# Patient Record
Sex: FEMALE | Race: WHITE | NOT HISPANIC OR LATINO | Employment: OTHER | URBAN - METROPOLITAN AREA
[De-identification: names, ages, dates, MRNs, and addresses within clinical notes are randomized per-mention and may not be internally consistent; named-entity substitution may affect disease eponyms.]

---

## 2017-02-17 ENCOUNTER — OFFICE VISIT (OUTPATIENT)
Dept: FAMILY MEDICINE | Facility: CLINIC | Age: 82
End: 2017-02-17
Payer: MEDICARE

## 2017-02-17 VITALS
BODY MASS INDEX: 25.12 KG/M2 | SYSTOLIC BLOOD PRESSURE: 126 MMHG | RESPIRATION RATE: 12 BRPM | WEIGHT: 141.75 LBS | DIASTOLIC BLOOD PRESSURE: 72 MMHG | HEART RATE: 100 BPM | OXYGEN SATURATION: 96 % | HEIGHT: 63 IN | TEMPERATURE: 98 F

## 2017-02-17 DIAGNOSIS — K59.01 SLOW TRANSIT CONSTIPATION: ICD-10-CM

## 2017-02-17 DIAGNOSIS — G81.94 LEFT HEMIPARESIS: ICD-10-CM

## 2017-02-17 DIAGNOSIS — I63.9 CEREBROVASCULAR ACCIDENT (CVA), UNSPECIFIED MECHANISM: Primary | ICD-10-CM

## 2017-02-17 DIAGNOSIS — F32.A DEPRESSION, UNSPECIFIED DEPRESSION TYPE: ICD-10-CM

## 2017-02-17 DIAGNOSIS — I69.354 HEMIPARESIS AFFECTING LEFT SIDE AS LATE EFFECT OF STROKE: ICD-10-CM

## 2017-02-17 DIAGNOSIS — I10 HYPERTENSION, WELL CONTROLLED: ICD-10-CM

## 2017-02-17 PROCEDURE — 99204 OFFICE O/P NEW MOD 45 MIN: CPT | Mod: S$GLB,,, | Performed by: FAMILY MEDICINE

## 2017-02-17 PROCEDURE — 3074F SYST BP LT 130 MM HG: CPT | Mod: S$GLB,,, | Performed by: FAMILY MEDICINE

## 2017-02-17 PROCEDURE — 3078F DIAST BP <80 MM HG: CPT | Mod: S$GLB,,, | Performed by: FAMILY MEDICINE

## 2017-02-17 PROCEDURE — 99999 PR PBB SHADOW E&M-EST. PATIENT-LVL III: CPT | Mod: PBBFAC,,, | Performed by: FAMILY MEDICINE

## 2017-02-17 PROCEDURE — 1157F ADVNC CARE PLAN IN RCRD: CPT | Mod: S$GLB,,, | Performed by: FAMILY MEDICINE

## 2017-02-17 PROCEDURE — 1160F RVW MEDS BY RX/DR IN RCRD: CPT | Mod: S$GLB,,, | Performed by: FAMILY MEDICINE

## 2017-02-17 PROCEDURE — 1159F MED LIST DOCD IN RCRD: CPT | Mod: S$GLB,,, | Performed by: FAMILY MEDICINE

## 2017-02-17 PROCEDURE — 99499 UNLISTED E&M SERVICE: CPT | Mod: S$PBB,,, | Performed by: FAMILY MEDICINE

## 2017-02-17 RX ORDER — ATORVASTATIN CALCIUM 40 MG/1
40 TABLET, FILM COATED ORAL DAILY
Qty: 90 TABLET | Refills: 2 | Status: SHIPPED | OUTPATIENT
Start: 2017-02-17 | End: 2017-02-24 | Stop reason: SDUPTHER

## 2017-02-17 RX ORDER — LISINOPRIL 2.5 MG/1
2.5 TABLET ORAL DAILY
COMMUNITY
End: 2018-01-19 | Stop reason: SDUPTHER

## 2017-02-17 RX ORDER — ASPIRIN 81 MG/1
81 TABLET ORAL DAILY
Status: ON HOLD | COMMUNITY
End: 2021-07-06 | Stop reason: HOSPADM

## 2017-02-17 NOTE — PROGRESS NOTES
Subjective:       Patient ID: Carol Yadav is a 81 y.o. female.    Chief Complaint: Establish Care    HPI    Pt is here today to establish care of here chronic medical conditions:    CVA - Pt had a CVA 2 years ago, and has chronic L sided weakness. She has zero function of her L arm, and weakness of L leg. She is not on a statin currently and stopped it due to her weakness.     Htn - Pt is lisinopril 2.5 once per day and her blood pressure is well controlled.     Depression - pt denies SI, but she hates the current situation she is.       Current Outpatient Prescriptions on File Prior to Visit   Medication Sig Dispense Refill    acetaminophen (TYLENOL) 325 MG tablet Take 2 tablets (650 mg total) by mouth every 4 (four) hours as needed.  0    busPIRone (BUSPAR) 15 MG tablet Take 1 tablet (15 mg total) by mouth 3 (three) times daily. 90 tablet 3    calcium carbonate (OS-HAYLEE) 600 mg (1,500 mg) Tab Take 600 mg by mouth 2 (two) times daily with meals.      [DISCONTINUED] alprazolam (XANAX) 0.25 MG tablet Take 0.25 mg by mouth every 6 (six) hours as needed for Anxiety.      [DISCONTINUED] HYDROXYZINE HCL ORAL Take by mouth.      [DISCONTINUED] multivitamin (ONE DAILY MULTIVITAMIN) per tablet Take 1 tablet by mouth once daily.      fluticasone (FLONASE) 50 mcg/actuation nasal spray 1 spray by Each Nare route 2 (two) times daily as needed for Rhinitis. 16 g 0    [DISCONTINUED] aspirin 325 MG tablet Take 1 tablet (325 mg total) by mouth once daily. 30 tablet 11    [DISCONTINUED] baclofen (LIORESAL) 20 MG tablet Take 1 tablet (20 mg total) by mouth every evening. 30 tablet 3    [DISCONTINUED] cetirizine (ZYRTEC) 10 MG tablet Take 1 tablet (10 mg total) by mouth daily as needed for Allergies. 20 tablet 0    [DISCONTINUED] ciprofloxacin HCl (CIPRO) 500 MG tablet Take 500 mg by mouth 2 (two) times daily.      [DISCONTINUED] diclofenac sodium 1 % Gel Apply 4 g topically 3 (three) times daily. 500 g 2    [DISCONTINUED]  docusate sodium (COLACE) 100 MG capsule Take 1 capsule (100 mg total) by mouth 2 (two) times daily.  0    [DISCONTINUED] ondansetron (ZOFRAN-ODT) 4 MG TbDL Take 8 mg by mouth once.      [DISCONTINUED] ONDANSETRON HCL ORAL Take by mouth.      [DISCONTINUED] paroxetine (PAXIL) 20 MG tablet Take 1 tablet (20 mg total) by mouth every morning. 30 tablet 11    [DISCONTINUED] tizanidine (ZANAFLEX) 4 MG tablet Take 4 mg by mouth every 6 (six) hours as needed.      [DISCONTINUED] trazodone (DESYREL) 50 MG tablet Take 0.5 tablets (25 mg total) by mouth nightly as needed for Insomnia. 30 tablet 3     No current facility-administered medications on file prior to visit.        Review of Systems   Constitutional: Positive for fatigue. Negative for chills, fever and unexpected weight change.   HENT: Positive for hearing loss. Negative for trouble swallowing.    Eyes: Positive for discharge. Negative for pain and visual disturbance.   Respiratory: Positive for shortness of breath. Negative for cough and wheezing.    Cardiovascular: Negative for chest pain and palpitations.   Gastrointestinal: Positive for anal bleeding and constipation. Negative for abdominal pain and nausea.   Genitourinary: Positive for dysuria. Negative for hematuria and vaginal bleeding.   Musculoskeletal: Positive for gait problem. Negative for joint swelling.   Skin: Negative for rash and wound.   Neurological: Positive for dizziness, weakness and numbness. Negative for syncope.   Psychiatric/Behavioral: Positive for dysphoric mood and sleep disturbance. Negative for self-injury and suicidal ideas.       Objective:     Vitals:    02/17/17 1323   BP: 126/72   Pulse: 100   Resp: 12   Temp: 97.9 °F (36.6 °C)        Physical Exam   Constitutional: She appears well-developed. No distress.   HENT:   Head: Normocephalic and atraumatic.   Eyes: Conjunctivae are normal. No scleral icterus.   Pulmonary/Chest: Effort normal.   Neurological: She is alert.   LUPE  weakness proximal to distal.     LLE weakness - uses a hemiwalker   Skin: She is not diaphoretic.   Psychiatric: She has a normal mood and affect. Her behavior is normal.   Vitals reviewed.      Assessment:       1. Cerebrovascular accident (CVA), unspecified mechanism    2. 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 Left hemiparesis    4. Hypertension, well controlled    5. Depression, unspecified depression type    6. Slow transit constipation        Plan:       Carol LONGORIA was seen today for establish care.    Diagnoses and all orders for this visit:    Most of today was spent going through pts active meidcation list and reviewing her current diagnoses. Pt will return next week to address any acute concerns other than constipation, which was addressed today.       Cerebrovascular accident (CVA), unspecified mechanism  -     atorvastatin (LIPITOR) 40 MG tablet; Take 1 tablet (40 mg total) by mouth once daily.    Pt was on a statin which was dc'd due to fatigue.    Of note - I resolved Hypercoag state. She has a suspected hypercoagulable state from Evista, 2 years ago, which she is no longer on see note 1/25/2015 from  [facility].     Continue aspirin 81mg and restart statin.     Hemiparesis affecting left side as late effect of stroke    Left hemiparesis    Hypertension, well controlled  - Chronic - stable     Pt is doing well on current therapy and is requesting a refill. No side effects noted. Will continue current therapy.       Depression, unspecified depression type  Pt stable today on current meds. No SI or HI. Discontinued xanax at night(she was taking for sleep) due to safety concerns.     Constipation  - sxs and PEx suggest constipation. Pt encouraged to maintain adequate hydration with water, to maintain regular cardiovascular exercise, and to use Miralax or Colace prn constipation sxs. Pt advised that these treatments take a few days for effect and that the frequency of Miralax in  particular can be titrated until the desired effect is achieved. Pt warned that if they develop N/V, bloody stools, severe abdominal pain, or other concerning sxs, pt asked to seek medical attention immediately.           Return in about 1 week (around 2/24/2017) for anxiety/depression, energy, bloody stool.        Pt verbalized understanding and agreed with our plan.

## 2017-02-17 NOTE — MR AVS SNAPSHOT
[facility]  [address]  [place] [address]  Phone: [phone]  Fax: [phone]                  Carol Yadav   2017 1:00 PM   Office Visit    Description:  Female : 1935   Provider:  Jaime Hopkins MD   Department:  [facility]           Reason for Visit     Establish Care           Diagnoses this Visit        Comments    Cerebrovascular accident (CVA), unspecified mechanism                To Do List           Future Appointments        Provider Department Dept Phone    2017 1:00 PM Jaime Hopkins MD [facility] [phone]      Goals (5 Years of Data)     None      Follow-Up and Disposition     Return for anxiety/depression, energy, bloody stool.       These Medications        Disp Refills Start End    atorvastatin (LIPITOR) 40 MG tablet 90 tablet 2 2017     Take 1 tablet (40 mg total) by mouth once daily. - Oral    Pharmacy: [address] Ph #: [phone]         [facility]     [facility] On Call Nurse Care Line -  Assistance  Registered nurses in the [facility] On Call Center provide clinical advisement, health education, appointment booking, and other advisory services.  Call for this free service at [phone].             Medications           Message regarding Medications     Verify the changes and/or additions to your medication regime listed below are the same as discussed with your clinician today.  If any of these changes or additions are incorrect, please notify your healthcare provider.        START taking these NEW medications        Refills    atorvastatin (LIPITOR) 40 MG tablet 2    Sig: Take 1 tablet (40 mg total) by mouth once daily.    Class: Normal    Route: Oral      STOP taking these medications     baclofen (LIORESAL) 20 MG tablet Take 1 tablet (20 mg total) by mouth every evening.    cetirizine (ZYRTEC) 10 MG tablet Take 1 tablet  (10 mg total) by mouth daily as needed for Allergies.    ciprofloxacin HCl (CIPRO) 500 MG tablet Take 500 mg by mouth 2 (two) times daily.    ONDANSETRON HCL ORAL Take by mouth.    ondansetron (ZOFRAN-ODT) 4 MG TbDL Take 8 mg by mouth once.    paroxetine (PAXIL) 20 MG tablet Take 1 tablet (20 mg total) by mouth every morning.    trazodone (DESYREL) 50 MG tablet Take 0.5 tablets (25 mg total) by mouth nightly as needed for Insomnia.    tizanidine (ZANAFLEX) 4 MG tablet Take 4 mg by mouth every 6 (six) hours as needed.    multivitamin (ONE DAILY MULTIVITAMIN) per tablet Take 1 tablet by mouth once daily.    HYDROXYZINE HCL ORAL Take by mouth.    docusate sodium (COLACE) 100 MG capsule Take 1 capsule (100 mg total) by mouth 2 (two) times daily.    diclofenac sodium 1 % Gel Apply 4 g topically 3 (three) times daily.    aspirin 325 MG tablet Take 1 tablet (325 mg total) by mouth once daily.    alprazolam (XANAX) 0.25 MG tablet Take 0.25 mg by mouth every 6 (six) hours as needed for Anxiety.           Verify that the below list of medications is an accurate representation of the medications you are currently taking.  If none reported, the list may be blank. If incorrect, please contact your healthcare provider. Carry this list with you in case of emergency.           Current Medications     acetaminophen (TYLENOL) 325 MG tablet Take 2 tablets (650 mg total) by mouth every 4 (four) hours as needed.    aspirin (ECOTRIN) 81 MG EC tablet Take 81 mg by mouth once daily.    busPIRone (BUSPAR) 15 MG tablet Take 1 tablet (15 mg total) by mouth 3 (three) times daily.    calcium carbonate (OS-HAYLEE) 600 mg (1,500 mg) Tab Take 600 mg by mouth 2 (two) times daily with meals.    lisinopril (PRINIVIL,ZESTRIL) 2.5 MG tablet Take 2.5 mg by mouth once daily.    atorvastatin (LIPITOR) 40 MG tablet Take 1 tablet (40 mg total) by mouth once daily.    fluticasone (FLONASE) 50 mcg/actuation nasal spray 1 spray by Each Nare route 2 (two) times  "daily as needed for Rhinitis.           Clinical Reference Information           Your Vitals Were     BP Pulse Temp Resp    126/72 (BP Location: Left arm, Patient Position: Sitting, BP Method: Manual) 100 97.9 °F (36.6 °C) (Oral) 12    Height Weight SpO2 BMI    5' 2.5" (1.588 m) 64.3 kg (141 lb 12.1 oz) 96% 25.51 kg/m2      Blood Pressure          Most Recent Value    BP  126/72      Allergies as of 2/17/2017     No Known Allergies      Immunizations Administered on Date of Encounter - 2/17/2017     None      Language Assistance Services     ATTENTION: Language assistance services are available, free of charge. Please call [phone].      ATENCIÓN: Si habla cynthia, tiene a amador disposición servicios gratuitos de asistencia lingüística. Llame al [phone].     CHÚ Ý: N?u b?n nói Ti?ng Vi?t, có các d?ch v? h? tr? ngôn ng? mi?n phí dành cho b?n. G?i s? [phone].         [facility] complies with applicable Federal civil rights laws and does not discriminate on the basis of race, color, national origin, age, disability, or sex.        "

## 2017-02-24 ENCOUNTER — OFFICE VISIT (OUTPATIENT)
Dept: FAMILY MEDICINE | Facility: CLINIC | Age: 82
End: 2017-02-24
Payer: MEDICARE

## 2017-02-24 VITALS
SYSTOLIC BLOOD PRESSURE: 110 MMHG | TEMPERATURE: 98 F | BODY MASS INDEX: 25.04 KG/M2 | OXYGEN SATURATION: 95 % | DIASTOLIC BLOOD PRESSURE: 60 MMHG | WEIGHT: 141.31 LBS | RESPIRATION RATE: 20 BRPM | HEART RATE: 97 BPM | HEIGHT: 63 IN

## 2017-02-24 DIAGNOSIS — F33.42 RECURRENT MAJOR DEPRESSIVE DISORDER, IN FULL REMISSION: ICD-10-CM

## 2017-02-24 DIAGNOSIS — I69.354 HEMIPARESIS AFFECTING LEFT SIDE AS LATE EFFECT OF STROKE: Primary | ICD-10-CM

## 2017-02-24 DIAGNOSIS — I63.9 CEREBROVASCULAR ACCIDENT (CVA), UNSPECIFIED MECHANISM: ICD-10-CM

## 2017-02-24 DIAGNOSIS — F41.9 ANXIETY DISORDER, UNSPECIFIED TYPE: ICD-10-CM

## 2017-02-24 DIAGNOSIS — N39.41 URGE INCONTINENCE: ICD-10-CM

## 2017-02-24 DIAGNOSIS — K62.5 RECTAL BLEEDING: ICD-10-CM

## 2017-02-24 PROCEDURE — 1160F RVW MEDS BY RX/DR IN RCRD: CPT | Mod: S$GLB,,, | Performed by: FAMILY MEDICINE

## 2017-02-24 PROCEDURE — 99499 UNLISTED E&M SERVICE: CPT | Mod: S$PBB,,, | Performed by: FAMILY MEDICINE

## 2017-02-24 PROCEDURE — 99214 OFFICE O/P EST MOD 30 MIN: CPT | Mod: S$GLB,,, | Performed by: FAMILY MEDICINE

## 2017-02-24 PROCEDURE — 1157F ADVNC CARE PLAN IN RCRD: CPT | Mod: S$GLB,,, | Performed by: FAMILY MEDICINE

## 2017-02-24 PROCEDURE — 3078F DIAST BP <80 MM HG: CPT | Mod: S$GLB,,, | Performed by: FAMILY MEDICINE

## 2017-02-24 PROCEDURE — 1159F MED LIST DOCD IN RCRD: CPT | Mod: S$GLB,,, | Performed by: FAMILY MEDICINE

## 2017-02-24 PROCEDURE — 99999 PR PBB SHADOW E&M-EST. PATIENT-LVL IV: CPT | Mod: PBBFAC,,, | Performed by: FAMILY MEDICINE

## 2017-02-24 PROCEDURE — 1126F AMNT PAIN NOTED NONE PRSNT: CPT | Mod: S$GLB,,, | Performed by: FAMILY MEDICINE

## 2017-02-24 PROCEDURE — 3074F SYST BP LT 130 MM HG: CPT | Mod: S$GLB,,, | Performed by: FAMILY MEDICINE

## 2017-02-24 RX ORDER — OXYBUTYNIN CHLORIDE 5 MG/1
5 TABLET, EXTENDED RELEASE ORAL NIGHTLY
Qty: 30 TABLET | Refills: 2 | Status: SHIPPED | OUTPATIENT
Start: 2017-02-24 | End: 2017-04-25 | Stop reason: SDUPTHER

## 2017-02-24 RX ORDER — ALPRAZOLAM 0.25 MG/1
0.25 TABLET ORAL DAILY PRN
Qty: 15 TABLET | Refills: 0 | Status: SHIPPED | OUTPATIENT
Start: 2017-02-24 | End: 2017-03-10

## 2017-02-24 RX ORDER — TRAZODONE HYDROCHLORIDE 50 MG/1
50 TABLET ORAL NIGHTLY
Qty: 30 TABLET | Refills: 2 | Status: SHIPPED | OUTPATIENT
Start: 2017-02-24 | End: 2017-04-25 | Stop reason: SDUPTHER

## 2017-02-24 RX ORDER — ATORVASTATIN CALCIUM 40 MG/1
40 TABLET, FILM COATED ORAL DAILY
Qty: 90 TABLET | Refills: 2 | Status: SHIPPED | OUTPATIENT
Start: 2017-02-24 | End: 2017-10-09 | Stop reason: SDUPTHER

## 2017-02-24 RX ORDER — ALPRAZOLAM 0.25 MG/1
0.25 TABLET ORAL DAILY
COMMUNITY
End: 2017-02-24 | Stop reason: SDUPTHER

## 2017-02-24 RX ORDER — HYDROCORTISONE 25 MG/G
CREAM TOPICAL DAILY
COMMUNITY
End: 2018-02-16

## 2017-02-24 NOTE — PROGRESS NOTES
Subjective:       Patient ID: Carol Yadav is a 81 y.o. female.    Chief Complaint: Depression (anxiety - follow up); Energy (follow up - weakness ); and Rectal Bleeding (bloody stool follow up )    HPI    Depression with anxiety - Pt states that she is depressed to stay where she currently is and from her chronic medical conditions, including her fatigue.     Fatigue - Pt has felt fatigued for greater than 1 year, ever since her stroke. This is constant, but fluctuates in intensity. If she does not get a good nights sleep, it is worse. She describes this as being physically tired in every muscle.    OAB/urge incontinence - Pt also notes that she has urinary frequency for many months that has worsened since the yeast.  She sometimes has some issues making to the bathroom on time. At other times, she has a hard time initiating urination.     Blood in her stool  - pt admitted that she had BRBPR 1 week ago , a small amount, over multi pel bms. Her sxs coincided with hard stools. Sxs have since resolved.     Her last colonoscopy was within 10 years and was normal.    Current Outpatient Prescriptions on File Prior to Visit   Medication Sig Dispense Refill    acetaminophen (TYLENOL) 325 MG tablet Take 2 tablets (650 mg total) by mouth every 4 (four) hours as needed.  0    aspirin (ECOTRIN) 81 MG EC tablet Take 81 mg by mouth once daily.      fluticasone (FLONASE) 50 mcg/actuation nasal spray 1 spray by Each Nare route 2 (two) times daily as needed for Rhinitis. 16 g 0    lisinopril (PRINIVIL,ZESTRIL) 2.5 MG tablet Take 2.5 mg by mouth once daily.      [DISCONTINUED] busPIRone (BUSPAR) 15 MG tablet Take 1 tablet (15 mg total) by mouth 3 (three) times daily. (Patient taking differently: Take 15 mg by mouth 2 (two) times daily. ) 90 tablet 3    calcium carbonate (OS-HAYLEE) 600 mg (1,500 mg) Tab Take 600 mg by mouth 2 (two) times daily with meals.      [DISCONTINUED] atorvastatin (LIPITOR) 40 MG tablet Take 1 tablet (40  mg total) by mouth once daily. 90 tablet 2     No current facility-administered medications on file prior to visit.        Review of Systems   Musculoskeletal: Positive for arthralgias. Negative for joint swelling.   Psychiatric/Behavioral: Positive for dysphoric mood and sleep disturbance. Negative for self-injury and suicidal ideas. The patient is nervous/anxious.        Objective:     Vitals:    02/24/17 1300   BP: 110/60   Pulse: 97   Resp: 20   Temp: 98 °F (36.7 °C)        Physical Exam   Constitutional: She appears well-developed. No distress.   HENT:   Head: Normocephalic and atraumatic.   Eyes: Conjunctivae are normal. No scleral icterus.   Pulmonary/Chest: Effort normal.   Neurological: She is alert.   LUE weakness proximal to distal.     LLE weakness - uses a hemiwalker   Skin: She is not diaphoretic.   Psychiatric: She has a normal mood and affect. Her behavior is normal.   Vitals reviewed.      Assessment:       1. Hemiparesis affecting left side as late effect of stroke    2. Cerebrovascular accident (CVA), unspecified mechanism    3. Recurrent major depressive disorder, in full remission    4. Urge incontinence    5. Rectal bleeding    6. Anxiety disorder, unspecified type        Plan:       Carol LONGORIA was seen today for depression, energy and rectal bleeding.    Diagnoses and all orders for this visit:    Hemiparesis affecting left side as late effect of stroke  -     Ambulatory referral to Neurology  Pt would like to establish care with neurology here on the [facility]. Referral placed.     Cerebrovascular accident (CVA), unspecified mechanism  -     atorvastatin (LIPITOR) 40 MG tablet; Take 1 tablet (40 mg total) by mouth once daily.  -     Ambulatory referral to Neurology  Pt was unable to obtain the atorvastatin due to some clerical error. Paper rx given for ALL meds today.     Recurrent major depressive disorder, in full remission  -     trazodone (DESYREL) 50 MG tablet; Take 1 tablet (50 mg total) by mouth  every evening.  I hope that treating pts depression and helping her sleep will improve her fatigue.     Urge incontinence  -     oxybutynin (DITROPAN-XL) 5 MG TR24; Take 1 tablet (5 mg total) by mouth nightly.  Will give trial in hopes to reduce urinary frequency.     Rectal bleeding  -     Ambulatory consult to Gastroenterology  Pt prefers to discuss this with GI.     Anxiety disorder, unspecified type  -     alprazolam (XANAX) 0.25 MG tablet; Take 1 tablet (0.25 mg total) by mouth daily as needed for Anxiety. To be kept on hand to be used in case of anxiety attack.  Will refill. Pt warned that medication is not considered safe in the elderly. It is intended to be used for anxiety attacks only. She understands.           Return in about 2 weeks (around 3/10/2017).      Pt verbalized understanding and agreed with our plan.

## 2017-02-24 NOTE — MR AVS SNAPSHOT
[facility] [address]  [place] [address]  Phone: [phone]  Fax: [phone]                  Carol Yadav   2017 1:00 PM   Office Visit    Description:  Female : 1935   Provider:  Jaime Hopkins MD   Department:  [facility]           Reason for Visit     Depression     Energy     Rectal Bleeding           Diagnoses this Visit        Comments    Hemiparesis affecting left side as late effect of stroke    -  Primary     Cerebrovascular accident (CVA), unspecified mechanism         Recurrent major depressive disorder, in full remission         Urge incontinence         Rectal bleeding         Anxiety disorder, unspecified type                To Do List           Goals (5 Years of Data)     None      Follow-Up and Disposition     Return in about 2 weeks (around 3/10/2017).       These Medications        Disp Refills Start End    atorvastatin (LIPITOR) 40 MG tablet 90 tablet 2 2017     Take 1 tablet (40 mg total) by mouth once daily. - Oral    Pharmacy: [place] [address] Ph #: [phone]       oxybutynin (DITROPAN-XL) 5 MG TR24 30 tablet 2 2017    Take 1 tablet (5 mg total) by mouth nightly. - Oral    Pharmacy: [place] [address] Ph #: [phone]       alprazolam (XANAX) 0.25 MG tablet 15 tablet 0 2017     Take 1 tablet (0.25 mg total) by mouth daily as needed for Anxiety. To be kept on hand to be used in case of anxiety attack. - Oral    Pharmacy: [facility] [address] Ph #: [phone]       trazodone (DESYREL) 50 MG tablet 30 tablet 2 2017    Take 1 tablet (50 mg total) by mouth every evening. - Oral    Pharmacy: [place] [address] Ph #: [phone]         [facility]     [facility] Nurse Care Line -  24/7 Assistance  Registered nurses in the [facility] On [facility] Center provide clinical advisement, health education, appointment booking, and other advisory services.  Call for this free service at [phone].             Medications           Message regarding Medications     Verify the changes and/or additions to your medication regime listed below are the same as discussed with your clinician today.  If any of these changes or additions are incorrect, please notify your healthcare provider.        START taking these NEW medications        Refills    oxybutynin (DITROPAN-XL) 5 MG TR24 2    Sig: Take 1 tablet (5 mg total) by mouth nightly.    Class: Print    Route: Oral    alprazolam (XANAX) 0.25 MG tablet 0    Sig: Take 1 tablet (0.25 mg total) by mouth daily as needed for Anxiety. To be kept on hand to be used in case of anxiety attack.    Class: Print    Route: Oral    trazodone (DESYREL) 50 MG tablet 2    Sig: Take 1 tablet (50 mg total) by mouth every evening.    Class: Print    Route: Oral      STOP taking these medications     busPIRone (BUSPAR) 15 MG tablet Take 1 tablet (15 mg total) by mouth 3 (three) times daily.           Verify that the below list of medications is an accurate representation of the medications you are currently taking.  If none reported, the list may be blank. If incorrect, please contact your healthcare provider. Carry this list with you in case of emergency.           Current Medications     acetaminophen (TYLENOL) 325 MG tablet Take 2 tablets (650 mg total) by mouth every 4 (four) hours as needed.    alprazolam (XANAX) 0.25 MG tablet Take 1 tablet (0.25 mg total) by mouth daily as needed for Anxiety. To be kept on hand to be used in case of anxiety attack.    aspirin (ECOTRIN) 81 MG EC tablet Take 81 mg by mouth once daily.    fluticasone (FLONASE) 50 mcg/actuation nasal spray 1 spray by Each Nare route 2 (two) times daily as needed for Rhinitis.    hydrocortisone 2.5 % cream Apply  topically once daily.    lisinopril (PRINIVIL,ZESTRIL) 2.5 MG tablet Take 2.5 mg by mouth once daily.    atorvastatin (LIPITOR) 40 MG tablet Take 1 tablet (40 mg total) by mouth once daily.    calcium carbonate (OS-HAYLEE) 600 mg (1,500 mg) Tab Take 600 mg by mouth 2 (two) times daily with meals.    oxybutynin (DITROPAN-XL) 5 MG TR24 Take 1 tablet (5 mg total) by mouth nightly.    trazodone (DESYREL) 50 MG tablet Take 1 tablet (50 mg total) by mouth every evening.           Clinical Reference Information           Your Vitals Were     BP                   110/60 (BP Location: Right arm, Patient Position: Sitting, BP Method: Manual)           Blood Pressure          Most Recent Value    BP  110/60      Allergies as of 2/24/2017     No Known Allergies      Immunizations Administered on Date of Encounter - 2/24/2017     None      Orders Placed During Today's Visit      Normal Orders This Visit    Ambulatory consult to Gastroenterology     Ambulatory referral to Neurology       Language Assistance Services     ATTENTION: Language assistance services are available, free of charge. Please call [phone].      ATENCIÓN: Si mary beth marie, tiene a amadro disposición servicios gratuitos de asistencia lingüística. Llame al [phone].     JOLIE Ý: N?u b?n nói Ti?ng Vi?t, có các d?ch v? h? tr? ngôn ng? mi?n phí dành cho b?n. G?i s? [phone].         [facility] complies with applicable Federal civil rights laws and does not discriminate on the basis of race, color, national origin, age, disability, or sex.

## 2017-03-10 ENCOUNTER — LAB VISIT (OUTPATIENT)
Dept: LAB | Facility: HOSPITAL | Age: 82
End: 2017-03-10
Attending: FAMILY MEDICINE
Payer: MEDICARE

## 2017-03-10 ENCOUNTER — OFFICE VISIT (OUTPATIENT)
Dept: FAMILY MEDICINE | Facility: CLINIC | Age: 82
End: 2017-03-10
Payer: MEDICARE

## 2017-03-10 VITALS
SYSTOLIC BLOOD PRESSURE: 122 MMHG | DIASTOLIC BLOOD PRESSURE: 60 MMHG | RESPIRATION RATE: 16 BRPM | WEIGHT: 143.06 LBS | OXYGEN SATURATION: 95 % | HEIGHT: 63 IN | BODY MASS INDEX: 25.35 KG/M2 | HEART RATE: 80 BPM | TEMPERATURE: 98 F

## 2017-03-10 DIAGNOSIS — R41.3 SHORT-TERM MEMORY LOSS: ICD-10-CM

## 2017-03-10 DIAGNOSIS — E78.5 HYPERLIPIDEMIA, UNSPECIFIED HYPERLIPIDEMIA TYPE: ICD-10-CM

## 2017-03-10 DIAGNOSIS — F41.9 ANXIETY DISORDER, UNSPECIFIED TYPE: Primary | ICD-10-CM

## 2017-03-10 DIAGNOSIS — Z23 NEED FOR VACCINATION WITH 13-POLYVALENT PNEUMOCOCCAL CONJUGATE VACCINE: ICD-10-CM

## 2017-03-10 DIAGNOSIS — J30.2 SEASONAL ALLERGIC RHINITIS, UNSPECIFIED ALLERGIC RHINITIS TRIGGER: ICD-10-CM

## 2017-03-10 LAB
ALBUMIN SERPL BCP-MCNC: 3.8 G/DL
ALP SERPL-CCNC: 76 U/L
ALT SERPL W/O P-5'-P-CCNC: 14 U/L
ANION GAP SERPL CALC-SCNC: 8 MMOL/L
AST SERPL-CCNC: 19 U/L
BASOPHILS # BLD AUTO: 0.03 K/UL
BASOPHILS NFR BLD: 0.3 %
BILIRUB SERPL-MCNC: 0.9 MG/DL
BUN SERPL-MCNC: 15 MG/DL
CALCIUM SERPL-MCNC: 9.5 MG/DL
CHLORIDE SERPL-SCNC: 107 MMOL/L
CHOLEST/HDLC SERPL: 3.2 {RATIO}
CO2 SERPL-SCNC: 28 MMOL/L
CREAT SERPL-MCNC: 0.9 MG/DL
DIFFERENTIAL METHOD: NORMAL
EOSINOPHIL # BLD AUTO: 0.1 K/UL
EOSINOPHIL NFR BLD: 0.9 %
ERYTHROCYTE [DISTWIDTH] IN BLOOD BY AUTOMATED COUNT: 13.7 %
EST. GFR  (AFRICAN AMERICAN): >60 ML/MIN/1.73 M^2
EST. GFR  (NON AFRICAN AMERICAN): >60 ML/MIN/1.73 M^2
GLUCOSE SERPL-MCNC: 87 MG/DL
HCT VFR BLD AUTO: 42.9 %
HDL/CHOLESTEROL RATIO: 31.7 %
HDLC SERPL-MCNC: 183 MG/DL
HDLC SERPL-MCNC: 58 MG/DL
HGB BLD-MCNC: 13.9 G/DL
LDLC SERPL CALC-MCNC: 97.4 MG/DL
LYMPHOCYTES # BLD AUTO: 2.5 K/UL
LYMPHOCYTES NFR BLD: 27.5 %
MAGNESIUM SERPL-MCNC: 2.1 MG/DL
MCH RBC QN AUTO: 30.2 PG
MCHC RBC AUTO-ENTMCNC: 32.4 %
MCV RBC AUTO: 93 FL
MONOCYTES # BLD AUTO: 0.7 K/UL
MONOCYTES NFR BLD: 7.7 %
NEUTROPHILS # BLD AUTO: 5.8 K/UL
NEUTROPHILS NFR BLD: 63.2 %
NONHDLC SERPL-MCNC: 125 MG/DL
PLATELET # BLD AUTO: 334 K/UL
PMV BLD AUTO: 10.4 FL
POTASSIUM SERPL-SCNC: 4.2 MMOL/L
PROT SERPL-MCNC: 7.2 G/DL
RBC # BLD AUTO: 4.6 M/UL
SODIUM SERPL-SCNC: 143 MMOL/L
TRIGL SERPL-MCNC: 138 MG/DL
TSH SERPL DL<=0.005 MIU/L-ACNC: 2.02 UIU/ML
WBC # BLD AUTO: 9.24 K/UL

## 2017-03-10 PROCEDURE — 80061 LIPID PANEL: CPT

## 2017-03-10 PROCEDURE — 3078F DIAST BP <80 MM HG: CPT | Mod: S$GLB,,, | Performed by: FAMILY MEDICINE

## 2017-03-10 PROCEDURE — 99215 OFFICE O/P EST HI 40 MIN: CPT | Mod: S$GLB,,, | Performed by: FAMILY MEDICINE

## 2017-03-10 PROCEDURE — 1159F MED LIST DOCD IN RCRD: CPT | Mod: S$GLB,,, | Performed by: FAMILY MEDICINE

## 2017-03-10 PROCEDURE — 80053 COMPREHEN METABOLIC PANEL: CPT

## 2017-03-10 PROCEDURE — 3074F SYST BP LT 130 MM HG: CPT | Mod: S$GLB,,, | Performed by: FAMILY MEDICINE

## 2017-03-10 PROCEDURE — 85025 COMPLETE CBC W/AUTO DIFF WBC: CPT

## 2017-03-10 PROCEDURE — 1160F RVW MEDS BY RX/DR IN RCRD: CPT | Mod: S$GLB,,, | Performed by: FAMILY MEDICINE

## 2017-03-10 PROCEDURE — 83036 HEMOGLOBIN GLYCOSYLATED A1C: CPT

## 2017-03-10 PROCEDURE — 36415 COLL VENOUS BLD VENIPUNCTURE: CPT | Mod: PO

## 2017-03-10 PROCEDURE — 1126F AMNT PAIN NOTED NONE PRSNT: CPT | Mod: S$GLB,,, | Performed by: FAMILY MEDICINE

## 2017-03-10 PROCEDURE — 99499 UNLISTED E&M SERVICE: CPT | Mod: S$PBB,,, | Performed by: FAMILY MEDICINE

## 2017-03-10 PROCEDURE — 99999 PR PBB SHADOW E&M-EST. PATIENT-LVL III: CPT | Mod: PBBFAC,,, | Performed by: FAMILY MEDICINE

## 2017-03-10 PROCEDURE — 1157F ADVNC CARE PLAN IN RCRD: CPT | Mod: S$GLB,,, | Performed by: FAMILY MEDICINE

## 2017-03-10 PROCEDURE — 83735 ASSAY OF MAGNESIUM: CPT

## 2017-03-10 PROCEDURE — 84443 ASSAY THYROID STIM HORMONE: CPT

## 2017-03-10 RX ORDER — FLUTICASONE PROPIONATE 50 MCG
1 SPRAY, SUSPENSION (ML) NASAL 2 TIMES DAILY PRN
Qty: 16 G | Refills: 5
Start: 2017-03-10 | End: 2017-03-10 | Stop reason: SDUPTHER

## 2017-03-10 RX ORDER — FLUTICASONE PROPIONATE 50 MCG
1 SPRAY, SUSPENSION (ML) NASAL 2 TIMES DAILY PRN
Qty: 16 G | Refills: 5 | Status: SHIPPED | OUTPATIENT
Start: 2017-03-10 | End: 2017-11-01 | Stop reason: SDUPTHER

## 2017-03-10 NOTE — PROGRESS NOTES
"Subjective:       Patient ID: Carol Yadav is a 81 y.o. female.    Chief Complaint: Follow-up (from LOV 2/24/17. Has new symptoms that she would like to discuss with you.)    HPI    Fatigue - pt states that she has had worsening fatigue over the last 2 weeks. She is unsure of any triggers.     She also states that she has difficulty with her short term memory which has worsened over the last 3 weeks.     HLD - pt has noted some "generalized weakness" after taking her stating at 1800. She thinks she will try taking the medication at night instead.     Blood per rectum - Pt has an appointment scheduled for 3/16 to be evaluated.     OAB - pts urinary frequency is much improved on oxybutynin! Pt is satisfied with the effects.     Anxiety - pt is doing well with her current trazodone dose and without xanax.       Current Outpatient Prescriptions on File Prior to Visit   Medication Sig Dispense Refill    acetaminophen (TYLENOL) 325 MG tablet Take 2 tablets (650 mg total) by mouth every 4 (four) hours as needed.  0    aspirin (ECOTRIN) 81 MG EC tablet Take 81 mg by mouth once daily.      atorvastatin (LIPITOR) 40 MG tablet Take 1 tablet (40 mg total) by mouth once daily. 90 tablet 2    calcium carbonate (OS-HAYLEE) 600 mg (1,500 mg) Tab Take 600 mg by mouth 2 (two) times daily with meals.      hydrocortisone 2.5 % cream Apply topically once daily.      lisinopril (PRINIVIL,ZESTRIL) 2.5 MG tablet Take 2.5 mg by mouth once daily.      oxybutynin (DITROPAN-XL) 5 MG TR24 Take 1 tablet (5 mg total) by mouth nightly. 30 tablet 2    trazodone (DESYREL) 50 MG tablet Take 1 tablet (50 mg total) by mouth every evening. 30 tablet 2     No current facility-administered medications on file prior to visit.        Review of Systems   Constitutional: Negative for chills and fever.   Musculoskeletal: Positive for gait problem.   Neurological: Positive for weakness and light-headedness. Negative for syncope and numbness.     "   Objective:     Vitals:    03/10/17 1321   BP: 122/60   Pulse: 80   Resp: 16   Temp: 98.2 °F (36.8 °C)        Physical Exam   Constitutional: She appears well-developed. No distress.   HENT:   Head: Normocephalic and atraumatic.   Eyes: Conjunctivae are normal. Right eye exhibits no discharge. Left eye exhibits no discharge. No scleral icterus.   Cardiovascular: Normal rate, regular rhythm and normal heart sounds.  Exam reveals no gallop and no friction rub.    No murmur heard.  Pulmonary/Chest: Effort normal and breath sounds normal. No respiratory distress. She has no wheezes. She has no rales.   Musculoskeletal: She exhibits no edema.   Neurological: She is alert.   Skin: She is not diaphoretic.   Psychiatric: She has a normal mood and affect.   Vitals reviewed.      Assessment:       1. Anxiety disorder, unspecified type    2. Need for vaccination with 13-polyvalent pneumococcal conjugate vaccine    3. Hyperlipidemia, unspecified hyperlipidemia type    4. Seasonal allergic rhinitis, unspecified allergic rhinitis trigger    5. Short-term memory loss        Plan:       Carol LONGORIA was seen today for follow-up.    Diagnoses and all orders for this visit:    Anxiety disorder, unspecified type  Pt is doing well with trazadone and without xanax. I am please with how she is doing.     - Chronic - stable      Will continue current therapy.    Need for vaccination with 13-polyvalent pneumococcal conjugate vaccine  -     Pneumococcal Conjugate Vaccine (13 Valent) (IM)    Hyperlipidemia, unspecified hyperlipidemia type  -     Comprehensive metabolic panel; Future  -     Hemoglobin A1c; Future  -     Lipid panel; Future  -     CBC auto differential; Future  Will obtain labwork as above. No old records have been received, so will obtain our own so I can see what meds are appropriate for her.    Seasonal allergic rhinitis, unspecified allergic rhinitis trigger  -     fluticasone (FLONASE) 50 mcg/actuation nasal spray; 1 spray by  Each Nare route 2 (two) times daily as needed for Rhinitis.  Pts sxs and PE most coincide with AR. Will give pt a trial of flonase and daily zyrtec for relief of pts sxs. Pt asked to allow 2 weeks of consistent use before evaluating the efficacy of flonase. Pt to call back if sxs worsen or do not improve in 2 weeks.     Short-term memory loss  -     Magnesium; Future  -     TSH; Future  Pt states that she has had this since her stroke, but this has been worsening lately. Will obtain labs as above and have pt f/u for MoCA.           Return in about 2 weeks (around 3/24/2017) for MoCA memory test - 40 minutes.        Pt verbalized understanding and agreed with our plan.     At least 45 minutes were spent today with the patient in the office, which more than 50% of the time was spent on evaluation and counseling regarding The primary encounter diagnosis was Anxiety disorder, unspecified type. Diagnoses of Need for vaccination with 13-polyvalent pneumococcal conjugate vaccine, Hyperlipidemia, unspecified hyperlipidemia type, Seasonal allergic rhinitis, unspecified allergic rhinitis trigger, and Short-term memory loss were also pertinent to this visit..

## 2017-03-10 NOTE — MR AVS SNAPSHOT
[facility]  [address]  [place] [address]  Phone: [phone]  Fax: [phone]                  Carol Yadav   3/10/2017 1:00 PM   Office Visit    Description:  Female : 1935   Provider:  Jaime Hopkins MD   Department:  [facility]           Reason for Visit     Follow-up           Diagnoses this Visit        Comments    Anxiety disorder, unspecified type    -  Primary     Need for vaccination with 13-polyvalent pneumococcal conjugate vaccine         Hyperlipidemia, unspecified hyperlipidemia type         Seasonal allergic rhinitis, unspecified allergic rhinitis trigger         Short-term memory loss                To Do List           Future Appointments        Provider Department Dept Phone    3/30/2017 11:00 AM Dorian Ruiz MD [facility] [phone]      Goals (5 Years of Data)     None      Follow-Up and Disposition     Return in about 2 weeks (around 3/24/2017) for MoCA memory test - 40 minutes.       These Medications        Disp Refills Start End    fluticasone (FLONASE) 50 mcg/actuation nasal spray 16 g 5 3/10/2017     1 spray by Each Nare route 2 (two) times daily as needed for Rhinitis. - Each Nare    Pharmacy: [facility] - [address]  #: [phone]         [facility] On Call     [facility] On Call Nurse Care Line -  Assistance  Registered nurses in the [facility] On Call Center provide clinical advisement, health education, appointment booking, and other advisory services.  Call for this free service at [phone].             Medications           Message regarding Medications     Verify the changes and/or additions to your medication regime listed below are the same as discussed with your clinician today.  If any of these changes or additions are incorrect, please notify your healthcare provider.        STOP taking these medications     alprazolam (XANAX) 0.25 MG tablet Take  "1 tablet (0.25 mg total) by mouth daily as needed for Anxiety. To be kept on hand to be used in case of anxiety attack.           Verify that the below list of medications is an accurate representation of the medications you are currently taking.  If none reported, the list may be blank. If incorrect, please contact your healthcare provider. Carry this list with you in case of emergency.           Current Medications     acetaminophen (TYLENOL) 325 MG tablet Take 2 tablets (650 mg total) by mouth every 4 (four) hours as needed.    aspirin (ECOTRIN) 81 MG EC tablet Take 81 mg by mouth once daily.    atorvastatin (LIPITOR) 40 MG tablet Take 1 tablet (40 mg total) by mouth once daily.    calcium carbonate (OS-HAYLEE) 600 mg (1,500 mg) Tab Take 600 mg by mouth 2 (two) times daily with meals.    fluticasone (FLONASE) 50 mcg/actuation nasal spray 1 spray by Each Nare route 2 (two) times daily as needed for Rhinitis.    hydrocortisone 2.5 % cream Apply topically once daily.    lisinopril (PRINIVIL,ZESTRIL) 2.5 MG tablet Take 2.5 mg by mouth once daily.    oxybutynin (DITROPAN-XL) 5 MG TR24 Take 1 tablet (5 mg total) by mouth nightly.    trazodone (DESYREL) 50 MG tablet Take 1 tablet (50 mg total) by mouth every evening.           Clinical Reference Information           Your Vitals Were     BP Pulse Temp Resp Height Weight    122/60 (BP Location: Right arm, Patient Position: Sitting, BP Method: Manual) 80 98.2 °F (36.8 °C) (Oral) 16 5' 2.5" (1.588 m) 64.9 kg (143 lb 1.3 oz)    SpO2 BMI             95% 25.75 kg/m2         Blood Pressure          Most Recent Value    BP  122/60      Allergies as of 3/10/2017     No Known Allergies      Immunizations Administered on Date of Encounter - 3/10/2017     Name Date Dose VIS Date Route    Pneumococcal Conjugate - 13 Valent  Incomplete 0.5 mL 11/5/2015 Intramuscular      Orders Placed During Today's Visit      Normal Orders This Visit    Pneumococcal Conjugate Vaccine (13 Valent) (IM)  "    Future Labs/Procedures Expected by Expires    CBC auto differential  3/10/2017 3/10/2018    Comprehensive metabolic panel  3/10/2017 3/10/2018    Hemoglobin A1c  3/10/2017 3/10/2018    Lipid panel  3/10/2017 3/10/2018    Magnesium  3/10/2017 5/9/2018    TSH  3/10/2017 3/10/2018      Language Assistance Services     ATTENTION: Language assistance services are available, free of charge. Please call [phone].      ATENCIÓN: Si habla cynthia, tiene a amador disposición servicios gratuitos de asistencia lingüística. Llame al [phone].     CHÚ Ý: N?u b?n nói Ti?ng Vi?t, có các d?ch v? h? tr? ngôn ng? mi?n phí dành cho b?n. G?i s? [phone].         [facility] complies with applicable Federal civil rights laws and does not discriminate on the basis of race, color, national origin, age, disability, or sex.

## 2017-03-11 LAB
ESTIMATED AVG GLUCOSE: 120 MG/DL
HBA1C MFR BLD HPLC: 5.8 %

## 2017-03-24 ENCOUNTER — OFFICE VISIT (OUTPATIENT)
Dept: FAMILY MEDICINE | Facility: CLINIC | Age: 82
End: 2017-03-24
Payer: MEDICARE

## 2017-03-24 VITALS
SYSTOLIC BLOOD PRESSURE: 128 MMHG | TEMPERATURE: 99 F | RESPIRATION RATE: 12 BRPM | DIASTOLIC BLOOD PRESSURE: 72 MMHG | HEART RATE: 74 BPM | OXYGEN SATURATION: 96 % | HEIGHT: 63 IN | WEIGHT: 142 LBS | BODY MASS INDEX: 25.16 KG/M2

## 2017-03-24 DIAGNOSIS — R41.3 SHORT-TERM MEMORY LOSS: Primary | ICD-10-CM

## 2017-03-24 DIAGNOSIS — Z23 NEED FOR VACCINATION WITH 13-POLYVALENT PNEUMOCOCCAL CONJUGATE VACCINE: ICD-10-CM

## 2017-03-24 PROCEDURE — 3078F DIAST BP <80 MM HG: CPT | Mod: S$GLB,,, | Performed by: FAMILY MEDICINE

## 2017-03-24 PROCEDURE — 1157F ADVNC CARE PLAN IN RCRD: CPT | Mod: S$GLB,,, | Performed by: FAMILY MEDICINE

## 2017-03-24 PROCEDURE — 3074F SYST BP LT 130 MM HG: CPT | Mod: S$GLB,,, | Performed by: FAMILY MEDICINE

## 2017-03-24 PROCEDURE — 99999 PR PBB SHADOW E&M-EST. PATIENT-LVL III: CPT | Mod: PBBFAC,,, | Performed by: FAMILY MEDICINE

## 2017-03-24 PROCEDURE — 1159F MED LIST DOCD IN RCRD: CPT | Mod: S$GLB,,, | Performed by: FAMILY MEDICINE

## 2017-03-24 PROCEDURE — 90670 PCV13 VACCINE IM: CPT | Mod: S$GLB,,, | Performed by: FAMILY MEDICINE

## 2017-03-24 PROCEDURE — 99214 OFFICE O/P EST MOD 30 MIN: CPT | Mod: S$GLB,,, | Performed by: FAMILY MEDICINE

## 2017-03-24 PROCEDURE — 1160F RVW MEDS BY RX/DR IN RCRD: CPT | Mod: S$GLB,,, | Performed by: FAMILY MEDICINE

## 2017-03-24 PROCEDURE — G0009 ADMIN PNEUMOCOCCAL VACCINE: HCPCS | Mod: S$GLB,,, | Performed by: FAMILY MEDICINE

## 2017-03-24 RX ORDER — ALPRAZOLAM 0.25 MG/1
0.25 TABLET ORAL
COMMUNITY
End: 2017-04-21

## 2017-03-24 NOTE — PROGRESS NOTES
Pt tolerated pneumococcal vaccine to left deltoid without difficulty; no adverse reaction noted; VIS given

## 2017-03-24 NOTE — PROGRESS NOTES
Subjective:       Patient ID: Carol Yadav is a 81 y.o. female.    Chief Complaint: Anxiety (2 week f/u)    HPI    Pt is here for today for her MoCA assessment. This was administered and she scored 28/30.     Blood per rectum - resolved - pt has decided to forgo the colonoscopy at this time as her sxs have resolved. She is also worried about th burden of the test itself.     Fatigue - worse - pt is concerned that the statin may be related to her fatigue. It is worsening over the last few weeks.       Current Outpatient Prescriptions on File Prior to Visit   Medication Sig Dispense Refill    acetaminophen (TYLENOL) 325 MG tablet Take 2 tablets (650 mg total) by mouth every 4 (four) hours as needed.  0    aspirin (ECOTRIN) 81 MG EC tablet Take 81 mg by mouth once daily.      atorvastatin (LIPITOR) 40 MG tablet Take 1 tablet (40 mg total) by mouth once daily. 90 tablet 2    calcium carbonate (OS-HAYLEE) 600 mg (1,500 mg) Tab Take 600 mg by mouth 2 (two) times daily with meals.      fluticasone (FLONASE) 50 mcg/actuation nasal spray 1 spray by Each Nare route 2 (two) times daily as needed for Rhinitis. 16 g 5    hydrocortisone 2.5 % cream Apply topically once daily.      lisinopril (PRINIVIL,ZESTRIL) 2.5 MG tablet Take 2.5 mg by mouth once daily.      oxybutynin (DITROPAN-XL) 5 MG TR24 Take 1 tablet (5 mg total) by mouth nightly. 30 tablet 2    trazodone (DESYREL) 50 MG tablet Take 1 tablet (50 mg total) by mouth every evening. 30 tablet 2     No current facility-administered medications on file prior to visit.        Review of Systems   Constitutional: Negative for chills and fever.   Neurological: Positive for weakness and numbness.       Objective:     Vitals:    03/24/17 1309   BP: 128/72   Pulse: 74   Resp: 12   Temp: 98.7 °F (37.1 °C)        Physical Exam   Constitutional: She appears well-developed. No distress.   HENT:   Head: Normocephalic and atraumatic.   Eyes: Conjunctivae are normal. No scleral  icterus.   Pulmonary/Chest: Effort normal.   Neurological: She is alert.   Skin: She is not diaphoretic.   Psychiatric: She has a normal mood and affect. Her behavior is normal.   Vitals reviewed.      Assessment:       1. Short-term memory loss    2. Need for vaccination with 13-polyvalent pneumococcal conjugate vaccine        Plan:       Carol LONGORIA was seen today for anxiety.    Diagnoses and all orders for this visit:    Short-term memory loss  MoCA Scanned. Score 28/30 which is normal No sign of dementia.  Pt advised that this does not mean that her memory is the same as it was when she was 22, but that it is not significant enough to suggest a progressive dementia at this time.       Need for vaccination with 13-polyvalent pneumococcal conjugate vaccine  -     Pneumococcal Conjugate Vaccine (13 Valent) (IM)          Return in about 4 weeks (around 4/21/2017) for generalized weakness.     At least 25 minutes were spent today with the patient in the office, which more than 50% of the time was spent on evaluation and counseling regarding The primary encounter diagnosis was Short-term memory loss. A diagnosis of Need for vaccination with 13-polyvalent pneumococcal conjugate vaccine was also pertinent to this visit.. Time spent administering and discussing MoCA results.           Pt verbalized understanding and agreed with our plan.

## 2017-03-30 ENCOUNTER — OFFICE VISIT (OUTPATIENT)
Dept: NEUROLOGY | Facility: CLINIC | Age: 82
End: 2017-03-30
Payer: MEDICARE

## 2017-03-30 VITALS
SYSTOLIC BLOOD PRESSURE: 141 MMHG | HEART RATE: 76 BPM | HEIGHT: 62 IN | BODY MASS INDEX: 26.41 KG/M2 | DIASTOLIC BLOOD PRESSURE: 70 MMHG | WEIGHT: 143.5 LBS

## 2017-03-30 DIAGNOSIS — I10 HYPERTENSION, WELL CONTROLLED: ICD-10-CM

## 2017-03-30 DIAGNOSIS — R53.81 PHYSICAL DECONDITIONING: Primary | ICD-10-CM

## 2017-03-30 DIAGNOSIS — I63.9 CEREBROVASCULAR ACCIDENT (CVA), UNSPECIFIED MECHANISM: ICD-10-CM

## 2017-03-30 DIAGNOSIS — R27.0 ATAXIA: ICD-10-CM

## 2017-03-30 DIAGNOSIS — R26.81 GAIT INSTABILITY: ICD-10-CM

## 2017-03-30 DIAGNOSIS — G81.94 LEFT HEMIPARESIS: ICD-10-CM

## 2017-03-30 DIAGNOSIS — R47.1 DYSARTHRIA: ICD-10-CM

## 2017-03-30 DIAGNOSIS — Z74.09 IMPAIRED MOBILITY AND ADLS: ICD-10-CM

## 2017-03-30 DIAGNOSIS — Z78.9 IMPAIRED MOBILITY AND ADLS: ICD-10-CM

## 2017-03-30 DIAGNOSIS — I69.354 HEMIPARESIS AFFECTING LEFT SIDE AS LATE EFFECT OF STROKE: ICD-10-CM

## 2017-03-30 PROCEDURE — 1126F AMNT PAIN NOTED NONE PRSNT: CPT | Mod: S$GLB,,, | Performed by: NEUROLOGICAL SURGERY

## 2017-03-30 PROCEDURE — 3077F SYST BP >= 140 MM HG: CPT | Mod: S$GLB,,, | Performed by: NEUROLOGICAL SURGERY

## 2017-03-30 PROCEDURE — 99499 UNLISTED E&M SERVICE: CPT | Mod: S$PBB,,, | Performed by: NEUROLOGICAL SURGERY

## 2017-03-30 PROCEDURE — 99215 OFFICE O/P EST HI 40 MIN: CPT | Mod: S$GLB,,, | Performed by: NEUROLOGICAL SURGERY

## 2017-03-30 PROCEDURE — 1159F MED LIST DOCD IN RCRD: CPT | Mod: S$GLB,,, | Performed by: NEUROLOGICAL SURGERY

## 2017-03-30 PROCEDURE — 1160F RVW MEDS BY RX/DR IN RCRD: CPT | Mod: S$GLB,,, | Performed by: NEUROLOGICAL SURGERY

## 2017-03-30 PROCEDURE — 99999 PR PBB SHADOW E&M-EST. PATIENT-LVL III: CPT | Mod: PBBFAC,,, | Performed by: NEUROLOGICAL SURGERY

## 2017-03-30 PROCEDURE — 1157F ADVNC CARE PLAN IN RCRD: CPT | Mod: S$GLB,,, | Performed by: NEUROLOGICAL SURGERY

## 2017-03-30 PROCEDURE — 3078F DIAST BP <80 MM HG: CPT | Mod: S$GLB,,, | Performed by: NEUROLOGICAL SURGERY

## 2017-03-30 NOTE — PROGRESS NOTES
Chief Complaint   Patient presents with    Stroke        Carol Yadav is a 81 y.o. female with a history of multiple medical diagnoses as listed below that presents for evaluation of stroke. She has been feeling that her strength has been getting worse progressively since she was discharged from rehab. She says that she still walks often and she has been exercising about three days per week but despite her level of activity she has been feeling generalized weakness and malaise. She says that she has been seen in the past by outpatient therapy but has not felt that she has made any improvement since she was discharges. She had discussed this with her PCP and has not felt that there has been any solution uncovered to this point. She has been compliant with all of her medications and has been maintaining a good blood pressure.    PAST MEDICAL HISTORY:  Past Medical History:   Diagnosis Date    Allergic rhinitis     Elevated blood pressure reading without diagnosis of hypertension     Hormone replacement therapy (postmenopausal)     Hyperlipidemia        PAST SURGICAL HISTORY:  Past Surgical History:   Procedure Laterality Date    APPENDECTOMY      EYE SURGERY      hai cataract surgery    OOPHORECTOMY      TONSILLECTOMY         SOCIAL HISTORY:  Social History     Social History    Marital status:      Spouse name: N/A    Number of children: N/A    Years of education: N/A     Occupational History    Not on file.     Social History Main Topics    Smoking status: Former Smoker     Years: 45.00     Types: Cigarettes    Smokeless tobacco: Not on file      Comment: quit smoking when 52 yrs old    Alcohol use 7.0 oz/week     14 drink(s) per week    Drug use: No    Sexual activity: Not on file     Other Topics Concern    Not on file     Social History Narrative       FAMILY HISTORY:  Family History   Problem Relation Age of Onset    Cancer Mother     Stroke Father        ALLERGIES AND MEDICATIONS:  updated and reviewed.  Review of patient's allergies indicates:  No Known Allergies  Current Outpatient Prescriptions   Medication Sig Dispense Refill    acetaminophen (TYLENOL) 325 MG tablet Take 2 tablets (650 mg total) by mouth every 4 (four) hours as needed.  0    alprazolam (XANAX) 0.25 MG tablet Take 0.25 mg by mouth as needed for Anxiety.      aspirin (ECOTRIN) 81 MG EC tablet Take 81 mg by mouth once daily.      atorvastatin (LIPITOR) 40 MG tablet Take 1 tablet (40 mg total) by mouth once daily. 90 tablet 2    calcium carbonate (OS-HAYLEE) 600 mg (1,500 mg) Tab Take 600 mg by mouth 2 (two) times daily with meals.      fluticasone (FLONASE) 50 mcg/actuation nasal spray 1 spray by Each Nare route 2 (two) times daily as needed for Rhinitis. 16 g 5    hydrocortisone 2.5 % cream Apply topically once daily.      lisinopril (PRINIVIL,ZESTRIL) 2.5 MG tablet Take 2.5 mg by mouth once daily.      oxybutynin (DITROPAN-XL) 5 MG TR24 Take 1 tablet (5 mg total) by mouth nightly. 30 tablet 2    trazodone (DESYREL) 50 MG tablet Take 1 tablet (50 mg total) by mouth every evening. 30 tablet 2     No current facility-administered medications for this visit.        Review of Systems   Constitutional: Negative for activity change, appetite change, fever and unexpected weight change.   HENT: Negative for trouble swallowing and voice change.    Eyes: Negative for photophobia and visual disturbance.   Respiratory: Negative for apnea and shortness of breath.    Cardiovascular: Negative for chest pain and leg swelling.   Gastrointestinal: Negative for constipation and nausea.   Genitourinary: Negative for difficulty urinating.   Musculoskeletal: Positive for gait problem. Negative for back pain and neck pain.   Skin: Negative for color change and pallor.   Neurological: Positive for facial asymmetry, speech difficulty and weakness. Negative for dizziness, seizures, syncope and numbness.   Hematological: Negative for  adenopathy.   Psychiatric/Behavioral: Negative for agitation, confusion and decreased concentration.       Neurologic Exam     Mental Status   Oriented to person, place, and time.   Registration: recalls 3 of 3 objects.   Attention: normal. Concentration: normal.   Speech: slurred   Level of consciousness: alert  Knowledge: good.     Cranial Nerves     CN II   Visual fields full to confrontation.   Right visual field deficit: none  Left visual field deficit: none     CN III, IV, VI   Pupils are equal, round, and reactive to light.  Extraocular motions are normal.   Right pupil: Size: 3 mm. Shape: regular. Accommodation: intact.   Left pupil: Size: 3 mm. Shape: regular. Accommodation: intact.   CN III: no CN III palsy  CN VI: no CN VI palsy  Nystagmus: none   Diplopia: none  Ophthalmoparesis: none  Upgaze: normal  Downgaze: normal  Conjugate gaze: present    CN V   Facial sensation intact.   Right facial sensation deficit: none  Left facial sensation deficit: none    CN VII   Facial expression full, symmetric.   Right facial weakness: none  Left facial weakness: central    CN VIII   CN VIII normal.     CN IX, X   CN IX normal.   CN X normal.   Palate: symmetric    CN XI   CN XI normal.   Right sternocleidomastoid strength: normal  Left sternocleidomastoid strength: normal  Right trapezius strength: normal  Left trapezius strength: normal    CN XII   CN XII normal.   Tongue deviation: left    Motor Exam   Muscle bulk: normal  Overall muscle tone: increased  Right arm tone: normal  Left arm tone: increased  Right leg tone: normal  Left leg tone: increased    Strength   Strength 5/5 except as noted.   Left deltoid: 4/5  Left biceps: 4/5  Left triceps: 4/5  Left wrist flexion: 4/5  Left wrist extension: 4/5  Left interossei: 4/5  Left iliopsoas: 4/5  Left quadriceps: 4/5  Left hamstrin/5  Left glutei: 4/5  Left anterior tibial: 4/5  Left posterior tibial: 4/5  Left peroneal: 4/5  Left gastroc: 4/5    Sensory Exam    Right arm light touch: normal  Left arm light touch: normal  Right leg light touch: normal  Left leg light touch: normal  Right arm vibration: normal  Left arm vibration: normal  Right leg vibration: normal  Left leg vibration: normal  Right arm proprioception: normal  Left arm proprioception: normal  Right leg proprioception: normal  Left leg proprioception: normal  Right arm pinprick: normal  Left arm pinprick: normal  Right leg pinprick: normal  Left leg pinprick: normal    Gait, Coordination, and Reflexes     Gait  Gait: normal    Coordination   Romberg: negative  Finger to nose coordination: normal  Heel to shin coordination: normal  Tandem walking coordination: normal    Tremor   Resting tremor: absent    Reflexes   Right brachioradialis: 2+  Left brachioradialis: 3+  Right biceps: 2+  Left biceps: 3+  Right triceps: 2+  Left triceps: 3+  Right patellar: 2+  Left patellar: 3+  Right achilles: 2+  Left achilles: 3+  Right plantar: normal  Left plantar: equivocal      Physical Exam   Constitutional: She is oriented to person, place, and time. She appears well-developed and well-nourished.   HENT:   Head: Normocephalic and atraumatic.   Eyes: EOM are normal. Pupils are equal, round, and reactive to light.   Neck: Normal range of motion.   Cardiovascular: Normal rate and intact distal pulses.    Pulmonary/Chest: Effort normal. No apnea. No respiratory distress.   Musculoskeletal: Normal range of motion.   Neurological: She is alert and oriented to person, place, and time. She has a normal Finger-Nose-Finger Test, a normal Heel to Shin Test, a normal Romberg Test and a normal Tandem Gait Test. Gait normal.   Reflex Scores:       Tricep reflexes are 2+ on the right side and 3+ on the left side.       Bicep reflexes are 2+ on the right side and 3+ on the left side.       Brachioradialis reflexes are 2+ on the right side and 3+ on the left side.       Patellar reflexes are 2+ on the right side and 3+ on the left  "side.       Achilles reflexes are 2+ on the right side and 3+ on the left side.  Skin: Skin is warm and dry.   Psychiatric: She has a normal mood and affect. Her behavior is normal. Thought content normal. Her speech is slurred.   Vitals reviewed.      Vitals:    03/30/17 1111   BP: (!) 141/70   BP Location: Left arm   Patient Position: Sitting   BP Method: Manual   Pulse: 76   Weight: 65.1 kg (143 lb 8.3 oz)   Height: 5' 2" (1.575 m)       Assessment & Plan:    Problem List Items Addressed This Visit     Ataxia    Hemiparesis affecting left side as late effect of stroke    CVA (cerebral vascular accident)    Overview     L sided chronic weakness. LUE > LLE         Left hemiparesis    Dysarthria    Impaired mobility and ADLs    Gait instability    Hypertension, well controlled      Other Visit Diagnoses     Physical deconditioning    -  Primary    Relevant Orders    Ambulatory Referral to Physical/Occupational Therapy          Follow-up: Return in about 3 months (around 6/30/2017).   More than 50% of this 45 minute encounter was spent in counseling and coordinating care.        "

## 2017-03-30 NOTE — LETTER
March 30, 2017      Jaime Hopkins MD  [address] [facility] [address]           [facility] [address] [facility] [address]  [facility] [address]  Phone: [phone]  Fax: [phone]          Patient: Carol Yadav   MR Number: [id number]   YOB: 1935   Date of Visit: 3/30/2017       Dear Dr. Jaime Hopkins:    Thank you for referring Carol Yadav to me for evaluation. Attached you will find relevant portions of my assessment and plan of care.    If you have questions, please do not hesitate to call me. I look forward to following Carol Yadav along with you.    Sincerely,    Dorian Ruiz MD    Enclosure  CC:  No Recipients    If you would like to receive this communication electronically, please contact [email or web address] or [phone] to request more information on [facility] [employer] access.    For providers and/or their staff who would like to refer a patient to [facility], please contact us through our one-stop-shop provider referral line, [facility], at [phone].    If you feel you have received this communication in error or would no longer like to receive these types of communications, please e-mail [email or web address]

## 2017-04-11 ENCOUNTER — CLINICAL SUPPORT (OUTPATIENT)
Dept: REHABILITATION | Facility: HOSPITAL | Age: 82
End: 2017-04-11
Attending: NEUROLOGICAL SURGERY
Payer: MEDICARE

## 2017-04-11 DIAGNOSIS — R26.81 GAIT INSTABILITY: ICD-10-CM

## 2017-04-11 DIAGNOSIS — I69.354 HEMIPARESIS AFFECTING LEFT SIDE AS LATE EFFECT OF STROKE: Primary | ICD-10-CM

## 2017-04-11 DIAGNOSIS — Z74.09 IMPAIRED MOBILITY AND ADLS: ICD-10-CM

## 2017-04-11 DIAGNOSIS — Z78.9 IMPAIRED MOBILITY AND ADLS: ICD-10-CM

## 2017-04-11 DIAGNOSIS — R53.81 PHYSICAL DECONDITIONING: ICD-10-CM

## 2017-04-11 PROCEDURE — G8979 MOBILITY GOAL STATUS: HCPCS | Mod: CK,PN

## 2017-04-11 PROCEDURE — 97161 PT EVAL LOW COMPLEX 20 MIN: CPT | Mod: PN

## 2017-04-11 PROCEDURE — G8978 MOBILITY CURRENT STATUS: HCPCS | Mod: CL,PN

## 2017-04-11 NOTE — PROGRESS NOTES
Physical Therapy Initial Evaluation     Name: Carol Yadav  [facility] Number: [id number]    Diagnosis:   Encounter Diagnoses   Name Primary?    Physical deconditioning     Hemiparesis affecting left side as late effect of stroke Yes    Gait instability     Impaired mobility and ADLs      Physician: Dorian Ruiz MD  Treatment Orders: PT Eval and Treat  Past Medical History:   Diagnosis Date    Allergic rhinitis     Elevated blood pressure reading without diagnosis of hypertension     Hormone replacement therapy (postmenopausal)     Hyperlipidemia      Current Outpatient Prescriptions   Medication Sig    acetaminophen (TYLENOL) 325 MG tablet Take 2 tablets (650 mg total) by mouth every 4 (four) hours as needed.    alprazolam (XANAX) 0.25 MG tablet Take 0.25 mg by mouth as needed for Anxiety.    aspirin (ECOTRIN) 81 MG EC tablet Take 81 mg by mouth once daily.    atorvastatin (LIPITOR) 40 MG tablet Take 1 tablet (40 mg total) by mouth once daily.    calcium carbonate (OS-HAYLEE) 600 mg (1,500 mg) Tab Take 600 mg by mouth 2 (two) times daily with meals.    fluticasone (FLONASE) 50 mcg/actuation nasal spray 1 spray by Each Nare route 2 (two) times daily as needed for Rhinitis.    hydrocortisone 2.5 % cream Apply topically once daily.    lisinopril (PRINIVIL,ZESTRIL) 2.5 MG tablet Take 2.5 mg by mouth once daily.    oxybutynin (DITROPAN-XL) 5 MG TR24 Take 1 tablet (5 mg total) by mouth nightly.    trazodone (DESYREL) 50 MG tablet Take 1 tablet (50 mg total) by mouth every evening.     No current facility-administered medications for this visit.      Review of patient's allergies indicates:  No Known Allergies    SUBJECTIVE     Patient states:  Chief complaint is progressive weakness overall following CVA affecting L side in 2015. Pt had PT following CVA for [facility] at [facility], and PT at [facility]. Pt states she had 3-6 months of PT, but  "unable to give exact amount. Pt states she has gotten much weaker, with "perpetual exhaustion" during simple tasks now. Pt has been using R cortez-walker since stroke. Pt lives in [facility]; Pt is doing exercise 3x/week there. Pt reports mild back ache, but no significant pain in extremities. Pt uses [employer] service bus. Pt denies fall in past 3 months. You have assistance with showering and dressing. Pt states she can experience SOB with extended walking. Pt reports occasional numbness in arm; she states she has 2 permanently numb toes in R foot. Pt states she feels dizziness.    Pts goals: To improve strength,    Pain Scale: Carol LONGORIA rates pain on a scale of 0-10 to be 4 at worst; 2 currently; 1 at best in lumbar .  Onset: gradual  Chief complaint:  Fatigue, exhaustion  Radicular symptoms:  None  Aggravating factors:   Standing, walking, squatting  Easing factors:  Resting  Precautions: Fall  Prior Therapy: Outpatient post-CVA in 2015  Home Environment (Steps/Adaptations): 3rd floor of Assisted Living facility, she uses elevator.  Functional Deficits Leading to Referral: Difficulty with dressing, grooming, bathing, walking  Prior functional status: Modified-independent with AD; Supervision with AD; assistance with bathing/dressing  Current functional status:  Supervision in AD  DME owned: Cortez-walker, wheel-chair, quad cane  Occupation:  Retired                           OBJECTIVE     Mental status: alert, oriented, aware  Posture Alignment: slouched posture  Sensation: Light Touch: Intact         ROM:  UPPER EXTREMITY--AROM/PROM  (R) UE: WNLs  (L) UE: limited as follows: 90% limited in Shoulder Flex/Abd; 50% limited in Elb flex; L hand  5% limited, aberrant movement pattern         LOWER EXTREMITY -- AROM/PROM  (R) LE: WFLs  (L) LE: limited as follows: L LE ROM 25% limited    FLEXIBILITY:  Sensation: Absent light touch on plantar surface of 3rd and 4th R toe; no " deficits L UE or L LE      Upper Extremity Strength  (R) UE  (L) UE    Shoulder flexion: 4+/5 Shoulder flexion: 2+/5   Shoulder Abduction: 4+/5 Shoulder Abduction: 2+/5   Shoulder IR: 4+/5 Shoulder IR: 2+/5   Shoulder ER: 4+/5 Shoulder ER: 2+/5   Elbow flexion: 4+/5 Elbow flexion: 2+/5   Elbow extension: 4+/5 Elbow extension: 2+/5   Wrist flexion: 4+/5 Wrist flexion: 3+/5   Wrist extension: 4+/5 Wrist extension: 3+/5    4+/5 : 3/5     Lower Extremity Strength  Right LE  Left LE    Hip Flexion: 4-/5 Hip Flexion: 3+/5   Hip Abduction: 4+/5 Hip Abduction: 4/5   Hip Adduction 4+/5 Hip IR: 4/5   Hip Extension: 4-/5 tested in side lying Hip Extension: 3+/5 tested in side lying   Knee extension: 4/5 Knee extension: 3-/5   Knee flexion: 4-/5 Knee flexion: 3-/5   Ankle dorsiflexion:   4+/5 Ankle dorsiflexion:   3+/5   Ankle plantarflexion: 4+/5 Ankle plantarflexion: 4-/5     GAIT: Carol LONGORIA ambulates 50 feet with dhruv-walker with independently.     GAIT DEVIATIONS: Carol LONGORIA displays L foot ER, decreased stance time on L LE,     Balance: Narrow ESTELITA: Eyes Open - 20 sec; Eyes Closed: 3 sec  Modified Tandem - 5 seconds bilaterally    Pt/family was provided educational information, including: role of PT, goals for PT, scheduling - pt verbalized understanding. Discussed insurance limitations with pt.     Exercises were reviewed and pt was able to demonstrate them prior to the end of the session. Pt received a written copy of exercises to perform at home.  Pt has no cultural, educational or language barriers to learning provided.    TREATMENT     Time In: 1:00pm  Time Out: 2:00pm    PT Evaluation Completed? Yes  Discussed Plan of Care with patient: Yes    Carol LONGORIA received 0 minutes of therapeutic exercises.  Carol LONGORIA received 0 minutes of neuromuscular re-education.    Written Home Exercises Provided:       ASSESSMENT     Patient presents to Physical Therapy Evaluation with diagnosis of Physical Deconditioning, with signs and  symptoms including: decreased strength, decreased active range of motion, decreased endurance, gait abnormality, postural dysfunction, and decreased tolerance to functional activities. Pt with severe L UE and LE weakness and deficits in active range of motion both UE and LE. Pt with dysfunctional gait pattern and decreased independence with ambulation. Pt with age-appropriate passive range of motion in B UE and B LE, no spasticity during this session. Pt with no significant sensory abnormalities, with light touch intact overall except for absent light touch of plantar surface of 3rd and 4th toes. Pt with impaired static standing balance, with eyes open and eyes closed; with inability to perform standing dynamic balance. Pt with gait abnormality demonstrated by L foot ER, decreased stance on L LE, decreased stride length, and occasional unsteadiness. Pt with no apparent cognitive or emotional deficits present. Pt with overall deconditioning and strength deficits related to residual effects of CVA affecting L side. Pt with great motivation to perform physical activity and responds well to cueing.    Pt prognosis is Good.  Pt will benefit from skilled outpatient physical therapy to address the above stated deficits, provide pt/family education and to maximize pt's level of independence.       History  Co-morbidities and personal factors that may impact the plan of care Examination  Body Structures and Functions, activity limitations and participation restrictions that may impact the plan of care Clinical Presentation   Decision Making/ Complexity Score   Co-morbidities:     -post-CVA strength deficits            Personal Factors:   Anxiety/panic disorder Body Regions: BLE, trunk/core     Body Systems: musculoskeletal (ROM, strength, endurance, flexibility, gait); neuromuscular (balance, posture, motor control, coordination)          Activity limitations:       Participation Restrictions:          Stable, uncomplicated    Low Complexity    FOTO Lower Leg (w/o knee) Survey  Score: 76% Limitation        Pt's spiritual, cultural and educational needs considered and pt agreeable to plan of care and goals as stated below:     Short Term GOALS: 4 weeks. Pt agrees with goals set.  1. Patient demonstrates independence with HEP.   2. Patient demonstrates independence with Postural Awareness.   3. Patient demonstrates improved L UE AROM to 75% Limitation to improve tolerance to functional activities pain free.   4. Patient demonstrates increased strength BUE/LE's by 1/2 muscle grade or greater, on MMT, to improve tolerance to functional activities pain free.  5. Patient will demonstrate ability to ambulate 200 feet, with R dhruv-walker, no LOB, appropriate gait pattern    Long Term GOALS: 8 weeks. Pt agrees with goals set.  1. Patient demonstrates improved L UE AROM to 50% Limitation to improve tolerance to functional activities pain free.   2. Patient demonstrates increased strength BLE's to 4/5 or greater to improve tolerance to functional activities pain free.   3. Patient demonstrates improved overall function per FOTO Knee Survey to 60% Limitation or less.   4. Patient will demonstrate ability to ambulate 500 feet, with R dhruv-walker, no LOB, appropriate gait pattern      Functional Limitations Reports - G Codes  Category: mobility  Tool: FOTO Lower Leg (w/o knee) Survey  Score: 76% Limitation    Modifier  Impairment Limitation Restriction    CH  0 % impaired, limited or restricted    CI  @ least 1% but less than 20% impaired, limited or restricted    CJ  @ least 20%<40% impaired, limited or restricted    CK  @ least 40%<60% impaired, limited or restricted    CL  @ least 60% <80% impaired, limited or restricted    CM  @ least 80%<100% impaired limited or restricted    CN  100% impaired, limited or restricted     Current/  CL = 60-80 Limitation  Goal/ : CK = 40-60 Limitation    PLAN     Certification Period: 4/11/17 -  6/11/17    Outpatient physical therapy 2-3 times weekly to include: pt ed, hep, therapeutic exercises, neuromuscular re-education/ balance exercises, joint mobilizations, aquatic therapy and modalities prn. Cont PT for  6-8 weeks. Pt may be seen by PTA as part of the rehabilitation team.       Therapist: Suhail Phelps, PT  4/11/2017

## 2017-04-11 NOTE — PLAN OF CARE
"                                                  Physical Therapy Initial Evaluation     Name: Carol Yadav  [facility] Number: [id number]    Diagnosis:   Encounter Diagnoses   Name Primary?    Physical deconditioning     Hemiparesis affecting left side as late effect of stroke Yes    Gait instability     Impaired mobility and ADLs      Physician: Dorian Ruiz MD  Treatment Orders: PT Eval and Treat    SUBJECTIVE     Patient states:  Chief complaint is progressive weakness overall following CVA affecting L side in 2015. Pt had PT following CVA for [facility] at [facility], and PT at [facility]. Pt states she had 3-6 months of PT, but unable to give exact amount. Pt states she has gotten much weaker, with "perpetual exhaustion" during simple tasks now. Pt has been using R dhruv-walker since stroke. Pt lives in [facility]; Pt is doing exercise 3x/week there. Pt reports mild back ache, but no significant pain in extremities. Pt uses [employer] service bus. Pt denies fall in past 3 months. You have assistance with showering and dressing. Pt states she can experience SOB with extended walking. Pt reports occasional numbness in arm; she states she has 2 permanently numb toes in R foot. Pt states she feels dizziness.    Pts goals: To improve strength,    Pain Scale: Carol LONGORIA rates pain on a scale of 0-10 to be 4 at worst; 2 currently; 1 at best in lumbar .  Onset: gradual  Chief complaint:  Fatigue, exhaustion  Radicular symptoms:  None  Aggravating factors:   Standing, walking, squatting  Easing factors:  Resting  Precautions: Fall  Prior Therapy: Outpatient post-CVA in 2015  Home Environment (Steps/Adaptations): 3rd floor of Assisted Living facility, she uses elevator.  Functional Deficits Leading to Referral: Difficulty with dressing, grooming, bathing, walking  Prior functional status: Modified-independent with AD; Supervision with AD; assistance with " bathing/dressing  Current functional status:  Supervision in AD  DME owned: Cortez-walker, wheel-chair, quad cane  Occupation:  Retired                           OBJECTIVE     Mental status: alert, oriented, aware  Posture Alignment: slouched posture  Sensation: Light Touch: Intact         ROM:  UPPER EXTREMITY--AROM/PROM  (R) UE: WNLs  (L) UE: limited as follows: 90% limited in Shoulder Flex/Abd; 50% limited in Elb flex; L hand  5% limited, aberrant movement pattern         LOWER EXTREMITY -- AROM/PROM  (R) LE: WFLs  (L) LE: limited as follows: L LE ROM 25% limited    FLEXIBILITY:  Sensation: Absent light touch on plantar surface of 3rd and 4th R toe; no deficits L UE or L LE      Upper Extremity Strength  (R) UE  (L) UE    Shoulder flexion: 4+/5 Shoulder flexion: 2+/5   Shoulder Abduction: 4+/5 Shoulder Abduction: 2+/5   Shoulder IR: 4+/5 Shoulder IR: 2+/5   Shoulder ER: 4+/5 Shoulder ER: 2+/5   Elbow flexion: 4+/5 Elbow flexion: 2+/5   Elbow extension: 4+/5 Elbow extension: 2+/5   Wrist flexion: 4+/5 Wrist flexion: 3+/5   Wrist extension: 4+/5 Wrist extension: 3+/5    4+/5 : 3/5     Lower Extremity Strength  Right LE  Left LE    Hip Flexion: 4-/5 Hip Flexion: 3+/5   Hip Abduction: 4+/5 Hip Abduction: 4/5   Hip Adduction 4+/5 Hip IR: 4/5   Hip Extension: 4-/5 tested in side lying Hip Extension: 3+/5 tested in side lying   Knee extension: 4/5 Knee extension: 3-/5   Knee flexion: 4-/5 Knee flexion: 3-/5   Ankle dorsiflexion:   4+/5 Ankle dorsiflexion:   3+/5   Ankle plantarflexion: 4+/5 Ankle plantarflexion: 4-/5     GAIT: Carol LONGORIA ambulates 50 feet with cortez-walker with independently.     GAIT DEVIATIONS: Carol LONGORIA displays L foot ER, decreased stance time on L LE,     Balance: Narrow ESTELITA: Eyes Open - 20 sec; Eyes Closed: 3 sec  Modified Tandem - 5 seconds bilaterally    Pt/family was provided educational information, including: role of PT, goals for PT, scheduling - pt verbalized understanding. Discussed  insurance limitations with pt.     Exercises were reviewed and pt was able to demonstrate them prior to the end of the session. Pt received a written copy of exercises to perform at home.  Pt has no cultural, educational or language barriers to learning provided.    TREATMENT     Time In: 1:00pm  Time Out: 2:00pm    PT Evaluation Completed? Yes  Discussed Plan of Care with patient: Yes    Carol LONGORIA received 0 minutes of therapeutic exercises.  Carol LONGORIA received 0 minutes of neuromuscular re-education.    Written Home Exercises Provided:       ASSESSMENT     Patient presents to Physical Therapy Evaluation with diagnosis of Physical Deconditioning, with signs and symptoms including: decreased strength, decreased active range of motion, decreased endurance, gait abnormality, postural dysfunction, and decreased tolerance to functional activities. Pt with severe L UE and LE weakness and deficits in active range of motion both UE and LE. Pt with dysfunctional gait pattern and decreased independence with ambulation. Pt with age-appropriate passive range of motion in B UE and B LE, no spasticity during this session. Pt with no significant sensory abnormalities, with light touch intact overall except for absent light touch of plantar surface of 3rd and 4th toes. Pt with impaired static standing balance, with eyes open and eyes closed; with inability to perform standing dynamic balance. Pt with gait abnormality demonstrated by L foot ER, decreased stance on L LE, decreased stride length, and occasional unsteadiness. Pt with no apparent cognitive or emotional deficits present. Pt with overall deconditioning and strength deficits related to residual effects of CVA affecting L side. Pt with great motivation to perform physical activity and responds well to cueing.    Pt prognosis is Good.  Pt will benefit from skilled outpatient physical therapy to address the above stated deficits, provide pt/family education and to maximize pt's  level of independence.       History  Co-morbidities and personal factors that may impact the plan of care Examination  Body Structures and Functions, activity limitations and participation restrictions that may impact the plan of care Clinical Presentation   Decision Making/ Complexity Score   Co-morbidities:     -post-CVA strength deficits            Personal Factors:   Anxiety/panic disorder Body Regions: BLE, trunk/core     Body Systems: musculoskeletal (ROM, strength, endurance, flexibility, gait); neuromuscular (balance, posture, motor control, coordination)          Activity limitations:       Participation Restrictions:          Stable, uncomplicated   Low Complexity    FOTO Lower Leg (w/o knee) Survey  Score: 76% Limitation        Pt's spiritual, cultural and educational needs considered and pt agreeable to plan of care and goals as stated below:     Short Term GOALS: 4 weeks. Pt agrees with goals set.  1. Patient demonstrates independence with HEP.   2. Patient demonstrates independence with Postural Awareness.   3. Patient demonstrates improved L UE AROM to 75% Limitation to improve tolerance to functional activities pain free.   4. Patient demonstrates increased strength BUE/LE's by 1/2 muscle grade or greater, on MMT, to improve tolerance to functional activities pain free.  5. Patient will demonstrate ability to ambulate 200 feet, with R dhruv-walker, no LOB, appropriate gait pattern    Long Term GOALS: 8 weeks. Pt agrees with goals set.  1. Patient demonstrates improved L UE AROM to 50% Limitation to improve tolerance to functional activities pain free.   2. Patient demonstrates increased strength BLE's to 4/5 or greater to improve tolerance to functional activities pain free.   3. Patient demonstrates improved overall function per FOTO Knee Survey to 60% Limitation or less.   4. Patient will demonstrate ability to ambulate 500 feet, with R dhruv-walker, no LOB, appropriate gait pattern      Functional  Limitations Reports - G Codes  Category: mobility  Tool: FOTO Lower Leg (w/o knee) Survey  Score: 76% Limitation    Modifier  Impairment Limitation Restriction    CH  0 % impaired, limited or restricted    CI  @ least 1% but less than 20% impaired, limited or restricted    CJ  @ least 20%<40% impaired, limited or restricted    CK  @ least 40%<60% impaired, limited or restricted    CL  @ least 60% <80% impaired, limited or restricted    CM  @ least 80%<100% impaired limited or restricted    CN  100% impaired, limited or restricted     Current/  CL = 60-80 Limitation  Goal/ : CK = 40-60 Limitation    PLAN     Certification Period: 4/11/17 - 6/11/17    Outpatient physical therapy 2-3 times weekly to include: pt ed, hep, therapeutic exercises, neuromuscular re-education/ balance exercises, joint mobilizations, aquatic therapy and modalities prn. Cont PT for  6-8 weeks. Pt may be seen by PTA as part of the rehabilitation team.       Therapist: Suhail Phelps, PT  4/11/2017

## 2017-04-18 ENCOUNTER — CLINICAL SUPPORT (OUTPATIENT)
Dept: REHABILITATION | Facility: HOSPITAL | Age: 82
End: 2017-04-18
Attending: NEUROLOGICAL SURGERY
Payer: MEDICARE

## 2017-04-18 PROCEDURE — 97110 THERAPEUTIC EXERCISES: CPT | Mod: PN

## 2017-04-18 NOTE — PROGRESS NOTES
"                                                    Physical Therapy Daily Note     Name: Carol LONGORIA [facility] Number: [id number]  Diagnosis:   Encounter Diagnoses   Name Primary?    Physical deconditioning     Hemiparesis affecting left side as late effect of stroke Yes    Gait instability     Impaired mobility and ADLs     Physician: Dorian Ruiz MD  Precautions: Fall  Visit #: 2 of 12  PTA Visit #: 1  Time In: 3:10 pm   Time Out: 3:50 pm  Total Treatment Time 1:1: 40 mins    Subjective     Pt reports: "I feel weak today."  Pain Scale: Carol LONGORIA rates pain on a scale of 0-10 to be 0 currently.    Objective     Carol LONGORIA received individual therapeutic exercises to develop strength, endurance, ROM, flexibility, posture and core stabilization for 40 minutes including:  Hip Add x 2 mins  Hip Abd YTB x 2 mins   Quad sets x 1 min ea LE  SLR x 1 min ea LE  Supine Hip Abd x 1 min ea LE  SAQ's x 1 min ea LE  LAQ's x 1 min ea LE  Bicep curl 20x ea UE - assistance on LUE  Scap squeezes x 2 mins  PROM L shoulder flexion     Written Home Exercises Provided: none  Pt demo good understanding of the education provided. Carol LONGORIA demonstrated good return demonstration of activities.     Education provided re: improving strength and endurance  Carol LONGORIA verbalized good understanding of education provided.   No spiritual or educational barriers to learning provided    Assessment     Patient tolerated treatment fair this session. Pt arrived complaining of general weakness and malaise stating "today is a bad day." Pt with good response to LE strengthening exercise. Pt unable to perform UE strengthening exercise due to previous stroke. Assistance required with L side bicep curls and hip abduction.   This is a 81 y.o. female referred to outpatient physical therapy and presents with a medical diagnosis of physical deconditioning and demonstrates limitations as described in the problem list. Pt prognosis is Good. Pt will continue to benefit " from skilled outpatient physical therapy to address the deficits listed in the problem list, provide pt/family education and to maximize pt's level of independence in the home and community environment.     Goals as follows: See IE on 4/11/2017     Plan     Continue with established Plan of Care towards PT goals.    Therapist: Liliana Paez, PTA  4/18/2017

## 2017-04-20 ENCOUNTER — CLINICAL SUPPORT (OUTPATIENT)
Dept: REHABILITATION | Facility: HOSPITAL | Age: 82
End: 2017-04-20
Attending: NEUROLOGICAL SURGERY
Payer: MEDICARE

## 2017-04-20 PROCEDURE — 97110 THERAPEUTIC EXERCISES: CPT | Mod: PN

## 2017-04-20 NOTE — PROGRESS NOTES
"                                                    Physical Therapy Daily Note     Name: Carol LONGORIA [facility] Number: [id number]  Diagnosis:   Encounter Diagnoses   Name Primary?    Physical deconditioning     Hemiparesis affecting left side as late effect of stroke Yes    Gait instability     Impaired mobility and ADLs     Physician: Dorian Ruiz MD  Precautions: Fall  Visit #: 3 of 12  PTA Visit #: 2  Time In: 12:00 pm   Time Out: 12:50 pm  Total Treatment Time 1:1: 50 mins    Subjective     Pt reports: mild L hip pain. Pt reports "I feel slightly better today."  Pain Scale: Carol LONGORIA rates pain on a scale of 0-10 to be 2 currently, L hip.    Objective     Carol LONGORIA received individual therapeutic exercises to develop strength, endurance, ROM, flexibility, posture and core stabilization for 50 minutes including:  Hip Add x 2 mins  Hip Abd YTB x 2 mins   Quad sets x 2 mins ea LE  SLR x 1 min ea LE  Supine Hip Add/Abd x 1 min ea LE  SAQ's x 1 min ea LE  LAQ's x 1 min ea LE  Bicep curl 2 x 10 (white dowel) - /c assistance on LUE  Scap squeezes x 2 mins  +Shoulder press ups (white dowel) 2 x 10 - /c assistance on LUE  +Shoulder wand flexion (white dowel) 2 x 10 - /c assistance on LUE    Neuro Re-ed:   +Seated marches x 1 minute  +Seated toe taps (half blue foam) x 20  +Kyle pick ups x 1 trial (LUE)    Written Home Exercises Provided: none  Pt demo good understanding of the education provided. Carol LONGORIA demonstrated good return demonstration of activities.     Education provided re: improving strength and endurance  Carol LONGORIA verbalized good understanding of education provided.   No spiritual or educational barriers to learning provided    Assessment     Patient tolerated treatment better this session. Pt with good muscle response to LE strengthening exercise. Added UE strengthening therex this session with use of white dowel. Pt requires assistance with L upper extremity due to previous stroke and increased weakness. Pt " challenged with seated marches and seated toe taps due to L hip weakness.   This is a 81 y.o. female referred to outpatient physical therapy and presents with a medical diagnosis of physical deconditioning and demonstrates limitations as described in the problem list. Pt prognosis is Good. Pt will continue to benefit from skilled outpatient physical therapy to address the deficits listed in the problem list, provide pt/family education and to maximize pt's level of independence in the home and community environment.     Goals as follows: See IE on 4/11/2017 (PN due by 5/11/17)     Plan     Continue with established Plan of Care towards PT goals.    Therapist: Liliana Paez, PTA  4/20/2017

## 2017-04-21 ENCOUNTER — OFFICE VISIT (OUTPATIENT)
Dept: FAMILY MEDICINE | Facility: CLINIC | Age: 82
End: 2017-04-21
Payer: MEDICARE

## 2017-04-21 VITALS
HEIGHT: 62 IN | OXYGEN SATURATION: 96 % | SYSTOLIC BLOOD PRESSURE: 120 MMHG | WEIGHT: 141.75 LBS | DIASTOLIC BLOOD PRESSURE: 72 MMHG | RESPIRATION RATE: 17 BRPM | BODY MASS INDEX: 26.09 KG/M2 | HEART RATE: 79 BPM | TEMPERATURE: 98 F

## 2017-04-21 DIAGNOSIS — R53.82 CHRONIC FATIGUE: Primary | ICD-10-CM

## 2017-04-21 PROCEDURE — 3074F SYST BP LT 130 MM HG: CPT | Mod: S$GLB,,, | Performed by: FAMILY MEDICINE

## 2017-04-21 PROCEDURE — 1159F MED LIST DOCD IN RCRD: CPT | Mod: S$GLB,,, | Performed by: FAMILY MEDICINE

## 2017-04-21 PROCEDURE — 99214 OFFICE O/P EST MOD 30 MIN: CPT | Mod: S$GLB,,, | Performed by: FAMILY MEDICINE

## 2017-04-21 PROCEDURE — 1160F RVW MEDS BY RX/DR IN RCRD: CPT | Mod: S$GLB,,, | Performed by: FAMILY MEDICINE

## 2017-04-21 PROCEDURE — 3078F DIAST BP <80 MM HG: CPT | Mod: S$GLB,,, | Performed by: FAMILY MEDICINE

## 2017-04-21 PROCEDURE — 1125F AMNT PAIN NOTED PAIN PRSNT: CPT | Mod: S$GLB,,, | Performed by: FAMILY MEDICINE

## 2017-04-21 PROCEDURE — 99999 PR PBB SHADOW E&M-EST. PATIENT-LVL III: CPT | Mod: PBBFAC,,, | Performed by: FAMILY MEDICINE

## 2017-04-21 RX ORDER — SERTRALINE HYDROCHLORIDE 50 MG/1
50 TABLET, FILM COATED ORAL DAILY
Qty: 30 TABLET | Refills: 1 | Status: SHIPPED | OUTPATIENT
Start: 2017-04-21 | End: 2017-04-25

## 2017-04-21 NOTE — MR AVS SNAPSHOT
[facility]  [address]  [place] [address]  Phone: [phone]  Fax: [phone]                  Carol Yadav   2017 1:00 PM   Office Visit    Description:  Female : 1935   Provider:  Jaime Hopkins MD   Department:  [facility]           Reason for Visit     Follow-up           Diagnoses this Visit        Comments    Chronic fatigue    -  Primary     Need for vaccination with 13-polyvalent pneumococcal conjugate vaccine                To Do List           Future Appointments        Provider Department Dept Phone    2017 11:00 AM Liliana Paez PTA [facility] [phone]    2017 1:30 PM Suhail Phelps PT [facility] [phone]    2017 11:00 AM Liliana Paez PTA [facility] [phone]      Goals (5 Years of Data)     None      Follow-Up and Disposition     Return in about 4 weeks (around 2017) for chronic fatigue.       These Medications        Disp Refills Start End    sertraline (ZOLOFT) 50 MG tablet 30 tablet 1 2017    Take 1 tablet (50 mg total) by mouth once daily. - Oral    Pharmacy: [facility]  #: [phone]         [facility]     [facility] Nurse Care Line - 24/ Assistance  Unless otherwise directed by your provider, please contact [facility], our nurse care line that is available for / assistance.     Registered nurses in the [facility] provide: appointment scheduling, clinical advisement, health education, and other advisory services.  Call: [phone] (toll free)               Medications           Message regarding Medications     Verify the changes and/or additions to your medication regime listed below are the same as discussed with your clinician today.  If any of these changes or additions are incorrect, please notify  "your healthcare provider.        START taking these NEW medications        Refills    sertraline (ZOLOFT) 50 MG tablet 1    Sig: Take 1 tablet (50 mg total) by mouth once daily.    Class: Normal    Route: Oral      STOP taking these medications     alprazolam (XANAX) 0.25 MG tablet Take 0.25 mg by mouth as needed for Anxiety.           Verify that the below list of medications is an accurate representation of the medications you are currently taking.  If none reported, the list may be blank. If incorrect, please contact your healthcare provider. Carry this list with you in case of emergency.           Current Medications     acetaminophen (TYLENOL) 325 MG tablet Take 2 tablets (650 mg total) by mouth every 4 (four) hours as needed.    aspirin (ECOTRIN) 81 MG EC tablet Take 81 mg by mouth once daily.    atorvastatin (LIPITOR) 40 MG tablet Take 1 tablet (40 mg total) by mouth once daily.    calcium carbonate (OS-HAYLEE) 600 mg (1,500 mg) Tab Take 600 mg by mouth 2 (two) times daily with meals.    fluticasone (FLONASE) 50 mcg/actuation nasal spray 1 spray by Each Nare route 2 (two) times daily as needed for Rhinitis.    hydrocortisone 2.5 % cream Apply topically once daily.    lisinopril (PRINIVIL,ZESTRIL) 2.5 MG tablet Take 2.5 mg by mouth once daily.    oxybutynin (DITROPAN-XL) 5 MG TR24 Take 1 tablet (5 mg total) by mouth nightly.    trazodone (DESYREL) 50 MG tablet Take 1 tablet (50 mg total) by mouth every evening.    sertraline (ZOLOFT) 50 MG tablet Take 1 tablet (50 mg total) by mouth once daily.           Clinical Reference Information           Your Vitals Were     BP Pulse Temp Resp Height Weight    120/72 (BP Location: Right arm, Patient Position: Sitting, BP Method: Manual) 79 97.6 °F (36.4 °C) (Oral) 17 5' 2" (1.575 m) 64.3 kg (141 lb 12.1 oz)    SpO2 BMI             96% 25.93 kg/m2         Blood Pressure          Most Recent Value    BP  120/72      Allergies as of 4/21/2017     No Known Allergies    "   Immunizations Administered on Date of Encounter - 4/21/2017     Name Date Dose VIS Date Route    Pneumococcal Conjugate - 13 Valent  Incomplete 0.5 mL 11/5/2015 Intramuscular      Orders Placed During Today's Visit      Normal Orders This Visit    Pneumococcal Conjugate Vaccine (13 Valent) (IM)       Language Assistance Services     ATTENTION: Language assistance services are available, free of charge. Please call [phone].      ATENCIÓN: Si habla español, tiene a amador disposición servicios gratuitos de asistencia lingüística. Llame al [phone].     CHÚ Ý: N?u b?n nói Ti?ng Vi?t, có các d?ch v? h? tr? ngôn ng? mi?n phí dành cho b?n. G?i s? [phone].         [facility] complies with applicable Federal civil rights laws and does not discriminate on the basis of race, color, national origin, age, disability, or sex.

## 2017-04-21 NOTE — PROGRESS NOTES
Subjective:       Patient ID: Carol Yadav is a 81 y.o. female.    Chief Complaint: Follow-up (memory loss)    HPI    Chronic fatigue - Her fatigue was worse than it has ever 6 days ago a/w weakness that lasted 1-2 days, and has since improved. She does nto feel that her muscel asre weak currently, but does continue to feel chronically fatigued.     Her sxs are worse first thing in the morning and after dinner at night, but vary during the day.     She states that her sxs seem to be stair stepping down at increments over the past 2 years.     A/w with mild int lightheadedness - no falls.     She is participating in PT which has not improved her sxs, but she enjoys it.     She feels that her words are slowly becoming more slurred over time over the past 2 years.     She does admit to some depressed sxs. No anhedonia.     Heartburn - pt had intermittent burning pain in her epigastrium a/w heartburn over the weekend as well. These sx worsened when she layed down, and improved when she stood up.         Current Outpatient Prescriptions on File Prior to Visit   Medication Sig Dispense Refill    acetaminophen (TYLENOL) 325 MG tablet Take 2 tablets (650 mg total) by mouth every 4 (four) hours as needed.  0    aspirin (ECOTRIN) 81 MG EC tablet Take 81 mg by mouth once daily.      atorvastatin (LIPITOR) 40 MG tablet Take 1 tablet (40 mg total) by mouth once daily. 90 tablet 2    calcium carbonate (OS-HAYLEE) 600 mg (1,500 mg) Tab Take 600 mg by mouth 2 (two) times daily with meals.      fluticasone (FLONASE) 50 mcg/actuation nasal spray 1 spray by Each Nare route 2 (two) times daily as needed for Rhinitis. 16 g 5    hydrocortisone 2.5 % cream Apply topically once daily.      lisinopril (PRINIVIL,ZESTRIL) 2.5 MG tablet Take 2.5 mg by mouth once daily.      oxybutynin (DITROPAN-XL) 5 MG TR24 Take 1 tablet (5 mg total) by mouth nightly. 30 tablet 2    trazodone (DESYREL) 50 MG tablet Take 1 tablet (50 mg total) by mouth  every evening. 30 tablet 2    [DISCONTINUED] alprazolam (XANAX) 0.25 MG tablet Take 0.25 mg by mouth as needed for Anxiety.       No current facility-administered medications on file prior to visit.        Past Medical History:   Diagnosis Date    Allergic rhinitis     Elevated blood pressure reading without diagnosis of hypertension     Hormone replacement therapy (postmenopausal)     Hyperlipidemia     Stroke        Family History   Problem Relation Age of Onset    Cancer Mother     Stroke Father         reports that she has quit smoking. Her smoking use included Cigarettes. She quit after 45.00 years of use. She does not have any smokeless tobacco history on file. She reports that she drinks about 7.0 oz of alcohol per week  She reports that she does not use illicit drugs.    Review of Systems   Constitutional: Negative for chills and fever.   Neurological: Positive for light-headedness. Negative for dizziness, seizures, syncope, weakness (chronic LLE weakness ) and numbness.   Psychiatric/Behavioral: Positive for dysphoric mood. Negative for self-injury and suicidal ideas. The patient is not nervous/anxious.        Objective:     Vitals:    04/21/17 1314   BP: 120/72   Pulse: 79   Resp: 17   Temp: 97.6 °F (36.4 °C)        Physical Exam   Constitutional: She appears well-developed. No distress.   HENT:   Head: Normocephalic and atraumatic.   Eyes: Conjunctivae are normal. No scleral icterus.   Pulmonary/Chest: Effort normal.   Neurological: She is alert.   LUE weakness proximal to distal.     LLE weakness - uses a hemiwalker    R sided facial droop   Skin: She is not diaphoretic.   Psychiatric: She has a normal mood and affect. Her behavior is normal.   Vitals reviewed.      Assessment:       1. Chronic fatigue    2. Need for vaccination with 13-polyvalent pneumococcal conjugate vaccine        Plan:       Carol LONGORIA was seen today for follow-up.    Diagnoses and all orders for this visit:    Chronic  fatigue  -     sertraline (ZOLOFT) 50 MG tablet; Take 1 tablet (50 mg total) by mouth once daily.  - Possibly CFS vs sx of depression? Will try sertraline in the am.  Discussed potential diagnosis with patient.     -  I also explained that SSRIs/SNRIs that are used can take up to 8 weeks for full efficacy and I encouraged compliance through this period.     - I also advised patient that once medication was started, that it should not be stopped abruptly. Pt asked to notify me if they want to stop themedication for any reason so that I can explain how to wean off of the medication safely.    At least 25 minutes were spent today with the patient in the office, which more than 50% of the time was spent on evaluation and counseling regarding The encounter diagnosis was Chronic fatigue..          Return in about 4 weeks (around 5/19/2017) for chronic fatigue.        Pt verbalized understanding and agreed with our plan.

## 2017-04-24 ENCOUNTER — TELEPHONE (OUTPATIENT)
Dept: FAMILY MEDICINE | Facility: CLINIC | Age: 82
End: 2017-04-24

## 2017-04-24 NOTE — TELEPHONE ENCOUNTER
----- Message from Ashley Cary sent at 4/24/2017  8:35 AM CDT -----  Contact: SELF  Calling regarding side effects from Zoloft . She had uncontrollable diarrhea once & did not take it again  .   [phone]    LL

## 2017-04-24 NOTE — TELEPHONE ENCOUNTER
Pt was started on zoloft Friday; states she had uncontrollable diarrhea few hours after taking the medication; she did not take it again and has not had any diarrhea; please advise

## 2017-04-25 DIAGNOSIS — N39.41 URGE INCONTINENCE: ICD-10-CM

## 2017-04-25 DIAGNOSIS — F33.42 RECURRENT MAJOR DEPRESSIVE DISORDER, IN FULL REMISSION: ICD-10-CM

## 2017-04-25 RX ORDER — OXYBUTYNIN CHLORIDE 5 MG/1
TABLET, EXTENDED RELEASE ORAL
Qty: 30 TABLET | Refills: 3 | Status: SHIPPED | OUTPATIENT
Start: 2017-04-25 | End: 2017-08-15 | Stop reason: SDUPTHER

## 2017-04-25 RX ORDER — TRAZODONE HYDROCHLORIDE 50 MG/1
TABLET ORAL
Qty: 30 TABLET | Refills: 1 | Status: SHIPPED | OUTPATIENT
Start: 2017-04-25 | End: 2017-06-20

## 2017-04-25 RX ORDER — FLUOXETINE HYDROCHLORIDE 20 MG/1
20 CAPSULE ORAL DAILY
Qty: 30 CAPSULE | Refills: 1 | Status: SHIPPED | OUTPATIENT
Start: 2017-04-25 | End: 2017-05-22

## 2017-04-26 ENCOUNTER — TELEPHONE (OUTPATIENT)
Dept: FAMILY MEDICINE | Facility: CLINIC | Age: 82
End: 2017-04-26

## 2017-04-26 NOTE — TELEPHONE ENCOUNTER
----- Message from Marcela Mustafa sent at 4/25/2017  5:58 PM CDT -----  Contact: Pt  Pt is calling to speak with nurse in regards to medication and care.  Pt can be reached at [phone].    Thank you

## 2017-04-26 NOTE — TELEPHONE ENCOUNTER
Patient inquiring on advice from previous encounter.Patient notified of the alternative sent to pharmacy per  in previous encounter

## 2017-04-27 ENCOUNTER — CLINICAL SUPPORT (OUTPATIENT)
Dept: REHABILITATION | Facility: HOSPITAL | Age: 82
End: 2017-04-27
Attending: NEUROLOGICAL SURGERY
Payer: MEDICARE

## 2017-04-27 PROCEDURE — 97110 THERAPEUTIC EXERCISES: CPT | Mod: PN

## 2017-04-27 NOTE — PROGRESS NOTES
"                                                    Physical Therapy Daily Note     Name: Carol LONGORIA [facility] Number: [id number]  Diagnosis:   Encounter Diagnoses   Name Primary?    Physical deconditioning     Hemiparesis affecting left side as late effect of stroke Yes    Gait instability     Impaired mobility and ADLs     Physician: Dorian Ruiz MD  Precautions: Fall  Visit #: 4 of 12  PTA Visit #: 3  Time In: 11:00 am   Time Out: 11:40 am  Total Treatment Time: 40 mins (1:1 with PTA for 40 mins)    Subjective     Pt reports: "I feel really fatigued and weak today."  Pain Scale: Carol LONGORIA rates pain on a scale of 0-10 to be 2 currently, L hip.    Objective     Carol LONGORIA received individual therapeutic exercises to develop strength, endurance, ROM, flexibility, posture and core stabilization for 35 minutes including:  Hip Add x 2 mins  Hip Abd YTB x 2 mins   Quad sets x 2 mins ea LE  SLR x 1 min ea LE  Supine Hip Add/Abd x 1 min ea LE  SAQ's x 1 min ea LE  LAQ's x 1 min ea LE  Bicep curl 2 x 10 (white dowel) - /c assistance on LUE  Scap squeezes x 2 mins  Shoulder press ups (white dowel) 2 x 10 - /c assistance on LUE  Shoulder wand flexion (white dowel) 2 x 10 - /c assistance on LUE    Neuro Re-ed: x 5 mins  Seated marches x 1 minute ea LE  Seated toe taps (half blue foam) x 20    Written Home Exercises Provided: none  Pt demo good understanding of the education provided. Carol LONGORIA demonstrated good return demonstration of activities.     Education provided re: improving strength and endurance  Carol LONGORIA verbalized good understanding of education provided.   No spiritual or educational barriers to learning provided    Assessment     Patient tolerated treatment good this session despite subjective reports of increased fatigue and weakness today. Pt with good muscle response to LE strengthening exercise. Pt requires assistance with L upper extremity strengthening therex. Pt challenged with seated marches and seated toe " taps due to L hip weakness.   This is a 81 y.o. female referred to outpatient physical therapy and presents with a medical diagnosis of physical deconditioning and demonstrates limitations as described in the problem list. Pt prognosis is Good. Pt will continue to benefit from skilled outpatient physical therapy to address the deficits listed in the problem list, provide pt/family education and to maximize pt's level of independence in the home and community environment.     Goals as follows: See IE on 4/11/2017 (PN due by 5/11/17)     Plan     Continue with established Plan of Care towards PT goals.    Therapist: Liliana Paez, PTA  4/27/2017

## 2017-05-02 ENCOUNTER — CLINICAL SUPPORT (OUTPATIENT)
Dept: REHABILITATION | Facility: HOSPITAL | Age: 82
End: 2017-05-02
Attending: NEUROLOGICAL SURGERY
Payer: MEDICARE

## 2017-05-02 DIAGNOSIS — R53.81 PHYSICAL DECONDITIONING: Primary | ICD-10-CM

## 2017-05-02 DIAGNOSIS — Z74.09 IMPAIRED MOBILITY AND ADLS: ICD-10-CM

## 2017-05-02 DIAGNOSIS — R26.81 GAIT INSTABILITY: ICD-10-CM

## 2017-05-02 DIAGNOSIS — I69.354 HEMIPARESIS AFFECTING LEFT SIDE AS LATE EFFECT OF STROKE: ICD-10-CM

## 2017-05-02 DIAGNOSIS — Z78.9 IMPAIRED MOBILITY AND ADLS: ICD-10-CM

## 2017-05-02 PROCEDURE — 97110 THERAPEUTIC EXERCISES: CPT | Mod: PN

## 2017-05-02 NOTE — PROGRESS NOTES
"                                                    Physical Therapy Daily Note     Name: Carol LONGORIA [facility] Number: [id number]  Diagnosis:   Encounter Diagnoses   Name Primary?    Physical deconditioning     Hemiparesis affecting left side as late effect of stroke Yes    Gait instability     Impaired mobility and ADLs     Physician: Dorian Ruiz MD  Precautions: Fall  Visit #: 4 of 12  PTA Visit #: 0  Time In: 1:00pm   Time Out: 2:00pm  Total Treatment Time: 60 mins (1:1 with PT for 30 mins)    Subjective     Pt reports: She doesn't believe she is improving, which she relates to change of anti-depressant medication causing increased fatigue. She wishes to continue attempting PT/exercise to improve mobility and function. She denies pain but states she has "increasing weakness due to medication".  Pain Scale: Carol LONGORIA rates pain on a scale of 0-10 to be 0 currently, L hip.    Objective     Sulcus Sign:  L GHJ: Positive    Carol LONGORIA received individual therapeutic exercises to develop strength, endurance, ROM, flexibility, posture and core stabilization for  60 minutes including:    Nustep 8 min for endurance  Hip Add c/ ball 2x10  Hip Abd YTB x 2 mins   Quad sets x 2 mins ea LE  SLR x 1 min ea LE  Supine Hip Add/Abd x 1 min ea LE  SAQ 2x10 B  Bridges 2x10  LAQ's x 1 min ea LE  Bicep curl 2 x 10 (white dowel) - /c assistance on LUE  Scap squeezes x 2 mins  Shoulder press ups (white dowel) 2 x 10 - /c assistance on LUE  Shoulder wand flexion (white dowel) 2 x 10 - /c assistance on LUE  Shoulder Shrugs 2x10  Modified Plantigrade at EOM with LUE; R UE stacking cups 5 min    Neuro Re-ed: x 5 mins  Seated marches x 1 minute ea LE  Seated toe taps (half blue foam) x 20    Written Home Exercises Provided: none  Pt demo good understanding of the education provided. Carol LONGORIA demonstrated good return demonstration of activities.     Education provided re: improving strength and endurance  Carol LONGORIA verbalized good understanding " of education provided.   No spiritual or educational barriers to learning provided    Assessment     Patient tolerated treatment fairly this session. Pt with easily achieved overall fatigue during aerobic training on Nustep this session. Pt with depressed L humeral head in glenoid cavity with positive L sulcus sign, with increased discomfort and aberrant motion while performing active L shoulder motion; significant deficits in L UE strength, endurance, and motor control. Pt with fair tolerance to modified plantigrade position with no adverse effects to increased weight-bearing through L UE.     This is a 81 y.o. female referred to outpatient physical therapy and presents with a medical diagnosis of physical deconditioning and demonstrates limitations as described in the problem list. Pt prognosis is Good. Pt will continue to benefit from skilled outpatient physical therapy to address the deficits listed in the problem list, provide pt/family education and to maximize pt's level of independence in the home and community environment.     Goals as follows:  (PN due by 5/11/17)  Short Term GOALS: 4 weeks. Pt agrees with goals set.  1. Patient demonstrates independence with HEP.   2. Patient demonstrates independence with Postural Awareness.   3. Patient demonstrates improved L UE AROM to 75% Limitation to improve tolerance to functional activities pain free.   4. Patient demonstrates increased strength BUE/LE's by 1/2 muscle grade or greater, on MMT, to improve tolerance to functional activities pain free.  5. Patient will demonstrate ability to ambulate 200 feet, with R dhruv-walker, no LOB, appropriate gait pattern     Plan     Certification Period: 4/11/17 - 6/11/17  Continue with established Plan of Care towards PT goals.    Therapist: Suhail Phelps, PT  5/2/2017

## 2017-05-22 ENCOUNTER — OFFICE VISIT (OUTPATIENT)
Dept: FAMILY MEDICINE | Facility: CLINIC | Age: 82
End: 2017-05-22
Payer: MEDICARE

## 2017-05-22 VITALS
DIASTOLIC BLOOD PRESSURE: 76 MMHG | OXYGEN SATURATION: 96 % | SYSTOLIC BLOOD PRESSURE: 128 MMHG | HEIGHT: 62 IN | TEMPERATURE: 99 F | BODY MASS INDEX: 26.82 KG/M2 | HEART RATE: 69 BPM | RESPIRATION RATE: 12 BRPM | WEIGHT: 145.75 LBS

## 2017-05-22 DIAGNOSIS — F41.8 ANXIETY WITH DEPRESSION: ICD-10-CM

## 2017-05-22 DIAGNOSIS — R26.81 GAIT INSTABILITY: Primary | ICD-10-CM

## 2017-05-22 DIAGNOSIS — R27.0 ATAXIA: ICD-10-CM

## 2017-05-22 DIAGNOSIS — Z12.11 COLON CANCER SCREENING: ICD-10-CM

## 2017-05-22 DIAGNOSIS — R53.83 FATIGUE, UNSPECIFIED TYPE: ICD-10-CM

## 2017-05-22 PROCEDURE — 99499 UNLISTED E&M SERVICE: CPT | Mod: S$PBB,,, | Performed by: FAMILY MEDICINE

## 2017-05-22 PROCEDURE — 1160F RVW MEDS BY RX/DR IN RCRD: CPT | Mod: S$GLB,,, | Performed by: FAMILY MEDICINE

## 2017-05-22 PROCEDURE — 3074F SYST BP LT 130 MM HG: CPT | Mod: S$GLB,,, | Performed by: FAMILY MEDICINE

## 2017-05-22 PROCEDURE — 1126F AMNT PAIN NOTED NONE PRSNT: CPT | Mod: S$GLB,,, | Performed by: FAMILY MEDICINE

## 2017-05-22 PROCEDURE — 99214 OFFICE O/P EST MOD 30 MIN: CPT | Mod: S$GLB,,, | Performed by: FAMILY MEDICINE

## 2017-05-22 PROCEDURE — 3078F DIAST BP <80 MM HG: CPT | Mod: S$GLB,,, | Performed by: FAMILY MEDICINE

## 2017-05-22 PROCEDURE — 1159F MED LIST DOCD IN RCRD: CPT | Mod: S$GLB,,, | Performed by: FAMILY MEDICINE

## 2017-05-22 PROCEDURE — 99999 PR PBB SHADOW E&M-EST. PATIENT-LVL IV: CPT | Mod: PBBFAC,,, | Performed by: FAMILY MEDICINE

## 2017-05-22 RX ORDER — DULOXETIN HYDROCHLORIDE 60 MG/1
60 CAPSULE, DELAYED RELEASE ORAL DAILY
Qty: 30 CAPSULE | Refills: 1 | Status: SHIPPED | OUTPATIENT
Start: 2017-05-22 | End: 2017-06-20

## 2017-05-22 RX ORDER — SERTRALINE HYDROCHLORIDE 50 MG/1
TABLET, FILM COATED ORAL
COMMUNITY
Start: 2017-05-05 | End: 2017-05-22

## 2017-05-22 NOTE — PROGRESS NOTES
Subjective:       Patient ID: Carol Yadav is a 81 y.o. female.    Chief Complaint: Fatigue (says she's more tired, forgetting)    HPI    Fatigue - Pt still has significant fatigue, as well as generalized weakness, which she feels may be worsening.  (has been worsening or at least, not better in the last 2 years). She has been evaluated by neurology, and he referred her to rehab, which she feels does give her more energy.     Depression - pt was taking sertraline daily until earlier this week, and did not feel any better, so she stopped it.     Trazodone works well to help her get to sleep. She would like to continue this, but it does not seem to help her depression.    Colon cancer screening - pt does not wish to pursue colonoscopy - if cancer causes her narrow stools, she would not pursue treatment.         Current Outpatient Prescriptions on File Prior to Visit   Medication Sig Dispense Refill    acetaminophen (TYLENOL) 325 MG tablet Take 2 tablets (650 mg total) by mouth every 4 (four) hours as needed.  0    aspirin (ECOTRIN) 81 MG EC tablet Take 81 mg by mouth once daily.      atorvastatin (LIPITOR) 40 MG tablet Take 1 tablet (40 mg total) by mouth once daily. 90 tablet 2    calcium carbonate (OS-HAYLEE) 600 mg (1,500 mg) Tab Take 600 mg by mouth 2 (two) times daily with meals.      fluticasone (FLONASE) 50 mcg/actuation nasal spray 1 spray by Each Nare route 2 (two) times daily as needed for Rhinitis. 16 g 5    hydrocortisone 2.5 % cream Apply topically once daily.      lisinopril (PRINIVIL,ZESTRIL) 2.5 MG tablet Take 2.5 mg by mouth once daily.      oxybutynin (DITROPAN-XL) 5 MG TR24 TAKE 1 TABLET BY MOUTH AT BEDTIME 30 tablet 3    trazodone (DESYREL) 50 MG tablet TAKE 1 TABLET BY MOUTH EVERY EVENING 30 tablet 1    [DISCONTINUED] fluoxetine (PROZAC) 20 MG capsule Take 1 capsule (20 mg total) by mouth once daily. (Patient taking differently: Take 20 mg by mouth as needed. ) 30 capsule 1     No current  facility-administered medications on file prior to visit.        Past Medical History:   Diagnosis Date    Allergic rhinitis     Elevated blood pressure reading without diagnosis of hypertension     Hormone replacement therapy (postmenopausal)     Hyperlipidemia     Stroke        Family History   Problem Relation Age of Onset    Cancer Mother     Stroke Father         reports that she has quit smoking. Her smoking use included Cigarettes. She quit after 45.00 years of use. She does not have any smokeless tobacco history on file. She reports that she drinks about 7.0 oz of alcohol per week . She reports that she does not use drugs.    Review of Systems   Constitutional: Positive for fatigue. Negative for activity change, appetite change and unexpected weight change.   Cardiovascular: Negative for chest pain and palpitations.   Psychiatric/Behavioral: Positive for dysphoric mood. The patient is nervous/anxious.        Objective:     Vitals:    05/22/17 1249   BP: 128/76   Pulse: 69   Resp: 12   Temp: 98.5 °F (36.9 °C)        Physical Exam   Constitutional: She appears well-developed. No distress.   HENT:   Head: Normocephalic and atraumatic.   Eyes: Conjunctivae are normal. No scleral icterus.   Pulmonary/Chest: Effort normal.   Neurological: She is alert.   LUE weakness proximal to distal.     LLE weakness - uses a hemiwalker    R sided facial droop   Skin: She is not diaphoretic.   Psychiatric: She has a normal mood and affect. Her behavior is normal.   Vitals reviewed.      Assessment:       1. Gait instability    2. Ataxia    3. Anxiety with depression    4. Colon cancer screening    5. Fatigue, unspecified type        Plan:       Carol LONGORIA was seen today for fatigue.    Diagnoses and all orders for this visit:    Fatigue - Chronic - worsening. Pt asked to return to neurology for what sounds like an NCS. Pt has associated generalized weakness, which was diagnosed by neuro as generalized weakness.     Will try  treatment for depression in hopes that this will improve her fatigue as well.     Gait instability  chronic - stable    Anxiety with depression  -     duloxetine (CYMBALTA) 60 MG capsule; Take 1 capsule (60 mg total) by mouth once daily.    Pt has tried and failed fluoxetine and sertraline due to ineffectiveness. Will try SNR, cymbalta.         Return in about 4 weeks (around 6/19/2017).        Pt verbalized understanding and agreed with our plan.

## 2017-05-25 DIAGNOSIS — R53.82 CHRONIC FATIGUE: ICD-10-CM

## 2017-05-26 RX ORDER — FLUOXETINE HYDROCHLORIDE 20 MG/1
CAPSULE ORAL
Qty: 28 CAPSULE | Refills: 2 | OUTPATIENT
Start: 2017-05-26

## 2017-05-26 RX ORDER — SERTRALINE HYDROCHLORIDE 50 MG/1
TABLET, FILM COATED ORAL
Qty: 28 TABLET | Refills: 2 | OUTPATIENT
Start: 2017-05-26

## 2017-05-30 ENCOUNTER — TELEPHONE (OUTPATIENT)
Dept: FAMILY MEDICINE | Facility: CLINIC | Age: 82
End: 2017-05-30

## 2017-05-30 NOTE — TELEPHONE ENCOUNTER
----- Message from Sobia Machuca sent at 5/30/2017 11:40 AM CDT -----  Contact: Self   Patient called to follow up on her referral to see a Neurologist. Please call at [phone]

## 2017-05-30 NOTE — TELEPHONE ENCOUNTER
Informed pt that since referral has been placed and she already had OV with Dr Ruiz she needs to call their office to schedule appointment; pt verbalized understanding; phone number for neurology given to pt

## 2017-06-20 ENCOUNTER — OFFICE VISIT (OUTPATIENT)
Dept: FAMILY MEDICINE | Facility: CLINIC | Age: 82
End: 2017-06-20
Payer: MEDICARE

## 2017-06-20 VITALS
RESPIRATION RATE: 16 BRPM | SYSTOLIC BLOOD PRESSURE: 114 MMHG | OXYGEN SATURATION: 95 % | BODY MASS INDEX: 27.1 KG/M2 | WEIGHT: 147.25 LBS | HEIGHT: 62 IN | HEART RATE: 101 BPM | DIASTOLIC BLOOD PRESSURE: 60 MMHG | TEMPERATURE: 98 F

## 2017-06-20 DIAGNOSIS — R53.83 FATIGUE, UNSPECIFIED TYPE: Primary | ICD-10-CM

## 2017-06-20 DIAGNOSIS — R53.81 PHYSICAL DECONDITIONING: ICD-10-CM

## 2017-06-20 DIAGNOSIS — N39.41 URGE INCONTINENCE: ICD-10-CM

## 2017-06-20 DIAGNOSIS — F41.8 ANXIETY WITH DEPRESSION: ICD-10-CM

## 2017-06-20 PROCEDURE — 1159F MED LIST DOCD IN RCRD: CPT | Mod: S$GLB,,, | Performed by: FAMILY MEDICINE

## 2017-06-20 PROCEDURE — 99499 UNLISTED E&M SERVICE: CPT | Mod: S$PBB,,, | Performed by: FAMILY MEDICINE

## 2017-06-20 PROCEDURE — 1126F AMNT PAIN NOTED NONE PRSNT: CPT | Mod: S$GLB,,, | Performed by: FAMILY MEDICINE

## 2017-06-20 PROCEDURE — 99999 PR PBB SHADOW E&M-EST. PATIENT-LVL III: CPT | Mod: PBBFAC,,, | Performed by: FAMILY MEDICINE

## 2017-06-20 PROCEDURE — 99214 OFFICE O/P EST MOD 30 MIN: CPT | Mod: S$GLB,,, | Performed by: FAMILY MEDICINE

## 2017-06-20 NOTE — MEDICAL/APP STUDENT
Subjective:       Patient ID: Carol Yadav is a 81 y.o. female.    Chief Complaint: Follow-up (memory loss getting worse)    HPI     Fatigue  Ms Yadav presents for 4wk follow-up of her medications. Last visit she was started on duloxetine 60mg daily for her anxiety and depression. Pt reports that nothing has improved and she feels worse. She has increased fatigue, weakness and memory loss. She also reports dizziness and gait instability but no recent falls or loss of consciousness. She has no associated pain. She says she has been compliant with all her medications and hasn't noticed any other side effects. She reports improvement with depression and anxiety. She also adds that she no longer has nocturia and has no problems with sleep. Pt expressed concern about expectations and seeks clarity about her prognosis.        Review of Systems   Constitutional: Positive for fatigue. Negative for chills and fever.   HENT: Negative for congestion, ear pain, rhinorrhea and sore throat.    Respiratory: Negative for cough, chest tightness and shortness of breath.    Cardiovascular: Negative for chest pain and leg swelling.   Gastrointestinal: Negative for abdominal distention, abdominal pain, blood in stool, constipation, diarrhea, nausea and vomiting.   Genitourinary: Negative for dysuria, frequency and hematuria.   Musculoskeletal: Negative for back pain and joint swelling.   Neurological: Positive for dizziness, weakness and light-headedness. Negative for tremors, seizures, syncope, numbness and headaches.       Objective:      Physical Exam   Constitutional: She appears well-developed and well-nourished.   HENT:   Head: Normocephalic and atraumatic.   Cardiovascular: Normal rate, regular rhythm, normal heart sounds and intact distal pulses.    Pulmonary/Chest: Effort normal and breath sounds normal.   Neurological: She is alert.   Psychiatric: Judgment normal.       Assessment:       No diagnosis found.    Plan:        Fatigue  Pt still having issues with new medications. Stop taking cymbalta and Trazadone and see if there is any improvement. Follow-up in 4 weeks   She has appointment with neurology for follow-up of generalized weakness. She is scheduled Dr. Ruiz 7/5/17.

## 2017-06-20 NOTE — PROGRESS NOTES
Subjective:       Patient ID: Carol Yadav is a 81 y.o. female.    Chief Complaint: Follow-up (memory loss getting worse)    HPI  Fatigue - pt feels like she is having more fatigue, worsening control of her R hand, tremors,     F/u for memory loss - stable - pts memory is still not what it used to be, but it is not worsening. She states that she forgets short term memory items, such as wether or not she took her medicine that day or not.     Anxiety/depression -   Pt states that she is not doing much better on trazadone or cymbalta    Urge incontinence - much improved with oxybutynin. No complaints of side effects.         Current Outpatient Prescriptions on File Prior to Visit   Medication Sig Dispense Refill    acetaminophen (TYLENOL) 325 MG tablet Take 2 tablets (650 mg total) by mouth every 4 (four) hours as needed.  0    aspirin (ECOTRIN) 81 MG EC tablet Take 81 mg by mouth once daily.      atorvastatin (LIPITOR) 40 MG tablet Take 1 tablet (40 mg total) by mouth once daily. 90 tablet 2    calcium carbonate (OS-HAYLEE) 600 mg (1,500 mg) Tab Take 600 mg by mouth 2 (two) times daily with meals.      fluticasone (FLONASE) 50 mcg/actuation nasal spray 1 spray by Each Nare route 2 (two) times daily as needed for Rhinitis. 16 g 5    hydrocortisone 2.5 % cream Apply topically once daily.      lisinopril (PRINIVIL,ZESTRIL) 2.5 MG tablet Take 2.5 mg by mouth once daily.      oxybutynin (DITROPAN-XL) 5 MG TR24 TAKE 1 TABLET BY MOUTH AT BEDTIME 30 tablet 3    [DISCONTINUED] duloxetine (CYMBALTA) 60 MG capsule Take 1 capsule (60 mg total) by mouth once daily. 30 capsule 1    [DISCONTINUED] trazodone (DESYREL) 50 MG tablet TAKE 1 TABLET BY MOUTH EVERY EVENING 30 tablet 1     No current facility-administered medications on file prior to visit.        Past Medical History:   Diagnosis Date    Allergic rhinitis     Elevated blood pressure reading without diagnosis of hypertension     Hormone replacement therapy  (postmenopausal)     Hyperlipidemia     Stroke        Family History   Problem Relation Age of Onset    Cancer Mother     Stroke Father         reports that she has quit smoking. Her smoking use included Cigarettes. She quit after 45.00 years of use. She does not have any smokeless tobacco history on file. She reports that she drinks about 7.0 oz of alcohol per week . She reports that she does not use drugs.    Review of Systems   Constitutional: Positive for fatigue.   Musculoskeletal: Positive for gait problem. Negative for joint swelling.   Neurological: Positive for facial asymmetry, weakness and numbness.       Objective:     Vitals:    06/20/17 1335   BP: 114/60   Pulse: 101   Resp: 16   Temp: 97.8 °F (36.6 °C)        Physical Exam   Constitutional: She appears well-developed. No distress.   HENT:   Head: Normocephalic and atraumatic.   Eyes: Conjunctivae are normal. No scleral icterus.   Pulmonary/Chest: Effort normal.   Neurological: She is alert.   LUE weakness proximal to distal.     LLE weakness - uses a hemiwalker    R sided facial droop   Skin: She is not diaphoretic.   Psychiatric: She has a normal mood and affect. Her behavior is normal.   Vitals reviewed.      Assessment:       1. Fatigue, unspecified type    2. Anxiety with depression    3. Physical deconditioning    4. Urge incontinence        Plan:       Carol LONGORIA was seen today for follow-up.    Diagnoses and all orders for this visit:    Fatigue, unspecified type  Pt starts the visit off often complaining of how her sxs have worsened, but by the end of the visit she stats that she is doing ok. Will follow this and discontinue cymbalta and trazodone as they do not seem to be helping her fatigue or depressed sxs. Pt asked to discontinue cymbalta by weaning off and instructed on how to do so safely. This was written down for her.     Anxiety with depression  See above.     Physical deconditioning  chronic - stable - pt wishes to stop PT as she  does not believe it si helping.     Urge incontinence    Doing well on oxybutynin. Will continue.         Return in about 4 weeks (around 7/18/2017) for fatigue.      Pt verbalized understanding and agreed with our plan.

## 2017-06-21 ENCOUNTER — TELEPHONE (OUTPATIENT)
Dept: FAMILY MEDICINE | Facility: CLINIC | Age: 82
End: 2017-06-21

## 2017-06-21 DIAGNOSIS — F33.42 RECURRENT MAJOR DEPRESSIVE DISORDER, IN FULL REMISSION: ICD-10-CM

## 2017-06-21 RX ORDER — TRAZODONE HYDROCHLORIDE 50 MG/1
TABLET ORAL
Qty: 28 TABLET | OUTPATIENT
Start: 2017-06-21

## 2017-06-21 NOTE — TELEPHONE ENCOUNTER
Patient was seen yesterday had asked to mention to PCP that she had stop rehab due to transportation  And time management. Patient is currently exercising 3 times per week on Monday, Wednesday, and Friday. Patient wanted to know if that is ok or if patient would have to go back to rehab if PCP recommend. Notified PCP. Note ok with what patient is currently doing right now. Pt verbalized understanding.    Patient stated that she also forgot to mention to PCP that she have been having loose stool for past week. Stated it had worsen. Pt is taking miralax every other day but will stop taking it due to sx. Patient denied of any other sx other than what is noted during visit. PCP notified. Stated to stop taking miralax but if constipation occur to resume taking. If sx worsen and is not getting better to schedule visit. Patient verbalized understanding.

## 2017-06-22 DIAGNOSIS — F33.42 RECURRENT MAJOR DEPRESSIVE DISORDER, IN FULL REMISSION: ICD-10-CM

## 2017-06-22 RX ORDER — TRAZODONE HYDROCHLORIDE 50 MG/1
TABLET ORAL
Qty: 28 TABLET | OUTPATIENT
Start: 2017-06-22

## 2017-06-22 RX ORDER — DULOXETIN HYDROCHLORIDE 60 MG/1
CAPSULE, DELAYED RELEASE ORAL
Qty: 28 CAPSULE | OUTPATIENT
Start: 2017-06-22

## 2017-06-27 ENCOUNTER — TELEPHONE (OUTPATIENT)
Dept: FAMILY MEDICINE | Facility: CLINIC | Age: 82
End: 2017-06-27

## 2017-06-27 NOTE — TELEPHONE ENCOUNTER
Director of nursing at [facility] states pt has gotten very mean and arguing with staff since weaning off of cymbalta; pt was arguing with staff that it was supposed to be the statin not the cymbalta until they showed her the AVS and pt called here to clarify; also states pt used to be on vitamins and wondering if maybe that would help with pts fatigue; please send order for cymbalta to pharmacy and also fax to nursing [place] at [phone] if you restart

## 2017-06-27 NOTE — TELEPHONE ENCOUNTER
----- Message from Leslie Morin sent at 6/27/2017  2:53 PM CDT -----  Contact: Neal/[facility]/[phone]  Neal would like to speak to the staff regarding the discontinuation of patient's prescription. Thank you.

## 2017-06-27 NOTE — TELEPHONE ENCOUNTER
"----- Message from Ashley Cary sent at 6/27/2017  2:32 PM CDT -----  Contact: self  Calling regarding a " medication confusion " . She refused to explain .She asked for a call back ASAP .            [phone]  "

## 2017-06-28 RX ORDER — DULOXETIN HYDROCHLORIDE 60 MG/1
60 CAPSULE, DELAYED RELEASE ORAL DAILY
Qty: 90 CAPSULE | Refills: 2 | Status: SHIPPED | OUTPATIENT
Start: 2017-06-28 | End: 2017-10-09 | Stop reason: SDUPTHER

## 2017-06-28 NOTE — TELEPHONE ENCOUNTER
I spoke to pt and she states she would like to get back on cymbalta; states she had a nervous breakdown yesterday and had to get a xanax from the nurse to help her calm down

## 2017-07-03 ENCOUNTER — DOCUMENTATION ONLY (OUTPATIENT)
Dept: REHABILITATION | Facility: HOSPITAL | Age: 82
End: 2017-07-03

## 2017-07-03 NOTE — PROGRESS NOTES
PHYSICAL THERAPY DISCHARGE:    This patient was originally evaluated at our facility on: 4/11/17         Pt attended PT for a total of 4 Visits receiving: Manual Therapy, Therex, Postural Education, Body Mechanics Training, Home Exercise Instruction     This patient's last visit occurred on: 5/2/17      Short term goals were achieved: 2/5    Long term goals were achieved: None    Pt was DC'd from our care secondary to: Pt declined to schedule further Physical Therapy appointments       Therapist: Suhail Phelps, PT  7/3/2017

## 2017-07-05 ENCOUNTER — OFFICE VISIT (OUTPATIENT)
Dept: NEUROLOGY | Facility: CLINIC | Age: 82
End: 2017-07-05
Payer: MEDICARE

## 2017-07-05 VITALS
WEIGHT: 147.25 LBS | HEIGHT: 62 IN | BODY MASS INDEX: 27.1 KG/M2 | DIASTOLIC BLOOD PRESSURE: 78 MMHG | HEART RATE: 104 BPM | SYSTOLIC BLOOD PRESSURE: 120 MMHG

## 2017-07-05 DIAGNOSIS — Z78.9 IMPAIRED MOBILITY AND ADLS: ICD-10-CM

## 2017-07-05 DIAGNOSIS — R26.81 GAIT INSTABILITY: ICD-10-CM

## 2017-07-05 DIAGNOSIS — R53.81 PHYSICAL DECONDITIONING: ICD-10-CM

## 2017-07-05 DIAGNOSIS — I69.354 HEMIPARESIS AFFECTING LEFT SIDE AS LATE EFFECT OF STROKE: ICD-10-CM

## 2017-07-05 DIAGNOSIS — Z74.09 IMPAIRED MOBILITY AND ADLS: ICD-10-CM

## 2017-07-05 DIAGNOSIS — R53.1 WEAKNESS: Primary | ICD-10-CM

## 2017-07-05 DIAGNOSIS — I63.9 CEREBROVASCULAR ACCIDENT (CVA), UNSPECIFIED MECHANISM: ICD-10-CM

## 2017-07-05 PROCEDURE — 1126F AMNT PAIN NOTED NONE PRSNT: CPT | Mod: S$GLB,,, | Performed by: NEUROLOGICAL SURGERY

## 2017-07-05 PROCEDURE — 99999 PR PBB SHADOW E&M-EST. PATIENT-LVL III: CPT | Mod: PBBFAC,,, | Performed by: NEUROLOGICAL SURGERY

## 2017-07-05 PROCEDURE — 99214 OFFICE O/P EST MOD 30 MIN: CPT | Mod: S$GLB,,, | Performed by: NEUROLOGICAL SURGERY

## 2017-07-05 PROCEDURE — 1159F MED LIST DOCD IN RCRD: CPT | Mod: S$GLB,,, | Performed by: NEUROLOGICAL SURGERY

## 2017-07-05 PROCEDURE — 99499 UNLISTED E&M SERVICE: CPT | Mod: S$PBB,,, | Performed by: NEUROLOGICAL SURGERY

## 2017-07-06 NOTE — PROGRESS NOTES
Chief Complaint   Patient presents with    Stroke        Carol Yadav is a 81 y.o. female with a history of multiple medical diagnoses as listed below that presents for evaluation of stroke. She has been feeling that her strength has been getting worse progressively since she was discharged from rehab. She says that she still walks often and she has been exercising about three days per week but despite her level of activity she has been feeling generalized weakness and malaise. She says that she has been seen in the past by outpatient therapy but has not felt that she has made any improvement since she was discharges. She had discussed this with her PCP and has not felt that there has been any solution uncovered to this point. She has been compliant with all of her medications and has been maintaining a good blood pressure.    Interval History  07/05/2017  She continues to feel globally weak. She says that overall she feels that all of her residual problems have continued to worsen. She feels that her walking and her balance has declines. She has been getting weaker since last seen as well.    PAST MEDICAL HISTORY:  Past Medical History:   Diagnosis Date    Allergic rhinitis     Elevated blood pressure reading without diagnosis of hypertension     Hormone replacement therapy (postmenopausal)     Hyperlipidemia     Stroke        PAST SURGICAL HISTORY:  Past Surgical History:   Procedure Laterality Date    APPENDECTOMY      EYE SURGERY      hai cataract surgery    OOPHORECTOMY      TONSILLECTOMY         SOCIAL HISTORY:  Social History     Social History    Marital status:      Spouse name: N/A    Number of children: N/A    Years of education: N/A     Occupational History    Not on file.     Social History Main Topics    Smoking status: Former Smoker     Years: 45.00     Types: Cigarettes    Smokeless tobacco: Not on file      Comment: quit smoking when 52 yrs old    Alcohol use 7.0 oz/week      14 drink(s) per week    Drug use: No    Sexual activity: Not on file     Other Topics Concern    Not on file     Social History Narrative    No narrative on file       FAMILY HISTORY:  Family History   Problem Relation Age of Onset    Cancer Mother     Stroke Father        ALLERGIES AND MEDICATIONS: updated and reviewed.  Review of patient's allergies indicates:  No Known Allergies  Current Outpatient Prescriptions   Medication Sig Dispense Refill    acetaminophen (TYLENOL) 325 MG tablet Take 2 tablets (650 mg total) by mouth every 4 (four) hours as needed.  0    aspirin (ECOTRIN) 81 MG EC tablet Take 81 mg by mouth once daily.      atorvastatin (LIPITOR) 40 MG tablet Take 1 tablet (40 mg total) by mouth once daily. 90 tablet 2    calcium carbonate (OS-HAYLEE) 600 mg (1,500 mg) Tab Take 600 mg by mouth 2 (two) times daily with meals.      duloxetine (CYMBALTA) 60 MG capsule Take 1 capsule (60 mg total) by mouth once daily. 90 capsule 2    fluticasone (FLONASE) 50 mcg/actuation nasal spray 1 spray by Each Nare route 2 (two) times daily as needed for Rhinitis. 16 g 5    hydrocortisone 2.5 % cream Apply topically once daily.      lisinopril (PRINIVIL,ZESTRIL) 2.5 MG tablet Take 2.5 mg by mouth once daily.      oxybutynin (DITROPAN-XL) 5 MG TR24 TAKE 1 TABLET BY MOUTH AT BEDTIME 30 tablet 3     No current facility-administered medications for this visit.        Review of Systems   Constitutional: Negative for activity change, appetite change, fever and unexpected weight change.   HENT: Negative for trouble swallowing and voice change.    Eyes: Negative for photophobia and visual disturbance.   Respiratory: Negative for apnea and shortness of breath.    Cardiovascular: Negative for chest pain and leg swelling.   Gastrointestinal: Negative for constipation and nausea.   Genitourinary: Negative for difficulty urinating.   Musculoskeletal: Positive for gait problem. Negative for back pain and neck pain.   Skin:  Negative for color change and pallor.   Neurological: Positive for facial asymmetry, speech difficulty and weakness. Negative for dizziness, seizures, syncope and numbness.   Hematological: Negative for adenopathy.   Psychiatric/Behavioral: Negative for agitation, confusion and decreased concentration.       Neurologic Exam     Mental Status   Oriented to person, place, and time.   Registration: recalls 3 of 3 objects.   Attention: normal. Concentration: normal.   Speech: slurred   Level of consciousness: alert  Knowledge: good.     Cranial Nerves     CN II   Visual fields full to confrontation.   Right visual field deficit: none  Left visual field deficit: none     CN III, IV, VI   Pupils are equal, round, and reactive to light.  Extraocular motions are normal.   Right pupil: Size: 3 mm. Shape: regular. Accommodation: intact.   Left pupil: Size: 3 mm. Shape: regular. Accommodation: intact.   CN III: no CN III palsy  CN VI: no CN VI palsy  Nystagmus: none   Diplopia: none  Ophthalmoparesis: none  Upgaze: normal  Downgaze: normal  Conjugate gaze: present    CN V   Facial sensation intact.   Right facial sensation deficit: none  Left facial sensation deficit: none    CN VII   Facial expression full, symmetric.   Right facial weakness: none  Left facial weakness: central    CN VIII   CN VIII normal.     CN IX, X   CN IX normal.   CN X normal.   Palate: symmetric    CN XI   CN XI normal.   Right sternocleidomastoid strength: normal  Left sternocleidomastoid strength: normal  Right trapezius strength: normal  Left trapezius strength: normal    CN XII   CN XII normal.   Tongue deviation: left    Motor Exam   Muscle bulk: normal  Overall muscle tone: increased  Right arm tone: normal  Left arm tone: increased  Right leg tone: normal  Left leg tone: increased    Strength   Strength 5/5 except as noted.   Left deltoid: 4/5  Left biceps: 4/5  Left triceps: 4/5  Left wrist flexion: 4/5  Left wrist extension: 4/5  Left  interossei: 4/5  Left iliopsoas: 4/5  Left quadriceps: 4/5  Left hamstrin/5  Left glutei: 4/5  Left anterior tibial: 4/5  Left posterior tibial: 4/5  Left peroneal: 4/5  Left gastroc: 4/5    Sensory Exam   Right arm light touch: normal  Left arm light touch: normal  Right leg light touch: normal  Left leg light touch: normal  Right arm vibration: normal  Left arm vibration: normal  Right leg vibration: normal  Left leg vibration: normal  Right arm proprioception: normal  Left arm proprioception: normal  Right leg proprioception: normal  Left leg proprioception: normal  Right arm pinprick: normal  Left arm pinprick: normal  Right leg pinprick: normal  Left leg pinprick: normal    Gait, Coordination, and Reflexes     Gait  Gait: normal    Coordination   Romberg: negative  Finger to nose coordination: normal  Heel to shin coordination: normal  Tandem walking coordination: normal    Tremor   Resting tremor: absent    Reflexes   Right brachioradialis: 2+  Left brachioradialis: 3+  Right biceps: 2+  Left biceps: 3+  Right triceps: 2+  Left triceps: 3+  Right patellar: 2+  Left patellar: 3+  Right achilles: 2+  Left achilles: 3+  Right plantar: normal  Left plantar: equivocal      Physical Exam   Constitutional: She is oriented to person, place, and time. She appears well-developed and well-nourished.   HENT:   Head: Normocephalic and atraumatic.   Eyes: EOM are normal. Pupils are equal, round, and reactive to light.   Neck: Normal range of motion.   Cardiovascular: Normal rate and intact distal pulses.    Pulmonary/Chest: Effort normal. No apnea. No respiratory distress.   Musculoskeletal: Normal range of motion.   Neurological: She is alert and oriented to person, place, and time. She has a normal Finger-Nose-Finger Test, a normal Heel to Shin Test, a normal Romberg Test and a normal Tandem Gait Test. Gait normal.   Reflex Scores:       Tricep reflexes are 2+ on the right side and 3+ on the left side.       Bicep  "reflexes are 2+ on the right side and 3+ on the left side.       Brachioradialis reflexes are 2+ on the right side and 3+ on the left side.       Patellar reflexes are 2+ on the right side and 3+ on the left side.       Achilles reflexes are 2+ on the right side and 3+ on the left side.  Skin: Skin is warm and dry.   Psychiatric: She has a normal mood and affect. Her behavior is normal. Thought content normal. Her speech is slurred.   Vitals reviewed.      Vitals:    07/05/17 1533   BP: 120/78   BP Location: Left arm   Patient Position: Sitting   BP Method: Manual   Pulse: 104   Weight: 66.8 kg (147 lb 4.3 oz)   Height: 5' 2" (1.575 m)       Assessment & Plan:    Problem List Items Addressed This Visit     Hemiparesis affecting left side as late effect of stroke    CVA (cerebral vascular accident)    Overview     L sided chronic weakness. LUE > LLE         Impaired mobility and ADLs    Gait instability    Physical deconditioning      Other Visit Diagnoses     Weakness    -  Primary    Relevant Orders    MRI Brain Without Contrast          Follow-up: Return in about 3 months (around 10/5/2017).   More than 50% of this 25 minute encounter was spent in counseling and coordinating care.        "

## 2017-07-12 ENCOUNTER — TELEPHONE (OUTPATIENT)
Dept: FAMILY MEDICINE | Facility: CLINIC | Age: 82
End: 2017-07-12

## 2017-07-12 NOTE — TELEPHONE ENCOUNTER
Pt has OV with you on 7/24 and Dr Ruiz on 7/27; wants to know if she should reschedule her OV with you until after she sees neurology; please advise

## 2017-07-12 NOTE — TELEPHONE ENCOUNTER
----- Message from Colleen Vega sent at 7/12/2017  2:19 PM CDT -----  Contact: self  Patient called not sure if she should pushed back her OV until after she sees Dr Ruiz. Please contact her at [phone].    Thanks!

## 2017-07-13 ENCOUNTER — TELEPHONE (OUTPATIENT)
Dept: FAMILY MEDICINE | Facility: CLINIC | Age: 82
End: 2017-07-13

## 2017-07-13 NOTE — TELEPHONE ENCOUNTER
----- Message from Charu Jauregui sent at 7/13/2017  2:44 PM CDT -----  Contact: self  Pt returning call.. Please call [phone].. Thanks

## 2017-07-17 ENCOUNTER — HOSPITAL ENCOUNTER (OUTPATIENT)
Dept: RADIOLOGY | Facility: HOSPITAL | Age: 82
Discharge: HOME | End: 2017-07-17
Attending: NEUROLOGICAL SURGERY
Payer: MEDICARE

## 2017-07-17 DIAGNOSIS — R53.1 WEAKNESS: ICD-10-CM

## 2017-07-17 PROCEDURE — 70551 MRI BRAIN STEM W/O DYE: CPT | Mod: 26,,, | Performed by: RADIOLOGY

## 2017-07-17 PROCEDURE — 70551 MRI BRAIN STEM W/O DYE: CPT | Mod: TC

## 2017-07-18 DIAGNOSIS — F33.42 RECURRENT MAJOR DEPRESSIVE DISORDER, IN FULL REMISSION: Primary | ICD-10-CM

## 2017-07-18 RX ORDER — TRAZODONE HYDROCHLORIDE 50 MG/1
50 TABLET ORAL NIGHTLY
Qty: 30 TABLET | Refills: 1 | Status: SHIPPED | OUTPATIENT
Start: 2017-07-18 | End: 2017-08-01

## 2017-07-18 NOTE — TELEPHONE ENCOUNTER
----- Message from Leslie Morin sent at 7/18/2017 11:50 AM CDT -----  Contact: Parviz/[facility]/[phone]  Parviz states that the patient has been having restless nights. She states that the patient is requesting a prescription for trazodone tablet 25 mg. Thank you.

## 2017-07-19 ENCOUNTER — TELEPHONE (OUTPATIENT)
Dept: FAMILY MEDICINE | Facility: CLINIC | Age: 82
End: 2017-07-19

## 2017-07-19 NOTE — TELEPHONE ENCOUNTER
----- Message from Airam Burrows sent at 7/19/2017 10:50 AM CDT -----  Contact: self  Pt requesting a call back regarding a sleep aid.  Please call pt at .    Thanks

## 2017-07-19 NOTE — TELEPHONE ENCOUNTER
Patient notified medication approved and sent to pharmacy on 7/18. Patient verbalized understanding

## 2017-08-01 ENCOUNTER — OFFICE VISIT (OUTPATIENT)
Dept: FAMILY MEDICINE | Facility: CLINIC | Age: 82
End: 2017-08-01
Payer: MEDICARE

## 2017-08-01 VITALS
BODY MASS INDEX: 28.24 KG/M2 | TEMPERATURE: 98 F | DIASTOLIC BLOOD PRESSURE: 72 MMHG | HEART RATE: 96 BPM | HEIGHT: 62 IN | OXYGEN SATURATION: 96 % | WEIGHT: 153.44 LBS | SYSTOLIC BLOOD PRESSURE: 140 MMHG | RESPIRATION RATE: 14 BRPM

## 2017-08-01 DIAGNOSIS — R53.82 CHRONIC FATIGUE: Primary | ICD-10-CM

## 2017-08-01 DIAGNOSIS — I10 HYPERTENSION, WELL CONTROLLED: ICD-10-CM

## 2017-08-01 DIAGNOSIS — F41.8 ANXIETY WITH DEPRESSION: ICD-10-CM

## 2017-08-01 DIAGNOSIS — I69.354 HEMIPARESIS AFFECTING LEFT SIDE AS LATE EFFECT OF STROKE: ICD-10-CM

## 2017-08-01 DIAGNOSIS — R41.3 MEMORY LOSS: ICD-10-CM

## 2017-08-01 PROCEDURE — 99214 OFFICE O/P EST MOD 30 MIN: CPT | Mod: S$GLB,,, | Performed by: FAMILY MEDICINE

## 2017-08-01 PROCEDURE — 1126F AMNT PAIN NOTED NONE PRSNT: CPT | Mod: S$GLB,,, | Performed by: FAMILY MEDICINE

## 2017-08-01 PROCEDURE — 99499 UNLISTED E&M SERVICE: CPT | Mod: S$PBB,,, | Performed by: FAMILY MEDICINE

## 2017-08-01 PROCEDURE — 1159F MED LIST DOCD IN RCRD: CPT | Mod: S$GLB,,, | Performed by: FAMILY MEDICINE

## 2017-08-01 PROCEDURE — 99999 PR PBB SHADOW E&M-EST. PATIENT-LVL III: CPT | Mod: PBBFAC,,, | Performed by: FAMILY MEDICINE

## 2017-08-01 RX ORDER — TRAZODONE HYDROCHLORIDE 50 MG/1
25 TABLET ORAL NIGHTLY
Qty: 15 TABLET | Refills: 11 | Status: SHIPPED | OUTPATIENT
Start: 2017-08-01 | End: 2017-08-15

## 2017-08-01 NOTE — PROGRESS NOTES
Subjective:       Patient ID: Carol Yadav is a 81 y.o. female.    Chief Complaint: Fatigue    HPI    Depression with anxiety and sleep disturbance - pt overall feels better after stopping trazodone, but she cannot sleep when she stops the trazodone, so her nurse called and had her restart it.     So she feels that she must choose between being overly fatigue, vs not sleeping.     Pt does feel that her cymbalta is working very well and that she gets along with others much better. She would like to continue this and experiences no side effects.     She also feels that her memory loss is worsening.     Elevated blood pressure - pt normally has very good blood pressure, but it is elevated today. She denies any sxs.         Current Outpatient Prescriptions on File Prior to Visit   Medication Sig Dispense Refill    acetaminophen (TYLENOL) 325 MG tablet Take 2 tablets (650 mg total) by mouth every 4 (four) hours as needed.  0    aspirin (ECOTRIN) 81 MG EC tablet Take 81 mg by mouth once daily.      atorvastatin (LIPITOR) 40 MG tablet Take 1 tablet (40 mg total) by mouth once daily. 90 tablet 2    calcium carbonate (OS-HAYLEE) 600 mg (1,500 mg) Tab Take 600 mg by mouth 2 (two) times daily with meals.      duloxetine (CYMBALTA) 60 MG capsule Take 1 capsule (60 mg total) by mouth once daily. 90 capsule 2    fluticasone (FLONASE) 50 mcg/actuation nasal spray 1 spray by Each Nare route 2 (two) times daily as needed for Rhinitis. 16 g 5    hydrocortisone 2.5 % cream Apply topically once daily.      lisinopril (PRINIVIL,ZESTRIL) 2.5 MG tablet Take 2.5 mg by mouth once daily.      oxybutynin (DITROPAN-XL) 5 MG TR24 TAKE 1 TABLET BY MOUTH AT BEDTIME 30 tablet 3    trazodone (DESYREL) 50 MG tablet Take 1 tablet (50 mg total) by mouth every evening. 30 tablet 1     No current facility-administered medications on file prior to visit.        Past Medical History:   Diagnosis Date    Allergic rhinitis     Elevated blood  pressure reading without diagnosis of hypertension     Hormone replacement therapy (postmenopausal)     Hyperlipidemia     Stroke        Family History   Problem Relation Age of Onset    Cancer Mother     Stroke Father         reports that she has quit smoking. Her smoking use included Cigarettes. She quit after 45.00 years of use. She has never used smokeless tobacco. She reports that she drinks about 7.0 oz of alcohol per week . She reports that she does not use drugs.    Review of Systems   Respiratory: Negative for cough and shortness of breath.    Cardiovascular: Negative for chest pain and palpitations.   Gastrointestinal: Negative for vomiting.       Objective:     Vitals:    08/01/17 1342   BP: (!) 140/72   Pulse:    Resp:    Temp:         Physical Exam   Constitutional: She appears well-developed. No distress.   HENT:   Head: Normocephalic and atraumatic.   Eyes: Conjunctivae are normal. No scleral icterus.   Cardiovascular: Normal rate.  Exam reveals no gallop and no friction rub.    No murmur heard.  Pulmonary/Chest: Effort normal and breath sounds normal. No respiratory distress. She has no wheezes. She has no rales. She exhibits no tenderness.   Neurological: She is alert.   LUE weakness proximal to distal.     LLE weakness - uses a hemiwalker    R sided facial droop   Skin: She is not diaphoretic.   Psychiatric: She has a normal mood and affect. Her behavior is normal.   Vitals reviewed.      Assessment:       1. Chronic fatigue    2. Hypertension, well controlled    3. Anxiety with depression    4. Hemiparesis affecting left side as late effect of stroke    5. Memory loss        Plan:       Diagnoses and all orders for this visit:    Chronic fatigue  - pt is frustrated with her chronic fatigue. Possibly exacerbated by trazodone? (pt had this before trazodone was initiated). Will try cutting dose of trazodone to 25mg to hopefully reach a happy medium. Pt to call in 3 weeks from now and report how  she is doing.      Hypertension, well controlled  BP usually well controlled - pt will have aid watch BP 3x a week.     Anxiety with depression  Well managed - will continue cymbalta and trazodone as above    Hemiparesis affecting left side as late effect of stroke  Chronic - stable    Memory loss  Worsening? MOCA done ~ 6 months ago was normal. Likely early onset dementia. Sees neurology.           Return in about 6 weeks (around 9/12/2017) for memory loss, elevated blood pressure.        Pt verbalized understanding and agreed with our plan.

## 2017-08-03 ENCOUNTER — TELEPHONE (OUTPATIENT)
Dept: FAMILY MEDICINE | Facility: CLINIC | Age: 82
End: 2017-08-03

## 2017-08-03 DIAGNOSIS — Z86.73 H/O: CVA (CEREBROVASCULAR ACCIDENT): Primary | ICD-10-CM

## 2017-08-03 NOTE — TELEPHONE ENCOUNTER
----- Message from Ashtyn Machuca sent at 8/3/2017 10:27 AM CDT -----  Patient states she can no longer see Dr Ruiz because her brother became too mad after they were turned away when late for her appt. Please call at [phone] Thank you!

## 2017-08-15 DIAGNOSIS — F33.42 RECURRENT MAJOR DEPRESSIVE DISORDER, IN FULL REMISSION: ICD-10-CM

## 2017-08-15 DIAGNOSIS — N39.41 URGE INCONTINENCE: ICD-10-CM

## 2017-08-15 RX ORDER — TRAZODONE HYDROCHLORIDE 50 MG/1
TABLET ORAL
Qty: 28 TABLET | Refills: 0 | Status: SHIPPED | OUTPATIENT
Start: 2017-08-15 | End: 2017-09-12 | Stop reason: SDUPTHER

## 2017-08-15 RX ORDER — OXYBUTYNIN CHLORIDE 5 MG/1
TABLET, EXTENDED RELEASE ORAL
Qty: 30 TABLET | Refills: 1 | Status: SHIPPED | OUTPATIENT
Start: 2017-08-15 | End: 2017-10-09 | Stop reason: SDUPTHER

## 2017-09-12 DIAGNOSIS — F33.42 RECURRENT MAJOR DEPRESSIVE DISORDER, IN FULL REMISSION: ICD-10-CM

## 2017-09-12 RX ORDER — TRAZODONE HYDROCHLORIDE 50 MG/1
TABLET ORAL
Qty: 28 TABLET | Refills: 5 | Status: SHIPPED | OUTPATIENT
Start: 2017-09-12 | End: 2018-02-28 | Stop reason: SDUPTHER

## 2017-09-15 ENCOUNTER — OFFICE VISIT (OUTPATIENT)
Dept: FAMILY MEDICINE | Facility: CLINIC | Age: 82
End: 2017-09-15
Payer: MEDICARE

## 2017-09-15 VITALS
RESPIRATION RATE: 14 BRPM | TEMPERATURE: 98 F | DIASTOLIC BLOOD PRESSURE: 82 MMHG | HEART RATE: 111 BPM | WEIGHT: 163.13 LBS | BODY MASS INDEX: 30.02 KG/M2 | SYSTOLIC BLOOD PRESSURE: 128 MMHG | HEIGHT: 62 IN | OXYGEN SATURATION: 93 %

## 2017-09-15 DIAGNOSIS — G47.9 SLEEP DISTURBANCE: ICD-10-CM

## 2017-09-15 DIAGNOSIS — I10 HYPERTENSION, WELL CONTROLLED: ICD-10-CM

## 2017-09-15 DIAGNOSIS — F41.8 ANXIETY WITH DEPRESSION: Primary | ICD-10-CM

## 2017-09-15 PROCEDURE — 99499 UNLISTED E&M SERVICE: CPT | Mod: S$PBB,,, | Performed by: FAMILY MEDICINE

## 2017-09-15 PROCEDURE — 1159F MED LIST DOCD IN RCRD: CPT | Mod: S$GLB,,, | Performed by: FAMILY MEDICINE

## 2017-09-15 PROCEDURE — 99999 PR PBB SHADOW E&M-EST. PATIENT-LVL IV: CPT | Mod: PBBFAC,,, | Performed by: FAMILY MEDICINE

## 2017-09-15 PROCEDURE — 99214 OFFICE O/P EST MOD 30 MIN: CPT | Mod: S$GLB,,, | Performed by: FAMILY MEDICINE

## 2017-09-15 PROCEDURE — 3079F DIAST BP 80-89 MM HG: CPT | Mod: S$GLB,,, | Performed by: FAMILY MEDICINE

## 2017-09-15 PROCEDURE — 3008F BODY MASS INDEX DOCD: CPT | Mod: S$GLB,,, | Performed by: FAMILY MEDICINE

## 2017-09-15 PROCEDURE — 3074F SYST BP LT 130 MM HG: CPT | Mod: S$GLB,,, | Performed by: FAMILY MEDICINE

## 2017-09-15 PROCEDURE — 1126F AMNT PAIN NOTED NONE PRSNT: CPT | Mod: S$GLB,,, | Performed by: FAMILY MEDICINE

## 2017-09-15 NOTE — PROGRESS NOTES
Subjective:       Patient ID: Carol Yadav is a 81 y.o. female.    Chief Complaint: Hypertension    HPI    HTN F/u:    Adherence with meds? Yes  Home BP range: 110-120s/80s  Side effects: No  Following a low salt diet ? No, she has difficulty as her place of residence makes food for her.   Current exercise? no  Need of refills? No  Recent changes in blood pressure medication: No  Patient has been in pain or taking decongestants/anti-inflammatory/OCP/SSRI medication: yes - snri  Patient has other symptoms (headache, weakness,  blurry vision, chest pain, palpitations, etc): yes - weakness - chronic - stable    Weight gain - + 10 lbs, pt attributes this to huge chocolate intake.       Sleeping difficulty - pt is doing ok with 1/2 trazodone. She is interested in taking a full 50mg, which is actually the prescribed dose. No side effects.     Anxiety - pt is doing well with cymbalta. Less angry outbursts. No side effects.     Current Outpatient Prescriptions on File Prior to Visit   Medication Sig Dispense Refill    acetaminophen (TYLENOL) 325 MG tablet Take 2 tablets (650 mg total) by mouth every 4 (four) hours as needed.  0    aspirin (ECOTRIN) 81 MG EC tablet Take 81 mg by mouth once daily.      atorvastatin (LIPITOR) 40 MG tablet Take 1 tablet (40 mg total) by mouth once daily. 90 tablet 2    calcium carbonate (OS-HAYLEE) 600 mg (1,500 mg) Tab Take 600 mg by mouth 2 (two) times daily with meals.      duloxetine (CYMBALTA) 60 MG capsule Take 1 capsule (60 mg total) by mouth once daily. 90 capsule 2    fluticasone (FLONASE) 50 mcg/actuation nasal spray 1 spray by Each Nare route 2 (two) times daily as needed for Rhinitis. 16 g 5    hydrocortisone 2.5 % cream Apply topically once daily.      lisinopril (PRINIVIL,ZESTRIL) 2.5 MG tablet Take 2.5 mg by mouth once daily.      oxybutynin (DITROPAN-XL) 5 MG TR24 TAKE 1 TABLET BY MOUTH AT BEDTIME 30 tablet 1    trazodone (DESYREL) 50 MG tablet TAKE 1 TABLET BY MOUTH EVERY  EVENING 28 tablet 5     No current facility-administered medications on file prior to visit.        Past Medical History:   Diagnosis Date    Allergic rhinitis     Elevated blood pressure reading without diagnosis of hypertension     Hormone replacement therapy (postmenopausal)     Hyperlipidemia     Stroke        Family History   Problem Relation Age of Onset    Cancer Mother     Stroke Father         reports that she has quit smoking. Her smoking use included Cigarettes. She quit after 45.00 years of use. She has never used smokeless tobacco. She reports that she drinks about 7.0 oz of alcohol per week . She reports that she does not use drugs.    Review of Systems  see hpi  Objective:     Vitals:    09/15/17 1308   BP: 128/82   Pulse: (!) 111   Resp: 14   Temp: 98 °F (36.7 °C)        Physical Exam   Constitutional: She appears well-developed. No distress.   HENT:   Head: Normocephalic and atraumatic.   Eyes: Conjunctivae are normal. No scleral icterus.   Cardiovascular: Normal rate.  Exam reveals no gallop and no friction rub.    No murmur heard.  Pulmonary/Chest: Effort normal and breath sounds normal. No respiratory distress. She has no wheezes. She has no rales. She exhibits no tenderness.   Neurological: She is alert.   LUE weakness proximal to distal.     LLE weakness - uses a hemiwalker    R sided facial droop   Skin: She is not diaphoretic.   Psychiatric: She has a normal mood and affect. Her behavior is normal.   Vitals reviewed.      Assessment:       1. Anxiety with depression    2. Sleep disturbance    3. Hypertension, well controlled        Plan:       Carol LONGORIA was seen today for hypertension.    Diagnoses and all orders for this visit:    Anxiety with depression  - Chronic - stable     Pt is doing well on current therapy and is requesting a refill. No side effects noted. Will continue current therapy.         Sleep disturbance  - Chronic - stable     Pt is doing well on current therapy and is  requesting a refill. No side effects noted. Will continue current therapy.         Hypertension, well controlled    - Chronic - stable     Pt is doing well on current therapy and is requesting a refill. No side effects noted. Will continue current therapy.    Pt will work on cutting back chocolate intake to curb weight gain.              Return in about 3 months (around 12/15/2017) for Hypertension, sleep disturbance.      Pt verbalized understanding and agreed with our plan.

## 2017-09-25 ENCOUNTER — TELEPHONE (OUTPATIENT)
Dept: FAMILY MEDICINE | Facility: CLINIC | Age: 82
End: 2017-09-25

## 2017-09-25 DIAGNOSIS — R53.83 FATIGUE, UNSPECIFIED TYPE: Primary | ICD-10-CM

## 2017-09-25 DIAGNOSIS — I69.354 HEMIPARESIS AFFECTING LEFT SIDE AS LATE EFFECT OF STROKE: ICD-10-CM

## 2017-09-25 DIAGNOSIS — Z86.73 H/O: CVA (CEREBROVASCULAR ACCIDENT): ICD-10-CM

## 2017-09-25 NOTE — TELEPHONE ENCOUNTER
Left message for patient of information below.        Patient wants to know if you Dr. Hopkins have tried getting patient in to see neuro somewhere else , in [facility]?----- Message from Leslie Morin sent at 9/25/2017 12:21 PM CDT -----  Contact: Self/[phone]  Patient would like to know if she needs an appointment. Thank you.

## 2017-09-26 ENCOUNTER — TELEPHONE (OUTPATIENT)
Dept: FAMILY MEDICINE | Facility: CLINIC | Age: 82
End: 2017-09-26

## 2017-09-26 NOTE — TELEPHONE ENCOUNTER
----- Message from Colleen Vega sent at 9/26/2017 10:35 AM CDT -----  Contact: self  Patient called just to let Dr know that she has appt with Dr Daugherty @1:40pm on 9/29. She wanted to make sure that  can retrieve her records.    Thanks!

## 2017-09-29 ENCOUNTER — OFFICE VISIT (OUTPATIENT)
Dept: NEUROLOGY | Facility: CLINIC | Age: 82
End: 2017-09-29
Payer: MEDICARE

## 2017-09-29 VITALS
DIASTOLIC BLOOD PRESSURE: 82 MMHG | SYSTOLIC BLOOD PRESSURE: 161 MMHG | HEIGHT: 62 IN | WEIGHT: 166.88 LBS | BODY MASS INDEX: 30.71 KG/M2 | HEART RATE: 100 BPM

## 2017-09-29 DIAGNOSIS — R41.3 MEMORY CHANGE: ICD-10-CM

## 2017-09-29 DIAGNOSIS — R25.1 TREMOR: ICD-10-CM

## 2017-09-29 DIAGNOSIS — I63.9 CEREBROVASCULAR ACCIDENT (CVA), UNSPECIFIED MECHANISM: Primary | ICD-10-CM

## 2017-09-29 DIAGNOSIS — I69.354 HEMIPARESIS AFFECTING LEFT SIDE AS LATE EFFECT OF STROKE: ICD-10-CM

## 2017-09-29 DIAGNOSIS — Z78.9 IMPAIRED MOBILITY AND ADLS: ICD-10-CM

## 2017-09-29 DIAGNOSIS — Z74.09 IMPAIRED MOBILITY AND ADLS: ICD-10-CM

## 2017-09-29 PROCEDURE — 99999 PR PBB SHADOW E&M-EST. PATIENT-LVL IV: CPT | Mod: PBBFAC,,, | Performed by: PHYSICIAN ASSISTANT

## 2017-09-29 PROCEDURE — 99499 UNLISTED E&M SERVICE: CPT | Mod: S$PBB,,, | Performed by: PHYSICIAN ASSISTANT

## 2017-09-29 PROCEDURE — 3008F BODY MASS INDEX DOCD: CPT | Mod: S$GLB,,, | Performed by: PHYSICIAN ASSISTANT

## 2017-09-29 PROCEDURE — 3077F SYST BP >= 140 MM HG: CPT | Mod: S$GLB,,, | Performed by: PHYSICIAN ASSISTANT

## 2017-09-29 PROCEDURE — 99214 OFFICE O/P EST MOD 30 MIN: CPT | Mod: S$GLB,,, | Performed by: PHYSICIAN ASSISTANT

## 2017-09-29 PROCEDURE — 3079F DIAST BP 80-89 MM HG: CPT | Mod: S$GLB,,, | Performed by: PHYSICIAN ASSISTANT

## 2017-09-29 PROCEDURE — 1159F MED LIST DOCD IN RCRD: CPT | Mod: S$GLB,,, | Performed by: PHYSICIAN ASSISTANT

## 2017-09-29 NOTE — LETTER
September 29, 2017      Jaime Hopkins MD  [address] [facility] [place] [address]           [facility]  [address]  [place] [address]  Phone: [phone]  Fax: [phone]          Patient: Carol Yadav   MR Number: [id number]   YOB: 1935   Date of Visit: 9/29/2017       Dear Dr. Jaime Hopkins:    Thank you for referring Carol Yadav to me for evaluation. Attached you will find relevant portions of my assessment and plan of care.    If you have questions, please do not hesitate to call me. I look forward to following Craol Yadav along with you.    Sincerely,    Angelica Daugherty PA-C    Enclosure  CC:  No Recipients    If you would like to receive this communication electronically, please contact [email or web address] or [phone] to request more information on [facility] access.    For providers and/or their staff who would like to refer a patient to [facility], please contact us through our one-stop-shop provider referral line, [facility] [place], at [phone].    If you feel you have received this communication in error or would no longer like to receive these types of communications, please e-mail [email or web address]

## 2017-09-29 NOTE — PROGRESS NOTES
[facility], Department of Neurology   [facility] [place] [address]  Phone:[phone]  Fax: [phone]    Patient name: Carol Yadav  : 1935  MRN: [id number]    2017      Chief complaint:   Chief Complaint   Patient presents with    Consult       PCP: Jaime Hopkins MD    Assessment:     ICD-10-CM ICD-9-CM    1. Cerebrovascular accident (CVA), unspecified mechanism I63.9 434.91 Vitamin B12      Methylmalonic acid, serum      TSH      T4      CK      CBC auto differential      Comprehensive metabolic panel      Folate      Ambulatory Referral to Neurology      Ambulatory consult to Physical Therapy      Ambulatory consult to Occupational Therapy   2. Hemiparesis affecting left side as late effect of stroke I69.354 438.20    3. Impaired mobility and ADLs Z74.09 799.89 Ambulatory consult to Physical Therapy      Ambulatory consult to Occupational Therapy   4. Memory change R41.3 780.93 Ambulatory Referral to Neurology   5. Tremor R25.1 781.0 Ambulatory Referral to Neurology      Ambulatory consult to Occupational Therapy       Dx: hx R BG/CR infarct 2014  Stroke risk factors: HLD, HTN     She notes increased weakness in the past couple of yrs following some initial improvement of stroke symptoms. She has residual left sided weakness which she feels has worsened. Recent MRI brain without new infarct. On exam she has 5/5 strength RUE and RLE. She has left sided weakness as detailed below. I do not suspect myopathy, as right sided strength appears normal. MND unlikely. Would like to have her complete therapy and see if strength improves. She also notes some difficulty with fine motor tasks (which OT may help), she also has tremor of this hand which concerns her. I will have her see Mrs. Sarkar for tremor and memory problems. If the weakness worsens in spite of therapy, may consider EMG.       Plan:  -labs today   -continue current medications   -refer to OT/PT  -will place  referral to Carol Sarkar NP for eval of memory problems along with tremor   -Continue f/u with PCP regarding stroke risk factor modification as outlined below and discussed with patient.   -RTC 3 mo    Stroke education: Continue to address with PCP these risk factor guidelines: SBP<140, FBS<100, LDL<70 mg/dL, antiplatelet. Continue exercise as tolerated, avoid tobacco, abstain from ETOH (<3 bevs optimal a week), stay well hydrated, avoid PO intake of NaCl, regular sleep. Will have patient continue to address this with PCP. Discussed CVA symptoms with patient at length, etiology of CVA and need to remain compliant on all medications. Mentioned that medication is preventative however nothing is absolute. Robust recovery first 4-6 months up to a year for noticeable improvement. If symptomatic, will need to report to ED in <2h for prompt eval. Patient voiced understanding.  All questions answered. The patient indicates understanding of these issues and agrees to the plan.    HPI:  Carol Yadav is a 81 yo female with HLD, HTN and history of right BG/CR infarct and asymptomatic left M1 stenosis. Currently on ASA 81 and Lipitor 40.    She had residual left sided weakness. Pt notes initial improvement following the stroke. About 6 mos later is when she believes she has progressively gotten weaker, mostly on the left side. She had repeat MRI brain 7/17/17 which showed no new strokes.     She notes her walking has declined. Feels her speech is lower volume. She needs to pull on things to help her sit up. Denies falls. Her muscles feel like she has done something very strenuously. This is more recent- began about 1 yr ago.     Pt notes memory loss, mostly short-term.   Denies chewing, swallowing, or breathing difficulty.       Cerebrovascular history:   11/7-11/11/17  HOSPITAL COURSE: pt admitted with acute cva on 11-7-14.  Progression noted in house.  Not a candidate for TPA, beyond time window.  Dense paralysis left arm,  3/5 weakness left leg, some mild dysphagia.  Started on asa, statin, low dose lisinopril.  BP stable in 140-145 range.  Transferred to in patient rehab on 11-  Echo unremarkable, mra no vascular occlusion, other than left mca, not amenable to intervention     History of Present Illness:  Patient is a 79 y.o. female who presents to clinic today. She has a h/o dyslipidemia, allergies. She had been on Evista (raloxifene). There was no h/o hypertension, DM, CAD, or prior stroke. She was admitted to [facility] on 11-7-14 with 2 days of LE weakness, heaviness, and left facial droop. Progression noted while in the hospital. Evaluation was notable for an acute  right BG/CR infarct on MRI and a asymptomatic left M1 stenosis. She was started on ASA and Lipitor. She was transferred for inpatient rehab on 11-11-14 and then to NH on 12-16-14. She has been home about 2 weeks.         Medications:   Current Outpatient Prescriptions   Medication Sig Dispense Refill    acetaminophen (TYLENOL) 325 MG tablet Take 2 tablets (650 mg total) by mouth every 4 (four) hours as needed.  0    aspirin (ECOTRIN) 81 MG EC tablet Take 81 mg by mouth once daily.      atorvastatin (LIPITOR) 40 MG tablet Take 1 tablet (40 mg total) by mouth once daily. 90 tablet 2    calcium carbonate (OS-HAYLEE) 600 mg (1,500 mg) Tab Take 600 mg by mouth 2 (two) times daily with meals.      duloxetine (CYMBALTA) 60 MG capsule Take 1 capsule (60 mg total) by mouth once daily. 90 capsule 2    fluticasone (FLONASE) 50 mcg/actuation nasal spray 1 spray by Each Nare route 2 (two) times daily as needed for Rhinitis. 16 g 5    hydrocortisone 2.5 % cream Apply topically once daily.      lisinopril (PRINIVIL,ZESTRIL) 2.5 MG tablet Take 2.5 mg by mouth once daily.      oxybutynin (DITROPAN-XL) 5 MG TR24 TAKE 1 TABLET BY MOUTH AT BEDTIME 30 tablet 1    trazodone (DESYREL) 50 MG tablet TAKE 1 TABLET BY MOUTH EVERY EVENING 28 tablet 5     No current facility-administered  medications for this visit.        Allergies:  Review of patient's allergies indicates:  No Known Allergies    PMHx:  Past Medical History:   Diagnosis Date    Allergic rhinitis     Elevated blood pressure reading without diagnosis of hypertension     Hormone replacement therapy (postmenopausal)     Hyperlipidemia     Stroke      Past Surgical History:   Procedure Laterality Date    APPENDECTOMY      EYE SURGERY      hai cataract surgery    OOPHORECTOMY      TONSILLECTOMY         Fhx:  Family History   Problem Relation Age of Onset    Cancer Mother     Stroke Father        Shx:   Social History     Social History    Marital status:      Spouse name: N/A    Number of children: N/A    Years of education: N/A     Occupational History    Not on file.     Social History Main Topics    Smoking status: Former Smoker     Years: 45.00     Types: Cigarettes    Smokeless tobacco: Never Used      Comment: quit smoking when 52 yrs old    Alcohol use 7.0 oz/week     14 Standard drinks or equivalent per week    Drug use: No    Sexual activity: Not on file     Other Topics Concern    Not on file     Social History Narrative    No narrative on file       Labs:  Results for COLIN HOOVER (MRN [id number]) as of 9/29/2017 14:02   Ref. Range 3/10/2017 14:07   Cholesterol Latest Ref Range: 120 - 199 mg/dL 183   HDL Latest Ref Range: 40 - 75 mg/dL 58   LDL Cholesterol Latest Ref Range: 63.0 - 159.0 mg/dL 97.4   Total Cholesterol/HDL Ratio Latest Ref Range: 2.0 - 5.0  3.2   Triglycerides Latest Ref Range: 30 - 150 mg/dL 138   Results for COLIN HOOVER (MRN [id number]) as of 9/29/2017 14:02   Ref. Range 3/10/2017 14:07   Hemoglobin A1C Latest Ref Range: 4.5 - 6.2 % 5.8   Estimated Avg Glucose Latest Ref Range: 68 - 131 mg/dL 120   TSH Latest Ref Range: 0.400 - 4.000 uIU/mL 2.022       Imaging:  MRI brain 7/17/17  Impression      There is no evidence acute intracranial process. There is evidence for prior right frontal  "infarct and periventricular white matter changes.         ROS:   Review Of Systems (questions asked, positive or additions in BOLD)  Gen: Weight change, fatigue/malaise, pyrexia   HEENT: Tinnitus, headache,  blurred vision, eye pain, diplopia, photophobia   Card: Palpitations, CP   Pulm: SOB   Vas: Easy bruising, easy bleeding   GI: N/V/D/C, incontinence, hematemesis, hematochezia    : incontinence, hematuria   Endocrine: Temp intolerance, polyuria, polydipsia   M/S: Neck pain, myalgia, back pain, joint pain, falls    Neuro: PER HPI   PSY: Memory loss, confusion, depression, anxiety, trouble in sleep      Physical Exam:  BP (!) 161/82   Pulse 100   Ht 5' 2" (1.575 m)   Wt 75.7 kg (166 lb 14.2 oz)   BMI 30.52 kg/m²     Well developed, well nourished male  Extremities: no edema    NIH Stroke Scale:  Level of Consciousness: 0 - alert  LOC Questions: 0 - answers both correctly  LOC Commands: 0 - performs both correctly  Best Gaze: 0 - normal  Visual: 0 - no visual loss  Facial Palsy: 0 - normal  Motor Left Arm: 3 - no effort against gravity   Motor Right Arm: 0 - no drift  Motor Left Le - no drift  Motor Right Le - no drift  Limb Ataxia: 0 - absent  Sensory: 0 - normal  Best Language: 0 - no aphasia  Dysarthria: 0 - normal articulation  Extinction and Inattention: 0 - no neglect    NIH: 3  Mental status:                        Awake, alert and appropriately oriented                        Normal recent and remote memory                        Normal attention and concentration                        Normal speech and language                        Normal fund of knowledge                        No extinction  Cranial nerves:                        PERRLA                        EOMF without nystagmus                        VFF                        Normal facial sensation                        Normal facial movements                        Intact hearing bilaterally                        Palate elevates " symmetrically                        Normal SCM and trapezius strength                        Tongue midline  Motor:                        No pronator drift                        Normal FF movements bilaterally                        Normal muscle tone, bulk and power                        No abnormal movements  She has 5/5 strength throughout RUE and RLE. She has diffuse weakness of the LUE. She has 4/5 left HF and 5-/5 left quad strength, otherwise 5/5 LLE.   5/5 neck flexion and extension. Good orbicularis oris and orbicularis oculi strength.     No fasciculations noted.     Sensory                        Intact to LT  DTRs                        2+ and symmetric  Coordination                        Intact to FNF and HTS, unable to perform with RUE   Gait                        in wheelchair         Attending, Dr. Solomon, was available during today's encounter. Any change to plan along with cosign to appear in the EMR.       This document has been electronically signed by Angelica Daugherty PA-C on 9/29/2017, 1:47 PM. I have personally typed this message using the EMR.       Angelica Daugherty PA-C  Department of Neurology   [facility] [place], [place]

## 2017-10-09 DIAGNOSIS — I63.9 CEREBROVASCULAR ACCIDENT (CVA), UNSPECIFIED MECHANISM: ICD-10-CM

## 2017-10-09 DIAGNOSIS — N39.41 URGE INCONTINENCE: ICD-10-CM

## 2017-10-09 RX ORDER — OXYBUTYNIN CHLORIDE 5 MG/1
TABLET, EXTENDED RELEASE ORAL
Qty: 30 TABLET | Refills: 0 | Status: SHIPPED | OUTPATIENT
Start: 2017-10-09 | End: 2017-11-07 | Stop reason: SDUPTHER

## 2017-10-09 RX ORDER — ATORVASTATIN CALCIUM 40 MG/1
TABLET, FILM COATED ORAL
Qty: 30 TABLET | Refills: 0 | Status: SHIPPED | OUTPATIENT
Start: 2017-10-09 | End: 2017-11-07 | Stop reason: SDUPTHER

## 2017-10-09 RX ORDER — DULOXETIN HYDROCHLORIDE 60 MG/1
CAPSULE, DELAYED RELEASE ORAL
Qty: 30 CAPSULE | Refills: 0 | Status: SHIPPED | OUTPATIENT
Start: 2017-10-09 | End: 2017-11-01

## 2017-10-16 ENCOUNTER — CLINICAL SUPPORT (OUTPATIENT)
Dept: REHABILITATION | Facility: HOSPITAL | Age: 82
End: 2017-10-16
Payer: MEDICARE

## 2017-10-16 DIAGNOSIS — Z78.9 IMPAIRED MOBILITY AND ADLS: ICD-10-CM

## 2017-10-16 DIAGNOSIS — G81.94 LEFT HEMIPARESIS: ICD-10-CM

## 2017-10-16 DIAGNOSIS — Z74.09 IMPAIRED MOBILITY AND ADLS: ICD-10-CM

## 2017-10-16 DIAGNOSIS — Z86.73 H/O: CVA (CEREBROVASCULAR ACCIDENT): Primary | ICD-10-CM

## 2017-10-16 DIAGNOSIS — R53.81 PHYSICAL DECONDITIONING: ICD-10-CM

## 2017-10-16 PROCEDURE — G8987 SELF CARE CURRENT STATUS: HCPCS | Mod: CK,PN

## 2017-10-16 PROCEDURE — 97166 OT EVAL MOD COMPLEX 45 MIN: CPT | Mod: PN

## 2017-10-16 PROCEDURE — G8988 SELF CARE GOAL STATUS: HCPCS | Mod: CK,PN

## 2017-10-18 NOTE — PLAN OF CARE
OUTPATIENT OCCUPATIONAL THERAPY NEUROLOGICAL EVALUATION    Date: 12/16/17    Start Time:  205 pm  Stop Time:  300 pm  Visit 1 / 12 exp 12/9/17    Total Timed Minutes:  55  Total Timed Units:  0  Total Untimed Units:  1  Charges Billed/# of units:  1    Encounter Diagnoses   Name Primary?    Physical deconditioning     Impaired mobility and ADLs     H/O: CVA (cerebrovascular accident) Yes    Left hemiparesis      Past Medical History:   Diagnosis Date    Allergic rhinitis     Elevated blood pressure reading without diagnosis of hypertension     Hormone replacement therapy (postmenopausal)     Hyperlipidemia     Stroke      Past Surgical History:   Procedure Laterality Date    APPENDECTOMY      EYE SURGERY      hai cataract surgery    OOPHORECTOMY      TONSILLECTOMY           Complaints of Pain:  No c/o pain  What Increases Pain:  na  What Decreases Pain:  na    Cognition/Perception: Appears grossly intact  Hearing:  intact  Communication:  intact  Vision:  intact  Safety/Judgment:  intact  Insight into Deficits:  intact  Cognition:  Appears grossly WFL for daily tasks Alert and oriented x 3  Attention:  intact  Memory:  Generally appears intact  Problem Solving:  intact  Neglect:  intact    Comments:    Current living situation:Pt had a CVA in 2014. And has had IP and OP therapy  Most recently had OP therapy in spring of 2017. She currently resides in an assisted living apt at [facility]. She gets assistance with dressing, showering, cleaning and laundry. She also has most of her meals in the dining room. Her meds are also taken care of by a nurse TINO.  Transportation is provided by a [facility] bus.  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  Hobbies: previously acting and singing, currently watching NFL football and IPad.  [facility] has a chair exercise class that she participates in 3x a week M W F      UE Movement and Coordination:  Hand Domination:   right  Affected Extremity:  left  Subluxation:  Not noted.  Tone:  Hypotonic  Arm Reach:  Impaired/no Synergy  Hand Function:  Stabilizer    UE Strength ROM and comments:  L UE ARom  Shoulder flex 20; Ext 30; Abd 30 shoulder strength grossly 2-/5 sHOULDER prom FLEX 137 ER 20 IR 80  Elbow flex 80 3+/5; ext -40 2+/5  Wrist flex 50; ext 5  Sup 68 / pro 70  PROM elbow wrist and hand WFL   L 15# R 40  Lat pinch L 4# R 8#  9 hole peg test unable to complete any pegs with L   Sensation:  intact  Activities of Daily Living Functional Limitations:  Self Care:  Mod assist for dressingUB and Max for LB with fastners and bathing. Able to don T shirt with minimal asist and pull up elaistic waist pants with mod assist.  Transfer:  Mod I  Functional Mobility:  Ambulates with a  Cortez walker    Balance:  Static Sitting:  good   Static Standing:  good   Dynamic Sitting:  good   Dynamic Standing:  fair     Edema:  NO  Additional Comments:  Patient would like to increase functional use of her L hand  CMS Impairment/Limitation/Restriction for FOTO Cerebrovascular Disorders Survey  Status Limitation G-Code CMS Severity Modifier  Intake 53% 47% Current Status CK - At least 40 percent but less than 60 percent  Predicted 60% 40% Goal Status+ CK - At least 40 percent but less than 60 percent    Assessment: patient has a history of CVA with L sided weakness. She has some degree of active movement throughout her L UE. She has poor strength at her shoulder but fair elbow distally. She has significantly impaired coordination in her L hand that limits her functional use. She states that this has gotten worse. She was able to use her L hand to stabilze container to open them and support her ipad to plug it in, however now has to resort to using her teeth or thighs for most of these tasks. Pt appers to have the potential to gain some functional use of her left hand to increase function as a gross assist.Pt would benefit from skillet OT to  address defecits in ROM, functional use, weakness and adl impairments.    Plan:  Plan to see pt 2 x a week for 6 week POC expiring on 12/9/17 for ROM, Strengthening, Neuromuscular Reeducation, therapeutic activities and modalities as indicated.  Goals: 6 weeks  1. Instruct pt in HEP  2. Increase  strength to 25 # for increased functional use.  3. Pt able to hold jar or bottle in L hand to open with R  4. Pt able to hold Ipad with L hand and plug it in with R  5. Pt able to don t shirt following set up.  6. Increased coordination for adl's demonstrated by ability to remove 7/9 pegs from 9 hole peg test in 2 min.    Therapist's Name: SUDEEP Malone         I CERTIFY THE NEED FOR THESE SERVICES FURNISHED UNDER THIS PLAN OF TREATMENT AND WHILE UNDER MY CARE    Physician's comments: ________________________________________________________________________________________________________________________________________________      Physician's Name: ___________________________________

## 2017-10-19 ENCOUNTER — CLINICAL SUPPORT (OUTPATIENT)
Dept: REHABILITATION | Facility: HOSPITAL | Age: 82
End: 2017-10-19
Payer: MEDICARE

## 2017-10-19 DIAGNOSIS — I69.354 HEMIPARESIS AFFECTING LEFT SIDE AS LATE EFFECT OF STROKE: ICD-10-CM

## 2017-10-19 DIAGNOSIS — Z78.9 IMPAIRED MOBILITY AND ADLS: ICD-10-CM

## 2017-10-19 DIAGNOSIS — G81.94 LEFT HEMIPARESIS: ICD-10-CM

## 2017-10-19 DIAGNOSIS — Z74.09 IMPAIRED MOBILITY AND ADLS: ICD-10-CM

## 2017-10-19 DIAGNOSIS — R53.81 PHYSICAL DECONDITIONING: ICD-10-CM

## 2017-10-19 DIAGNOSIS — Z86.73 H/O: CVA (CEREBROVASCULAR ACCIDENT): Primary | ICD-10-CM

## 2017-10-19 PROCEDURE — 97110 THERAPEUTIC EXERCISES: CPT | Mod: PN

## 2017-10-19 PROCEDURE — 97112 NEUROMUSCULAR REEDUCATION: CPT | Mod: PN

## 2017-10-19 NOTE — PROGRESS NOTES
Patient:  Carol Yadav  [facility] #:  [id number]   Date of Note: 10/19/2017   Referring Physician:  Angelica Daugherty PA-C  Diagnosis:    Encounter Diagnoses   Name Primary?    Physical deconditioning     Impaired mobility and ADLs     H/O: CVA (cerebrovascular accident) Yes    Left hemiparesis         Start Time: 105   End Time: 200  Total Time: 55 min  Visit 2/12 Exp 12/9/17  Subjective:   I am feeling tired today, but that is nothing new.  Pain: no C/O pain today.Except at end ROM    Objective:   Patient seen by OT this session. Treatment  consist of the following:  Neuromuscular re-education and there ex  Treatment: Therapeutic exercises x 25 min and Neuromuscular re-education x 30 min  Pt ambulated in with Cortez Walker. Participated in there ex:  Supine Elbow ext 3x10; Supine AA flexion 3x10; Supine Dowel Chest PRess AA with 1 # dowel 3x10; Raymundo transfer supine  To sit. Sitting on side of mat  Open close fist 20x; wrist extendion 2x15; pronation and supination 2x15; Manual resistance for ER 2x10. UBE 2 min reverse. Cross cable pulley 7# AA shoulder flexiion and Active shoulder extension elbow to side. Also focus on shoulder depression.  Neuro re ed Standing utilizing R hand to stack small orange cones with  L hand also participated in L hand over large red swiss ball for protraction retraction and IR ER.      Assessment:  Patient participated very well today. She is motivated and has good potential to make functional gains with her L UE. Continues to need cuing to remained focused on task and for proper technique. Pt has AROM throughout L UE. Weakness is more significant at shoulder.  Pt will continue to benefit from skilled OT intervention.    Patient continues to demonstrate limitation  with  ROM, strength coordination and functional use.        Plan:  Continue 2x week x 4 weeks  during the certification period from  to 12/9/17  in pursuit of  OT goals.

## 2017-10-23 ENCOUNTER — TELEPHONE (OUTPATIENT)
Dept: NEUROLOGY | Facility: CLINIC | Age: 82
End: 2017-10-23

## 2017-10-23 NOTE — TELEPHONE ENCOUNTER
----- Message from Angelica Daugherty PA-C sent at 10/12/2017  5:29 PM CDT -----  Can you let them know her folate was a little low and she might benefit from over the counter folate

## 2017-10-26 ENCOUNTER — CLINICAL SUPPORT (OUTPATIENT)
Dept: REHABILITATION | Facility: HOSPITAL | Age: 82
End: 2017-10-26
Payer: MEDICARE

## 2017-10-26 DIAGNOSIS — G81.94 LEFT HEMIPARESIS: ICD-10-CM

## 2017-10-26 DIAGNOSIS — Z78.9 IMPAIRED MOBILITY AND ADLS: ICD-10-CM

## 2017-10-26 DIAGNOSIS — I69.354 HEMIPARESIS AFFECTING LEFT SIDE AS LATE EFFECT OF STROKE: ICD-10-CM

## 2017-10-26 DIAGNOSIS — R53.81 PHYSICAL DECONDITIONING: ICD-10-CM

## 2017-10-26 DIAGNOSIS — Z86.73 H/O: CVA (CEREBROVASCULAR ACCIDENT): Primary | ICD-10-CM

## 2017-10-26 DIAGNOSIS — Z74.09 IMPAIRED MOBILITY AND ADLS: ICD-10-CM

## 2017-10-26 PROCEDURE — 97110 THERAPEUTIC EXERCISES: CPT | Mod: PN

## 2017-10-26 PROCEDURE — 97112 NEUROMUSCULAR REEDUCATION: CPT | Mod: PN

## 2017-10-26 NOTE — PROGRESS NOTES
Patient:  Carol Yadav  [facility] #:  [id number]   Date of Note: 10/26/2017   Referring Physician:  Angelica Daugherty PA-C  Diagnosis:    Encounter Diagnoses   Name Primary?    Physical deconditioning     Impaired mobility and ADLs     H/O: CVA (cerebrovascular accident) Yes    Left hemiparesis         Start Time: 127   End Time: 210  Total Time: 55 min  Visit 3/12 Exp 12/9/17  Subjective:   I am aggrivated with my transportation. They broght me here on the wrong dayt also c/o more pain in her arm today.  Pain: L UE 6/10 with motion    Objective:   Patient seen by OT this session. Treatment  consist of the following:  Neuromuscular re-education and there ex  Treatment: Therapeutic exercises x 30 min and Neuromuscular re-education x 13 min  Pt ambulated in with Cortez Walker. Participated in there ex:  Supine Elbow ext 3x10; Supine AA flexion 3x10; Supine Dowel Chest PRess AA with 1 # dowel 3x10; Raymundo transfer supine  To sit. Sitting on side of mat  Open close fist 20x; wrist extendion 2x15; pronation and supination 2x15; Manual resistance for ER 2x10. UBE 2 min reverse, 2 min forward. Cross cable pulley 3# AA shoulder flexiion and Active shoulder extension elbow to side. Also focus on shoulder depression.  Neuro re ed Standing utilizing R hand to stack small orange cones with  L hand also participated in L hand over large red swiss ball for protraction retraction and IR ER. Pt slso participated weightbearing L and reaching with R UE leaning on elevated Mat.   Assessment:  Patient participated well today, but appeared more fatigued and c/o more pain in L shoulder. Pain decreased with reverse on UBE and with hands close together for supine chest press. She is motivated and has good potential to make functional gains with her L UE. Continues to need cuing to remained focused on task and for proper technique. Pt has AROM throughout L UE. Weakness is more significant at shoulder.  Pt will continue to benefit from skilled OT  intervention.    Patient continues to require skilled OT demonstrate limitation  with  ROM, strength coordination and functional use.    Plan:  Continue 2x week during the certification period from  to 12/9/17  in pursuit of  OT goals.

## 2017-10-31 ENCOUNTER — CLINICAL SUPPORT (OUTPATIENT)
Dept: REHABILITATION | Facility: HOSPITAL | Age: 82
End: 2017-10-31
Payer: MEDICARE

## 2017-10-31 DIAGNOSIS — Z74.09 IMPAIRED MOBILITY AND ADLS: ICD-10-CM

## 2017-10-31 DIAGNOSIS — Z86.73 H/O: CVA (CEREBROVASCULAR ACCIDENT): Primary | ICD-10-CM

## 2017-10-31 DIAGNOSIS — G81.94 LEFT HEMIPARESIS: ICD-10-CM

## 2017-10-31 DIAGNOSIS — R53.81 PHYSICAL DECONDITIONING: ICD-10-CM

## 2017-10-31 DIAGNOSIS — I69.354 HEMIPARESIS AFFECTING LEFT SIDE AS LATE EFFECT OF STROKE: ICD-10-CM

## 2017-10-31 DIAGNOSIS — Z78.9 IMPAIRED MOBILITY AND ADLS: ICD-10-CM

## 2017-10-31 PROCEDURE — 97112 NEUROMUSCULAR REEDUCATION: CPT | Mod: PN

## 2017-10-31 PROCEDURE — 97110 THERAPEUTIC EXERCISES: CPT | Mod: PN

## 2017-11-01 ENCOUNTER — OFFICE VISIT (OUTPATIENT)
Dept: FAMILY MEDICINE | Facility: CLINIC | Age: 82
End: 2017-11-01
Payer: MEDICARE

## 2017-11-01 VITALS
HEIGHT: 62 IN | TEMPERATURE: 98 F | SYSTOLIC BLOOD PRESSURE: 116 MMHG | OXYGEN SATURATION: 94 % | DIASTOLIC BLOOD PRESSURE: 80 MMHG | RESPIRATION RATE: 20 BRPM | HEART RATE: 92 BPM | BODY MASS INDEX: 30.71 KG/M2 | WEIGHT: 166.88 LBS

## 2017-11-01 DIAGNOSIS — K59.01 SLOW TRANSIT CONSTIPATION: ICD-10-CM

## 2017-11-01 DIAGNOSIS — F32.A ANXIETY AND DEPRESSION: Primary | ICD-10-CM

## 2017-11-01 DIAGNOSIS — R27.0 ATAXIA: ICD-10-CM

## 2017-11-01 DIAGNOSIS — F41.9 ANXIETY AND DEPRESSION: Primary | ICD-10-CM
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
## 2017-11-01 DIAGNOSIS — J30.2 ACUTE SEASONAL ALLERGIC RHINITIS DUE TO OTHER ALLERGEN: ICD-10-CM

## 2017-11-01 DIAGNOSIS — Z13.820 OSTEOPOROSIS SCREENING: ICD-10-CM

## 2017-11-01 DIAGNOSIS — H04.123 DRY EYES: ICD-10-CM

## 2017-11-01 DIAGNOSIS — I69.354 HEMIPARESIS AFFECTING LEFT SIDE AS LATE EFFECT OF STROKE: ICD-10-CM

## 2017-11-01 DIAGNOSIS — Z86.73 H/O: CVA (CEREBROVASCULAR ACCIDENT): ICD-10-CM

## 2017-11-01 DIAGNOSIS — G47.9 SLEEP DISTURBANCE: ICD-10-CM

## 2017-11-01 DIAGNOSIS — Z23 FLU VACCINE NEED: ICD-10-CM

## 2017-11-01 PROCEDURE — 99215 OFFICE O/P EST HI 40 MIN: CPT | Mod: S$GLB,,, | Performed by: FAMILY MEDICINE

## 2017-11-01 PROCEDURE — 99999 PR PBB SHADOW E&M-EST. PATIENT-LVL V: CPT | Mod: PBBFAC,,, | Performed by: FAMILY MEDICINE

## 2017-11-01 PROCEDURE — 99499 UNLISTED E&M SERVICE: CPT | Mod: S$PBB,,, | Performed by: FAMILY MEDICINE

## 2017-11-01 PROCEDURE — 90662 IIV NO PRSV INCREASED AG IM: CPT | Mod: S$GLB,,, | Performed by: FAMILY MEDICINE

## 2017-11-01 PROCEDURE — G0008 ADMIN INFLUENZA VIRUS VAC: HCPCS | Mod: S$GLB,,, | Performed by: FAMILY MEDICINE

## 2017-11-01 RX ORDER — CALCIUM CARBONATE 600 MG
600 TABLET ORAL 2 TIMES DAILY WITH MEALS
Qty: 60 TABLET | Refills: 9 | Status: SHIPPED | OUTPATIENT
Start: 2017-11-01 | End: 2018-07-25 | Stop reason: SDUPTHER

## 2017-11-01 RX ORDER — FLUTICASONE PROPIONATE 50 MCG
1 SPRAY, SUSPENSION (ML) NASAL 2 TIMES DAILY PRN
Qty: 16 G | Refills: 9 | Status: SHIPPED | OUTPATIENT
Start: 2017-11-01 | End: 2019-04-03 | Stop reason: SDUPTHER

## 2017-11-01 RX ORDER — VENLAFAXINE HYDROCHLORIDE 37.5 MG/1
37.5 CAPSULE, EXTENDED RELEASE ORAL DAILY
Qty: 30 CAPSULE | Refills: 1 | Status: SHIPPED | OUTPATIENT
Start: 2017-11-01 | End: 2017-12-11 | Stop reason: SDUPTHER

## 2017-11-01 NOTE — PROGRESS NOTES
Subjective:       Patient ID: Carol Yadav is a 82 y.o. female.    Chief Complaint: Chest Pain (chest pain 1 time last week )    HPI    New - Fall 1 week ago - pt fell due to weakness and slipped down the side of her bed down to the floor. She did not have enough strength to stand up. No hard hit on the floor. No complaints of pain.     This has occurred 3 times total in this matter. She has significant difficulty to get out of bed.     She is in rehab and is improving.       Chest pain 1 episode 1 week ago that lasted for 2 hours and was intermittent during this time frame. It started while she was cough. No pain since this time. HUYNH at baseline with strenuous activity.      Constipation - no relief with miralax once per day. Onset 4 days ago of hard bowel movements.     She also notes that she has had increased difficulty sleeping over the last 1 week or so. Trazodone does work, but she feels that the efficacy may be waning.         Current Outpatient Prescriptions on File Prior to Visit   Medication Sig Dispense Refill    aspirin (ECOTRIN) 81 MG EC tablet Take 81 mg by mouth once daily.      atorvastatin (LIPITOR) 40 MG tablet TAKE 1 TABLET BY MOUTH ONCE DAILY 30 tablet 0    lisinopril (PRINIVIL,ZESTRIL) 2.5 MG tablet Take 2.5 mg by mouth once daily.      oxybutynin (DITROPAN-XL) 5 MG TR24 TAKE 1 TABLET BY MOUTH AT BEDTIME 30 tablet 0    trazodone (DESYREL) 50 MG tablet TAKE 1 TABLET BY MOUTH EVERY EVENING 28 tablet 5    [DISCONTINUED] duloxetine (CYMBALTA) 60 MG capsule TAKE 1 CAPSULE BY MOUTH ONCE DAILY 30 capsule 0    [DISCONTINUED] fluticasone (FLONASE) 50 mcg/actuation nasal spray 1 spray by Each Nare route 2 (two) times daily as needed for Rhinitis. 16 g 5    acetaminophen (TYLENOL) 325 MG tablet Take 2 tablets (650 mg total) by mouth every 4 (four) hours as needed.  0    hydrocortisone 2.5 % cream Apply topically once daily.      [DISCONTINUED] calcium carbonate (OS-HAYLEE) 600 mg (1,500 mg) Tab Take  600 mg by mouth 2 (two) times daily with meals.       No current facility-administered medications on file prior to visit.        Past Medical History:   Diagnosis Date    Allergic rhinitis     Elevated blood pressure reading without diagnosis of hypertension     Hormone replacement therapy (postmenopausal)     Hyperlipidemia     Stroke        Family History   Problem Relation Age of Onset    Cancer Mother     Stroke Father         reports that she has quit smoking. Her smoking use included Cigarettes. She quit after 45.00 years of use. She has never used smokeless tobacco. She reports that she drinks about 12.6 oz of alcohol per week . She reports that she does not use drugs.    Review of Systems   Constitutional: Negative for chills and fever.   Respiratory: Negative for shortness of breath and wheezing.    Cardiovascular: Positive for chest pain. Negative for palpitations.        HUYNH at baseline with strenuous exertion.   Gastrointestinal: Negative for nausea and vomiting.       Objective:     Vitals:    11/01/17 1059   BP: 116/80   Pulse: 92   Resp: 20   Temp: 98.1 °F (36.7 °C)        Physical Exam   Constitutional: She appears well-developed. No distress.   HENT:   Head: Normocephalic and atraumatic.   Eyes: Conjunctivae are normal. No scleral icterus.   Cardiovascular: Normal rate.  Exam reveals no gallop and no friction rub.    No murmur heard.  Pulmonary/Chest: Effort normal and breath sounds normal. No respiratory distress. She has no wheezes. She has no rales. She exhibits no tenderness.   Neurological: She is alert.   LUE weakness proximal to distal.     LLE weakness - uses a hemiwalker    R sided facial droop   Skin: She is not diaphoretic.   Psychiatric: She has a normal mood and affect. Her behavior is normal.   Vitals reviewed.      Assessment:       1. Anxiety and depression    2. Flu vaccine need    3. H/O: CVA (cerebrovascular accident)    4. Hemiparesis affecting left side as late effect of  stroke    5. Slow transit constipation    6. Ataxia    7. Sleep disturbance    8. Fall, initial encounter    9. Dry eyes    10. Acute seasonal allergic rhinitis due to other allergen        Plan:       Carol LONGORIA was seen today for chest pain.    Diagnoses and all orders for this visit:    Anxiety and depression  -     venlafaxine (EFFEXOR-XR) 37.5 MG 24 hr capsule; Take 1 capsule (37.5 mg total) by mouth once daily.  Patient continues to feel fatigued and depressed. She is amenable to trying another medication for her anxiety and depression. Will try Effexor.  Patient was on Paxil, fluoxetine, sertraline, and now Cymbalta with little to no effect.    Flu vaccine need  -     Influenza - High Dose (65+) (PF) (IM)    H/O: CVA (cerebrovascular accident)  Chronic. This is likely the cause of her chronic fatigue as her chronic fatigue began around this time  Hemiparesis affecting left side as late effect of stroke  As above.  Slow transit constipation  I encouraged patient to increase MiraLAX to twice a day for 3 days and then increased to 3 times a day for up to 3 days if twice a day dosing is not affected. Patient may titrate back down once his bowel movements are obtained.    Ataxia  -     calcium carbonate (OS-HAYLEE) 600 mg calcium (1,500 mg) Tab; Take 1 tablet (600 mg total) by mouth 2 (two) times daily with meals.  -     multivitamin-minerals-lutein (MULTIVITAMIN 50 PLUS) Tab; Take 1 tablet by mouth once daily.  Patient is following twice in the last week or so per her recollection. She is in the process of obtaining more safety bars/supports in her room at the nursing home. I encouraged patient to continue rehabilitation as this may help prevent future falls. Further education on fall prevention was given. Patient does have a neckalce transponder to call for help if she does fall.     Her 3 most recent falls of all but about the same and have resulted in no traumatic injury. She has been able to slide herself gently  down the side of the bed to the floor.    Sleep disturbance  Patient is on trazodone. We'll continue current dose in hopes that controlling her anxiety and depression with Effexor will help improve her sleep quality as well     Fall, initial encounter   as mentioned above. Patient is aware of the potential harm falls can cause.     Dry eyes  -     peg 400-propylene glycol 0.4-0.3 % Drop; Apply 1 drop to eye 3 (three) times daily as needed.   chest before ending the visit, the patient asked for recommendation for eyedrops for dry eyes. No examination was performed. Patient to follow-up if symptoms worsen or she develops blurry vision eye redness eye pain or other concerning symptoms.     Acute seasonal allergic rhinitis due to other allergen  -     fluticasone (FLONASE) 50 mcg/actuation nasal spray; 1 spray by Each Nare route 2 (two) times daily as needed for Rhinitis.  - Chronic - stable     Pt is doing well on current therapy and is requesting a refill. No side effects noted. Will continue current therapy.                Return as scheduled on 12/15, for falls, sleep difficulty.        Pt verbalized understanding and agreed with our plan.     At least 40 minutes were spent today with the patient in the office, which more than 50% of the time was spent on evaluation and counseling regarding The primary encounter diagnosis was Anxiety and depression. Diagnoses of Flu vaccine need, H/O: CVA (cerebrovascular accident), Hemiparesis affecting left side as late effect of stroke, Slow transit constipation, Ataxia, Sleep disturbance, Fall, initial encounter, Dry eyes, and Acute seasonal allergic rhinitis due to other allergen were also pertinent to this visit..

## 2017-11-01 NOTE — PROGRESS NOTES
Patient:  Carol Yadav  [facility] #:  [id number]   Date of Note: 10/31/2017   Referring Physician:  Angelica Daugherty PA-C  Diagnosis:    Encounter Diagnoses   Name Primary?    Physical deconditioning     Impaired mobility and ADLs     H/O: CVA (cerebrovascular accident) Yes    Left hemiparesis         Start Time: 105   End Time: 205  Total Time: 60 min  Visit 4/12 Exp 12/9/17  Subjective:   I just feel like I keep getting weaker. I did not do my group exercises yesterday, because I was too tired.  Pain: L UE 6/10 with motion    Objective:   Patient seen by OT this session. Treatment  consist of the following:  Neuromuscular re-education and there ex  Treatment: Therapeutic exercises x 35 min and Neuromuscular re-education x 25 min  Pt ambulated in with Cortez Walker. Participated in there ex:  Supine Elbow ext 3x10; Supine AA flexion 3x10; Supine Dowel Chest PRess AA with 1 # dowel 3x10; manual resistance for isometric ER L sholder 2x10; Supine elbow flex/ext 2x10.  MIN A transfer supine  To sit. Sitting on side of mat  Open close fist 20x; wrist extendion 2x15; pronation and supination 2x15; UBE 2 min reverse x2. Cross cable pulley 3# AA shoulder flexion and Active shoulder extension elbow to side. Also focus on shoulder depression.  Neuro re ed Standing utilizing R hand to stack small orange cones with  L hand also participated in L hand over large red swiss ball for protraction retraction and IR ER. Pt slso participated weightbearing L and reaching with R UE standing and leaning on elevated Mat.   Assessment:  Pt continues to c/o fatigue, but did not appear any different today. C/o pain was less in shoulder than last visit. She is motivated and has good potential to make functional gains with her L UE. Continues to need cuing to remained focused on task and for proper technique. Pt has AROM throughout L UE. Weakness is more significant at shoulder.  Pt will continue to benefit from skilled OT intervention.    Patient  continues to require skilled OT demonstrate limitation  with  ROM, strength coordination and functional use.    Plan:  Continue 2x week during the certification period from  to 12/9/17  in pursuit of  OT goals.

## 2017-11-01 NOTE — PROGRESS NOTES
DEXA SCAN  scheduled. Pending orders put in today.     Zoster Vaccine Hx shingles in the past - consult      Influenza Vaccine Ok to get today

## 2017-11-02 ENCOUNTER — CLINICAL SUPPORT (OUTPATIENT)
Dept: REHABILITATION | Facility: HOSPITAL | Age: 82
End: 2017-11-02
Payer: MEDICARE

## 2017-11-02 DIAGNOSIS — I69.354 HEMIPARESIS AFFECTING LEFT SIDE AS LATE EFFECT OF STROKE: ICD-10-CM

## 2017-11-02 DIAGNOSIS — G81.94 LEFT HEMIPARESIS: ICD-10-CM

## 2017-11-02 DIAGNOSIS — Z86.73 H/O: CVA (CEREBROVASCULAR ACCIDENT): Primary | ICD-10-CM

## 2017-11-02 DIAGNOSIS — Z78.9 IMPAIRED MOBILITY AND ADLS: ICD-10-CM

## 2017-11-02 DIAGNOSIS — Z74.09 IMPAIRED MOBILITY AND ADLS: ICD-10-CM

## 2017-11-02 DIAGNOSIS — R53.81 PHYSICAL DECONDITIONING: ICD-10-CM

## 2017-11-02 PROCEDURE — 97112 NEUROMUSCULAR REEDUCATION: CPT | Mod: PN

## 2017-11-02 PROCEDURE — 97110 THERAPEUTIC EXERCISES: CPT | Mod: PN

## 2017-11-03 NOTE — PROGRESS NOTES
Patient:  Carol Yadav  [facility] #:  [id number]   Date of Note: 11/2/2017   Referring Physician:  Angelica Daugherty PA-C, Dr Ruiz  Diagnosis:    Encounter Diagnoses   Name Primary?    Physical deconditioning     Impaired mobility and ADLs     H/O: CVA (cerebrovascular accident) Yes    Left hemiparesis         Start Time: 100pm   End Time: 150pm  Total Time: 50 min  Visit 5/12 Exp 12/9/17  Subjective: I feel really tired today. I almost didn't come, but I want to try. I am using my L arm a liilt more at home.  Pain: L UE 6/10 with motion    Objective:   Patient seen by OT this session. Treatment  consist of the following:  Neuromuscular re-education and there ex  Treatment: Therapeutic exercises x 30 min and Neuromuscular re-education x 20 min  Pt ambulated in with Cortez Walker. Participated in there ex:  Supine Elbow ext 3x10; Supine AA flexion 3x10; Supine Dowel Chest PRess AA with 2 # dowel 3x10; manual resistance for isometric ER L sholder 2x10, also ER in POS lifting arm off of abdomen; Supine elbow flex/ext 2x10.  MIN A transfer supine to sit. Sitting on side of mat  Open close fist 20x; wrist extendion 2x15; pronation and supination 2x15; UBE 2 min reverse x2.   Neuro re ed Sitting utilizing R hand to stack small orange cones with  L hand reaching down into gravity; also participated in L hand over large red swiss ball for protraction retraction and IR ER.    Assessment:  Pt continues to c/o fatigue, and appeard more fatigued today. No significant c/o pain today except at end ROM.She did state that she is using her L hand as more of a gross assist than she was a month ago. She is motivated and has good potential to make functional gains with her L UE. Continues to need cuing to remained focused on task and for proper technique. Pt has AROM throughout L UE. Weakness is more significant at shoulder.  Pt will continue to benefit from skilled OT intervention.    Patient continues to require skilled OT demonstrate  limitation  with  ROM, strength coordination and functional use.    Plan:  Continue 2x week during the certification period from  to 12/9/17  in pursuit of  OT goals.  Encouraged pt to contue with dowel ex at home. Provided pt with pictures.

## 2017-11-06 ENCOUNTER — CLINICAL SUPPORT (OUTPATIENT)
Dept: REHABILITATION | Facility: HOSPITAL | Age: 82
End: 2017-11-06
Payer: MEDICARE

## 2017-11-06 DIAGNOSIS — Z74.09 DECREASED FUNCTIONAL MOBILITY AND ENDURANCE: ICD-10-CM

## 2017-11-06 PROCEDURE — G8979 MOBILITY GOAL STATUS: HCPCS | Mod: CK,PN

## 2017-11-06 PROCEDURE — 97162 PT EVAL MOD COMPLEX 30 MIN: CPT | Mod: PN

## 2017-11-06 PROCEDURE — G8978 MOBILITY CURRENT STATUS: HCPCS | Mod: CK,PN

## 2017-11-06 NOTE — PROGRESS NOTES
See full Physical Therapy Evaluation in POC     Precautions: Chronic CVA 3 years ago      Evaluation Date: 11/7/2017  Visit # authorized: 12  Authorization period: 12/9/2017     Prognosis: Fair     Anticipated barriers to physical therapy: Chronic stroke     Medical necessity is demonstrated by the following IMPAIRMENTS/PROBLEM LIST:   1) Impaired functional mobility/balance   2) LE weakness   3) Decreased flexibility    4) Decreased mobility    5) Lack of HEP    GOALS: Short Term Goals:  4 weeks  1. Patient will be able to increase 30 second sit to stand by 2 in order to improve transfer ability.   2. Patient will be able to increase B popliteal angle by > or = 10 degrees in order to improve flexibility and posture.   3. Patient will be able to increase strength in left hip flexion to 4+/5 in order to improve mobility.   4. Patient will be able to report an increase in ability to perform transfers in bed at home by 30% to improve mobility.   5. Patient will be able to report a decrease in resting pain to 3/4 to improve mobility and quality of life.     Long Term Goals: 6 weeks  1. Patient will be able to increase 30 second sit to stand by 4 in order to improve transfer ability.   2. Patient will be able to increase B popliteal angle by > or = 15 degrees in order to improve flexibility and posture.   3. Patient will be able to increase strength in left hip extensor to 4+/5  in order to improve mobility.   4. Patient will be able to report an increase in ability to perform transfers in bed at home by 70% to improve mobility.   5. Patient will be able to report a decrease in resting pain to 2/4 to improve mobility and quality of life.   6. Patient to be Independent with HEP to improve ROM and independence with ADL's

## 2017-11-07 ENCOUNTER — CLINICAL SUPPORT (OUTPATIENT)
Dept: REHABILITATION | Facility: HOSPITAL | Age: 82
End: 2017-11-07
Payer: MEDICARE

## 2017-11-07 DIAGNOSIS — I63.9 CEREBROVASCULAR ACCIDENT (CVA), UNSPECIFIED MECHANISM: ICD-10-CM

## 2017-11-07 DIAGNOSIS — Z78.9 IMPAIRED MOBILITY AND ADLS: ICD-10-CM

## 2017-11-07 DIAGNOSIS — N39.41 URGE INCONTINENCE: ICD-10-CM

## 2017-11-07 DIAGNOSIS — Z74.09 IMPAIRED MOBILITY AND ADLS: ICD-10-CM

## 2017-11-07 DIAGNOSIS — Z86.73 H/O: CVA (CEREBROVASCULAR ACCIDENT): Primary | ICD-10-CM

## 2017-11-07 DIAGNOSIS — Z74.09 DECREASED FUNCTIONAL MOBILITY AND ENDURANCE: ICD-10-CM

## 2017-11-07 DIAGNOSIS — G81.94 LEFT HEMIPARESIS: ICD-10-CM

## 2017-11-07 DIAGNOSIS — R53.81 PHYSICAL DECONDITIONING: ICD-10-CM

## 2017-11-07 PROCEDURE — 97112 NEUROMUSCULAR REEDUCATION: CPT | Mod: PN

## 2017-11-07 PROCEDURE — 97110 THERAPEUTIC EXERCISES: CPT | Mod: PN

## 2017-11-07 RX ORDER — OXYBUTYNIN CHLORIDE 5 MG/1
TABLET, EXTENDED RELEASE ORAL
Qty: 30 TABLET | Refills: 2 | Status: SHIPPED | OUTPATIENT
Start: 2017-11-07 | End: 2018-02-01 | Stop reason: SDUPTHER

## 2017-11-07 RX ORDER — DULOXETIN HYDROCHLORIDE 60 MG/1
CAPSULE, DELAYED RELEASE ORAL
Qty: 30 CAPSULE | Refills: 2 | OUTPATIENT
Start: 2017-11-07

## 2017-11-07 RX ORDER — ATORVASTATIN CALCIUM 40 MG/1
TABLET, FILM COATED ORAL
Qty: 30 TABLET | Refills: 11 | Status: SHIPPED | OUTPATIENT
Start: 2017-11-07 | End: 2018-09-10 | Stop reason: SDUPTHER

## 2017-11-07 NOTE — PLAN OF CARE
Physical Therapy Evaluation    Name: Carol Yadav  Clinic Number: [id number]    Diagnosis:   Encounter Diagnosis   Name Primary?    Decreased functional mobility and endurance      Physician: Angelica Daugherty PA-C  Treatment Orders: PT Eval and Treat    Past Medical History:   Diagnosis Date    Allergic rhinitis     Elevated blood pressure reading without diagnosis of hypertension     Hormone replacement therapy (postmenopausal)     Hyperlipidemia     Stroke      Current Outpatient Prescriptions   Medication Sig    acetaminophen (TYLENOL) 325 MG tablet Take 2 tablets (650 mg total) by mouth every 4 (four) hours as needed.    aspirin (ECOTRIN) 81 MG EC tablet Take 81 mg by mouth once daily.    atorvastatin (LIPITOR) 40 MG tablet TAKE 1 TABLET BY MOUTH ONCE DAILY    calcium carbonate (OS-HAYLEE) 600 mg calcium (1,500 mg) Tab Take 1 tablet (600 mg total) by mouth 2 (two) times daily with meals.    fluticasone (FLONASE) 50 mcg/actuation nasal spray 1 spray by Each Nare route 2 (two) times daily as needed for Rhinitis.    hydrocortisone 2.5 % cream Apply topically once daily.    lisinopril (PRINIVIL,ZESTRIL) 2.5 MG tablet Take 2.5 mg by mouth once daily.    multivitamin-minerals-lutein (MULTIVITAMIN 50 PLUS) Tab Take 1 tablet by mouth once daily.    oxybutynin (DITROPAN-XL) 5 MG TR24 TAKE 1 TABLET BY MOUTH AT BEDTIME    peg 400-propylene glycol 0.4-0.3 % Drop Apply 1 drop to eye 3 (three) times daily as needed.    trazodone (DESYREL) 50 MG tablet TAKE 1 TABLET BY MOUTH EVERY EVENING    venlafaxine (EFFEXOR-XR) 37.5 MG 24 hr capsule Take 1 capsule (37.5 mg total) by mouth once daily.     No current facility-administered medications for this visit.      Review of patient's allergies indicates:  No Known Allergies  Precautions: Chronic CVA 3 years ago      Evaluation Date: 11/7/2017  Visit # authorized: 12  Authorization period: 12/9/2017       Lynne LONGORIA is a 82 y.o. female that presents to [facility]  outpatient clinic secondary to right sided CVA that happened three years ago. Patient indicates feeling worse about a year ago.      Patient c/o: of feelings of tired and weakness, patient also indicates that she gets dizzy when she gets up and down, patient indicates that she has problems lifting her bottom from her bed and she has had to slide off her bed in order to prefent herself from falling,. Patient indicates that she had a muscle spasm in her back recently and it seems to keep happening unless she lays down. Patient indicates discomfort when she bends down   Onset: sudden following a stroke 3 years ago   Pain Scale: 4/10 when she bends down and mainly when she comes back up   Aggravating factors: Patient indicates that when she goes to pull herself up with a transfer from supine to sit   Relieving factors: Laying down   Previous treatment: Patient had [facility] inpatient followed with outpatient therapy for about 3 weeks  Imaging: present in chart   Past surgical history: none   Functional deficits: problems with lifting and carrying with arm, patient indicates problems with pushing up from her bed,   Prior level of function:   Occupation: Retried   Environment:Lives in a home, has hemiwalker AD, patient indicates that she can go up steps but its harder to go down   No cultural or spiritual barriers identified to treatment or learning.  Patient's goals: Balance, and what she is doing, WC volley ball she would like to get back into that with a beach ball, maintain strength in legs as long as she can       Objective     Observation: Patient walked into clinic with dhruv walker on right side     Posture Alignment :trunk deviated right  Dominant hand:  right    Lower Extremity Strength  Right LE  Left LE    Hip flexion:  5/5 Hip flexion: 4-/5   Knee extension: 5/5 Knee extension: 4+/5   Knee flexion: 5/5 Knee flexion: 4-/5   Ankle dorsiflexion:  5/5 Ankle dorsiflexion: 5/5   Ankle plantarflexion:  5/5 Ankle  plantarflexion: 5/5   Hip abduction: 5/5 Hip abduction: 5/5   Hip adduction: 5/5 Hip adduction 4+/5   Hip extension: 4+/5 Hip extension: 4/5     Upper extremity strength: Only LE was assessed due to active engaging in OT     Functional Strength:   -30 sec sit to stand: 8 (17 in table height)    - Abdominal/Trunk Strength: 3+/5    Bed mobility: Mod I     Transfers: Mod I     TU.5 seconds (on smooth surface)     Balance Assessment  5. Pivot Transfer: 2 - able to transfer with verbal cuing and/or supervision  6. Standing with Eyes Closed: 3 - able to stand 10 seconds with supervision  7. Standing with Feet Together: 3 - able to place feet together independently and stand for 1 minute with supervision  8. Reaching Forward with Outstretched Arm: 1 - reaches forward but needs supervision    Flexibility:    Hamstring length 90/90: R = 40 degrees ; L = 50 degrees    Sensation: intact     Gait Assessment:   · AD used: Cortez walker Assistance: Mod I; patient demonstrated the ability to walk into clinic with no problem     · GAIT DEVIATIONS:   Carol A displays the following deviations with ambulation:    Impairments contributing to deviations: impaired motor control, hemiparesis   ·     Functional Limitations Reports - G Codes  Category: Mobility   Intake 50% Current Status CK -    Predicted 42% Goal Status+ CK -     PT Evaluation Completed? Yes  Discussed Plan of Care with patient: Yes    HEP provided: Patient will be given an additional exercises to be part of HEP.   Instructed pt. Regarding: Proper technique with all exercises. Pt demo good understanding of the education provided. Carol A demonstrated good return demonstration of activities.      Assessment     This is a 82 y.o. female referred to outpatient physical therapy and presents with a medical diagnosis of Cerebrovascular accident (CVA), unspecified mechanism and impaired mobility and ADLs and demonstrates limitations as described in the problem list.  Patient presents with poor balance, gait speed, fall risk, decreased bed mobility and limited flexibility. Patient will benefit from activities to help improve her transfer ability such as a sit to stand that will focus on a more equal weight distribution into lower extremities. Patient should engage core activities in order to improve ability to transfer from supine to short seated. Patient will benefit from stretches and strengthening activities for her back as well as she express some complaint of discomfort with bending activities. Lumbar activities should be coupled with core activities to better improve mobility and transfers. Pt will benefit from physcial therapy services in order to maximize functional independence. The following goals were discussed with the patient and patient is in agreement with them as to be addressed in the treatment plan. Patient will be given additional exercises to be added to her HEP. Pt verbally understood the instructions as they were given and demonstrated proper form and technique during therapy. Pt was advised to perform these exercises free of pain, and to stop performing them if pain occurs. Patient would benefit from skilled PT to address above stated problems, as well as, achieve pt goals within a timely manner. Patient has set realistic goals and has verbalized good understanding and agreement with reported diagnosis, prognosis and treatment. Patient demonstrates no additional cultural, spiritual or educational need and currently has no barriers to learning.      History  Co-morbidities and personal factors that may impact the plan of care Examination  Body Structures and Functions, activity limitations and participation restrictions that may impact the plan of care Clinical Presentation   Decision Making/ Complexity Score   Co-morbidities:   Allergic rhinitis   Elevated blood pressure reading without diagnosis of hypertension    Hormone replacement therapy  (postmenopausal)  Hyperlipidemia   Stroke     Personal Factors:   Age 82  Occupation: Retired   Lifestyle: Mildly active    Attitudes: Pleasant but wants to be more active    Body Regions: Core, Le,     Body Systems: Musculoskeletal (symmetry, ROM, strength, flexibility, joint mobility), Neuromuscular (coordination, posture, balance, gait, transfers, motor control/learning), Cardiovascular (endurance)    Activity limitations: Limited involvement in activities that do not include the use of a WC     Participation Restrictions: Patient reports that she would like to get back to her volleyball with the beach ball    Stable clinical presentation with changing clinical characteristics    Pain: 4/10 a rest    Complexity: Moderate     Functional Outcome measure  FOTO limitation:  50% disability       Prognosis: Fair     Anticipated barriers to physical therapy: Chronic stroke     Medical necessity is demonstrated by the following IMPAIRMENTS/PROBLEM LIST:   1) Impaired functional mobility/balance   2) LE weakness   3) Decreased flexibility    4) Decreased mobility    5) Lack of HEP    GOALS: Short Term Goals:  4 weeks  1. Patient will be able to increase 30 second sit to stand by 2 in order to improve transfer ability.   2. Patient will be able to increase B popliteal angle by > or = 10 degrees in order to improve flexibility and posture.   3. Patient will be able to increase strength in left hip flexion to 4+/5 in order to improve mobility.   4. Patient will be able to report an increase in ability to perform transfers in bed at home by 30% to improve mobility.   5. Patient will be able to report a decrease in resting pain to 3/4 to improve mobility and quality of life.     Long Term Goals: 6 weeks  1. Patient will be able to increase 30 second sit to stand by 4 in order to improve transfer ability.   2. Patient will be able to increase B popliteal angle by > or = 15 degrees in order to improve flexibility and posture.    3. Patient will be able to increase strength in left hip extensor to 4+/5  in order to improve mobility.   4. Patient will be able to report an increase in ability to perform transfers in bed at home by 70% to improve mobility.   5. Patient will be able to report a decrease in resting pain to 2/4 to improve mobility and quality of life.   6. Patient to be Independent with HEP to improve ROM and independence with ADL's      Plan     Patient will be treated by physical therapy 1-2 times a week for 6 weeks for Electrical Stimulation PRN, Iontophoresis (with dexamethasone PRN), Manual Therapy, Moist Heat/ Ice, Neuromuscular Re-ed, Patient Education, Therapeutic Activites, Therapeutic Exercise and Other therapeutic taping, dry needling, aquatic therapy to achieve established goals. Carol may at times be seen by a PTA as part of the Rehab Team.      Cont PT for 6 weeks.      I certify the need for these services furnished under this plan of treatment and while under my care.______________________________ Physician/Referring Practitioner  Date of Signature      Buzz Grace, PT  11/7/2017

## 2017-11-08 ENCOUNTER — HOSPITAL ENCOUNTER (OUTPATIENT)
Dept: RADIOLOGY | Facility: HOSPITAL | Age: 82
Discharge: HOME | End: 2017-11-08
Attending: FAMILY MEDICINE
Payer: MEDICARE

## 2017-11-08 DIAGNOSIS — Z13.820 OSTEOPOROSIS SCREENING: ICD-10-CM

## 2017-11-08 DIAGNOSIS — M81.0 AGE-RELATED OSTEOPOROSIS WITHOUT CURRENT PATHOLOGICAL FRACTURE: Primary | ICD-10-CM

## 2017-11-08 PROCEDURE — 77080 DXA BONE DENSITY AXIAL: CPT | Mod: 26,,, | Performed by: RADIOLOGY

## 2017-11-08 PROCEDURE — 77080 DXA BONE DENSITY AXIAL: CPT | Mod: TC

## 2017-11-08 RX ORDER — ALENDRONATE SODIUM 70 MG/1
70 TABLET ORAL
Qty: 12 TABLET | Refills: 3 | Status: SHIPPED | OUTPATIENT
Start: 2017-11-08 | End: 2018-02-02

## 2017-11-08 RX ORDER — VIT C/E/ZN/COPPR/LUTEIN/ZEAXAN 250MG-90MG
1000 CAPSULE ORAL DAILY
Qty: 90 CAPSULE | Refills: 3 | Status: SHIPPED | OUTPATIENT
Start: 2017-11-08 | End: 2018-10-09 | Stop reason: SDUPTHER

## 2017-11-08 NOTE — PROGRESS NOTES
Patient:  Carol Yadav  Clinic #:  [id number]   Date of Note: 11/2/2017   Referring Physician:  Angelica Daugherty PA-C, Dr Ruiz  Diagnosis:    Encounter Diagnoses   Name Primary?    Physical deconditioning     Impaired mobility and ADLs     H/O: CVA (cerebrovascular accident) Yes    Decreased functional mobility and endurance     Left hemiparesis         Start Time: 100pm   End Time: 200pm  Total Time: 60min  Visit 6/12 Exp 12/9/17  Neuro re ed 2  There ex 2  Subjective: I feel really tired today. I almost didn't come, but I want to try. I am using my L arm a little more at home.  Pain: L UE  Pain at end ROM motion    Objective:   Patient seen by OT this session. Treatment  consist of the following:  Neuromuscular re-education and there ex  Treatment: Therapeutic exercises x 35 min and Neuromuscular re-education x 25 min  Pt ambulated in with Cortez Walker. Participated in there ex:  Supine Elbow ext 3x10; Supine AA flexion 3x10; Supine Dowel Chest PRess AA with 2 # dowel 3x10; manual resistance for isometric ER L sholder 2x10, also ER in POS lifting arm off of abdomen to 90 degrees; Supine elbow flex/ext 2x10.  MIN A transfer supine to sit. Sitting on side of mat  Open close fist 20x; wrist extendion 2x15; pronation and supination 2x15; UBE 2 min reverse x2.   Neuro re ed Sitting utilizing R hand to stack small orange cones with  L hand reaching down into gravity; also participated in weight bearing on L hand and reaching with Right hand. Repeated sets of 10 weight bearing and 6 cones for 25 min.    Assessment:  Pt continues to c/o fatigue, however she stated that she felt better following therapy today. No significant c/o pain today except at end ROM.She was able to grasp cones better today than previous session.  She is motivated and has good potential to make functional gains with her L UE. Continues to need cuing to remained focused on task and for proper technique. Pt has AROM throughout L UE. Weakness is more  significant at shoulder.  Pt will continue to benefit from skilled OT intervention.    Patient continues to require skilled OT demonstrate limitation  with  ROM, strength coordination and functional use.    Plan:  Continue 2x week during the certification period from  to 12/9/17  in pursuit of  OT goals.  Encouraged pt to contue with dowel ex at home. Provided pt with pictures.

## 2017-11-09 ENCOUNTER — TELEPHONE (OUTPATIENT)
Dept: FAMILY MEDICINE | Facility: CLINIC | Age: 82
End: 2017-11-09

## 2017-11-09 NOTE — TELEPHONE ENCOUNTER
----- Message from Jaime Hopkins MD sent at 11/8/2017  5:09 PM CST -----  Please inform patient that her bone density test showed that she has osteoporosis and is at risk for fracture. I am starting a new medication that is taken by mouth once per week to help avoid fractures and strengthen the bones. An order for this to be effective, patient also needs to be taking calcium and vitamin D supplement. She is currently taking an adequate amount of calcium, but needs a vitamin D supplement which I will also send a prescription for.

## 2017-11-10 RX ORDER — DULOXETIN HYDROCHLORIDE 60 MG/1
CAPSULE, DELAYED RELEASE ORAL
Qty: 30 CAPSULE | OUTPATIENT
Start: 2017-11-10

## 2017-11-13 NOTE — PATIENT INSTRUCTIONS
Flexion (Passive)        Use other hand to bend elbow, with thumb toward same shoulder. Do NOT force this motion. Hold ____ seconds.  Repeat ____ times. Do ____ sessions per day.    Copyright © I. All rights reserved.   Flexion (Passive)        Use other hand to bend elbow, with thumb toward same shoulder. Do NOT force this motion. Hold ____ seconds.  Repeat ____ times. Do ____ sessions per day.    Copyright © I. All rights reserved.

## 2017-11-17 ENCOUNTER — TELEPHONE (OUTPATIENT)
Dept: FAMILY MEDICINE | Facility: CLINIC | Age: 82
End: 2017-11-17

## 2017-11-17 ENCOUNTER — CLINICAL SUPPORT (OUTPATIENT)
Dept: REHABILITATION | Facility: HOSPITAL | Age: 82
End: 2017-11-17
Payer: MEDICARE

## 2017-11-17 DIAGNOSIS — Z74.09 DECREASED FUNCTIONAL MOBILITY AND ENDURANCE: ICD-10-CM

## 2017-11-17 DIAGNOSIS — N39.0 URINARY TRACT INFECTION WITHOUT HEMATURIA, SITE UNSPECIFIED: Primary | ICD-10-CM

## 2017-11-17 DIAGNOSIS — G81.94 LEFT HEMIPARESIS: ICD-10-CM

## 2017-11-17 PROCEDURE — 97110 THERAPEUTIC EXERCISES: CPT | Mod: PN

## 2017-11-17 NOTE — TELEPHONE ENCOUNTER
Pt states she been urinating frequently, strange smell and burning; would like to get urine specimen sent to [facility]

## 2017-11-17 NOTE — PROGRESS NOTES
Physical Therapy Daily Note   Name: Carol Yadav  Clinic Number: [id number]    Diagnosis:   Encounter Diagnoses   Name Primary?    Decreased functional mobility and endurance     Left hemiparesis      Physician: Angelica aDugherty PA-C  Treatment Orders: PT Eval and Treat    Precautions: Chronic CVA 3 years ago   Visit #: 2 of 12  Eval date: 11/6/2017  G-code reported: Initial Evaluation (Visit #1)     Subjective   Pt reports: that she is hurting today due to weakness only. No presence of pain     Pain Scale:  0/10      Objective     Time In: 1030  Time Out:: 1100  Total Treatment time: 30 minutes (1:1 with PT for 30' of treatment session)     Patient performed the following therapeutic exercises for 30 minutes in order to strength and improve transfers:  Sit to stands 2 sets of 8 reps  Lateral push ups c wedge 10 each side  Lateral push ups from table 10 on left side   Hip adduction c ball 2 times 1 minute  Hip abduction c yellow TB 2 times 1 minute  Standing M/L weight shifts 2 minutes  Standing A/P weight shifts 2 minutes     Written Home Exercises Provided: Patient educated to continue with previously issued HEP in order to complement therapy sessions.   Exercises were reviewed and Carol LONGORIA was able to demonstrate them prior to the end of the session. Patient received a written copy of exercises to perform at home. Carol LONGORIA demonstrated good  understanding of the education provided.     Assessment     Carol LONGORIA is progressing well towards her goals. Patient demonstrates accuracy in her HEP exercises demonstrated and performed in clinic today. Patient was able to demonstrate the supine to sit transfer from her initial evaluation. Patient was able to complete exercises in clinic today but demonstrates limited endurance and was only able to tolerate 30 minute session. Patient will continue to engage in activities that promote the strength in her LE and trunk to improve her  transfers. Patient was also able to tolerate a few balance activities today. Patient required several rest breaks during treatment session today and water breaks. Patient will continue to benefit from skilled PT services in order to address limitations present in problem list and education on proper transfers to reduce strain on UE and lower back.       Pt's spiritual, cultural and educational needs considered and pt agreeable to plan of care and goals.    Prognosis: Fair     Anticipated barriers to physical therapy: Chronic stroke     Medical necessity is demonstrated by the following IMPAIRMENTS/PROBLEM LIST:   1) Impaired functional mobility/balance   2) LE weakness   3) Decreased flexibility    4) Decreased mobility    5) Lack of HEP    GOALS: Short Term Goals:  4 weeks  1. Patient will be able to increase 30 second sit to stand by 2 in order to improve transfer ability.   2. Patient will be able to increase B popliteal angle by > or = 10 degrees in order to improve flexibility and posture.   3. Patient will be able to increase strength in left hip flexion to 4+/5 in order to improve mobility.   4. Patient will be able to report an increase in ability to perform transfers in bed at home by 30% to improve mobility.   5. Patient will be able to report a decrease in resting pain to 3/4 to improve mobility and quality of life.     Long Term Goals: 6 weeks  1. Patient will be able to increase 30 second sit to stand by 4 in order to improve transfer ability.   2. Patient will be able to increase B popliteal angle by > or = 15 degrees in order to improve flexibility and posture.   3. Patient will be able to increase strength in left hip extensor to 4+/5  in order to improve mobility.   4. Patient will be able to report an increase in ability to perform transfers in bed at home by 70% to improve mobility.   5. Patient will be able to report a decrease in resting pain to 2/4 to improve mobility and quality of life.   6.  Patient to be Independent with HEP to improve ROM and independence with ADL's    Plan   Continue with established Plan of Care towards PT goals.   Certification period: (11/6/2017 -12/1/2017) PN Due (12/6/2017)    Buzz Grace, PT  11/17/2017

## 2017-11-17 NOTE — TELEPHONE ENCOUNTER
Neal states pt reports having UTI; previous PCP had standing orders for prn urinalysis; if you would like to enter orders for urinalysis to be done at [facility] Neal can come  specimen cup, collect from pt and bring to [facility]; please advise; she was unable to report what symptoms pt complaining of

## 2017-11-17 NOTE — TELEPHONE ENCOUNTER
----- Message from Rosa Isela Lindsey sent at 11/17/2017  3:09 PM CST -----  Patient states that she wants to know where/how to go to get urinary sample be tested by doctor. Patient can be reached at [phone]. Thank you!

## 2017-11-17 NOTE — TELEPHONE ENCOUNTER
----- Message from Susanne Hobbs sent at 11/17/2017  3:14 PM CST -----  Contact: Neal with [facility]  Possible UTI and doctor to call something in. Neal can be reached at [phone].      Thanks,

## 2017-11-20 ENCOUNTER — TELEPHONE (OUTPATIENT)
Dept: FAMILY MEDICINE | Facility: CLINIC | Age: 82
End: 2017-11-20

## 2017-11-20 NOTE — TELEPHONE ENCOUNTER
----- Message from Lucero Ramos sent at 11/20/2017 11:57 AM CST -----  Contact: [phone]  Pt is requesting to speak to you regarding a growth d=she has on her arm, pt use to go to a doctor and he would burn it off, wants to know if she should see you or another doctor. Pls call pt [phone]. Thanks.....Nury

## 2017-11-20 NOTE — TELEPHONE ENCOUNTER
Called to schedule patient for an appointment but no answer/ patient does already have an appointment set for 12/15/2017 with Dr. Kincaid (checking to see if patient wants a sooner appointment)

## 2017-11-21 ENCOUNTER — TELEPHONE (OUTPATIENT)
Dept: FAMILY MEDICINE | Facility: CLINIC | Age: 82
End: 2017-11-21

## 2017-11-21 DIAGNOSIS — L98.9 SKIN LESION: Primary | ICD-10-CM

## 2017-11-21 NOTE — TELEPHONE ENCOUNTER
----- Message from Lucero Ramos sent at 11/21/2017 12:10 PM CST -----  Contact: Neal Edward  [phone]  Requesting to speak to you regarding a growth this pt has. Pls call nurse De Jesus [phone]. Thanks......Nury

## 2017-11-22 NOTE — TELEPHONE ENCOUNTER
Pt states she has skin growth that dermatologist in [place] removed before; she would rather stay on [facility] if there is a dermatologist on [address] [facility] that she could be referred to

## 2017-11-27 ENCOUNTER — CLINICAL SUPPORT (OUTPATIENT)
Dept: REHABILITATION | Facility: HOSPITAL | Age: 82
End: 2017-11-27
Payer: MEDICARE

## 2017-11-27 DIAGNOSIS — Z74.09 DECREASED FUNCTIONAL MOBILITY AND ENDURANCE: ICD-10-CM

## 2017-11-27 DIAGNOSIS — G81.94 LEFT HEMIPARESIS: ICD-10-CM

## 2017-11-27 PROCEDURE — 97110 THERAPEUTIC EXERCISES: CPT | Mod: PN

## 2017-11-27 NOTE — PROGRESS NOTES
Physical Therapy Daily Note   Name: Carol Yadav  Clinic Number: [id number]    Diagnosis:   Encounter Diagnoses   Name Primary?    Decreased functional mobility and endurance     Left hemiparesis      Physician: Angelica Daugherty PA-C  Treatment Orders: PT Eval and Treat    Precautions: Chronic CVA 3 years ago   Visit #: 3 of 12  Eval date: 11/6/2017  G-code reported: Initial Evaluation (Visit #1)     Subjective   Pt reports: that once again she is not feeling any presence of pain just weakness. Patient indicates she does not know how she was able to make it today just based on how weak she feels.     Pain Scale:  0/10      Objective     Time In: 1315  Time Out:: 1400  Total Treatment time: 45 minutes (1:1 with PT for 45' of treatment session)     Patient performed the following therapeutic exercises for 30 minutes in order to strength and improve transfers:  Sit to stands 2 sets of 8 reps  Lateral push ups c wedge table 10 each side  Hip adduction c ball 2 times 1 minute  Hip abduction c yellow TB 2 times 1 minute  Standing M/L weight shifts 2 minutes  Standing A/P weight shifts 2 minutes   Supine hip IR and ER AROM 3 minutes     Written Home Exercises Provided: Patient educated to continue with previously issued HEP in order to complement therapy sessions.   Exercises were reviewed and Carol LONGORIA was able to demonstrate them prior to the end of the session. Patient received a written copy of exercises to perform at home. Carol LONGORIA demonstrated good  understanding of the education provided.     Assessment     Carol LONGORIA is progressing well towards her goals. Patient was able to demonstrate greater endurance during today's treatment session as she did not require as many rest breaks. Patient was also able to engage in exercises without complaint of feeling tired. Patient will continue to benefit from skilled PT services in order to address limitations present in problem list and  education on proper transfers to reduce strain on UE and lower back.     Pt's spiritual, cultural and educational needs considered and pt agreeable to plan of care and goals.    Prognosis: Fair     Anticipated barriers to physical therapy: Chronic stroke     Medical necessity is demonstrated by the following IMPAIRMENTS/PROBLEM LIST:   1) Impaired functional mobility/balance   2) LE weakness   3) Decreased flexibility    4) Decreased mobility    5) Lack of HEP    GOALS: Short Term Goals:  4 weeks  1. Patient will be able to increase 30 second sit to stand by 2 in order to improve transfer ability.   2. Patient will be able to increase B popliteal angle by > or = 10 degrees in order to improve flexibility and posture.   3. Patient will be able to increase strength in left hip flexion to 4+/5 in order to improve mobility.   4. Patient will be able to report an increase in ability to perform transfers in bed at home by 30% to improve mobility.   5. Patient will be able to report a decrease in resting pain to 3/4 to improve mobility and quality of life.     Long Term Goals: 6 weeks  1. Patient will be able to increase 30 second sit to stand by 4 in order to improve transfer ability.   2. Patient will be able to increase B popliteal angle by > or = 15 degrees in order to improve flexibility and posture.   3. Patient will be able to increase strength in left hip extensor to 4+/5  in order to improve mobility.   4. Patient will be able to report an increase in ability to perform transfers in bed at home by 70% to improve mobility.   5. Patient will be able to report a decrease in resting pain to 2/4 to improve mobility and quality of life.   6. Patient to be Independent with HEP to improve ROM and independence with ADL's    Plan   Continue with established Plan of Care towards PT goals.   Certification period: (11/6/2017 -12/1/2017) PN Due (12/6/2017)    Buzz Grace, PT  11/28/2017

## 2017-12-01 ENCOUNTER — CLINICAL SUPPORT (OUTPATIENT)
Dept: REHABILITATION | Facility: HOSPITAL | Age: 82
End: 2017-12-01
Payer: MEDICARE

## 2017-12-01 DIAGNOSIS — Z74.09 DECREASED FUNCTIONAL MOBILITY AND ENDURANCE: ICD-10-CM

## 2017-12-01 DIAGNOSIS — G81.94 LEFT HEMIPARESIS: ICD-10-CM

## 2017-12-01 PROCEDURE — 97110 THERAPEUTIC EXERCISES: CPT | Mod: PN

## 2017-12-01 PROCEDURE — 97112 NEUROMUSCULAR REEDUCATION: CPT | Mod: PN

## 2017-12-01 NOTE — PROGRESS NOTES
Physical Therapy Daily Note   Name: Carol Yadav  Clinic Number: [id number]    Diagnosis:   Encounter Diagnoses   Name Primary?    Decreased functional mobility and endurance     Left hemiparesis      Physician: Angelica Daugherty PA-C  Treatment Orders: PT Eval and Treat    Precautions: Chronic CVA 3 years ago   Visit #: 4 of 12  Eval date: 11/6/2017  G-code reported: Initial Evaluation (Visit #1)     Subjective   Pt reports: that she still feels like she is dying and that it just does not seem to be getting any better. Patient indicates that she is not feeling pain she is just feeling like she is weak and it isn't getting any better. Patient indicates that she is able to get out of her bed much better.     Pain Scale:  0/10      Objective     Time In: 1330  Time Out:: 1410  Total Treatment time: 40 minutes (1:1 with PT for 30' of treatment session)     Patient performed the following therapeutic exercises for 40 minutes in order to strength and improve transfers:  Sit to stands 1 sets of 10 reps  Lateral push ups c wedge table 10 each side  Hip adduction c ball 2 times 1 minute  Hip abduction c yellow TB 2 times 1 minute  Standing M/L weight shifts 2 minutes  Standing A/P weight shifts 2 minutes   Supine hip IR and ER AROM 3 minutes   Step ups on 1 inch step 2 sets of 10 reps  Modified Romberg stance weight shifts 2 minutes ea   Seated ball roll outs for back stretch 10 reps 5 second holds     Written Home Exercises Provided: Patient educated to continue with previously issued HEP in order to complement therapy sessions.   Exercises were reviewed and Carol LONGORIA was able to demonstrate them prior to the end of the session. Patient received a written copy of exercises to perform at home. Carol LONGORIA demonstrated good  understanding of the education provided.     Assessment     Carol LONGORIA is progressing well towards her goals. Patient tolerated treatment session well today. Patient  was challenged with more standing activities. Patient continues to have a negative outlook on her progress and her feelings waking up every morning. Patient demonstrates greater strength while in clinic and even demonstrates greater ease with transfers from supine to short sitting. Patient will continue to benefit from skilled PT services in order to address limitations present in problem list and education on proper transfers to reduce strain on UE and lower back.     Pt's spiritual, cultural and educational needs considered and pt agreeable to plan of care and goals.    Prognosis: Fair     Anticipated barriers to physical therapy: Chronic stroke     Medical necessity is demonstrated by the following IMPAIRMENTS/PROBLEM LIST:   1) Impaired functional mobility/balance   2) LE weakness   3) Decreased flexibility    4) Decreased mobility    5) Lack of HEP    GOALS: Short Term Goals:  4 weeks  1. Patient will be able to increase 30 second sit to stand by 2 in order to improve transfer ability.   2. Patient will be able to increase B popliteal angle by > or = 10 degrees in order to improve flexibility and posture.   3. Patient will be able to increase strength in left hip flexion to 4+/5 in order to improve mobility.   4. Patient will be able to report an increase in ability to perform transfers in bed at home by 30% to improve mobility.   5. Patient will be able to report a decrease in resting pain to 3/4 to improve mobility and quality of life.     Long Term Goals: 6 weeks  1. Patient will be able to increase 30 second sit to stand by 4 in order to improve transfer ability.   2. Patient will be able to increase B popliteal angle by > or = 15 degrees in order to improve flexibility and posture.   3. Patient will be able to increase strength in left hip extensor to 4+/5  in order to improve mobility.   4. Patient will be able to report an increase in ability to perform transfers in bed at home by 70% to improve  mobility.   5. Patient will be able to report a decrease in resting pain to 2/4 to improve mobility and quality of life.   6. Patient to be Independent with HEP to improve ROM and independence with ADL's    Plan   Continue with established Plan of Care towards PT goals.   Certification period: (11/6/2017 -12/1/2017) PN Due (12/6/2017)    Buzz Grace, PT  12/1/2017

## 2017-12-04 ENCOUNTER — CLINICAL SUPPORT (OUTPATIENT)
Dept: REHABILITATION | Facility: HOSPITAL | Age: 82
End: 2017-12-04
Payer: MEDICARE

## 2017-12-04 DIAGNOSIS — G81.94 LEFT HEMIPARESIS: ICD-10-CM

## 2017-12-04 DIAGNOSIS — Z74.09 DECREASED FUNCTIONAL MOBILITY AND ENDURANCE: ICD-10-CM

## 2017-12-04 PROCEDURE — 97110 THERAPEUTIC EXERCISES: CPT | Mod: PN

## 2017-12-04 NOTE — PROGRESS NOTES
Physical Therapy Daily Note   Name: Carol Yadav  [facility] Number: [id number]    Diagnosis:   Encounter Diagnoses   Name Primary?    Decreased functional mobility and endurance     Left hemiparesis      Physician: Angelica Daugherty PA-C  Treatment Orders: PT Eval and Treat    Precautions: Chronic CVA 3 years ago   Visit #: 5 of 12  Eval date: 11/6/2017  G-code reported: Initial Evaluation (Visit #1)     Subjective   Pt reports: similar feelings as last treatment session. Patient indicates that she just keeps getting weaker and does not see any improvements in her function.     Pain Scale:  0/10      Objective     Time In: 1330  Time Out: 1410  Total Treatment time: 40 minutes (1:1 with PT for 30' of treatment session)     Patient performed the following therapeutic exercises for 40 minutes in order to strength and improve transfers:  Sit to stands 1 sets of 10 reps  Lateral push ups c wedge table 10 each side  Hip adduction c ball 1 times 1 minute  Hip abduction c red TB 1 times 1 minute  Standing M/L weight shifts 2 minutes  Standing A/P weight shifts 2 minutes   Supine hip IR and ER AROM 3 minutes   Step ups on 1 inch step 2 sets of 10 reps  Modified Romberg stance weight shifts 2 minutes ea   Seated ball roll outs for back stretch 10 reps 5 second holds     Written Home Exercises Provided: Patient educated to continue with previously issued HEP in order to complement therapy sessions.   Exercises were reviewed and Carol LONGORIA was able to demonstrate them prior to the end of the session. Patient received a written copy of exercises to perform at home. Carol LONGORIA demonstrated good  understanding of the education provided.     Assessment     Carol LONGORIA is progressing well towards her goals. Patient tolerated treatment session well today. Patient was challenged with more standing activities. Patient continues to have a negative outlook on her progress and her feelings waking up every  morning. Patient was able to demonstrate greater ease with her sit to stand task in clinic today but still requires additional cueing to shift weight toward her left LE during the task. Patient even demonstrates greater clearance of left foot while ambulating into clinic today. Left foot clearance was less following treatment session most likely due to fatigue. Patient will continue to benefit from skilled PT services in order to address limitations present in problem list and education on proper transfers to reduce strain on UE and lower back.     Pt's spiritual, cultural and educational needs considered and pt agreeable to plan of care and goals.    Prognosis: Fair     Anticipated barriers to physical therapy: Chronic stroke     Medical necessity is demonstrated by the following IMPAIRMENTS/PROBLEM LIST:   1) Impaired functional mobility/balance   2) LE weakness   3) Decreased flexibility    4) Decreased mobility    5) Lack of HEP    GOALS: Short Term Goals:  4 weeks  1. Patient will be able to increase 30 second sit to stand by 2 in order to improve transfer ability.   2. Patient will be able to increase B popliteal angle by > or = 10 degrees in order to improve flexibility and posture.   3. Patient will be able to increase strength in left hip flexion to 4+/5 in order to improve mobility.   4. Patient will be able to report an increase in ability to perform transfers in bed at home by 30% to improve mobility.   5. Patient will be able to report a decrease in resting pain to 3/4 to improve mobility and quality of life.     Long Term Goals: 6 weeks  1. Patient will be able to increase 30 second sit to stand by 4 in order to improve transfer ability.   2. Patient will be able to increase B popliteal angle by > or = 15 degrees in order to improve flexibility and posture.   3. Patient will be able to increase strength in left hip extensor to 4+/5  in order to improve mobility.   4. Patient will be able to report an  increase in ability to perform transfers in bed at home by 70% to improve mobility.   5. Patient will be able to report a decrease in resting pain to 2/4 to improve mobility and quality of life.   6. Patient to be Independent with HEP to improve ROM and independence with ADL's    Plan   Continue with established Plan of Care towards PT goals.   Certification period: (11/6/2017 -12/1/2017) PN Due (12/6/2017)    Buzz Grace, PT  12/4/2017

## 2017-12-11 DIAGNOSIS — F41.9 ANXIETY AND DEPRESSION: ICD-10-CM

## 2017-12-11 DIAGNOSIS — F32.A ANXIETY AND DEPRESSION: ICD-10-CM

## 2017-12-11 NOTE — TELEPHONE ENCOUNTER
----- Message from Rosa Isela Lindsey sent at 12/11/2017  2:46 PM CST -----  Refill: venlafaxine (EFFEXOR-XR) 37.5 MG 24 hr capsule    [facility] [place] - [address]  [facility] can be reached at [phone]. Thank you!

## 2017-12-12 RX ORDER — VENLAFAXINE HYDROCHLORIDE 37.5 MG/1
37.5 CAPSULE, EXTENDED RELEASE ORAL DAILY
Qty: 30 CAPSULE | Refills: 1 | Status: SHIPPED | OUTPATIENT
Start: 2017-12-12 | End: 2018-03-28 | Stop reason: SDUPTHER

## 2017-12-14 ENCOUNTER — TELEPHONE (OUTPATIENT)
Dept: FAMILY MEDICINE | Facility: CLINIC | Age: 82
End: 2017-12-14

## 2017-12-15 ENCOUNTER — OFFICE VISIT (OUTPATIENT)
Dept: FAMILY MEDICINE | Facility: CLINIC | Age: 82
End: 2017-12-15
Payer: MEDICARE

## 2017-12-15 VITALS
DIASTOLIC BLOOD PRESSURE: 78 MMHG | BODY MASS INDEX: 31.21 KG/M2 | HEART RATE: 100 BPM | WEIGHT: 170.63 LBS | OXYGEN SATURATION: 95 % | TEMPERATURE: 98 F | SYSTOLIC BLOOD PRESSURE: 148 MMHG | RESPIRATION RATE: 16 BRPM

## 2017-12-15 DIAGNOSIS — H04.203 BILATERAL EPIPHORA: Primary | ICD-10-CM

## 2017-12-15 DIAGNOSIS — F41.8 ANXIETY WITH DEPRESSION: ICD-10-CM

## 2017-12-15 DIAGNOSIS — I69.354 HEMIPARESIS AFFECTING LEFT SIDE AS LATE EFFECT OF STROKE: ICD-10-CM

## 2017-12-15 DIAGNOSIS — J30.2 CHRONIC SEASONAL ALLERGIC RHINITIS, UNSPECIFIED TRIGGER: ICD-10-CM

## 2017-12-15 PROCEDURE — 99214 OFFICE O/P EST MOD 30 MIN: CPT | Mod: S$GLB,,, | Performed by: FAMILY MEDICINE

## 2017-12-15 PROCEDURE — 99999 PR PBB SHADOW E&M-EST. PATIENT-LVL IV: CPT | Mod: PBBFAC,,, | Performed by: FAMILY MEDICINE

## 2017-12-15 PROCEDURE — 99499 UNLISTED E&M SERVICE: CPT | Mod: S$PBB,,, | Performed by: FAMILY MEDICINE

## 2017-12-15 RX ORDER — OLOPATADINE HYDROCHLORIDE 1 MG/ML
1 SOLUTION/ DROPS OPHTHALMIC 2 TIMES DAILY
Qty: 5 ML | Refills: 3 | Status: SHIPPED | OUTPATIENT
Start: 2017-12-15 | End: 2018-07-13

## 2017-12-15 NOTE — PROGRESS NOTES
Subjective:       Patient ID: Carol Yadav is a 82 y.o. female.    Chief Complaint: Anxiety    HPI    Depression - Pt is about the same on effexor as she was on cymbalta. This has not increased her generalized weakness/chronic fatigue as we had hoped.     Eye watering - Pt is complaining of increased eye watering without fevers, chills, SOB or wheezing. No releif with lubricating eye drops.     AR - pt is inconsistent with flonase, but this is helpful for her sxs when she uses it.       Current Outpatient Prescriptions on File Prior to Visit   Medication Sig Dispense Refill    acetaminophen (TYLENOL) 325 MG tablet Take 2 tablets (650 mg total) by mouth every 4 (four) hours as needed.  0    alendronate (FOSAMAX) 70 MG tablet Take 1 tablet (70 mg total) by mouth every 7 days. 12 tablet 3    aspirin (ECOTRIN) 81 MG EC tablet Take 81 mg by mouth once daily.      atorvastatin (LIPITOR) 40 MG tablet TAKE 1 TABLET BY MOUTH ONCE DAILY 30 tablet 11    calcium carbonate (OS-HAYLEE) 600 mg calcium (1,500 mg) Tab Take 1 tablet (600 mg total) by mouth 2 (two) times daily with meals. 60 tablet 9    cholecalciferol, vitamin D3, 1,000 unit capsule Take 1 capsule (1,000 Units total) by mouth once daily. 90 capsule 3    fluticasone (FLONASE) 50 mcg/actuation nasal spray 1 spray by Each Nare route 2 (two) times daily as needed for Rhinitis. 16 g 9    hydrocortisone 2.5 % cream Apply topically once daily.      lisinopril (PRINIVIL,ZESTRIL) 2.5 MG tablet Take 2.5 mg by mouth once daily.      multivitamin-minerals-lutein (MULTIVITAMIN 50 PLUS) Tab Take 1 tablet by mouth once daily. 90 tablet 3    oxybutynin (DITROPAN-XL) 5 MG TR24 TAKE 1 TABLET BY MOUTH AT BEDTIME 30 tablet 2    peg 400-propylene glycol 0.4-0.3 % Drop Apply 1 drop to eye 3 (three) times daily as needed. 40 mL 3    trazodone (DESYREL) 50 MG tablet TAKE 1 TABLET BY MOUTH EVERY EVENING 28 tablet 5    venlafaxine (EFFEXOR-XR) 37.5 MG 24 hr capsule Take 1 capsule  (37.5 mg total) by mouth once daily. 30 capsule 1     No current facility-administered medications on file prior to visit.        Past Medical History:   Diagnosis Date    Allergic rhinitis     Elevated blood pressure reading without diagnosis of hypertension     Hormone replacement therapy (postmenopausal)     Hyperlipidemia     Stroke        Family History   Problem Relation Age of Onset    Cancer Mother     Stroke Father         reports that she has quit smoking. Her smoking use included Cigarettes. She quit after 45.00 years of use. She has never used smokeless tobacco. She reports that she drinks about 12.6 oz of alcohol per week . She reports that she does not use drugs.    Review of Systems   Constitutional: Positive for fatigue. Negative for chills and fever.   Neurological: Positive for weakness and numbness.       Objective:     Vitals:    12/15/17 1303   BP: (!) 148/78   Pulse: 100   Resp: 16   Temp: 98.1 °F (36.7 °C)        Physical Exam   Constitutional: She appears well-developed. No distress.   HENT:   Head: Normocephalic and atraumatic.   Eyes: Conjunctivae are normal. No scleral icterus.   Cardiovascular: Normal rate.  Exam reveals no gallop and no friction rub.    No murmur heard.  Pulmonary/Chest: Effort normal and breath sounds normal. No respiratory distress. She has no wheezes. She has no rales. She exhibits no tenderness.   Neurological: She is alert.   LUE weakness proximal to distal.     LLE weakness - uses a hemiwalker    R sided facial droop   Skin: She is not diaphoretic.   Psychiatric: She has a normal mood and affect. Her behavior is normal.   Vitals reviewed.      Assessment:       1. Bilateral epiphora    2. Chronic seasonal allergic rhinitis, unspecified trigger    3. Hemiparesis affecting left side as late effect of stroke    4. Anxiety with depression        Plan:       Carol LONGORIA was seen today for anxiety.    Diagnoses and all orders for this visit:    Bilateral epiphora  -     " olopatadine (PATANOL) 0.1 % ophthalmic solution; Place 1 drop into both eyes 2 (two) times daily.  Trial of patanol for eye watering.    Chronic seasonal allergic rhinitis, unspecified trigger  continue flonase    Hemiparesis affecting left side as late effect of stroke  Chronic - stable     Anxiety with depression  Continue effexor. Patient complains of chronic fatigue that is "worsening' but her weakness and functioning seems stable since I have known her. PT agrees that her strength is improving.           Return in about 3 months (around 3/15/2018) for chronic fatigue.        Pt verbalized understanding and agreed with our plan.   "

## 2018-01-04 ENCOUNTER — TELEPHONE (OUTPATIENT)
Dept: ADMINISTRATIVE | Facility: HOSPITAL | Age: 83
End: 2018-01-04

## 2018-01-04 NOTE — TELEPHONE ENCOUNTER
Contact patient to schedule. She states she scheduled an appt with Dr Lucien Figueroa located on [place].

## 2018-01-05 ENCOUNTER — TELEPHONE (OUTPATIENT)
Dept: FAMILY MEDICINE | Facility: CLINIC | Age: 83
End: 2018-01-05

## 2018-01-05 NOTE — TELEPHONE ENCOUNTER
"Pt calling stated she previously had skin biopsy from dermatology, after biopsy pt notice skin problem was spreading throughout back and arm. Pt denied of rash like sx. Pt stated it maybe "some kind of cancer like sx." Pt wondering if needed to follow up with both Dermatologist along with seeing Dr Hopkins as well. Notified pt best to follow up with dermatologist but if worsen best to go to urgent care; notified pt x3. Pt denied to go to urgent care but will wait until OV 1/11/17 since sooner appt for Dr Hopkins will be same day as visit with dermatologist. Pt will get records to be sent to Dr Hopkins as well once advise. PCP notified   "

## 2018-01-05 NOTE — TELEPHONE ENCOUNTER
----- Message from Kathleen Don sent at 1/5/2018 10:24 AM CST -----  Contact: [phone]  Pt is requesting a call back in regards to a serious skin disease that she needs to discuss with the provider Please call pt at your earliest convenience.  Thanks !

## 2018-01-12 ENCOUNTER — TELEPHONE (OUTPATIENT)
Dept: FAMILY MEDICINE | Facility: CLINIC | Age: 83
End: 2018-01-12

## 2018-01-12 NOTE — TELEPHONE ENCOUNTER
----- Message from Colleen Vega sent at 1/12/2018 10:06 AM CST -----  Contact: self  Patient called stating that she is trying to submit a urine sample but does not have the resources where she resides. She also have some other concerns. Please contact her at [phone].    Thanks!

## 2018-01-19 ENCOUNTER — OFFICE VISIT (OUTPATIENT)
Dept: FAMILY MEDICINE | Facility: CLINIC | Age: 83
End: 2018-01-19
Payer: MEDICARE

## 2018-01-19 VITALS
DIASTOLIC BLOOD PRESSURE: 82 MMHG | SYSTOLIC BLOOD PRESSURE: 148 MMHG | BODY MASS INDEX: 32.18 KG/M2 | TEMPERATURE: 99 F | WEIGHT: 175.94 LBS | RESPIRATION RATE: 18 BRPM | OXYGEN SATURATION: 95 % | HEART RATE: 112 BPM

## 2018-01-19 DIAGNOSIS — F33.41 RECURRENT MAJOR DEPRESSIVE DISORDER, IN PARTIAL REMISSION: ICD-10-CM

## 2018-01-19 DIAGNOSIS — R30.0 DYSURIA: ICD-10-CM

## 2018-01-19 DIAGNOSIS — I69.354 HEMIPARESIS AFFECTING LEFT SIDE AS LATE EFFECT OF STROKE: ICD-10-CM

## 2018-01-19 DIAGNOSIS — J06.9 ACUTE UPPER RESPIRATORY INFECTION: Primary | ICD-10-CM

## 2018-01-19 DIAGNOSIS — Z86.73 H/O: CVA (CEREBROVASCULAR ACCIDENT): ICD-10-CM

## 2018-01-19 DIAGNOSIS — N89.8 VAGINAL DISCHARGE: ICD-10-CM

## 2018-01-19 DIAGNOSIS — E66.09 CLASS 1 OBESITY DUE TO EXCESS CALORIES WITH SERIOUS COMORBIDITY AND BODY MASS INDEX (BMI) OF 32.0 TO 32.9 IN ADULT: ICD-10-CM

## 2018-01-19 DIAGNOSIS — I10 HYPERTENSION, WELL CONTROLLED: ICD-10-CM

## 2018-01-19 DIAGNOSIS — G81.94 LEFT HEMIPARESIS: ICD-10-CM

## 2018-01-19 PROBLEM — E66.9 CLASS 1 OBESITY WITH SERIOUS COMORBIDITY AND BODY MASS INDEX (BMI) OF 32.0 TO 32.9 IN ADULT: Status: ACTIVE | Noted: 2018-01-19

## 2018-01-19 PROBLEM — E66.811 CLASS 1 OBESITY WITH SERIOUS COMORBIDITY AND BODY MASS INDEX (BMI) OF 32.0 TO 32.9 IN ADULT: Status: ACTIVE | Noted: 2018-01-19

## 2018-01-19 PROCEDURE — 99999 PR PBB SHADOW E&M-EST. PATIENT-LVL III: CPT | Mod: PBBFAC,,, | Performed by: FAMILY MEDICINE

## 2018-01-19 PROCEDURE — 99214 OFFICE O/P EST MOD 30 MIN: CPT | Mod: S$GLB,,, | Performed by: FAMILY MEDICINE

## 2018-01-19 PROCEDURE — 99499 UNLISTED E&M SERVICE: CPT | Mod: S$GLB,,, | Performed by: FAMILY MEDICINE

## 2018-01-19 RX ORDER — LISINOPRIL 2.5 MG/1
5 TABLET ORAL DAILY
Qty: 90 TABLET | Refills: 3 | Status: SHIPPED | OUTPATIENT
Start: 2018-01-19 | End: 2018-01-19 | Stop reason: SDUPTHER

## 2018-01-19 RX ORDER — LISINOPRIL 5 MG/1
5 TABLET ORAL DAILY
Qty: 90 TABLET | Refills: 3 | Status: SHIPPED | OUTPATIENT
Start: 2018-01-19 | End: 2018-02-02

## 2018-01-19 NOTE — PROGRESS NOTES
Subjective:       Patient ID: Carol Yadav is a 82 y.o. female.    Chief Complaint: Vaginal Discharge (for about a month patient has been having symptoms of discomfort)    HPI       Pt had cough/sneeze, some fatigue, and some SOB that has since resolved. These sxs began about 3 days ago. No fevers/c n/v.     AK - pt had recent treatments by derm for AKs that were removed from her chest and L forehead. She is recovering well with bandages in place today.     Depression - pt is currently on venlafaxine which isnt completely controlling her sxs, but has been the best medication she has tried as she has tried and failed several.     Obesity - pt has noted that she has gained 35lbs over the last year. She noted that she eats 2Lb box of chocolate per week! She has decided that she will stop this habit and try to be more mindful of her dietary choices.     Vaginal Discharge - intermittent - Pts sxs foul smelling , and have been occurring off and on x 1 month. No vaginal itching, but occasionally dose. Intermittent dysuria noted as well. Patient prefers to see OB/GYN as opposed to the medial vaginal examination today.          Current Outpatient Prescriptions on File Prior to Visit   Medication Sig Dispense Refill    acetaminophen (TYLENOL) 325 MG tablet Take 2 tablets (650 mg total) by mouth every 4 (four) hours as needed.  0    alendronate (FOSAMAX) 70 MG tablet Take 1 tablet (70 mg total) by mouth every 7 days. 12 tablet 3    aspirin (ECOTRIN) 81 MG EC tablet Take 81 mg by mouth once daily.      atorvastatin (LIPITOR) 40 MG tablet TAKE 1 TABLET BY MOUTH ONCE DAILY 30 tablet 11    calcium carbonate (OS-HAYLEE) 600 mg calcium (1,500 mg) Tab Take 1 tablet (600 mg total) by mouth 2 (two) times daily with meals. 60 tablet 9    cholecalciferol, vitamin D3, 1,000 unit capsule Take 1 capsule (1,000 Units total) by mouth once daily. 90 capsule 3    fluticasone (FLONASE) 50 mcg/actuation nasal spray 1 spray by Each Nare route 2  (two) times daily as needed for Rhinitis. 16 g 9    hydrocortisone 2.5 % cream Apply topically once daily.      multivitamin-minerals-lutein (MULTIVITAMIN 50 PLUS) Tab Take 1 tablet by mouth once daily. 90 tablet 3    olopatadine (PATANOL) 0.1 % ophthalmic solution Place 1 drop into both eyes 2 (two) times daily. 5 mL 3    oxybutynin (DITROPAN-XL) 5 MG TR24 TAKE 1 TABLET BY MOUTH AT BEDTIME 30 tablet 2    peg 400-propylene glycol 0.4-0.3 % Drop Apply 1 drop to eye 3 (three) times daily as needed. 40 mL 3    trazodone (DESYREL) 50 MG tablet TAKE 1 TABLET BY MOUTH EVERY EVENING 28 tablet 5    venlafaxine (EFFEXOR-XR) 37.5 MG 24 hr capsule Take 1 capsule (37.5 mg total) by mouth once daily. 30 capsule 1    [DISCONTINUED] lisinopril (PRINIVIL,ZESTRIL) 2.5 MG tablet Take 2.5 mg by mouth once daily.       No current facility-administered medications on file prior to visit.        Past Medical History:   Diagnosis Date    Allergic rhinitis     Elevated blood pressure reading without diagnosis of hypertension     Hormone replacement therapy (postmenopausal)     Hyperlipidemia     Stroke        Family History   Problem Relation Age of Onset    Cancer Mother     Stroke Father         reports that she has quit smoking. Her smoking use included Cigarettes. She quit after 45.00 years of use. She has never used smokeless tobacco. She reports that she drinks about 12.6 oz of alcohol per week . She reports that she does not use drugs.    Review of Systems   Constitutional: Negative for chills and fever.   Cardiovascular: Positive for palpitations. Negative for chest pain.       Objective:     Vitals:    01/19/18 1259   BP: (!) 148/82   Pulse: (!) 112   Resp: 18   Temp: 98.6 °F (37 °C)        Physical Exam   Constitutional: She appears well-developed. No distress.   HENT:   Head: Normocephalic and atraumatic.   Eyes: Conjunctivae are normal. No scleral icterus.   Cardiovascular: Normal rate.  Exam reveals no gallop  and no friction rub.    No murmur heard.  Pulmonary/Chest: Effort normal and breath sounds normal. No respiratory distress. She has no wheezes. She has no rales. She exhibits no tenderness.   Neurological: She is alert.   LUE weakness proximal to distal.     LLE weakness - uses a hemiwalker    R sided facial droop   Skin: She is not diaphoretic.   Psychiatric: She has a normal mood and affect. Her behavior is normal.   Vitals reviewed.      Assessment:       1. Acute upper respiratory infection    2. Dysuria    3. Left hemiparesis    4. H/O: CVA (cerebrovascular accident)    5. 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 Recurrent major depressive disorder, in partial remission    7. Class 1 obesity due to excess calories with serious comorbidity and body mass index (BMI) of 32.0 to 32.9 in adult    8. Vaginal discharge    9. Hypertension, well controlled        Plan:       Carol LONGORIA was seen today for vaginal discharge.    Diagnoses and all orders for this visit:    Acute upper respiratory infection  I counseled the patient on fluids, rest, OTC medications that can safely be used such as Mucinex, Zyrtec and cough syrup (if pt does not have htn), alternative therapies such as honey, hand/cough hygiene, and expected course of illness. I also advised them when to seek further medical attention, including but not limited to the development of  persistently high fevers, shortness of breath, or persistent vomiting.    F/u as needed or as previously scheduled.     Dysuria    -     Urine culture  -     Urinalysis    Patient with symptoms of urine dysuria. We'll obtain urine studies and treatment depending on findings. Patient was unable to urinate today so she was sent home with specimen cup and labs were changed at home collect.    Left hemiparesis  Chronic after CVA. Stable. Patient is on antihypertensives and a statin.    H/O: CVA (cerebrovascular accident)  Chronic after CVA. Stable. Patient is on  antihypertensives and a statin.    Hemiparesis affecting left side as late effect of stroke  Chronic after CVA. Stable. Patient is on antihypertensives and a statin.    Recurrent major depressive disorder, in partial remission  Patient is doing fairly well on her Effexor.    Class 1 obesity due to excess calories with serious comorbidity and body mass index (BMI) of 32.0 to 32.9 in adult  Patient is currently formulating a plan to help weight loss. She realizes that this is slowing her down and should not continue at the rate that it currently is.    Vaginal discharge  -     Ambulatory referral to Gynecology    Hypertension, well controlled    -     lisinopril (PRINIVIL,ZESTRIL) 5 MG tablet; Take 1 tablet (5 mg total) by mouth once daily.  Pts blood pressure is uncontrolled. I advised the following lifestyle changes:  - exercise for 20 mins x 3-5 a week per AHA recommendations  - weight reduction if overweight by calorie restriction  - increase consumption of fruit/vegetables to 5 or more servings a day        - reduce sodium intake    At this stage, we discussed that their risk for MI and CVA is too high with their current BP, and will increase lis to 5mg.          Pt asked to keep BP log with date/BP, taking BP at least 4 x a week. They will bring this with them to their next appointment.     Pt asked to call me if BPs consistently above 140/90.    Pts questions were answered.    The CVD Risk score (D'Agostino, et al., 2008) failed to calculate for the following reasons:    The 2008 CVD risk score is only valid for ages 30 to 74    The patient has a prior MI, stroke, CHF, or peripheral vascular disease diagnosis            Follow-up in about 2 months (around 3/19/2018) for Hypertension.        Pt verbalized understanding and agreed with our plan.

## 2018-01-25 ENCOUNTER — TELEPHONE (OUTPATIENT)
Dept: FAMILY MEDICINE | Facility: CLINIC | Age: 83
End: 2018-01-25

## 2018-01-25 LAB
APPEARANCE UR: CLEAR
BACTERIA UR CULT: NORMAL
BILIRUB UR QL STRIP: NEGATIVE
COLOR UR: YELLOW
GLUCOSE UR QL STRIP: NEGATIVE
HGB UR QL STRIP: NEGATIVE
KETONES UR QL STRIP: NEGATIVE
LEUKOCYTE ESTERASE UR QL STRIP: ABNORMAL
NITRITE UR QL STRIP: NEGATIVE
PH UR STRIP: 7 [PH] (ref 5–8)
PROT UR QL STRIP: NEGATIVE
SP GR UR STRIP: 1.02 (ref 1–1.03)

## 2018-01-25 NOTE — TELEPHONE ENCOUNTER
Notified patient of information below. Verbalized understanding  Pt requesting a call back regarding schedule GYN. Pt c/o never received called

## 2018-01-25 NOTE — TELEPHONE ENCOUNTER
----- Message from Jaime Hopkins MD sent at 1/25/2018  1:25 PM CST -----  Please notify patient results show no sign of a urinary track infection. She may need to see gyn if she is continuing to have sxs.  Thanks.

## 2018-01-29 ENCOUNTER — TELEPHONE (OUTPATIENT)
Dept: FAMILY MEDICINE | Facility: CLINIC | Age: 83
End: 2018-01-29

## 2018-01-29 NOTE — TELEPHONE ENCOUNTER
----- Message from Maximus Hale sent at 1/29/2018 10:02 AM CST -----  Contact: Self  Pt called regarding change in medication. Pt can be reached @ [phone]..

## 2018-01-29 NOTE — LETTER
January 29, 2018    Carol Yadav  [address]  [address]  [place] [address]             [facility]  [address]  [place] [address]  Phone: [phone]  Fax: [phone] Dear Mrs. Yadav:    iTm we were unable to contact you to schedule your OB-GYN appointment. Please give the referral department a call at [phone].      If you have any questions or concerns, please don't hesitate to call.    Sincerely,        Og Parks MA

## 2018-01-29 NOTE — TELEPHONE ENCOUNTER
Patient stated that she think that it is the lisinopril that is causing her to feel bad. She wanted you to know this information and stated that if this is her stroke medication that is keeping her alive she know that there are no changes that can be made.

## 2018-01-30 NOTE — TELEPHONE ENCOUNTER
Patient notified of  advise, patient verbalized understanding, patient scheduled for Friday to discuss

## 2018-02-01 DIAGNOSIS — N39.41 URGE INCONTINENCE: ICD-10-CM

## 2018-02-01 RX ORDER — OXYBUTYNIN CHLORIDE 5 MG/1
TABLET, EXTENDED RELEASE ORAL
Qty: 30 TABLET | Refills: 2 | Status: SHIPPED | OUTPATIENT
Start: 2018-02-01 | End: 2018-02-02

## 2018-02-02 ENCOUNTER — OFFICE VISIT (OUTPATIENT)
Dept: FAMILY MEDICINE | Facility: CLINIC | Age: 83
End: 2018-02-02
Payer: MEDICARE

## 2018-02-02 VITALS
DIASTOLIC BLOOD PRESSURE: 74 MMHG | RESPIRATION RATE: 16 BRPM | HEIGHT: 62 IN | BODY MASS INDEX: 31.85 KG/M2 | TEMPERATURE: 98 F | OXYGEN SATURATION: 97 % | HEART RATE: 111 BPM | WEIGHT: 173.06 LBS | SYSTOLIC BLOOD PRESSURE: 122 MMHG

## 2018-02-02 DIAGNOSIS — R00.0 TACHYCARDIA: ICD-10-CM

## 2018-02-02 DIAGNOSIS — R41.0 CONFUSION: Primary | ICD-10-CM

## 2018-02-02 DIAGNOSIS — T50.905A ADVERSE EFFECT OF DRUG, INITIAL ENCOUNTER: ICD-10-CM

## 2018-02-02 DIAGNOSIS — I10 HYPERTENSION, WELL CONTROLLED: ICD-10-CM

## 2018-02-02 PROCEDURE — 1159F MED LIST DOCD IN RCRD: CPT | Mod: S$GLB,,, | Performed by: FAMILY MEDICINE

## 2018-02-02 PROCEDURE — 99999 PR PBB SHADOW E&M-EST. PATIENT-LVL IV: CPT | Mod: PBBFAC,,, | Performed by: FAMILY MEDICINE

## 2018-02-02 PROCEDURE — 3008F BODY MASS INDEX DOCD: CPT | Mod: S$GLB,,, | Performed by: FAMILY MEDICINE

## 2018-02-02 PROCEDURE — 99214 OFFICE O/P EST MOD 30 MIN: CPT | Mod: S$GLB,,, | Performed by: FAMILY MEDICINE

## 2018-02-02 PROCEDURE — 93010 ELECTROCARDIOGRAM REPORT: CPT | Mod: S$GLB,,, | Performed by: INTERNAL MEDICINE

## 2018-02-02 PROCEDURE — 1126F AMNT PAIN NOTED NONE PRSNT: CPT | Mod: S$GLB,,, | Performed by: FAMILY MEDICINE

## 2018-02-02 PROCEDURE — 93005 ELECTROCARDIOGRAM TRACING: CPT | Mod: S$GLB,,, | Performed by: FAMILY MEDICINE

## 2018-02-02 RX ORDER — AMLODIPINE BESYLATE 2.5 MG/1
2.5 TABLET ORAL DAILY
Qty: 90 TABLET | Refills: 2 | Status: SHIPPED | OUTPATIENT
Start: 2018-02-02 | End: 2018-03-05

## 2018-02-02 RX ORDER — BUSPIRONE HYDROCHLORIDE 15 MG/1
1 TABLET ORAL
COMMUNITY
Start: 2017-02-01 | End: 2018-02-02

## 2018-02-02 NOTE — PROGRESS NOTES
Subjective:       Patient ID: Carol Yadav is a 82 y.o. female.    Chief Complaint: Medication Problem (patient is confused she says about the meds the facility is giving and the amounts of pills she is being given.) and Knee Pain (left knee pain when walking today)    HPI    Confusion - pt states that she feels dizzy and shaky after taking her medicine this morning. She stated that lisinopril was making her feel worse, so she called in and we discontinued this earlier this week, but she believes that her SNF may have given her the medication again today. She does state that she does not drink much water.       Tachycardia - Pt has elevated HR today on intake. No palpitations or cp. No SOB +dizziness.     Constipation - pt continues to use miralax to good effect. Pt would like to continue.                Current Outpatient Prescriptions on File Prior to Visit   Medication Sig Dispense Refill    acetaminophen (TYLENOL) 325 MG tablet Take 2 tablets (650 mg total) by mouth every 4 (four) hours as needed.  0    aspirin (ECOTRIN) 81 MG EC tablet Take 81 mg by mouth once daily.      atorvastatin (LIPITOR) 40 MG tablet TAKE 1 TABLET BY MOUTH ONCE DAILY 30 tablet 11    calcium carbonate (OS-HAYLEE) 600 mg calcium (1,500 mg) Tab Take 1 tablet (600 mg total) by mouth 2 (two) times daily with meals. 60 tablet 9    cholecalciferol, vitamin D3, 1,000 unit capsule Take 1 capsule (1,000 Units total) by mouth once daily. 90 capsule 3    fluticasone (FLONASE) 50 mcg/actuation nasal spray 1 spray by Each Nare route 2 (two) times daily as needed for Rhinitis. 16 g 9    hydrocortisone 2.5 % cream Apply topically once daily.      multivitamin-minerals-lutein (MULTIVITAMIN 50 PLUS) Tab Take 1 tablet by mouth once daily. 90 tablet 3    olopatadine (PATANOL) 0.1 % ophthalmic solution Place 1 drop into both eyes 2 (two) times daily. 5 mL 3    peg 400-propylene glycol 0.4-0.3 % Drop Apply 1 drop to eye 3 (three) times daily as needed.  40 mL 3    trazodone (DESYREL) 50 MG tablet TAKE 1 TABLET BY MOUTH EVERY EVENING 28 tablet 5    venlafaxine (EFFEXOR-XR) 37.5 MG 24 hr capsule Take 1 capsule (37.5 mg total) by mouth once daily. 30 capsule 1    [DISCONTINUED] alendronate (FOSAMAX) 70 MG tablet Take 1 tablet (70 mg total) by mouth every 7 days. 12 tablet 3    [DISCONTINUED] oxybutynin (DITROPAN-XL) 5 MG TR24 TAKE 1 TABLET BY MOUTH AT BEDTIME 30 tablet 2    [DISCONTINUED] lisinopril (PRINIVIL,ZESTRIL) 5 MG tablet Take 1 tablet (5 mg total) by mouth once daily. 90 tablet 3     No current facility-administered medications on file prior to visit.        Past Medical History:   Diagnosis Date    Allergic rhinitis     Elevated blood pressure reading without diagnosis of hypertension     Hormone replacement therapy (postmenopausal)     Hyperlipidemia     Stroke        Family History   Problem Relation Age of Onset    Cancer Mother     Stroke Father         reports that she has quit smoking. Her smoking use included Cigarettes. She quit after 45.00 years of use. She has never used smokeless tobacco. She reports that she drinks about 12.6 oz of alcohol per week . She reports that she does not use drugs.    Review of Systems   Respiratory: Negative for cough and shortness of breath.    Cardiovascular: Negative for chest pain and palpitations.   Gastrointestinal: Negative for vomiting.   Neurological: Positive for dizziness.       Objective:     Vitals:    02/02/18 1337   BP: 122/74   Pulse: (!) 111   Resp: 16   Temp: 97.6 °F (36.4 °C)        Physical Exam   Constitutional: She appears well-developed. No distress.   HENT:   Head: Normocephalic and atraumatic.   Eyes: Conjunctivae are normal. Right eye exhibits no discharge. Left eye exhibits no discharge. No scleral icterus.   Cardiovascular: Normal rate, regular rhythm and normal heart sounds.  Exam reveals no gallop and no friction rub.    No murmur heard.  Pulmonary/Chest: Effort normal and  breath sounds normal. No respiratory distress. She has no wheezes. She has no rales.   Neurological: She is alert.   LUE weakness proximal to distal.     LLE weakness - uses a hemiwalker    R sided facial droop    Skin: She is not diaphoretic.   Psychiatric: She has a normal mood and affect.   Vitals reviewed.      Assessment:       1. Confusion    2. Adverse effect of drug, initial encounter    3. Tachycardia    4. Hypertension, well controlled        Plan:       Carol LONGORIA was seen today for medication problem and knee pain.    Diagnoses and all orders for this visit:    Confusion  Improved since onset this morning. Possibly secondary to lisinopril? Pt does feel much better now than when she presented.     Adverse effect of drug, initial encounter  Discontinued lisinopril and boniva for possible side effects.     Tachycardia  -     EKG 12-lead  Resolved since her presentation. No cp or dizziness currently.     EKG as read by me is NSR @ ~90bpm, nl axis RBBB, mod st- t wave abnormalities, no acute MI.      Hypertension, well controlled  -     amLODIPine (NORVASC) 2.5 MG tablet; Take 1 tablet (2.5 mg total) by mouth once daily.    Will replace lisinopril with amlodipine.        Follow-up in about 4 weeks (around 3/2/2018).      Pt verbalized understanding and agreed with our plan.

## 2018-02-16 ENCOUNTER — OFFICE VISIT (OUTPATIENT)
Dept: FAMILY MEDICINE | Facility: CLINIC | Age: 83
End: 2018-02-16
Payer: MEDICARE

## 2018-02-16 VITALS
HEART RATE: 102 BPM | SYSTOLIC BLOOD PRESSURE: 126 MMHG | DIASTOLIC BLOOD PRESSURE: 56 MMHG | BODY MASS INDEX: 31.72 KG/M2 | TEMPERATURE: 98 F | WEIGHT: 172.38 LBS | HEIGHT: 62 IN | OXYGEN SATURATION: 95 % | RESPIRATION RATE: 16 BRPM

## 2018-02-16 DIAGNOSIS — H02.403 PTOSIS OF BOTH EYELIDS: ICD-10-CM

## 2018-02-16 DIAGNOSIS — F41.8 ANXIETY WITH DEPRESSION: ICD-10-CM

## 2018-02-16 DIAGNOSIS — R53.83 FATIGUE, UNSPECIFIED TYPE: ICD-10-CM

## 2018-02-16 DIAGNOSIS — R53.81 PHYSICAL DECONDITIONING: Primary | ICD-10-CM

## 2018-02-16 DIAGNOSIS — R00.0 TACHYCARDIA: ICD-10-CM

## 2018-02-16 PROCEDURE — 1125F AMNT PAIN NOTED PAIN PRSNT: CPT | Mod: S$GLB,,, | Performed by: FAMILY MEDICINE

## 2018-02-16 PROCEDURE — 1159F MED LIST DOCD IN RCRD: CPT | Mod: S$GLB,,, | Performed by: FAMILY MEDICINE

## 2018-02-16 PROCEDURE — 3008F BODY MASS INDEX DOCD: CPT | Mod: S$GLB,,, | Performed by: FAMILY MEDICINE

## 2018-02-16 PROCEDURE — 99999 PR PBB SHADOW E&M-EST. PATIENT-LVL III: CPT | Mod: PBBFAC,,, | Performed by: FAMILY MEDICINE

## 2018-02-16 PROCEDURE — 99214 OFFICE O/P EST MOD 30 MIN: CPT | Mod: S$GLB,,, | Performed by: FAMILY MEDICINE

## 2018-02-16 RX ORDER — POLYETHYLENE GLYCOL 3350 17 G/17G
POWDER, FOR SOLUTION ORAL
COMMUNITY
End: 2021-05-26 | Stop reason: SDUPTHER

## 2018-02-16 NOTE — PROGRESS NOTES
"Subjective:       Patient ID: Carol Yadav is a 82 y.o. female.    Chief Complaint: Extremity Weakness (follow up ) and Fatigue (follow up )    HPI    Fatigue - Pts sxs have not improved despite cessation of lisinopril which we thought may be contributing. Her fatigue is stable.     Confusion - resolved per patient report. Has not occurred since I saw her last year.     Tachycardia - pts HR is elevated today, she denies chest pain, but she does have an occasional "flutter" that lasts a few seconds and travels across her chest.    Depressive sxs - pt states that her depression is stable. She gets some depressed mood when she thinks about her poor health. No SI or HI.     Ptosis R > L - chronic and stable.     Outpatient Prescriptions Marked as Taking for the 2/16/18 encounter (Office Visit) with Jaime Hopkins MD   Medication Sig Dispense Refill    acetaminophen (TYLENOL) 325 MG tablet Take 2 tablets (650 mg total) by mouth every 4 (four) hours as needed.  0    amLODIPine (NORVASC) 2.5 MG tablet Take 1 tablet (2.5 mg total) by mouth once daily. 90 tablet 2    aspirin (ECOTRIN) 81 MG EC tablet Take 81 mg by mouth once daily.      atorvastatin (LIPITOR) 40 MG tablet TAKE 1 TABLET BY MOUTH ONCE DAILY 30 tablet 11    calcium carbonate (OS-HAYLEE) 600 mg calcium (1,500 mg) Tab Take 1 tablet (600 mg total) by mouth 2 (two) times daily with meals. 60 tablet 9    cholecalciferol, vitamin D3, 1,000 unit capsule Take 1 capsule (1,000 Units total) by mouth once daily. 90 capsule 3    fluticasone (FLONASE) 50 mcg/actuation nasal spray 1 spray by Each Nare route 2 (two) times daily as needed for Rhinitis. 16 g 9    multivitamin-minerals-lutein (MULTIVITAMIN 50 PLUS) Tab Take 1 tablet by mouth once daily. 90 tablet 3    olopatadine (PATANOL) 0.1 % ophthalmic solution Place 1 drop into both eyes 2 (two) times daily. 5 mL 3    polyethylene glycol (MIRALAX) 17 gram PwPk Take by mouth. Take every other day      trazodone " (DESYREL) 50 MG tablet TAKE 1 TABLET BY MOUTH EVERY EVENING 28 tablet 5    venlafaxine (EFFEXOR-XR) 37.5 MG 24 hr capsule Take 1 capsule (37.5 mg total) by mouth once daily. 30 capsule 1    [DISCONTINUED] hydrocortisone 2.5 % cream Apply topically once daily.         Past Medical History:   Diagnosis Date    Allergic rhinitis     Elevated blood pressure reading without diagnosis of hypertension     Hormone replacement therapy (postmenopausal)     Hyperlipidemia     Stroke        Family History   Problem Relation Age of Onset    Cancer Mother     Stroke Father         reports that she has quit smoking. Her smoking use included Cigarettes. She quit after 45.00 years of use. She has never used smokeless tobacco. She reports that she drinks about 12.6 oz of alcohol per week . She reports that she does not use drugs.    Review of Systems   Respiratory: Positive for shortness of breath. Negative for wheezing.    Cardiovascular: Positive for palpitations. Negative for chest pain and leg swelling.       Objective:     Vitals:    02/16/18 1321   BP: (!) 126/56   Pulse: 102   Resp: 16   Temp: 97.5 °F (36.4 °C)        Physical Exam   Constitutional: She appears well-developed. No distress.   HENT:   Head: Normocephalic and atraumatic.   Eyes: Conjunctivae are normal. Right eye exhibits no discharge. Left eye exhibits no discharge. No scleral icterus.   Bilateral R > L facial droop   Cardiovascular: Normal rate, regular rhythm and normal heart sounds.  Exam reveals no gallop and no friction rub.    No murmur heard.  Pulmonary/Chest: Effort normal and breath sounds normal. No respiratory distress. She has no wheezes. She has no rales.   Neurological: She is alert.   LUE weakness proximal to distal.     LLE weakness - uses a hemiwalker    R sided facial droop    Skin: She is not diaphoretic.   Psychiatric: She has a normal mood and affect.   Vitals reviewed.      Assessment:       1. Physical deconditioning    2.  "Tachycardia    3. Anxiety with depression    4. Fatigue, unspecified type    5. Ptosis of both eyelids        Plan:       Carol LONGORIA was seen today for extremity weakness and fatigue.    Diagnoses and all orders for this visit:    Tachycardia  -     Ambulatory referral to Cardiology    Pt with intermittent tachycardia and "heart flutters". Will send to cards for their opinion.     Physical deconditioning  Chronic - stable    Anxiety with depression  Chronic - stable - will continue effexor. We have tried several antidepressants, none of which have been very effective.    Fatigue, unspecified type   Chronic - stable - maybe related to depression.      Ptosis of both eyelids  Chronic - stable.           Follow-up in about 3 months (around 5/16/2018) for fatigue, depression.      Pt verbalized understanding and agreed with our plan.   "

## 2018-02-23 ENCOUNTER — TELEPHONE (OUTPATIENT)
Dept: FAMILY MEDICINE | Facility: CLINIC | Age: 83
End: 2018-02-23

## 2018-02-23 NOTE — LETTER
February 23, 2018    Carol Yadav  [address]  [address]  [place] [address]             [facility]  [address]  [place] [address]  Phone: [phone]  Fax: [phone] Dear Mrs. Yadav:    Tim we were unable to contact you to schedule your Cardiology appointment. Please give the referral department a call at [phone].        If you have any questions or concerns, please don't hesitate to call.    Sincerely,        Og Parks MA

## 2018-02-28 DIAGNOSIS — F33.42 RECURRENT MAJOR DEPRESSIVE DISORDER, IN FULL REMISSION: ICD-10-CM

## 2018-02-28 RX ORDER — TRAZODONE HYDROCHLORIDE 50 MG/1
TABLET ORAL
Qty: 28 TABLET | Refills: 0 | Status: SHIPPED | OUTPATIENT
Start: 2018-02-28 | End: 2018-04-25 | Stop reason: SDUPTHER

## 2018-03-03 ENCOUNTER — NURSE TRIAGE (OUTPATIENT)
Dept: ADMINISTRATIVE | Facility: CLINIC | Age: 83
End: 2018-03-03

## 2018-03-03 NOTE — TELEPHONE ENCOUNTER
"Daughter called re pt at facility who has CVA three years ago. Pt is excitable/ easily upset. rec'd call from care giver /92.     Reason for Disposition   BP  >= 180/110    Answer Assessment - Initial Assessment Questions  1. BLOOD PRESSURE: "What is the blood pressure?" "Did you take at least two measurements 5 minutes apart?"     187/92 , feeling strange feeling all over. Feeling weak all over. Got worse this past week. Afeb. Able to walk around with dhruv walker. Numbness in arms at night nothing now.   2. ONSET: "When did you take your blood pressure?"    Today, laying down now and feeling now.   3. HOW: "How did you obtain the blood pressure?" (e.g., visiting nurse, automatic home BP monitor)     Caregiver woke pt up to check BP.   4. HISTORY: "Do you have a history of high blood pressure?"     yes  5. MEDICATIONS: "Are you taking any medications for blood pressure?" "Have you missed any doses recently?"     Changed med 2/21  6. OTHER SYMPTOMS: "Do you have any symptoms?" (e.g., headache, chest pain, blurred vision, difficulty breathing, weakness)    Ate lunch.    Answer Assessment - Initial Assessment Questions  1. DESCRIPTION: "Describe how you are feeling."     Weaker this week.   2. SEVERITY: "How bad is it?"  "Can you stand and walk?"    - MILD - Feels weak or tired, but does not interfere with work, school or normal activities    - MODERATE - Able to stand and walk; weakness interferes with work, school, or normal activities    - SEVERE - Unable to stand or walk     moderate  3. ONSET:  "When did the weakness begin?"     This week   4. CAUSE: "What do you think is causing the weakness?"     Unsure   5. OTHER SYMPTOMS: "Do you have any other symptoms?" (e.g., chest pain, fever, cough, SOB, vomiting, diarrhea, bleeding)     Constipation- taking miralax qd. Good uop, no pain with urination. Drinking three days of water. rec to drink more water. Pt has MD appt on Monday.    Protocols used: ST HIGH BLOOD " PRESSURE-A-AH, ST WEAKNESS (GENERALIZED) AND FATIGUE-A-AH  trouble sleeping this past week. Restless legs. Cat at vet.   ER/EMS warnings given. Call back with questions.

## 2018-03-05 ENCOUNTER — OFFICE VISIT (OUTPATIENT)
Dept: FAMILY MEDICINE | Facility: CLINIC | Age: 83
End: 2018-03-05
Payer: MEDICARE

## 2018-03-05 ENCOUNTER — TELEPHONE (OUTPATIENT)
Dept: FAMILY MEDICINE | Facility: CLINIC | Age: 83
End: 2018-03-05

## 2018-03-05 VITALS
RESPIRATION RATE: 16 BRPM | OXYGEN SATURATION: 95 % | SYSTOLIC BLOOD PRESSURE: 136 MMHG | HEART RATE: 99 BPM | BODY MASS INDEX: 31.24 KG/M2 | WEIGHT: 169.75 LBS | HEIGHT: 62 IN | DIASTOLIC BLOOD PRESSURE: 68 MMHG | TEMPERATURE: 98 F

## 2018-03-05 DIAGNOSIS — I69.354 HEMIPARESIS AFFECTING LEFT SIDE AS LATE EFFECT OF STROKE: ICD-10-CM

## 2018-03-05 DIAGNOSIS — R45.4 DIFFICULTY CONTROLLING ANGER: Primary | ICD-10-CM

## 2018-03-05 DIAGNOSIS — N32.81 OAB (OVERACTIVE BLADDER): ICD-10-CM

## 2018-03-05 DIAGNOSIS — Z86.73 H/O: CVA (CEREBROVASCULAR ACCIDENT): ICD-10-CM

## 2018-03-05 DIAGNOSIS — G81.94 LEFT HEMIPARESIS: ICD-10-CM

## 2018-03-05 DIAGNOSIS — R27.0 ATAXIA: ICD-10-CM

## 2018-03-05 DIAGNOSIS — F33.41 RECURRENT MAJOR DEPRESSIVE DISORDER, IN PARTIAL REMISSION: ICD-10-CM

## 2018-03-05 DIAGNOSIS — F41.9 ANXIETY: ICD-10-CM

## 2018-03-05 DIAGNOSIS — I10 HYPERTENSION, UNCONTROLLED: ICD-10-CM

## 2018-03-05 PROCEDURE — 3075F SYST BP GE 130 - 139MM HG: CPT | Mod: S$GLB,,, | Performed by: FAMILY MEDICINE

## 2018-03-05 PROCEDURE — 99215 OFFICE O/P EST HI 40 MIN: CPT | Mod: S$GLB,,, | Performed by: FAMILY MEDICINE

## 2018-03-05 PROCEDURE — 3078F DIAST BP <80 MM HG: CPT | Mod: S$GLB,,, | Performed by: FAMILY MEDICINE

## 2018-03-05 PROCEDURE — 99999 PR PBB SHADOW E&M-EST. PATIENT-LVL IV: CPT | Mod: PBBFAC,,, | Performed by: FAMILY MEDICINE

## 2018-03-05 RX ORDER — OXYBUTYNIN CHLORIDE 10 MG/1
10 TABLET, EXTENDED RELEASE ORAL DAILY
Qty: 90 TABLET | Refills: 1 | Status: SHIPPED | OUTPATIENT
Start: 2018-03-05 | End: 2018-08-20 | Stop reason: SDUPTHER

## 2018-03-05 RX ORDER — AMLODIPINE BESYLATE 5 MG/1
5 TABLET ORAL DAILY
Qty: 90 TABLET | Refills: 1 | Status: SHIPPED | OUTPATIENT
Start: 2018-03-05 | End: 2018-04-02

## 2018-03-05 NOTE — TELEPHONE ENCOUNTER
Daughter states this weekend pt had argument with daughter and her BP was elevated; states lately she has been having bad mood swings, yelling at family, bad temper; she did give her xanax to calm her down because BP was 205 systolic, came down to 150's; she states pt saw psychiatrist in past who had her on depakote to help with mood swings; also thinks maybe she needs to get back on lisinopril for her BP; they have OV scheduled for 1:20 today; wanted you to know this prior to OV

## 2018-03-05 NOTE — PROGRESS NOTES
"Subjective:       Patient ID: Carol Yadav is a 82 y.o. female.    Chief Complaint: Chest Pain (follow up) and Tachycardia (follow up)    HPI    Htn - Pts blood pressure was very elevated over the weekend , up to 200+ systolic which she blames on being very upset at her daughters.  Pt declined going to the hospital at the time as she states that she "knew why it was high".  Her bp has been better since then.     She has been unable to f/u with cardiology as she never received a call.     Pt has also been ill tempered over the weekend and her daughter called and informed my staff that she lost her tempered several times and "needed to go back on her depakote".     She is very frustrated by her surroundings and has been lashing out at her living quarters.             Outpatient Prescriptions Marked as Taking for the 3/5/18 encounter (Office Visit) with Jaime Hopkins MD   Medication Sig Dispense Refill    acetaminophen (TYLENOL) 325 MG tablet Take 2 tablets (650 mg total) by mouth every 4 (four) hours as needed.  0    aspirin (ECOTRIN) 81 MG EC tablet Take 81 mg by mouth once daily.      atorvastatin (LIPITOR) 40 MG tablet TAKE 1 TABLET BY MOUTH ONCE DAILY 30 tablet 11    calcium carbonate (OS-HAYLEE) 600 mg calcium (1,500 mg) Tab Take 1 tablet (600 mg total) by mouth 2 (two) times daily with meals. 60 tablet 9    cholecalciferol, vitamin D3, 1,000 unit capsule Take 1 capsule (1,000 Units total) by mouth once daily. 90 capsule 3    fluticasone (FLONASE) 50 mcg/actuation nasal spray 1 spray by Each Nare route 2 (two) times daily as needed for Rhinitis. 16 g 9    multivitamin-minerals-lutein (MULTIVITAMIN 50 PLUS) Tab Take 1 tablet by mouth once daily. 90 tablet 3    olopatadine (PATANOL) 0.1 % ophthalmic solution Place 1 drop into both eyes 2 (two) times daily. 5 mL 3    peg 400-propylene glycol 0.4-0.3 % Drop Apply 1 drop to eye 3 (three) times daily as needed. 40 mL 3    polyethylene glycol (MIRALAX) 17 " gram PwPk Take by mouth. Take every other day      traZODone (DESYREL) 50 MG tablet TAKE 1 TABLET BY MOUTH EVERY EVENING 28 tablet 0    [DISCONTINUED] amLODIPine (NORVASC) 2.5 MG tablet Take 1 tablet (2.5 mg total) by mouth once daily. 90 tablet 2       Past Medical History:   Diagnosis Date    Allergic rhinitis     Elevated blood pressure reading without diagnosis of hypertension     Hormone replacement therapy (postmenopausal)     Hyperlipidemia     Stroke        Family History   Problem Relation Age of Onset    Cancer Mother     Stroke Father         reports that she has quit smoking. Her smoking use included Cigarettes. She quit after 45.00 years of use. She has never used smokeless tobacco. She reports that she drinks about 12.6 oz of alcohol per week . She reports that she does not use drugs.    Review of Systems   Neurological: Positive for weakness and numbness.   Psychiatric/Behavioral: Positive for agitation, behavioral problems and dysphoric mood. Negative for confusion, hallucinations, self-injury and suicidal ideas.       Objective:     Vitals:    03/05/18 1326   BP: 136/68   Pulse: 99   Resp: 16   Temp: 97.7 °F (36.5 °C)        Physical Exam   Constitutional: She appears well-developed. No distress.   HENT:   Head: Normocephalic and atraumatic.   Eyes: Conjunctivae are normal. Right eye exhibits no discharge. Left eye exhibits no discharge. No scleral icterus.   Bilateral R > L facial droop   Cardiovascular: Normal rate, regular rhythm and normal heart sounds.  Exam reveals no gallop and no friction rub.    No murmur heard.  Pulmonary/Chest: Effort normal and breath sounds normal. No respiratory distress. She has no wheezes. She has no rales.   Neurological: She is alert.   LUE weakness proximal to distal.     LLE weakness - uses a hemiwalker    R sided facial droop    Skin: She is not diaphoretic.   Psychiatric: She has a normal mood and affect. Thought content normal.   Speech slightly  pressured and pt a bit agitated today, but did calm down.        Vitals reviewed.      Assessment:       1. Difficulty controlling anger    2. Anxiety    3. Hypertension, uncontrolled    4. OAB (overactive bladder)    5. 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 H/O: CVA (cerebrovascular accident)    7. Ataxia    8. Left hemiparesis    9. Recurrent major depressive disorder, in partial remission        Plan:       Carol LNOGORIA was seen today for chest pain and tachycardia.    Diagnoses and all orders for this visit:    Difficulty controlling anger  With patient's recent outbursts at her living facility and difficulty with controlling her anger at her with anxiety and depressive symptoms I have recommended again that the patient see psychiatry for further evaluation. Patient agrees and understands the process of obtaining an appointment with psychiatry. She understands that no referral is necessary    Anxiety  Patient has been tried on multiple different medications for her anxiety and depression all of which have either suboptimally controlled her symptoms orbit associated with side effects. We'll continue current medication although I believe her control is suboptimal. Psychiatry will be able to help with this. She requires specialty management.    Hypertension, uncontrolled  -     amLODIPine (NORVASC) 5 MG tablet; Take 1 tablet (5 mg total) by mouth once daily.  Pts blood pressure is uncontrolled. I advised the following lifestyle changes:  - exercise for 20 mins x 3-5 a week per AHA recommendations  - weight reduction if overweight by calorie restriction  - increase consumption of fruit/vegetables to 5 or more servings a day        - reduce sodium intake    At this stage, we discussed that their risk for MI and CVA is too high with their current BP, and will adjust their medications by increasing amlo to 5mg.          Pt asked to keep BP log with date/BP, taking BP at least 4 x a week. They will  bring this with them to their next appointment.     Pt asked to call me if BPs consistently above 140/90.    Pts questions were answered.    The CVD Risk score (D'Agostino, et al., 2008) failed to calculate for the following reasons:    The 2008 CVD risk score is only valid for ages 30 to 74    The patient has a prior MI, stroke, CHF, or peripheral vascular disease diagnosis    OAB (overactive bladder)  -     oxybutynin (DITROPAN-XL) 10 MG 24 hr tablet; Take 1 tablet (10 mg total) by mouth once daily. For over active bladder    Problem List Items Addressed This Visit        Neuro    Ataxia    Relevant Orders    Ambulatory referral to Neurology    H/O: CVA (cerebrovascular accident)    Overview     L sided chronic weakness. LUE > LLE         Relevant Orders    Ambulatory referral to Neurology    Hemiparesis affecting left side as late effect of stroke    Overview     Chronic - Stable - walks with dhruv walker. Followed by Neuro         Relevant Orders    Ambulatory referral to Neurology    Left hemiparesis    Overview     Chronic - Stable - pt has been advised to contiue follow up with neurology. 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             Psychiatric    Recurrent major depressive disorder, in partial remission    Overview     Chronic - stable, yet suboptimally controlled. Pt referred to psych 3/5/18.            Other Visit Diagnoses     Difficulty controlling anger    -  Primary    Anxiety        Hypertension, uncontrolled        Relevant Medications    amLODIPine (NORVASC) 5 MG tablet    OAB (overactive bladder)        Relevant Medications    oxybutynin (DITROPAN-XL) 10 MG 24 hr tablet                  Follow-up in about 4 weeks (around 4/2/2018) for Hypertension, OAB, and Anxiety with stress.        Pt verbalized understanding and agreed with our plan.     At least 40 minutes were spent today with the patient in the office, which more than 50% of the time was spent on  evaluation and counseling regarding The primary encounter diagnosis was Difficulty controlling anger. Diagnoses of Anxiety, Hypertension, uncontrolled, OAB (overactive bladder), Hemiparesis affecting left side as late effect of stroke, H/O: CVA (cerebrovascular accident), Ataxia, Left hemiparesis, and Recurrent major depressive disorder, in partial remission were also pertinent to this visit..

## 2018-03-05 NOTE — TELEPHONE ENCOUNTER
Dr Hopkins would like to help with scheduling cardiology referral that was place 2/16/18 as well neurology that was place in today. Thanks!

## 2018-03-09 ENCOUNTER — TELEPHONE (OUTPATIENT)
Dept: FAMILY MEDICINE | Facility: CLINIC | Age: 83
End: 2018-03-09

## 2018-03-09 NOTE — TELEPHONE ENCOUNTER
Patient wanted to know if she could take her oxybutin at night because the nurse at the facility told her to change from night to daytime to take it. Patient notified med Rx, daily morning or evening. Patient stated she want to take it at night.

## 2018-03-09 NOTE — TELEPHONE ENCOUNTER
----- Message from Leslie Morin sent at 3/8/2018  3:02 PM CST -----  Contact: Self/[phone]  Patient would like to speak to the staff regarding frequent urination at night. Thank you.

## 2018-03-09 NOTE — TELEPHONE ENCOUNTER
----- Message from Susanne Hobbs sent at 3/9/2018  3:44 PM CST -----  Contact: self  Patient is returning a call and can be reached at [phone].      thanks

## 2018-03-15 ENCOUNTER — OFFICE VISIT (OUTPATIENT)
Dept: CARDIOLOGY | Facility: CLINIC | Age: 83
End: 2018-03-15
Payer: MEDICARE

## 2018-03-15 VITALS
BODY MASS INDEX: 31.21 KG/M2 | OXYGEN SATURATION: 95 % | HEART RATE: 102 BPM | DIASTOLIC BLOOD PRESSURE: 62 MMHG | RESPIRATION RATE: 20 BRPM | SYSTOLIC BLOOD PRESSURE: 124 MMHG | WEIGHT: 170.63 LBS

## 2018-03-15 DIAGNOSIS — R00.0 TACHYCARDIA: ICD-10-CM

## 2018-03-15 DIAGNOSIS — I10 HYPERTENSION, WELL CONTROLLED: ICD-10-CM

## 2018-03-15 DIAGNOSIS — R06.02 SHORTNESS OF BREATH: ICD-10-CM

## 2018-03-15 DIAGNOSIS — E78.2 MIXED HYPERLIPIDEMIA: ICD-10-CM

## 2018-03-15 DIAGNOSIS — R42 DIZZINESS: Primary | ICD-10-CM

## 2018-03-15 DIAGNOSIS — R06.09 DYSPNEA ON EXERTION: ICD-10-CM

## 2018-03-15 DIAGNOSIS — Z86.73 H/O: CVA (CEREBROVASCULAR ACCIDENT): ICD-10-CM

## 2018-03-15 PROCEDURE — 3074F SYST BP LT 130 MM HG: CPT | Mod: CPTII,S$GLB,, | Performed by: INTERNAL MEDICINE

## 2018-03-15 PROCEDURE — 3078F DIAST BP <80 MM HG: CPT | Mod: CPTII,S$GLB,, | Performed by: INTERNAL MEDICINE

## 2018-03-15 PROCEDURE — 99204 OFFICE O/P NEW MOD 45 MIN: CPT | Mod: S$GLB,,, | Performed by: INTERNAL MEDICINE

## 2018-03-15 PROCEDURE — 99999 PR PBB SHADOW E&M-EST. PATIENT-LVL III: CPT | Mod: PBBFAC,,, | Performed by: INTERNAL MEDICINE

## 2018-03-15 NOTE — LETTER
March 15, 2018      Jaime Hopkins MD  [address] [facility] [place] [address]           [facility] - Card[facility] [address] [facility] [address]  [place] [address]  Phone: [phone]          Patient: Carol Yadav   MR Number: [id number]   YOB: 1935   Date of Visit: 3/15/2018       Dear Dr. Jaime Hopkins:    Thank you for referring Carol Yadav to me for evaluation. Attached you will find relevant portions of my assessment and plan of care.    If you have questions, please do not hesitate to call me. I look forward to following Carol Yadav along with you.    Sincerely,    Pato Beverly MD    Enclosure  CC:  No Recipients    If you would like to receive this communication electronically, please contact [email or web address] or [phone] to request more information on [facility] Link access.    For providers and/or their staff who would like to refer a patient to [facility], please contact us through our one-stop-shop provider referral line, [facility] , at [phone].    If you feel you have received this communication in error or would no longer like to receive these types of communications, please e-mail [email or web address]

## 2018-03-15 NOTE — PROGRESS NOTES
Subjective:    Patient ID:  Carol Yadav is a 82 y.o. female who presents for evaluation of Consult (cant walk for too long ) and Tachycardia      HPI  Patient is here to establish cardiovascular care.  She has history of stroke and possible arrhythmia with left-sided residual weakness.  Somewhat of a poor historian mainly complains of lack of energy and interest in activities.  She feels like she is depressed and has a lot of anxiety from time to time which causes her symptoms.  She does complain of some chest pain shortness breath on activity but relieved with rest.  She's never taking anything to make it better or worse.  She denies any PND, orthopnea or lower edema.  She's not expressing dizziness to the point of presyncope or syncope.    Review of Systems   Constitution: Positive for weakness and malaise/fatigue.   HENT: Negative.    Eyes: Negative.    Cardiovascular: Positive for chest pain and dyspnea on exertion. Negative for irregular heartbeat, leg swelling, near-syncope, orthopnea, palpitations, paroxysmal nocturnal dyspnea and syncope.   Respiratory: Positive for shortness of breath.    Skin: Negative.    Musculoskeletal: Negative.    Gastrointestinal: Negative for abdominal pain, constipation and diarrhea.   Genitourinary: Negative for dysuria.   Neurological: Positive for dizziness, focal weakness, light-headedness and loss of balance.   Psychiatric/Behavioral: Negative.      Past Medical History:   Diagnosis Date    Allergic rhinitis     Elevated blood pressure reading without diagnosis of hypertension     Hormone replacement therapy (postmenopausal)     Hyperlipidemia     Stroke      Past Surgical History:   Procedure Laterality Date    APPENDECTOMY      EYE SURGERY      hai cataract surgery    OOPHORECTOMY      TONSILLECTOMY       Social History   Substance Use Topics    Smoking status: Former Smoker     Years: 45.00     Types: Cigarettes    Smokeless tobacco: Never Used      Comment: quit  smoking when 52 yrs old    Alcohol use 12.6 oz/week     14 Standard drinks or equivalent, 7 Glasses of wine per week     Family History   Problem Relation Age of Onset    Cancer Mother     Stroke Father         Objective:    Physical Exam   Constitutional: She is oriented to person, place, and time. She appears well-developed and well-nourished.   HENT:   Head: Normocephalic and atraumatic.   Eyes: Conjunctivae and EOM are normal. Pupils are equal, round, and reactive to light.   Neck: Normal range of motion. Neck supple. No thyromegaly present.   Cardiovascular: Normal rate and regular rhythm.    No murmur heard.  Pulmonary/Chest: Effort normal and breath sounds normal. No respiratory distress.   Abdominal: Soft. Bowel sounds are normal.   Musculoskeletal: She exhibits no edema.   Neurological: She is alert and oriented to person, place, and time.   Skin: Skin is warm and dry.   Psychiatric: She has a normal mood and affect. Her behavior is normal.       ekg normal sinus rhythm with right bundle-branch block    Assessment:       1. Dizziness    2. Shortness of breath    3. Tachycardia    4. Mixed hyperlipidemia    5. Hypertension, well controlled    6. H/O: CVA (cerebrovascular accident)    7. Dyspnea on exertion         Plan:       -We'll risk factors with current symptoms likely multi-factorial but will rule out cardiac issues with echo, nuclear and Holter  -Check carotid ultrasound with history of CVA current dizziness  -Encouraged exercise as tolerated    Return to clinic in one month with testing ASAP

## 2018-03-20 ENCOUNTER — OFFICE VISIT (OUTPATIENT)
Dept: OBSTETRICS AND GYNECOLOGY | Facility: CLINIC | Age: 83
End: 2018-03-20
Payer: MEDICARE

## 2018-03-20 VITALS
WEIGHT: 167.56 LBS | HEIGHT: 62 IN | SYSTOLIC BLOOD PRESSURE: 135 MMHG | BODY MASS INDEX: 30.83 KG/M2 | DIASTOLIC BLOOD PRESSURE: 77 MMHG

## 2018-03-20 DIAGNOSIS — M85.80 OSTEOPENIA, UNSPECIFIED LOCATION: ICD-10-CM

## 2018-03-20 DIAGNOSIS — Z80.3 FAMILY HISTORY OF BREAST CANCER: ICD-10-CM

## 2018-03-20 DIAGNOSIS — N76.3 CHRONIC VULVITIS: ICD-10-CM

## 2018-03-20 DIAGNOSIS — N76.0 ACUTE VAGINITIS: ICD-10-CM

## 2018-03-20 DIAGNOSIS — N95.1 MENOPAUSAL STATE: Primary | ICD-10-CM

## 2018-03-20 PROCEDURE — 3075F SYST BP GE 130 - 139MM HG: CPT | Mod: CPTII,S$GLB,, | Performed by: OBSTETRICS & GYNECOLOGY

## 2018-03-20 PROCEDURE — 99999 PR PBB SHADOW E&M-EST. PATIENT-LVL IV: CPT | Mod: PBBFAC,,, | Performed by: OBSTETRICS & GYNECOLOGY

## 2018-03-20 PROCEDURE — 87480 CANDIDA DNA DIR PROBE: CPT

## 2018-03-20 PROCEDURE — G0101 CA SCREEN;PELVIC/BREAST EXAM: HCPCS | Mod: S$GLB,,, | Performed by: OBSTETRICS & GYNECOLOGY

## 2018-03-20 PROCEDURE — 3078F DIAST BP <80 MM HG: CPT | Mod: CPTII,S$GLB,, | Performed by: OBSTETRICS & GYNECOLOGY

## 2018-03-20 PROCEDURE — 88175 CYTOPATH C/V AUTO FLUID REDO: CPT | Performed by: PATHOLOGY

## 2018-03-20 PROCEDURE — 88141 CYTOPATH C/V INTERPRET: CPT | Mod: ,,, | Performed by: PATHOLOGY

## 2018-03-20 RX ORDER — ALENDRONATE SODIUM 70 MG/1
70 TABLET ORAL
COMMUNITY
Start: 2018-03-06 | End: 2018-09-12 | Stop reason: SDUPTHER

## 2018-03-20 RX ORDER — CLOTRIMAZOLE AND BETAMETHASONE DIPROPIONATE 10; .64 MG/G; MG/G
CREAM TOPICAL
Qty: 15 G | Refills: 4 | Status: SHIPPED | OUTPATIENT
Start: 2018-03-20 | End: 2018-12-12

## 2018-03-20 NOTE — PROGRESS NOTES
Subjective:      Chief Complaint:    Chief Complaint   Patient presents with    Vaginal Discharge       Menstrual History:    OB History      Para Term  AB Living    3         3    SAB TAB Ectopic Multiple Live Births                       Menarche age: 15     No LMP recorded. Patient is postmenopausal.                Objective:        History of Present Illness AND  Examination detailed DICTATE:    PRESENT ILLNESS AND  NOTE    HISTORY OF PRESENT ILLNESS:  The patient is 82 years of age, here for annual   exam.  New patient to [facility], new patient to me.   5, para 3.    The patient has no mammogram or a bone density or Pap smear in the computer   system.  Not on any estrogen replacement therapy.  Reviewing her medical   problems, increased blood pressure, dyslipidemia, CVA, osteoporosis and weakness   secondary to CVA on her left side.  Surgery:  Right salpingo-oophorectomy,   tonsillectomy, appendectomy and cataract surgery.    PHYSICAL EXAMINATION:  VITAL SIGNS:  Blood pressure 135/77, weight 167.  BREASTS:  No lump, mass, discharge, skin changes, retraction, nipple changes.    Axilla negative.  PELVIC:  External, vulva inflammation, vulvitis.  [place], urethral and [place]   glands are negative.  Vagina is very thin, dry.  Copious amount of discharge is   noted.  Cervix is present.  Uterus is small.  Adnexa is not palpable.  Good   pelvic support.  No descensus.  No pain on examination.  EXTREMITIES:  The patient has weakness because of the paralysis as stated.    PLAN:  We will do mammogram, bone density.  The patient also received the   benefit of Pap smear and vaginal cultures.  We will put the patient on Lotrisone   ointment for the vulvitis and depending on the vaginal culture and Pap smear,   if additional therapy needed, we will administer it at that time.      ASHLEY  dd: 2018 13:56:27 (CDT)  td: 2018 10:30:53 (CDT)  Doc ID   #[id number]  Job ID #[id number]    CC:          Physical Exam   Constitutional: She is oriented to person, place, and time. She appears well-developed and well-nourished. No distress.   HENT:   Head: Normocephalic.   Mouth/Throat: Oropharynx is clear.  Eyes: Pupils are equal, round, and reactive to light.   Neck: Neck supple. No tracheal deviation present.   Cardiovascular: Normal rate, CHEST   PAIN   DYSPNEA.  Pulmonary/Chest: Effort normal and SHORTNESS   OF   BREATH.   Abdominal: Bowel sounds are normal. She exhibits no distension and no mass. There is no tenderness. There is no rebound and no guarding. Hernia confirmed negative in the right inguinal area and confirmed negative in the left inguinal area.   Musculoskeletal:WEAKNESS  Lymphadenopathy:        Right: No inguinal adenopathy present.        Left: No inguinal adenopathy present.   Neurological: She is alert and oriented.  Skin: Skin is warm. No rash noted.        Review of Systems  Review of Systems   Normal ROS:   Constitutional: Negative for fever, chills, activity change fatigue and unexpected weight change.   HENT: Negative for nosebleeds, congestion.  Eyes: Negative for visual disturbance.   Respiratory: Negative for shortness of breath and wheezing.    Cardiovascular: Negative for chest pain, palpitations.   Gastrointestinal: Negative for abdominal pain, diarrhea, constipation, blood in stool and abdominal distention.   Musculoskeletal: Negative for back pain.   Allergic/Immunologic: Negative for environmental allergies and food allergies.   Neurological: , weakness, numbness   DIZZY  Hematological: Negative for adenopathy. Does not bruise/bleed easily.   Psychiatric/Behavioral: Negative for hallucinations, behavioral problems, confusion.    Assessment:      Diagnosis:GYN   EXAM     VULVITIS     VAG   DISHYCARGE   MENOPAUSAL       Plan:      Return in 12  months

## 2018-03-20 NOTE — PATIENT INSTRUCTIONS

## 2018-03-20 NOTE — LETTER
March 20, 2018      Jaime Hopkins MD  [address] [facility] [place] [address]           [facility] [place] - OB/ GYN  [address] [facility] [address]  [facility] [address]  Phone: [phone]          Patient: Carol Yadav   MR Number: [id number]   YOB: 1935   Date of Visit: 3/20/2018       Dear Dr. Jaime Hopkins:    Thank you for referring Carol Yadav to me for evaluation. Attached you will find relevant portions of my assessment and plan of care.    If you have questions, please do not hesitate to call me. I look forward to following Carol Yadav along with you.    Sincerely,    Javier Patel MD    Enclosure  CC:  No Recipients    If you would like to receive this communication electronically, please contact [email or web address] or [phone] to request more information on [facility] Link access.    For providers and/or their staff who would like to refer a patient to [facility], please contact us through our one-stop-shop provider referral line, [facility], at [phone].    If you feel you have received this communication in error or would no longer like to receive these types of communications, please e-mail [email or web address]

## 2018-03-21 LAB
CANDIDA RRNA VAG QL PROBE: NEGATIVE
G VAGINALIS RRNA GENITAL QL PROBE: NEGATIVE
T VAGINALIS RRNA GENITAL QL PROBE: NEGATIVE

## 2018-03-28 ENCOUNTER — OFFICE VISIT (OUTPATIENT)
Dept: NEUROLOGY | Facility: CLINIC | Age: 83
End: 2018-03-28
Payer: MEDICARE

## 2018-03-28 VITALS
HEIGHT: 62 IN | DIASTOLIC BLOOD PRESSURE: 75 MMHG | BODY MASS INDEX: 31.32 KG/M2 | SYSTOLIC BLOOD PRESSURE: 141 MMHG | WEIGHT: 170.19 LBS | HEART RATE: 96 BPM

## 2018-03-28 DIAGNOSIS — F32.A ANXIETY AND DEPRESSION: ICD-10-CM

## 2018-03-28 DIAGNOSIS — R25.1 EXCESSIVE PHYSIOLOGIC TREMOR: Primary | ICD-10-CM

## 2018-03-28 DIAGNOSIS — F41.9 ANXIETY AND DEPRESSION: ICD-10-CM

## 2018-03-28 PROCEDURE — 3078F DIAST BP <80 MM HG: CPT | Mod: CPTII,GC,S$GLB, | Performed by: PSYCHIATRY & NEUROLOGY

## 2018-03-28 PROCEDURE — 99214 OFFICE O/P EST MOD 30 MIN: CPT | Mod: GC,S$GLB,, | Performed by: PSYCHIATRY & NEUROLOGY

## 2018-03-28 PROCEDURE — 99999 PR PBB SHADOW E&M-EST. PATIENT-LVL III: CPT | Mod: PBBFAC,GC,, | Performed by: STUDENT IN AN ORGANIZED HEALTH CARE EDUCATION/TRAINING PROGRAM

## 2018-03-28 PROCEDURE — 3077F SYST BP >= 140 MM HG: CPT | Mod: CPTII,GC,S$GLB, | Performed by: PSYCHIATRY & NEUROLOGY

## 2018-03-28 RX ORDER — VENLAFAXINE HYDROCHLORIDE 37.5 MG/1
CAPSULE, EXTENDED RELEASE ORAL
Qty: 90 CAPSULE | Refills: 1 | Status: SHIPPED | OUTPATIENT
Start: 2018-03-28 | End: 2018-09-10 | Stop reason: SDUPTHER

## 2018-03-30 RX ORDER — PROPRANOLOL HYDROCHLORIDE 10 MG/1
5 TABLET ORAL DAILY
Qty: 15 TABLET | Refills: 11 | Status: SHIPPED | OUTPATIENT
Start: 2018-03-30 | End: 2018-12-12

## 2018-03-30 NOTE — PROGRESS NOTES
"NEUROLOGY OUTPATIENT CLINIC NOTE    Patient Name:  Carol Yadav  Patient MRN:  [id number]    HPI:  Patient is a 82 y.o. female with PMHx of HTN, HLD, urinary retention, and R frontal stroke 2014 (basal ganglia, corona radiata) with residual LUE > LLE weakness who presents to [facility] 3/30/2018 for sensation of tremoring on her L side more than her R.  She notes that she occasionally has some tremor in the RUE as well.  Describes the tremor as intermittent and sometimes imperceptible to others, almost like an "internal shaking."  She is not experiencing the tremor on this visit but has some RUE physiologic tremor that seemed to worsen toward the end of the office visit.  She also notes having a lower voice but denies quivering voice or trembling voice.  No involvement of the head in her tremor either.  No family hx as far as she knows.  Tremor seems to be worse in the early morning. Also worse on standing but does not involve the legs very much and is not exclusively present on sitting up/standing.  She uses a hemiwalker since her stroke and feels that she has had worsening weakness since the stroke but still tries to exercise 3 times per week in the assisted living center where she resides.  Denies constipation, anosmia, change in gait, difficulty initiating movements.  Feels that her memory has been worsening but describes this as difficulty with recalling some trivial facts (gives example of names of actors in movies).  Denies depression, loss of interest in activities, change in appetite, loss of focus.  She does sleep late, but estimates that she gets ~ 8 hours total.  Gets fatigued around 6 pm.  Enjoys puzzles including crosswords and sudoku.  Also likes to binge-watch certain tv series and seems to enjoy watching [employer] [place] [employer] most recently.  Does note that she probably would enjoy having some more conversation and social opportunities.    ROS:  General:  No fever, no chills, no fatigue, +gain " of few lbs over past few months  HEENT:  No headache, no changes in vision  Respiratory:  No cough, no SOB  Cardiovascular:  No chest pain, no palpitations  GI:  No abdominal pain, no n/v/c/d  Skin:  No rashes, no pruritus, no wounds  Musculoskeletal:  No myalgias, no arthralgias  Hematologic:  No easy bruising or bleeding  Endocrine:  No heat or cold intolerance, no polyuria/polydipsia  Neuro:  No tremors, no focal weakness, no paresthesias  Psych:  No anxiety, no depression    PMHx:  Patient Active Problem List   Diagnosis    Hyperlipidemia    Allergic rhinitis    Ataxia    Slurred speech    Diastolic dysfunction    Low back pain radiating to left leg    Hemiparesis affecting left side as late effect of stroke    H/O: CVA (cerebrovascular accident)    Left hemiparesis    Dysarthria    Neurological neglect syndrome    Anxiety disorder    Frozen shoulder    Adhesive capsulitis of left shoulder    Impaired mobility and ADLs    Gait instability    Hypertension, well controlled    Anxiety with depression    Slow transit constipation    Physical deconditioning    Decreased functional mobility and endurance    Recurrent major depressive disorder, in partial remission    Class 1 obesity with serious comorbidity and body mass index (BMI) of 32.0 to 32.9 in adult    Menopausal state    Acute vaginitis    Chronic vulvitis    Osteopenia    Family history of breast cancer     PSHx:  Past Surgical History:   Procedure Laterality Date    APPENDECTOMY      EYE SURGERY      hai cataract surgery    OOPHORECTOMY      TONSILLECTOMY       Medications:  Current Outpatient Prescriptions on File Prior to Visit   Medication Sig Dispense Refill    acetaminophen (TYLENOL) 325 MG tablet Take 2 tablets (650 mg total) by mouth every 4 (four) hours as needed.  0    alendronate (FOSAMAX) 70 MG tablet       amLODIPine (NORVASC) 5 MG tablet Take 1 tablet (5 mg total) by mouth once daily. 90 tablet 1    aspirin  "(ECOTRIN) 81 MG EC tablet Take 81 mg by mouth once daily.      atorvastatin (LIPITOR) 40 MG tablet TAKE 1 TABLET BY MOUTH ONCE DAILY 30 tablet 11    calcium carbonate (OS-HAYLEE) 600 mg calcium (1,500 mg) Tab Take 1 tablet (600 mg total) by mouth 2 (two) times daily with meals. 60 tablet 9    cholecalciferol, vitamin D3, 1,000 unit capsule Take 1 capsule (1,000 Units total) by mouth once daily. 90 capsule 3    fluticasone (FLONASE) 50 mcg/actuation nasal spray 1 spray by Each Nare route 2 (two) times daily as needed for Rhinitis. 16 g 9    multivitamin-minerals-lutein (MULTIVITAMIN 50 PLUS) Tab Take 1 tablet by mouth once daily. 90 tablet 3    olopatadine (PATANOL) 0.1 % ophthalmic solution Place 1 drop into both eyes 2 (two) times daily. 5 mL 3    oxybutynin (DITROPAN-XL) 10 MG 24 hr tablet Take 1 tablet (10 mg total) by mouth once daily. For over active bladder 90 tablet 1    peg 400-propylene glycol 0.4-0.3 % Drop Apply 1 drop to eye 3 (three) times daily as needed. 40 mL 3    polyethylene glycol (MIRALAX) 17 gram PwPk Take by mouth. Take every other day      traZODone (DESYREL) 50 MG tablet TAKE 1 TABLET BY MOUTH EVERY EVENING 28 tablet 0    clotrimazole-betamethasone 1-0.05% (LOTRISONE) cream Apply to affected area 2 times daily 15 g 4     No current facility-administered medications on file prior to visit.        Allergies:  Review of patient's allergies indicates:  No Known Allergies    Social History:  Patient is a former actress.  She still enjoys local theater but does not act anymore.  Resides at an assisted living center with her cat.  Has a daughter who visits at least once a week and a few grandchildren.  Notes that her granddaughter used to be very involved in theater at school and was a natural, but she thinks she's become more concerned with what the popular crowd at her new school is doing.  She also notes that her grandson was just accepted at a "better school" which she is happy with as " "he'll get more attention in areas he struggles with.  As per HPI, she tries to get to the assisted living center gym ~ 3 times per week but stopped doing formal PT/OT as she felt it was not helpful.  She is very lively and a great conversationalist with interests in local theater, several tv series, and some older video games.  Notes that her dad was in finance but played the piano beautifully and would bring her to many events in [place] involving live music and theater.  Her mom was a stay at home mom.  She has lived in [place] all her life apart from 2 years during which she studied acting up in [place].  Quit using tobacco > 30 years ago, uses EtOH rarely--maybe 1-2 drinks per week.  Denies illicits, tried marijuana once in her youth and did not care for it.    Physical Exam:  BP (!) 141/75   Pulse 96   Ht 5' 2" (1.575 m)   Wt 77.2 kg (170 lb 3.1 oz)   BMI 31.13 kg/m²   General:  Well-developed, well-nourished, nad  HEENT:  NCAT, PERRL, EOMI, oropharygneal membranes non-erythematous/without exudate  Neck:  Supple, normal ROM without nuchal rigidity  Respiratory:  Symmetric expansion, no increased wob  CVS:  No LE edema.  Peripheral pulses 2+ and symmetric--radial, dorsalis pedis.  GI:  Abd soft, non-distended, non-tender to palpation  Skin:  No visible rashes or wounds  Psych:  Very pleasant, cooperative with exam.  Speech and thought content appropriate.  Neurologic Exam:  Mental Status:  AAOx3.  Converses easily.  Able to spell 'world' forward and backward, remote/recent recall intact.  Cranial Nerves:  PERRLA, EOMI.  Visual fields intact to confrontation.  Facial movement, sensation intact and symmetric. Tongue protrudes midline, palate raises symmetrically. Trapezius 5/5 bilaterally.  Motor:  Normal muscle bulk and tone.  RUE 4+/5 throughout.  LUE with 1/5 shoulder abduction, 3/5 biceps/triceps, and 4/5  strength.  RLE 4+/5 throughout.  LLE with 4-/5 hip flexors, 4/5 knee " flexion/extension, and 4-/5 plantarflexion/dorsiflexion.  No atrophy noted.  Sensory:  Sensation intact to light touch at all extremities without inattention.  Vibratory sensation intact and symmetric at BUE/BLE digits.  Negative Romberg.  Reflexes:  Reflexes 2+--biceps, brachioradialis, patellar, Achilles.  No ankle clonus.   Coordination:  Mild physiologic tremor RUE, low amplitude with high frequency.  Some tremor in LUE on opening hand with patient noting difficulty keeping hand open.  Otherwise no resting tremor or myoclonus appreciated.  No exacerbation of tremor with distraction.  FNF, MICHAEL intact--limited LUE testing 2/2 weakness.  HTS intact bilaterally.  No clear dysmetria/ataxia/dysdiadochokinesia.  Gait:  Uses hemiwalker at baseline.  Leans slightly toward L on hemiwalker but has good clearance bilaterally--no shuffling.  Posture normal, R arm swing with L arm on hemiwalker.  ~ 3 point turn.      Labs:  Results: CBC:   Lab Results   Component Value Date/Time    WBC 9.36 09/29/2017 03:05 PM    RBC 4.64 09/29/2017 03:05 PM    HGB 13.7 09/29/2017 03:05 PM    HCT 42.4 09/29/2017 03:05 PM     09/29/2017 03:05 PM    MCV 91 09/29/2017 03:05 PM    MCH 29.5 09/29/2017 03:05 PM    MCHC 32.3 09/29/2017 03:05 PM     CMP:   Lab Results   Component Value Date/Time     (H) 09/29/2017 03:05 PM    CALCIUM 9.0 09/29/2017 03:05 PM    ALBUMIN 3.4 (L) 09/29/2017 03:05 PM    PROT 7.0 09/29/2017 03:05 PM     09/29/2017 03:05 PM    K 3.9 09/29/2017 03:05 PM    CO2 21 (L) 09/29/2017 03:05 PM     09/29/2017 03:05 PM    BUN 13 09/29/2017 03:05 PM    CREATININE 0.8 09/29/2017 03:05 PM    ALKPHOS 101 09/29/2017 03:05 PM    ALT 21 09/29/2017 03:05 PM    AST 22 09/29/2017 03:05 PM    BILITOT 0.7 09/29/2017 03:05 PM     Imaging:  Imaging Results    None       Additional Diagnotic Testing:  N/a    ASSESSMENT/PLAN:  Patient is a 82 y.o. female with a PMHx of HLD, HTN, urinary retention, and R frontal stroke in  2014 w/ residual LUE > LLE weakness who presents to [facility] 3/30/2018 due to sensation of tremor, worse on the left than the right.    Tremor  -Ddx:  Post-stroke tremor (R basal ganglia affected), essential tremor, orthostatic tremor, PD, physiologic tremor  -Suspect very mild essential tremor vs physiologic tremor amplified by anxiety/caffeine/medications  -Tremor is not consistent with PD, orthostatic tremor  -Basal ganglia affected by previous stroke may be contributing  -Will trial low dose propranolol 5 mg   -Encouraged pt to continue exercise and any social events that interest her  -Patient will return to clinic in approximately 2 months for follow up  -In interim, patient will call clinic if there are any side effects or issues on propranolol    Loree Villalobos MD  Pager:  [phone] [place], [place] [phone]

## 2018-04-02 ENCOUNTER — OFFICE VISIT (OUTPATIENT)
Dept: FAMILY MEDICINE | Facility: CLINIC | Age: 83
End: 2018-04-02
Payer: MEDICARE

## 2018-04-02 VITALS
TEMPERATURE: 98 F | BODY MASS INDEX: 30.95 KG/M2 | HEART RATE: 97 BPM | SYSTOLIC BLOOD PRESSURE: 114 MMHG | HEIGHT: 62 IN | OXYGEN SATURATION: 95 % | DIASTOLIC BLOOD PRESSURE: 68 MMHG | RESPIRATION RATE: 20 BRPM | WEIGHT: 168.19 LBS

## 2018-04-02 DIAGNOSIS — F41.8 ANXIETY WITH DEPRESSION: ICD-10-CM

## 2018-04-02 DIAGNOSIS — G81.94 LEFT HEMIPARESIS: ICD-10-CM

## 2018-04-02 DIAGNOSIS — F33.41 RECURRENT MAJOR DEPRESSIVE DISORDER, IN PARTIAL REMISSION: ICD-10-CM

## 2018-04-02 DIAGNOSIS — T50.905A ADVERSE EFFECT OF DRUG, INITIAL ENCOUNTER: ICD-10-CM

## 2018-04-02 DIAGNOSIS — I10 HYPERTENSION, WELL CONTROLLED: Primary | ICD-10-CM

## 2018-04-02 PROCEDURE — 3078F DIAST BP <80 MM HG: CPT | Mod: CPTII,S$GLB,, | Performed by: FAMILY MEDICINE

## 2018-04-02 PROCEDURE — 99215 OFFICE O/P EST HI 40 MIN: CPT | Mod: S$GLB,,, | Performed by: FAMILY MEDICINE

## 2018-04-02 PROCEDURE — 3074F SYST BP LT 130 MM HG: CPT | Mod: CPTII,S$GLB,, | Performed by: FAMILY MEDICINE

## 2018-04-02 PROCEDURE — 99999 PR PBB SHADOW E&M-EST. PATIENT-LVL III: CPT | Mod: PBBFAC,,, | Performed by: FAMILY MEDICINE

## 2018-04-02 RX ORDER — BETAMETHASONE DIPROPIONATE 0.5 MG/G
CREAM TOPICAL
COMMUNITY
Start: 2018-03-20 | End: 2021-02-22

## 2018-04-02 RX ORDER — CLOTRIMAZOLE 1 %
CREAM (GRAM) TOPICAL
Status: ON HOLD | COMMUNITY
Start: 2018-03-20 | End: 2021-06-18

## 2018-04-02 RX ORDER — AMLODIPINE BESYLATE AND BENAZEPRIL HYDROCHLORIDE 2.5; 1 MG/1; MG/1
1 CAPSULE ORAL DAILY
Qty: 90 CAPSULE | Refills: 3 | Status: SHIPPED | OUTPATIENT
Start: 2018-04-02 | End: 2019-04-08 | Stop reason: SDUPTHER

## 2018-04-02 NOTE — PROGRESS NOTES
"Subjective:       Patient ID: Carol Yadav is a 82 y.o. female.    Chief Complaint: Hypertension (4 wks follow up ); OAB (4 wks follow up ); and Anxiety (with stress - 4 wks follow up )    HPI    Htn - BP looks much better today.  She is doing very well with amlodipine 5mg after we changed her from lisinopril.     OAB - Pt is doing well on oxybutynin and is now only getting up once at night, down from multiple trips a night. No side effects.    Anxiety - Pt feel much better today. Her emotions are on much more of an "even keel" lately.     tremors - pt recently saw neurology who prescribed propanolol for her tremors which she has not started yet.     Pedal edema - noted since increased dose of amlo 5mg. Worsens throughout the day.   Outpatient Prescriptions Marked as Taking for the 4/2/18 encounter (Office Visit) with Jaime Hopkins MD   Medication Sig Dispense Refill    acetaminophen (TYLENOL) 325 MG tablet Take 2 tablets (650 mg total) by mouth every 4 (four) hours as needed.  0    amLODIPine (NORVASC) 5 MG tablet Take 1 tablet (5 mg total) by mouth once daily. 90 tablet 1    aspirin (ECOTRIN) 81 MG EC tablet Take 81 mg by mouth once daily.      atorvastatin (LIPITOR) 40 MG tablet TAKE 1 TABLET BY MOUTH ONCE DAILY 30 tablet 11    calcium carbonate (OS-HAYLEE) 600 mg calcium (1,500 mg) Tab Take 1 tablet (600 mg total) by mouth 2 (two) times daily with meals. 60 tablet 9    cholecalciferol, vitamin D3, 1,000 unit capsule Take 1 capsule (1,000 Units total) by mouth once daily. 90 capsule 3    fluticasone (FLONASE) 50 mcg/actuation nasal spray 1 spray by Each Nare route 2 (two) times daily as needed for Rhinitis. 16 g 9    multivitamin-minerals-lutein (MULTIVITAMIN 50 PLUS) Tab Take 1 tablet by mouth once daily. 90 tablet 3    olopatadine (PATANOL) 0.1 % ophthalmic solution Place 1 drop into both eyes 2 (two) times daily. 5 mL 3    oxybutynin (DITROPAN-XL) 10 MG 24 hr tablet Take 1 tablet (10 mg total) by " mouth once daily. For over active bladder 90 tablet 1    polyethylene glycol (MIRALAX) 17 gram PwPk Take by mouth. Take every other day      traZODone (DESYREL) 50 MG tablet TAKE 1 TABLET BY MOUTH EVERY EVENING 28 tablet 0    venlafaxine (EFFEXOR-XR) 37.5 MG 24 hr capsule TAKE 1 CAPSULE BY MOUTH ONCE DAILY 90 capsule 1       Past Medical History:   Diagnosis Date    Allergic rhinitis     Elevated blood pressure reading without diagnosis of hypertension     Hormone replacement therapy (postmenopausal)     Hyperlipidemia     Stroke        Family History   Problem Relation Age of Onset    Cancer Mother     Stroke Father         reports that she has quit smoking. Her smoking use included Cigarettes. She quit after 45.00 years of use. She has never used smokeless tobacco. She reports that she drinks about 12.6 oz of alcohol per week . She reports that she does not use drugs.    Review of Systems   Constitutional: Negative for chills and fever.   Psychiatric/Behavioral: Negative for agitation and behavioral problems. The patient is not nervous/anxious and is not hyperactive.         Memory loss       Objective:     Vitals:    04/02/18 1343   BP: 114/68   Pulse: 97   Resp: 20   Temp: 97.6 °F (36.4 °C)        Physical Exam   Constitutional: She appears well-developed. No distress.   HENT:   Head: Normocephalic and atraumatic.   Eyes: Conjunctivae are normal. No scleral icterus.   Pulmonary/Chest: Effort normal.   Neurological: She is alert.   Skin: She is not diaphoretic.   Psychiatric: She has a normal mood and affect. Her behavior is normal.   Vitals reviewed.      Assessment:       1. Hypertension, well controlled    2. Left hemiparesis    3. Anxiety with depression    4. Recurrent major depressive disorder, in partial remission        Plan:       Carol LONGORIA was seen today for hypertension, oab and anxiety.    Diagnoses and all orders for this visit:    Hypertension, well controlled  Chronic - stable - pt is doing  well, but is having some issues with pedal edema, likely from increased amlod dose. See below. Changed to amlo 2.5mg-benaz 10mg.     Left hemiparesis  Chronic stable    Anxiety with depression  Chronic - stable - improved since her last visit. Emotional lability and agitation decreased significantly.     Recurrent major depressive disorder, in partial remission  Chronic - stable - continue Effexor.      Med Side Effect - amlo 5--> 2.5mg due to pedal edema          Follow-up in about 3 months (around 7/2/2018) for Hypertension, memory loss.        Pt verbalized understanding and agreed with our plan.     At least 40 minutes were spent today with the patient in the office, which more than 50% of the time was spent on evaluation and counseling regarding The primary encounter diagnosis was Hypertension, well controlled. Diagnoses of Left hemiparesis, Anxiety with depression, and Recurrent major depressive disorder, in partial remission were also pertinent to this visit..

## 2018-04-04 NOTE — PROGRESS NOTES
Patient seen and examined.  I agree with the history, exam, assessment and plan within the resident's note,            Kalen Zamudio MD, MPH  Division of Movement and Memory Disorders  [facility]

## 2018-04-05 ENCOUNTER — HOSPITAL ENCOUNTER (OUTPATIENT)
Dept: CARDIOLOGY | Facility: HOSPITAL | Age: 83
Discharge: HOME | End: 2018-04-05
Attending: INTERNAL MEDICINE
Payer: MEDICARE

## 2018-04-05 ENCOUNTER — HOSPITAL ENCOUNTER (OUTPATIENT)
Dept: RADIOLOGY | Facility: HOSPITAL | Age: 83
Discharge: HOME | End: 2018-04-05
Attending: INTERNAL MEDICINE
Payer: MEDICARE

## 2018-04-05 DIAGNOSIS — R42 DIZZINESS: ICD-10-CM

## 2018-04-05 DIAGNOSIS — R06.09 DYSPNEA ON EXERTION: ICD-10-CM

## 2018-04-05 DIAGNOSIS — R06.02 SHORTNESS OF BREATH: ICD-10-CM

## 2018-04-05 DIAGNOSIS — R00.0 TACHYCARDIA: ICD-10-CM

## 2018-04-05 LAB
DIASTOLIC DYSFUNCTION: NO
DIASTOLIC DYSFUNCTION: YES
ESTIMATED PA SYSTOLIC PRESSURE: 48.97
GLOBAL PERICARDIAL EFFUSION: ABNORMAL
MITRAL VALVE MOBILITY: NORMAL
RETIRED EF AND QEF - SEE NOTES: 60 (ref 55–65)
TRICUSPID VALVE REGURGITATION: ABNORMAL

## 2018-04-05 PROCEDURE — 93306 TTE W/DOPPLER COMPLETE: CPT

## 2018-04-05 PROCEDURE — 93880 EXTRACRANIAL BILAT STUDY: CPT | Mod: 26,,, | Performed by: INTERNAL MEDICINE

## 2018-04-05 PROCEDURE — 93017 CV STRESS TEST TRACING ONLY: CPT

## 2018-04-05 PROCEDURE — 63600175 PHARM REV CODE 636 W HCPCS

## 2018-04-05 PROCEDURE — 93880 EXTRACRANIAL BILAT STUDY: CPT

## 2018-04-05 PROCEDURE — 93016 CV STRESS TEST SUPVJ ONLY: CPT | Mod: ,,, | Performed by: INTERNAL MEDICINE

## 2018-04-05 PROCEDURE — 78452 HT MUSCLE IMAGE SPECT MULT: CPT | Mod: 26,,, | Performed by: INTERNAL MEDICINE

## 2018-04-05 PROCEDURE — 93018 CV STRESS TEST I&R ONLY: CPT | Mod: ,,, | Performed by: INTERNAL MEDICINE

## 2018-04-05 PROCEDURE — A9502 TC99M TETROFOSMIN: HCPCS

## 2018-04-05 PROCEDURE — 93227 XTRNL ECG REC<48 HR R&I: CPT | Mod: ,,, | Performed by: INTERNAL MEDICINE

## 2018-04-05 PROCEDURE — 93306 TTE W/DOPPLER COMPLETE: CPT | Mod: 26,,, | Performed by: INTERNAL MEDICINE

## 2018-04-05 PROCEDURE — 93226 XTRNL ECG REC<48 HR SCAN A/R: CPT

## 2018-04-05 RX ORDER — REGADENOSON 0.08 MG/ML
INJECTION, SOLUTION INTRAVENOUS
Status: DISPENSED
Start: 2018-04-05 | End: 2018-04-05

## 2018-04-06 LAB — INTERNAL CAROTID STENOSIS: NORMAL

## 2018-04-19 ENCOUNTER — OFFICE VISIT (OUTPATIENT)
Dept: CARDIOLOGY | Facility: CLINIC | Age: 83
End: 2018-04-19
Payer: MEDICARE

## 2018-04-19 VITALS
OXYGEN SATURATION: 93 % | BODY MASS INDEX: 30.4 KG/M2 | RESPIRATION RATE: 20 BRPM | WEIGHT: 166.25 LBS | SYSTOLIC BLOOD PRESSURE: 144 MMHG | DIASTOLIC BLOOD PRESSURE: 72 MMHG | HEART RATE: 73 BPM

## 2018-04-19 DIAGNOSIS — R06.02 SHORTNESS OF BREATH: ICD-10-CM

## 2018-04-19 DIAGNOSIS — E78.2 MIXED HYPERLIPIDEMIA: ICD-10-CM

## 2018-04-19 DIAGNOSIS — R42 DIZZINESS: Primary | ICD-10-CM

## 2018-04-19 DIAGNOSIS — Z86.73 H/O: CVA (CEREBROVASCULAR ACCIDENT): ICD-10-CM

## 2018-04-19 DIAGNOSIS — R06.09 DYSPNEA ON EXERTION: ICD-10-CM

## 2018-04-19 DIAGNOSIS — R00.0 TACHYCARDIA: ICD-10-CM

## 2018-04-19 DIAGNOSIS — I10 HYPERTENSION, WELL CONTROLLED: ICD-10-CM

## 2018-04-19 PROCEDURE — 99999 PR PBB SHADOW E&M-EST. PATIENT-LVL III: CPT | Mod: PBBFAC,,, | Performed by: INTERNAL MEDICINE

## 2018-04-19 PROCEDURE — 3077F SYST BP >= 140 MM HG: CPT | Mod: CPTII,S$GLB,, | Performed by: INTERNAL MEDICINE

## 2018-04-19 PROCEDURE — 99499 UNLISTED E&M SERVICE: CPT | Mod: S$GLB,,, | Performed by: INTERNAL MEDICINE

## 2018-04-19 PROCEDURE — 99214 OFFICE O/P EST MOD 30 MIN: CPT | Mod: S$GLB,,, | Performed by: INTERNAL MEDICINE

## 2018-04-19 PROCEDURE — 3078F DIAST BP <80 MM HG: CPT | Mod: CPTII,S$GLB,, | Performed by: INTERNAL MEDICINE

## 2018-04-19 NOTE — PROGRESS NOTES
Subjective:    Patient ID:  Carol Yadav is a 82 y.o. female who presents for follow-up of No chief complaint on file.      HPI  Patient is here for follow-up shortness of breath and dizziness.  She underwent diagnostic testing as below.  This essentially within normal limits.  She denies any worsening cardiopulmonary complaints.  She still has some mild residuals that she deals with post stroke but otherwise she's been working with physical exercises through her nursing home.  She denies any worsening chest pain or shortness of breath.  She's expands no PND, orthopnea or lower edema.  She denies any dizziness to the point of presyncope or syncope.  Otherwise she is mainly been in her usual state of health.  She's very active in terms of doing puzzles and activity.  She says she is dealing with some loss of memory issues and does feel depressed from time to time.  She is on antidepressant.    Review of Systems   Constitution: Negative.   HENT: Negative.    Eyes: Negative.    Cardiovascular: Negative for chest pain, dyspnea on exertion, irregular heartbeat, leg swelling, near-syncope, orthopnea, palpitations, paroxysmal nocturnal dyspnea and syncope.   Respiratory: Negative for shortness of breath.    Skin: Negative.    Musculoskeletal: Negative.    Gastrointestinal: Negative for abdominal pain, constipation and diarrhea.   Genitourinary: Negative for dysuria.   Neurological: Negative.    Psychiatric/Behavioral: Positive for depression.        Objective:    Physical Exam   Constitutional: She is oriented to person, place, and time. She appears well-developed and well-nourished.   HENT:   Head: Normocephalic and atraumatic.   Eyes: Conjunctivae and EOM are normal. Pupils are equal, round, and reactive to light.   Neck: Normal range of motion. Neck supple. No thyromegaly present.   Cardiovascular: Normal rate and regular rhythm.    No murmur heard.  Pulmonary/Chest: Effort normal and breath sounds normal. No respiratory  distress.   Abdominal: Soft. Bowel sounds are normal.   Musculoskeletal: She exhibits no edema.   Neurological: She is alert and oriented to person, place, and time.   Skin: Skin is warm and dry.   Psychiatric: She has a normal mood and affect. Her behavior is normal.       echo:  4-18  CONCLUSIONS     1 - Normal left ventricular systolic function (EF 60-65%).     2 - No wall motion abnormalities.     3 - Impaired LV relaxation, elevated LAP (grade 2 diastolic dysfunction).     4 - Trivial tricuspid regurgitation.     5 - Pulmonary hypertension. The estimated PA systolic pressure is 49 mmHg.     NST:  Impression: NORMAL MYOCARDIAL PERFUSION  1. The perfusion scan is free of evidence for myocardial ischemia or injury.   2. Resting wall motion is physiologic.   3. Visually estimated LV function is normal.   4. The ventricular volumes are normal at rest and stress.   5. The extracardiac distribution of radioactivity is normal.   6. There was no previous study available to compare.    Carotid ultrasound:  CONCLUSIONS   There is 0 - 19% right Internal Carotid stenosis.  There is 0 - 19% left Internal Carotid stenosis.    Holter within normal limits  Assessment:       1. Dizziness    2. Tachycardia    3. Dyspnea on exertion    4. Shortness of breath    5. Mixed hyperlipidemia    6. Hypertension, well controlled    7. H/O: CVA (cerebrovascular accident)         Plan:       -Only reassurance in light of testing  -Continue physical rehabilitation at nursing home    Return to clinic in 6 months

## 2018-04-25 DIAGNOSIS — F33.42 RECURRENT MAJOR DEPRESSIVE DISORDER, IN FULL REMISSION: ICD-10-CM

## 2018-04-25 RX ORDER — TRAZODONE HYDROCHLORIDE 50 MG/1
TABLET ORAL
Qty: 28 TABLET | Refills: 0 | Status: SHIPPED | OUTPATIENT
Start: 2018-04-25 | End: 2018-05-23 | Stop reason: SDUPTHER

## 2018-05-23 DIAGNOSIS — F33.42 RECURRENT MAJOR DEPRESSIVE DISORDER, IN FULL REMISSION: ICD-10-CM

## 2018-05-23 RX ORDER — TRAZODONE HYDROCHLORIDE 50 MG/1
TABLET ORAL
Qty: 28 TABLET | Refills: 0 | Status: SHIPPED | OUTPATIENT
Start: 2018-05-23 | End: 2018-07-13 | Stop reason: SDUPTHER

## 2018-07-02 ENCOUNTER — OFFICE VISIT (OUTPATIENT)
Dept: FAMILY MEDICINE | Facility: CLINIC | Age: 83
End: 2018-07-02
Payer: MEDICARE

## 2018-07-02 VITALS
BODY MASS INDEX: 29.7 KG/M2 | HEIGHT: 62 IN | RESPIRATION RATE: 16 BRPM | HEART RATE: 83 BPM | SYSTOLIC BLOOD PRESSURE: 136 MMHG | OXYGEN SATURATION: 95 % | TEMPERATURE: 98 F | DIASTOLIC BLOOD PRESSURE: 70 MMHG | WEIGHT: 161.38 LBS

## 2018-07-02 DIAGNOSIS — I10 HYPERTENSION, WELL CONTROLLED: ICD-10-CM

## 2018-07-02 DIAGNOSIS — K59.01 SLOW TRANSIT CONSTIPATION: ICD-10-CM

## 2018-07-02 DIAGNOSIS — Z86.73 H/O: CVA (CEREBROVASCULAR ACCIDENT): ICD-10-CM

## 2018-07-02 DIAGNOSIS — H01.005 BLEPHARITIS OF LOWER EYELIDS OF BOTH EYES, UNSPECIFIED TYPE: ICD-10-CM

## 2018-07-02 DIAGNOSIS — G81.94 LEFT HEMIPARESIS: Primary | ICD-10-CM

## 2018-07-02 DIAGNOSIS — H53.8 BLURRY VISION: ICD-10-CM

## 2018-07-02 DIAGNOSIS — I69.354 HEMIPARESIS AFFECTING LEFT SIDE AS LATE EFFECT OF STROKE: ICD-10-CM

## 2018-07-02 DIAGNOSIS — H01.002 BLEPHARITIS OF LOWER EYELIDS OF BOTH EYES, UNSPECIFIED TYPE: ICD-10-CM

## 2018-07-02 PROCEDURE — 99999 PR PBB SHADOW E&M-EST. PATIENT-LVL III: CPT | Mod: PBBFAC,,, | Performed by: FAMILY MEDICINE

## 2018-07-02 PROCEDURE — 99214 OFFICE O/P EST MOD 30 MIN: CPT | Mod: S$GLB,,, | Performed by: FAMILY MEDICINE

## 2018-07-02 PROCEDURE — 3075F SYST BP GE 130 - 139MM HG: CPT | Mod: CPTII,S$GLB,, | Performed by: FAMILY MEDICINE

## 2018-07-02 PROCEDURE — 3078F DIAST BP <80 MM HG: CPT | Mod: CPTII,S$GLB,, | Performed by: FAMILY MEDICINE

## 2018-07-02 NOTE — PROGRESS NOTES
"Subjective:       Patient ID: Carol Yadav is a 82 y.o. female.    Chief Complaint: Hypertension (3 months follow up ) and Memory Loss (3 months follow up )    HPI    Htn - pt is doing well on the lower dose of amlodipine. Bp looks good today. She is adherent with her medication.    Down 6 lbs since her last visit. She east less and cut out chocolate.     Pedal edema - pts sxs have remarkably improved since her last visit with me. Trivial L sided pedal edema.     Ataxia - pt is doing well with her hemiwalker. Stable.     Urinary frequency - pt is doing well with oxybutinin 10 xL.     Abnormal stools - pt states that she has noted "black pieces" in her stool, but this is an inconsistent finding. No red or black when wiping.      Miralax works well for her constipation. No Side effects.     Bilateral eyelid itching with crusty excretions that accumulate a few times per day.          Outpatient Prescriptions Marked as Taking for the 7/2/18 encounter (Office Visit) with Jaime Hopkins MD   Medication Sig Dispense Refill    acetaminophen (TYLENOL) 325 MG tablet Take 2 tablets (650 mg total) by mouth every 4 (four) hours as needed.  0    alendronate (FOSAMAX) 70 MG tablet Take 70 mg by mouth every 7 days.       amlodipine-benazepril 2.5-10 mg (LOTREL) 2.5-10 mg per capsule Take 1 capsule by mouth once daily. 90 capsule 3    aspirin (ECOTRIN) 81 MG EC tablet Take 81 mg by mouth once daily.      atorvastatin (LIPITOR) 40 MG tablet TAKE 1 TABLET BY MOUTH ONCE DAILY 30 tablet 11    betamethasone dipropionate (DIPROLENE) 0.05 % cream Apply topically as needed.       calcium carbonate (OS-HAYLEE) 600 mg calcium (1,500 mg) Tab Take 1 tablet (600 mg total) by mouth 2 (two) times daily with meals. 60 tablet 9    cholecalciferol, vitamin D3, 1,000 unit capsule Take 1 capsule (1,000 Units total) by mouth once daily. 90 capsule 3    clotrimazole (LOTRIMIN) 1 % cream Apply topically.       fluticasone (FLONASE) 50 " mcg/actuation nasal spray 1 spray by Each Nare route 2 (two) times daily as needed for Rhinitis. 16 g 9    multivitamin-minerals-lutein (MULTIVITAMIN 50 PLUS) Tab Take 1 tablet by mouth once daily. 90 tablet 3    olopatadine (PATANOL) 0.1 % ophthalmic solution Place 1 drop into both eyes 2 (two) times daily. 5 mL 3    oxybutynin (DITROPAN-XL) 10 MG 24 hr tablet Take 1 tablet (10 mg total) by mouth once daily. For over active bladder 90 tablet 1    polyethylene glycol (MIRALAX) 17 gram PwPk Take by mouth. Take every other day      propranolol (INDERAL) 10 MG tablet Take 0.5 tablets (5 mg total) by mouth once daily. Please call [phone] to ask for Dr. Villalobos if you have any side effects. 15 tablet 11    traZODone (DESYREL) 50 MG tablet TAKE 1 TABLET BY MOUTH EVERY EVENING 28 tablet 0    venlafaxine (EFFEXOR-XR) 37.5 MG 24 hr capsule TAKE 1 CAPSULE BY MOUTH ONCE DAILY 90 capsule 1       Past Medical History:   Diagnosis Date    Allergic rhinitis     Elevated blood pressure reading without diagnosis of hypertension     Hormone replacement therapy (postmenopausal)     Hyperlipidemia     Stroke        Family History   Problem Relation Age of Onset    Cancer Mother     Stroke Father         reports that she has quit smoking. Her smoking use included Cigarettes. She quit after 45.00 years of use. She has never used smokeless tobacco. She reports that she drinks about 12.6 oz of alcohol per week . She reports that she does not use drugs.    Review of Systems   Constitutional: Positive for fatigue. Negative for appetite change.   Eyes: Positive for discharge, itching and visual disturbance. Negative for pain and redness.   Musculoskeletal: Positive for back pain and gait problem.   Neurological: Positive for weakness.       Objective:     Vitals:    07/02/18 1335   BP: 136/70   Pulse: 83   Resp: 16   Temp: 98.1 °F (36.7 °C)        Physical Exam   Constitutional: She appears well-developed. No distress.   HENT:    Head: Normocephalic and atraumatic.   Eyes: Conjunctivae are normal. Right eye exhibits no discharge. Left eye exhibits no discharge. No scleral icterus.   Cardiovascular: Normal rate, regular rhythm and normal heart sounds.  Exam reveals no gallop and no friction rub.    No murmur heard.  Pulmonary/Chest: Effort normal and breath sounds normal. No respiratory distress. She has no wheezes. She has no rales.   Neurological: She is alert.   LUE weakness proximal to distal.     LLE weakness - uses a hemiwalker    L sided facial droop    Skin: She is not diaphoretic.   Psychiatric: She has a normal mood and affect. Thought content normal.   Vitals reviewed.      Assessment:       1. Left hemiparesis    2. 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 H/O: CVA (cerebrovascular accident)    4. Blepharitis of lower eyelids of both eyes, unspecified type    5. Slow transit constipation    6. Blurry vision    7. Hypertension, well controlled        Plan:       Carol LONGORIA was seen today for hypertension and memory loss.    Diagnoses and all orders for this visit:    Left hemiparesis  Chronic - stable - pt is doing well with her hemiwalker.     Hemiparesis affecting left side as late effect of stroke  As above    H/O: CVA (cerebrovascular accident)  As above    Blepharitis of lower eyelids of both eyes, unspecified type  -     Ambulatory referral to Ophthalmology  New - will refer to optho.       Slow transit constipation  - Chronic - stable     Pt is doing well on current therapy and is requesting a refill. No side effects noted. Will continue current therapy.         Blurry vision  -     Ambulatory referral to Ophthalmology    Htn  - Chronic - stable     Pt is doing well on current therapy and is requesting a refill. No side effects noted. Will continue current therapy.                 Follow-up in about 2 months (around 9/2/2018).      Pt verbalized understanding and agreed with our plan.

## 2018-07-13 ENCOUNTER — OFFICE VISIT (OUTPATIENT)
Dept: OPTOMETRY | Facility: CLINIC | Age: 83
End: 2018-07-13
Payer: MEDICARE

## 2018-07-13 DIAGNOSIS — H01.00B BLEPHARITIS OF UPPER AND LOWER EYELIDS OF BOTH EYES, UNSPECIFIED TYPE: Primary | ICD-10-CM

## 2018-07-13 DIAGNOSIS — H52.7 REFRACTIVE ERROR: ICD-10-CM

## 2018-07-13 DIAGNOSIS — Z96.1 PSEUDOPHAKIA: ICD-10-CM

## 2018-07-13 DIAGNOSIS — F33.42 RECURRENT MAJOR DEPRESSIVE DISORDER, IN FULL REMISSION: ICD-10-CM

## 2018-07-13 DIAGNOSIS — H01.00A BLEPHARITIS OF UPPER AND LOWER EYELIDS OF BOTH EYES, UNSPECIFIED TYPE: Primary | ICD-10-CM

## 2018-07-13 PROCEDURE — 92015 DETERMINE REFRACTIVE STATE: CPT | Mod: S$GLB,,, | Performed by: OPTOMETRIST

## 2018-07-13 PROCEDURE — 99999 PR PBB SHADOW E&M-EST. PATIENT-LVL I: CPT | Mod: PBBFAC,,, | Performed by: OPTOMETRIST

## 2018-07-13 PROCEDURE — 92004 COMPRE OPH EXAM NEW PT 1/>: CPT | Mod: S$GLB,,, | Performed by: OPTOMETRIST

## 2018-07-13 RX ORDER — TRAZODONE HYDROCHLORIDE 50 MG/1
TABLET ORAL
Qty: 30 TABLET | Refills: 0 | Status: SHIPPED | OUTPATIENT
Start: 2018-07-13 | End: 2018-08-17 | Stop reason: SDUPTHER

## 2018-07-13 NOTE — LETTER
July 13, 2018      Jaime Hopkins MD  [address] [facility]  [place] [address]           [facility]  [address]  [place] [address]  Phone: [phone]  Fax: [phone]          Patient: Carol Yadav   MR Number: [id number]   YOB: 1935   Date of Visit: 7/13/2018       Dear Dr. Jaime Hopkins:    Thank you for referring Carol Yadav to me for evaluation. Attached you will find relevant portions of my assessment and plan of care.    If you have questions, please do not hesitate to call me. I look forward to following Carol Yadav along with you.    Sincerely,    Anita Zambrano, OD    Enclosure  CC:  No Recipients    If you would like to receive this communication electronically, please contact [email or web address] or [phone] to request more information on [employer] access.    For providers and/or their staff who would like to refer a patient to [facility], please contact us through our one-stop-shop provider referral line, [facility], at [phone].    If you feel you have received this communication in error or would no longer like to receive these types of communications, please e-mail [email or web address]

## 2018-07-13 NOTE — PROGRESS NOTES
Subjective:       Patient ID: Carol Yadav is a 82 y.o. female      Chief Complaint   Patient presents with    Concerns About Ocular Health     History of Present Illness  Dls: ? Yrs     81 y/o female presents today for ocular health check.   Pt c/o blurry vision at distance ou. Pt does not wear any glasses.   Pt c/o crusting ou in am x 2-3 yrs getting worse. Pt using lid scrubs.     + Dry eyes ou    +  Tearing ou   No itching  No burning  No pain  No ha's  + floaters ou off/on for yrs  No flashes     Eye meds:  systane ou qd     Pt states she has monovision IOL, unsure which eye is distance and which eye is near.       Assessment/Plan:     1. Blepharitis of upper and lower eyelids of both eyes, unspecified type  Increase lid scrubs to BID OU. AT QID OU. Discussed chronicity    2. Pseudophakia  Open posterior capsule.    3. Refractive error  Educated patient on refractive error and discussed lens options. Dispensed updated spectacle Rx. Educated about adaptation period to new specs.    Eyeglass Final Rx     Eyeglass Final Rx       Sphere Cylinder Axis Add    Right -1.50 +1.00 040 +2.75    Left -2.50 +0.50 085 +2.75    Expiration Date:  7/14/2019                Follow-up in about 1 year (around 7/13/2019).

## 2018-07-25 DIAGNOSIS — R27.0 ATAXIA: ICD-10-CM

## 2018-07-25 RX ORDER — CALCIUM CARBONATE 600 MG
TABLET ORAL
Qty: 60 TABLET | Refills: 0 | Status: SHIPPED | OUTPATIENT
Start: 2018-07-25 | End: 2018-08-20 | Stop reason: SDUPTHER

## 2018-08-17 DIAGNOSIS — F33.42 RECURRENT MAJOR DEPRESSIVE DISORDER, IN FULL REMISSION: ICD-10-CM

## 2018-08-17 RX ORDER — TRAZODONE HYDROCHLORIDE 50 MG/1
TABLET ORAL
Qty: 30 TABLET | Refills: 0 | Status: SHIPPED | OUTPATIENT
Start: 2018-08-17 | End: 2018-09-10 | Stop reason: SDUPTHER

## 2018-08-20 DIAGNOSIS — N32.81 OAB (OVERACTIVE BLADDER): ICD-10-CM

## 2018-08-20 DIAGNOSIS — R27.0 ATAXIA: ICD-10-CM

## 2018-08-21 RX ORDER — OXYBUTYNIN CHLORIDE 10 MG/1
10 TABLET, EXTENDED RELEASE ORAL NIGHTLY
Qty: 90 TABLET | Refills: 3 | Status: SHIPPED | OUTPATIENT
Start: 2018-08-21 | End: 2019-07-17 | Stop reason: SDUPTHER

## 2018-08-21 RX ORDER — CALCIUM CARBONATE 600 MG
1 TABLET ORAL
Qty: 90 TABLET | Refills: 0 | Status: SHIPPED | OUTPATIENT
Start: 2018-08-21 | End: 2018-11-06 | Stop reason: SDUPTHER

## 2018-08-29 ENCOUNTER — TELEPHONE (OUTPATIENT)
Dept: FAMILY MEDICINE | Facility: CLINIC | Age: 83
End: 2018-08-29

## 2018-08-29 DIAGNOSIS — I51.89 DIASTOLIC DYSFUNCTION: Primary | ICD-10-CM

## 2018-08-29 DIAGNOSIS — E78.5 HYPERLIPIDEMIA LDL GOAL <160: Primary | ICD-10-CM

## 2018-09-10 ENCOUNTER — LAB VISIT (OUTPATIENT)
Dept: LAB | Facility: HOSPITAL | Age: 83
End: 2018-09-10
Attending: FAMILY MEDICINE
Payer: MEDICARE

## 2018-09-10 DIAGNOSIS — F33.42 RECURRENT MAJOR DEPRESSIVE DISORDER, IN FULL REMISSION: ICD-10-CM

## 2018-09-10 DIAGNOSIS — F41.9 ANXIETY AND DEPRESSION: ICD-10-CM

## 2018-09-10 DIAGNOSIS — E78.5 HYPERLIPIDEMIA LDL GOAL <160: ICD-10-CM

## 2018-09-10 DIAGNOSIS — I51.89 DIASTOLIC DYSFUNCTION: ICD-10-CM

## 2018-09-10 DIAGNOSIS — M81.0 AGE-RELATED OSTEOPOROSIS WITHOUT CURRENT PATHOLOGICAL FRACTURE: ICD-10-CM

## 2018-09-10 DIAGNOSIS — I63.9 CEREBROVASCULAR ACCIDENT (CVA), UNSPECIFIED MECHANISM: ICD-10-CM

## 2018-09-10 DIAGNOSIS — F32.A ANXIETY AND DEPRESSION: ICD-10-CM

## 2018-09-10 LAB
ALBUMIN SERPL BCP-MCNC: 3.7 G/DL
ALP SERPL-CCNC: 95 U/L
ALT SERPL W/O P-5'-P-CCNC: 15 U/L
ANION GAP SERPL CALC-SCNC: 8 MMOL/L
AST SERPL-CCNC: 19 U/L
BASOPHILS # BLD AUTO: 0.08 K/UL
BASOPHILS NFR BLD: 0.8 %
BILIRUB SERPL-MCNC: 1.2 MG/DL
BUN SERPL-MCNC: 9 MG/DL
CALCIUM SERPL-MCNC: 9.2 MG/DL
CHLORIDE SERPL-SCNC: 108 MMOL/L
CHOLEST SERPL-MCNC: 154 MG/DL
CHOLEST/HDLC SERPL: 3.4 {RATIO}
CO2 SERPL-SCNC: 25 MMOL/L
CREAT SERPL-MCNC: 0.7 MG/DL
DIFFERENTIAL METHOD: ABNORMAL
EOSINOPHIL # BLD AUTO: 0.2 K/UL
EOSINOPHIL NFR BLD: 2.1 %
ERYTHROCYTE [DISTWIDTH] IN BLOOD BY AUTOMATED COUNT: 13.8 %
EST. GFR  (AFRICAN AMERICAN): >60 ML/MIN/1.73 M^2
EST. GFR  (NON AFRICAN AMERICAN): >60 ML/MIN/1.73 M^2
GLUCOSE SERPL-MCNC: 115 MG/DL
HCT VFR BLD AUTO: 44.4 %
HDLC SERPL-MCNC: 45 MG/DL
HDLC SERPL: 29.2 %
HGB BLD-MCNC: 14.3 G/DL
IMM GRANULOCYTES # BLD AUTO: 0.04 K/UL
IMM GRANULOCYTES NFR BLD AUTO: 0.4 %
LDLC SERPL CALC-MCNC: 89.6 MG/DL
LYMPHOCYTES # BLD AUTO: 1.9 K/UL
LYMPHOCYTES NFR BLD: 18.6 %
MCH RBC QN AUTO: 30.6 PG
MCHC RBC AUTO-ENTMCNC: 32.2 G/DL
MCV RBC AUTO: 95 FL
MONOCYTES # BLD AUTO: 0.9 K/UL
MONOCYTES NFR BLD: 9.2 %
NEUTROPHILS # BLD AUTO: 7 K/UL
NEUTROPHILS NFR BLD: 68.9 %
NONHDLC SERPL-MCNC: 109 MG/DL
NRBC BLD-RTO: 0 /100 WBC
PLATELET # BLD AUTO: 378 K/UL
PMV BLD AUTO: 10.2 FL
POTASSIUM SERPL-SCNC: 4 MMOL/L
PROT SERPL-MCNC: 7.1 G/DL
RBC # BLD AUTO: 4.67 M/UL
SODIUM SERPL-SCNC: 141 MMOL/L
TRIGL SERPL-MCNC: 97 MG/DL
WBC # BLD AUTO: 10.19 K/UL

## 2018-09-10 PROCEDURE — 80061 LIPID PANEL: CPT

## 2018-09-10 PROCEDURE — 85025 COMPLETE CBC W/AUTO DIFF WBC: CPT

## 2018-09-10 PROCEDURE — 80053 COMPREHEN METABOLIC PANEL: CPT

## 2018-09-10 PROCEDURE — 36415 COLL VENOUS BLD VENIPUNCTURE: CPT | Mod: PO

## 2018-09-10 RX ORDER — TRAZODONE HYDROCHLORIDE 50 MG/1
TABLET ORAL
Qty: 28 TABLET | Refills: 2 | Status: SHIPPED | OUTPATIENT
Start: 2018-09-10 | End: 2019-01-04 | Stop reason: SDUPTHER

## 2018-09-11 RX ORDER — VENLAFAXINE HYDROCHLORIDE 37.5 MG/1
CAPSULE, EXTENDED RELEASE ORAL
Qty: 90 CAPSULE | Refills: 3 | Status: SHIPPED | OUTPATIENT
Start: 2018-09-11 | End: 2019-09-09 | Stop reason: SDUPTHER

## 2018-09-11 RX ORDER — ALENDRONATE SODIUM 70 MG/1
TABLET ORAL
Qty: 4 TABLET | OUTPATIENT
Start: 2018-09-11

## 2018-09-11 RX ORDER — ATORVASTATIN CALCIUM 40 MG/1
TABLET, FILM COATED ORAL
Qty: 90 TABLET | Refills: 3 | Status: SHIPPED | OUTPATIENT
Start: 2018-09-11 | End: 2019-09-09 | Stop reason: SDUPTHER

## 2018-09-11 RX ORDER — LYSINE HCL 500 MG
TABLET ORAL
Qty: 90 TABLET | Refills: 3 | Status: SHIPPED | OUTPATIENT
Start: 2018-09-11 | End: 2019-01-16 | Stop reason: SDUPTHER

## 2018-09-12 ENCOUNTER — OFFICE VISIT (OUTPATIENT)
Dept: FAMILY MEDICINE | Facility: CLINIC | Age: 83
End: 2018-09-12
Payer: MEDICARE

## 2018-09-12 VITALS
SYSTOLIC BLOOD PRESSURE: 126 MMHG | DIASTOLIC BLOOD PRESSURE: 66 MMHG | OXYGEN SATURATION: 95 % | TEMPERATURE: 98 F | BODY MASS INDEX: 29.05 KG/M2 | HEIGHT: 62 IN | HEART RATE: 72 BPM | RESPIRATION RATE: 16 BRPM | WEIGHT: 157.88 LBS

## 2018-09-12 DIAGNOSIS — K59.01 SLOW TRANSIT CONSTIPATION: ICD-10-CM

## 2018-09-12 DIAGNOSIS — M81.6 LOCALIZED OSTEOPOROSIS WITHOUT CURRENT PATHOLOGICAL FRACTURE: ICD-10-CM

## 2018-09-12 DIAGNOSIS — Z23 NEED FOR INFLUENZA VACCINATION: ICD-10-CM

## 2018-09-12 DIAGNOSIS — I10 HYPERTENSION, WELL CONTROLLED: Primary | ICD-10-CM

## 2018-09-12 DIAGNOSIS — M85.80 OSTEOPENIA, UNSPECIFIED LOCATION: ICD-10-CM

## 2018-09-12 DIAGNOSIS — K92.1 BLACK STOOL: ICD-10-CM

## 2018-09-12 PROCEDURE — 3078F DIAST BP <80 MM HG: CPT | Mod: CPTII,,, | Performed by: FAMILY MEDICINE

## 2018-09-12 PROCEDURE — 99214 OFFICE O/P EST MOD 30 MIN: CPT | Mod: PBBFAC,PO | Performed by: FAMILY MEDICINE

## 2018-09-12 PROCEDURE — 99214 OFFICE O/P EST MOD 30 MIN: CPT | Mod: S$PBB,,, | Performed by: FAMILY MEDICINE

## 2018-09-12 PROCEDURE — 90662 IIV NO PRSV INCREASED AG IM: CPT | Mod: PBBFAC,PO

## 2018-09-12 PROCEDURE — 99999 PR PBB SHADOW E&M-EST. PATIENT-LVL IV: CPT | Mod: PBBFAC,,, | Performed by: FAMILY MEDICINE

## 2018-09-12 PROCEDURE — 1101F PT FALLS ASSESS-DOCD LE1/YR: CPT | Mod: CPTII,,, | Performed by: FAMILY MEDICINE

## 2018-09-12 PROCEDURE — 3074F SYST BP LT 130 MM HG: CPT | Mod: CPTII,,, | Performed by: FAMILY MEDICINE

## 2018-09-12 RX ORDER — ALENDRONATE SODIUM 70 MG/1
70 TABLET ORAL
Qty: 12 TABLET | Refills: 3 | Status: SHIPPED | OUTPATIENT
Start: 2018-09-12 | End: 2019-08-07 | Stop reason: SDUPTHER

## 2018-09-12 RX ORDER — OLOPATADINE HYDROCHLORIDE 1 MG/ML
1 SOLUTION/ DROPS OPHTHALMIC 2 TIMES DAILY
COMMUNITY
Start: 2018-08-17 | End: 2019-05-01 | Stop reason: SDUPTHER

## 2018-09-12 NOTE — PROGRESS NOTES
Health Maintenance     Zoster Vaccine Hx shingles    Influenza Vaccine Pending orders ; ok to get today

## 2018-09-12 NOTE — PROGRESS NOTES
"Subjective:       Patient ID: Carol Yadav is a 82 y.o. female.    Chief Complaint: Hypertension (follow up ) and Hyperlipidemia (follow up recent labs )    HPI    HTN F/u:    Adherence with meds? Yes  Side effects: Yes  Following a low salt diet ? No  Current exercise? No  Need of refills? No  Recent changes in blood pressure medication: No  Patient has been in pain or taking decongestants/anti-inflammatory/OCP/SSRI medication: yes   Patient has other symptoms (headache, weakness,  blurry vision, chest pain, palpitations, SOB): no    Black stools  - pt still has black "pieces" in her stool, She is in ready to see GI. Pt also has some small caliber stools that she is passing as well.        Constipation - pt is doing well with miralax one dose daily.     Reviewed lab results.     HLD - reviewed labs which show appropriate lipid levels. She is adherent with atorvastatin.       Weight loss - pt has changed her diet and has reduced her chocolate intake as well as her alcohol intake.     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       Current Outpatient Medications on File Prior to Visit   Medication Sig Dispense Refill    acetaminophen (TYLENOL) 325 MG tablet Take 2 tablets (650 mg total) by mouth every 4 (four) hours as needed.  0    amlodipine-benazepril 2.5-10 mg (LOTREL) 2.5-10 mg per capsule Take 1 capsule by mouth once daily. 90 capsule 3    aspirin (ECOTRIN) 81 MG EC tablet Take 81 mg by mouth once daily.      atorvastatin (LIPITOR) 40 MG tablet TAKE 1 TABLET BY MOUTH ONCE DAILY 90 tablet 3    betamethasone dipropionate (DIPROLENE) 0.05 % cream Apply topically as needed.       calcium carbonate (CALCIUM 600) 600 mg calcium (1,500 mg) Tab Take 1 tablet (600 mg total) by mouth daily with breakfast. 90 tablet 0    cholecalciferol, vitamin D3, 1,000 unit capsule Take 1 capsule (1,000 Units total) by mouth once daily. 90 capsule 3    fluticasone " (FLONASE) 50 mcg/actuation nasal spray 1 spray by Each Nare route 2 (two) times daily as needed for Rhinitis. 16 g 9    multivitamin-minerals-lutein (MULTIVITAMIN 50 PLUS) Tab Take 1 tablet by mouth once daily. 90 tablet 3    olopatadine (PATANOL) 0.1 % ophthalmic solution Place 1 drop into both eyes 2 (two) times daily.       oxybutynin (DITROPAN-XL) 10 MG 24 hr tablet Take 1 tablet (10 mg total) by mouth every evening. 90 tablet 3    peg 400-propylene glycol 0.4-0.3 % Drop Apply 1 drop to eye 3 (three) times daily as needed. 40 mL 3    polyethylene glycol (MIRALAX) 17 gram PwPk Take by mouth. Take every other day      traZODone (DESYREL) 50 MG tablet TAKE 1 TABLET BY MOUTH AT BEDTIME 28 tablet 2    venlafaxine (EFFEXOR-XR) 37.5 MG 24 hr capsule TAKE 1 CAPSULE BY MOUTH ONCE DAILY 90 capsule 3    [DISCONTINUED] alendronate (FOSAMAX) 70 MG tablet Take 70 mg by mouth every 7 days.       clotrimazole (LOTRIMIN) 1 % cream Apply topically.       clotrimazole-betamethasone 1-0.05% (LOTRISONE) cream Apply to affected area 2 times daily 15 g 4    ONE DAILY WOMEN 50 PLUS 400-120 mcg-mg Tab TAKE 1 TABLET BY MOUTH ONCE DAILY 90 tablet 3    propranolol (INDERAL) 10 MG tablet Take 0.5 tablets (5 mg total) by mouth once daily. Please call [phone] to ask for Dr. Villalobos if you have any side effects. 15 tablet 11     No current facility-administered medications on file prior to visit.        Past Medical History:   Diagnosis Date    Allergic rhinitis     Arthritis     Elevated blood pressure reading without diagnosis of hypertension     Hormone replacement therapy (postmenopausal)     Hyperlipidemia     Hypertension     Stroke        Family History   Problem Relation Age of Onset    Cancer Mother     Stroke Father     No Known Problems Sister     No Known Problems Brother     No Known Problems Maternal Aunt     No Known Problems Maternal Uncle     No Known Problems Paternal Aunt     No Known Problems  Paternal Uncle     No Known Problems Maternal Grandmother     No Known Problems Maternal Grandfather     No Known Problems Paternal Grandmother     No Known Problems Paternal Grandfather     Amblyopia Neg Hx     Blindness Neg Hx     Cataracts Neg Hx     Diabetes Neg Hx     Glaucoma Neg Hx     Hypertension Neg Hx     Macular degeneration Neg Hx     Retinal detachment Neg Hx     Strabismus Neg Hx     Thyroid disease Neg Hx         reports that she has quit smoking. Her smoking use included cigarettes. She quit after 45.00 years of use. she has never used smokeless tobacco. She reports that she drinks about 12.6 oz of alcohol per week. She reports that she does not use drugs.    Review of Systems   Constitutional: Positive for unexpected weight change. Negative for chills and fever.   Gastrointestinal: Negative for abdominal pain, constipation, diarrhea and vomiting.       Objective:     Vitals:    09/12/18 1443   BP: 126/66   Pulse: 72   Resp: 16   Temp: 98 °F (36.7 °C)        Physical Exam   Constitutional: She appears well-developed. No distress.   HENT:   Head: Normocephalic and atraumatic.   Eyes: Conjunctivae are normal. Right eye exhibits no discharge. Left eye exhibits no discharge. No scleral icterus.   Cardiovascular: Normal rate, regular rhythm and normal heart sounds. Exam reveals no gallop and no friction rub.   No murmur heard.  Pulmonary/Chest: Effort normal and breath sounds normal. No respiratory distress. She has no wheezes. She has no rales.   Abdominal: Soft. Bowel sounds are normal. She exhibits no distension and no mass. There is no tenderness. There is no rebound and no guarding.   Neurological: She is alert.   LUE weakness proximal to distal.     LLE weakness - uses a hemiwalker    L sided facial droop    Skin: She is not diaphoretic.   Psychiatric: She has a normal mood and affect. Thought content normal.   Vitals reviewed.      Assessment:       1. Hypertension, well controlled     2. Need for influenza vaccination    3. Black stool    4. Slow transit constipation    5. Osteopenia, unspecified location    6. Localized osteoporosis without current pathological fracture        Plan:       Carol LONGORIA was seen today for hypertension and hyperlipidemia.    Diagnoses and all orders for this visit:    Hypertension, well controlled  - Chronic - stable     Pt is doing well on current therapy. No side effects noted. Will continue current therapy.    Need for influenza vaccination  -     Influenza - High Dose (65+) (PF) (IM)    Black stool  -     Ambulatory consult to Gastroenterology  Associated with smaller caliber stools.  Patient will referred to GI to evaluate.    Slow transit constipation  - Chronic - stable     Pt is doing well on current therapy. No side effects noted. Will continue current therapy.    Localized osteoporosis without current pathological fracture  - fosamax  - Chronic - stable     Pt is doing well on current therapy. No side effects noted. Will continue current therapy.            Follow-up in about 3 months (around 12/12/2018).        Pt verbalized understanding and agreed with our plan.

## 2018-10-09 DIAGNOSIS — M81.0 AGE-RELATED OSTEOPOROSIS WITHOUT CURRENT PATHOLOGICAL FRACTURE: ICD-10-CM

## 2018-10-10 RX ORDER — CHOLECALCIFEROL (VITAMIN D3) 25 MCG
TABLET ORAL
Qty: 90 TABLET | Refills: 3 | Status: SHIPPED | OUTPATIENT
Start: 2018-10-10 | End: 2019-10-22 | Stop reason: SDUPTHER

## 2018-11-06 DIAGNOSIS — R27.0 ATAXIA: ICD-10-CM

## 2018-11-07 RX ORDER — CALCIUM CARBONATE 600 MG
TABLET ORAL
Qty: 90 TABLET | Refills: 1 | Status: SHIPPED | OUTPATIENT
Start: 2018-11-07 | End: 2019-05-01 | Stop reason: SDUPTHER

## 2018-11-30 ENCOUNTER — TELEPHONE (OUTPATIENT)
Dept: ADMINISTRATIVE | Facility: HOSPITAL | Age: 83
End: 2018-11-30

## 2018-12-12 ENCOUNTER — OFFICE VISIT (OUTPATIENT)
Dept: FAMILY MEDICINE | Facility: CLINIC | Age: 83
End: 2018-12-12
Payer: MEDICARE

## 2018-12-12 VITALS
DIASTOLIC BLOOD PRESSURE: 70 MMHG | SYSTOLIC BLOOD PRESSURE: 106 MMHG | OXYGEN SATURATION: 96 % | HEART RATE: 78 BPM | TEMPERATURE: 98 F | HEIGHT: 62 IN | WEIGHT: 155.19 LBS | BODY MASS INDEX: 28.56 KG/M2 | RESPIRATION RATE: 16 BRPM

## 2018-12-12 DIAGNOSIS — K92.1 MELENA: ICD-10-CM

## 2018-12-12 DIAGNOSIS — G25.0 ESSENTIAL TREMOR: ICD-10-CM

## 2018-12-12 DIAGNOSIS — I10 HYPERTENSION, WELL CONTROLLED: Primary | ICD-10-CM

## 2018-12-12 DIAGNOSIS — R27.0 ATAXIA: ICD-10-CM

## 2018-12-12 PROCEDURE — 3078F DIAST BP <80 MM HG: CPT | Mod: CPTII,S$GLB,, | Performed by: FAMILY MEDICINE

## 2018-12-12 PROCEDURE — 1101F PT FALLS ASSESS-DOCD LE1/YR: CPT | Mod: CPTII,S$GLB,, | Performed by: FAMILY MEDICINE

## 2018-12-12 PROCEDURE — 99214 OFFICE O/P EST MOD 30 MIN: CPT | Mod: S$GLB,,, | Performed by: FAMILY MEDICINE

## 2018-12-12 PROCEDURE — 3074F SYST BP LT 130 MM HG: CPT | Mod: CPTII,S$GLB,, | Performed by: FAMILY MEDICINE

## 2018-12-12 PROCEDURE — 99999 PR PBB SHADOW E&M-EST. PATIENT-LVL V: CPT | Mod: PBBFAC,,, | Performed by: FAMILY MEDICINE

## 2018-12-12 NOTE — PROGRESS NOTES
Subjective:       Patient ID: Carol Yadav is a 83 y.o. female.    Chief Complaint: Hypertension (3 months follow up )    HPI    Pt had melena a few months ago and had a subsequent colonoscopy by the GI she follows. Her colonoscopy was normal!    Htn - blood pressure look a little on the low side today, but it has not been checked at her SNF. She is adherent with her medication and was recently given propanolol for essential tremors which is not helpful.      Ataxia - pt doing well with hemiwalker. Stable.           Current Outpatient Medications on File Prior to Visit   Medication Sig Dispense Refill    acetaminophen (TYLENOL) 325 MG tablet Take 2 tablets (650 mg total) by mouth every 4 (four) hours as needed.  0    alendronate (FOSAMAX) 70 MG tablet Take 1 tablet (70 mg total) by mouth every 7 days. 12 tablet 3    amlodipine-benazepril 2.5-10 mg (LOTREL) 2.5-10 mg per capsule Take 1 capsule by mouth once daily. 90 capsule 3    aspirin (ECOTRIN) 81 MG EC tablet Take 81 mg by mouth once daily.      atorvastatin (LIPITOR) 40 MG tablet TAKE 1 TABLET BY MOUTH ONCE DAILY 90 tablet 3    betamethasone dipropionate (DIPROLENE) 0.05 % cream Apply topically as needed.       CALCIUM 600 600 mg calcium (1,500 mg) Tab TAKE 1 TABLET BY MOUTH ONCE DAILY WITH BREAKFAST 90 tablet 1    fluticasone (FLONASE) 50 mcg/actuation nasal spray 1 spray by Each Nare route 2 (two) times daily as needed for Rhinitis. 16 g 9    multivitamin-minerals-lutein (MULTIVITAMIN 50 PLUS) Tab Take 1 tablet by mouth once daily. 90 tablet 3    olopatadine (PATANOL) 0.1 % ophthalmic solution Place 1 drop into both eyes 2 (two) times daily.       oxybutynin (DITROPAN-XL) 10 MG 24 hr tablet Take 1 tablet (10 mg total) by mouth every evening. 90 tablet 3    polyethylene glycol (MIRALAX) 17 gram PwPk Take by mouth. Take every other day      traZODone (DESYREL) 50 MG tablet TAKE 1 TABLET BY MOUTH AT BEDTIME 28 tablet 2    venlafaxine (EFFEXOR-XR)  37.5 MG 24 hr capsule TAKE 1 CAPSULE BY MOUTH ONCE DAILY 90 capsule 3    VITAMIN D3 1,000 unit tablet TAKE 1 TABLET BY MOUTH ONCE DAILY 90 tablet 3    [DISCONTINUED] peg 400-propylene glycol 0.4-0.3 % Drop Apply 1 drop to eye 3 (three) times daily as needed. 40 mL 3    clotrimazole (LOTRIMIN) 1 % cream Apply topically.       ONE DAILY WOMEN 50 PLUS 400-120 mcg-mg Tab TAKE 1 TABLET BY MOUTH ONCE DAILY 90 tablet 3    [DISCONTINUED] clotrimazole-betamethasone 1-0.05% (LOTRISONE) cream Apply to affected area 2 times daily 15 g 4    [DISCONTINUED] propranolol (INDERAL) 10 MG tablet Take 0.5 tablets (5 mg total) by mouth once daily. Please call [phone] to ask for Dr. Villalobos if you have any side effects. 15 tablet 11     No current facility-administered medications on file prior to visit.        Past Medical History:   Diagnosis Date    Allergic rhinitis     Arthritis     Elevated blood pressure reading without diagnosis of hypertension     Hormone replacement therapy (postmenopausal)     Hyperlipidemia     Hypertension     Stroke        Family History   Problem Relation Age of Onset    Cancer Mother     Stroke Father     No Known Problems Sister     No Known Problems Brother     No Known Problems Maternal Aunt     No Known Problems Maternal Uncle     No Known Problems Paternal Aunt     No Known Problems Paternal Uncle     No Known Problems Maternal Grandmother     No Known Problems Maternal Grandfather     No Known Problems Paternal Grandmother     No Known Problems Paternal Grandfather     Amblyopia Neg Hx     Blindness Neg Hx     Cataracts Neg Hx     Diabetes Neg Hx     Glaucoma Neg Hx     Hypertension Neg Hx     Macular degeneration Neg Hx     Retinal detachment Neg Hx     Strabismus Neg Hx     Thyroid disease Neg Hx         reports that she has quit smoking. Her smoking use included cigarettes. She quit after 45.00 years of use. she has never used smokeless tobacco. She reports  that she drinks about 12.6 oz of alcohol per week. She reports that she does not use drugs.    Review of Systems   Constitutional: Negative for chills and fever.   Respiratory: Negative for shortness of breath and wheezing.    Cardiovascular: Negative for chest pain and palpitations.       Objective:     Vitals:    12/12/18 1341   BP: 106/70   Pulse: 78   Resp: 16   Temp: 98.3 °F (36.8 °C)        Physical Exam   Constitutional: She appears well-developed. No distress.   HENT:   Head: Normocephalic and atraumatic.   Eyes: Conjunctivae are normal. Right eye exhibits no discharge. Left eye exhibits no discharge. No scleral icterus.   Cardiovascular: Normal rate, regular rhythm and normal heart sounds. Exam reveals no gallop and no friction rub.   No murmur heard.  Pulmonary/Chest: Effort normal and breath sounds normal. No respiratory distress. She has no wheezes. She has no rales.   Abdominal: Soft. Bowel sounds are normal. She exhibits no distension and no mass. There is no tenderness. There is no rebound and no guarding.   Neurological: She is alert.   LUE weakness proximal to distal.     LLE weakness - uses a hemiwalker    L sided facial droop    Skin: She is not diaphoretic.   Psychiatric: She has a normal mood and affect. Thought content normal.   Vitals reviewed.      Assessment:       1. Hypertension, well controlled    2. Melena    3. Essential tremor    4. Ataxia        Plan:       Carol LONGORIA was seen today for hypertension.    Diagnoses and all orders for this visit:    Hypertension, well controlled  - Chronic - stable     Pt is doing well on current therapy. No side effects noted. Will continue current therapy.    Melena  Resolved     Essential tremor  minor - pt would like to discontinue her propanolol.    Ataxia  Chronic - stable.           Follow-up in about 3 months (around 3/12/2019) for Hypertension, anxiety, ataxia.        Pt verbalized understanding and agreed with our plan.

## 2018-12-19 ENCOUNTER — HOSPITAL ENCOUNTER (OUTPATIENT)
Dept: RADIOLOGY | Facility: HOSPITAL | Age: 83
Discharge: HOME | End: 2018-12-19
Attending: OBSTETRICS & GYNECOLOGY
Payer: MEDICARE

## 2018-12-19 VITALS — HEIGHT: 62 IN | BODY MASS INDEX: 28.52 KG/M2 | WEIGHT: 155 LBS

## 2018-12-19 DIAGNOSIS — N95.1 MENOPAUSAL STATE: ICD-10-CM

## 2018-12-19 DIAGNOSIS — Z80.3 FAMILY HISTORY OF BREAST CANCER: ICD-10-CM

## 2018-12-19 DIAGNOSIS — Z12.39 BREAST CANCER SCREENING: ICD-10-CM

## 2018-12-19 PROCEDURE — 77067 SCR MAMMO BI INCL CAD: CPT | Mod: TC

## 2018-12-19 PROCEDURE — 77063 BREAST TOMOSYNTHESIS BI: CPT | Mod: TC

## 2018-12-19 PROCEDURE — 77067 SCR MAMMO BI INCL CAD: CPT | Mod: 26,,, | Performed by: RADIOLOGY

## 2018-12-19 PROCEDURE — 77063 BREAST TOMOSYNTHESIS BI: CPT | Mod: 26,,, | Performed by: RADIOLOGY

## 2019-01-04 DIAGNOSIS — F33.42 RECURRENT MAJOR DEPRESSIVE DISORDER, IN FULL REMISSION: ICD-10-CM

## 2019-01-04 RX ORDER — TRAZODONE HYDROCHLORIDE 50 MG/1
TABLET ORAL
Qty: 30 TABLET | Refills: 0 | Status: SHIPPED | OUTPATIENT
Start: 2019-01-04 | End: 2019-02-01 | Stop reason: SDUPTHER

## 2019-01-16 ENCOUNTER — OFFICE VISIT (OUTPATIENT)
Dept: FAMILY MEDICINE | Facility: CLINIC | Age: 84
End: 2019-01-16
Payer: MEDICARE

## 2019-01-16 VITALS
OXYGEN SATURATION: 96 % | WEIGHT: 156.75 LBS | DIASTOLIC BLOOD PRESSURE: 78 MMHG | RESPIRATION RATE: 20 BRPM | BODY MASS INDEX: 28.84 KG/M2 | SYSTOLIC BLOOD PRESSURE: 130 MMHG | TEMPERATURE: 98 F | HEART RATE: 96 BPM | HEIGHT: 62 IN

## 2019-01-16 DIAGNOSIS — I10 HYPERTENSION, WELL CONTROLLED: ICD-10-CM

## 2019-01-16 DIAGNOSIS — Z91.89 HISTORY OF WEIGHT CHANGE: ICD-10-CM

## 2019-01-16 DIAGNOSIS — F33.41 RECURRENT MAJOR DEPRESSIVE DISORDER, IN PARTIAL REMISSION: ICD-10-CM

## 2019-01-16 DIAGNOSIS — I69.354 HEMIPARESIS AFFECTING LEFT SIDE AS LATE EFFECT OF STROKE: Primary | ICD-10-CM

## 2019-01-16 PROCEDURE — 99214 OFFICE O/P EST MOD 30 MIN: CPT | Mod: S$GLB,,, | Performed by: FAMILY MEDICINE

## 2019-01-16 PROCEDURE — 99214 PR OFFICE/OUTPT VISIT, EST, LEVL IV, 30-39 MIN: ICD-10-PCS | Mod: S$GLB,,, | Performed by: FAMILY MEDICINE

## 2019-01-16 PROCEDURE — 3075F SYST BP GE 130 - 139MM HG: CPT | Mod: CPTII,S$GLB,, | Performed by: FAMILY MEDICINE

## 2019-01-16 PROCEDURE — 1101F PR PT FALLS ASSESS DOC 0-1 FALLS W/OUT INJ PAST YR: ICD-10-PCS | Mod: CPTII,S$GLB,, | Performed by: FAMILY MEDICINE

## 2019-01-16 PROCEDURE — 99999 PR PBB SHADOW E&M-EST. PATIENT-LVL IV: ICD-10-PCS | Mod: PBBFAC,,, | Performed by: FAMILY MEDICINE

## 2019-01-16 PROCEDURE — 3078F PR MOST RECENT DIASTOLIC BLOOD PRESSURE < 80 MM HG: ICD-10-PCS | Mod: CPTII,S$GLB,, | Performed by: FAMILY MEDICINE

## 2019-01-16 PROCEDURE — 3078F DIAST BP <80 MM HG: CPT | Mod: CPTII,S$GLB,, | Performed by: FAMILY MEDICINE

## 2019-01-16 PROCEDURE — 99999 PR PBB SHADOW E&M-EST. PATIENT-LVL IV: CPT | Mod: PBBFAC,,, | Performed by: FAMILY MEDICINE

## 2019-01-16 PROCEDURE — 3075F PR MOST RECENT SYSTOLIC BLOOD PRESS GE 130-139MM HG: ICD-10-PCS | Mod: CPTII,S$GLB,, | Performed by: FAMILY MEDICINE

## 2019-01-16 PROCEDURE — 1101F PT FALLS ASSESS-DOCD LE1/YR: CPT | Mod: CPTII,S$GLB,, | Performed by: FAMILY MEDICINE

## 2019-01-16 NOTE — PROGRESS NOTES
Subjective:       Patient ID: Carol Yadav is a 83 y.o. female.    Chief Complaint: Extremity Weakness (follow up if needing additional labs or testing )    HPI    Onset 2 weeks ago of thoracic back pain that worsened with reaching or bending on the R side, but this has resolved since the onset.     Weight changes - pts weight changes have stabilized over the last few months.     H/o CVA - pt is doing well as can be expected s/p CVA. She still has gait disability but is managing fairly well.    Htn - pt is adherent with her medications without side effects. She is adherent.     Depression - chronic -stable on Effexor - no side effects.       Current Outpatient Medications on File Prior to Visit   Medication Sig Dispense Refill    acetaminophen (TYLENOL) 325 MG tablet Take 2 tablets (650 mg total) by mouth every 4 (four) hours as needed.  0    alendronate (FOSAMAX) 70 MG tablet Take 1 tablet (70 mg total) by mouth every 7 days. 12 tablet 3    amlodipine-benazepril 2.5-10 mg (LOTREL) 2.5-10 mg per capsule Take 1 capsule by mouth once daily. 90 capsule 3    aspirin (ECOTRIN) 81 MG EC tablet Take 81 mg by mouth once daily.      atorvastatin (LIPITOR) 40 MG tablet TAKE 1 TABLET BY MOUTH ONCE DAILY 90 tablet 3    betamethasone dipropionate (DIPROLENE) 0.05 % cream Apply topically as needed.       CALCIUM 600 600 mg calcium (1,500 mg) Tab TAKE 1 TABLET BY MOUTH ONCE DAILY WITH BREAKFAST 90 tablet 1    clotrimazole (LOTRIMIN) 1 % cream Apply topically.       fluticasone (FLONASE) 50 mcg/actuation nasal spray 1 spray by Each Nare route 2 (two) times daily as needed for Rhinitis. 16 g 9    multivitamin-minerals-lutein (MULTIVITAMIN 50 PLUS) Tab Take 1 tablet by mouth once daily. 90 tablet 3    olopatadine (PATANOL) 0.1 % ophthalmic solution Place 1 drop into both eyes 2 (two) times daily.       oxybutynin (DITROPAN-XL) 10 MG 24 hr tablet Take 1 tablet (10 mg total) by mouth every evening. 90 tablet 3     polyethylene glycol (MIRALAX) 17 gram PwPk Take by mouth. Take every other day      traZODone (DESYREL) 50 MG tablet TAKE 1 TABLET BY MOUTH AT BEDTIME 30 tablet 0    venlafaxine (EFFEXOR-XR) 37.5 MG 24 hr capsule TAKE 1 CAPSULE BY MOUTH ONCE DAILY 90 capsule 3    VITAMIN D3 1,000 unit tablet TAKE 1 TABLET BY MOUTH ONCE DAILY 90 tablet 3    [DISCONTINUED] ONE DAILY WOMEN 50 PLUS 400-120 mcg-mg Tab TAKE 1 TABLET BY MOUTH ONCE DAILY 90 tablet 3     No current facility-administered medications on file prior to visit.        Past Medical History:   Diagnosis Date    Allergic rhinitis     Arthritis     Elevated blood pressure reading without diagnosis of hypertension     Hormone replacement therapy (postmenopausal)     Hyperlipidemia     Hypertension     Stroke        Family History   Problem Relation Age of Onset    Cancer Mother     Breast cancer Mother     Stroke Father     No Known Problems Sister     No Known Problems Brother     No Known Problems Maternal Aunt     No Known Problems Maternal Uncle     No Known Problems Paternal Aunt     No Known Problems Paternal Uncle     No Known Problems Maternal Grandmother     No Known Problems Maternal Grandfather     No Known Problems Paternal Grandmother     No Known Problems Paternal Grandfather     Amblyopia Neg Hx     Blindness Neg Hx     Cataracts Neg Hx     Diabetes Neg Hx     Glaucoma Neg Hx     Hypertension Neg Hx     Macular degeneration Neg Hx     Retinal detachment Neg Hx     Strabismus Neg Hx     Thyroid disease Neg Hx         reports that she has quit smoking. Her smoking use included cigarettes. She quit after 45.00 years of use. she has never used smokeless tobacco. She reports that she drinks about 12.6 oz of alcohol per week. She reports that she does not use drugs.    Review of Systems   Constitutional: Negative for chills and fever.   Gastrointestinal: Negative for diarrhea, nausea and vomiting.       Objective:     Vitals:     01/16/19 1321   BP: 130/78   Pulse: 96   Resp: 20   Temp: 97.9 °F (36.6 °C)        Physical Exam   Constitutional: She appears well-developed. No distress.   HENT:   Head: Normocephalic and atraumatic.   Eyes: Conjunctivae are normal. Right eye exhibits no discharge. Left eye exhibits no discharge. No scleral icterus.   Cardiovascular: Normal rate, regular rhythm and normal heart sounds. Exam reveals no gallop and no friction rub.   No murmur heard.  Pulmonary/Chest: Effort normal and breath sounds normal. No respiratory distress. She has no wheezes. She has no rales.   Abdominal: Soft. Bowel sounds are normal. She exhibits no distension and no mass. There is no tenderness. There is no rebound and no guarding.   Neurological: She is alert.   LUE weakness proximal to distal.     LLE weakness - uses a hemiwalker    L sided facial droop    Skin: She is not diaphoretic.   Psychiatric: She has a normal mood and affect. Thought content normal.   Vitals reviewed.      Assessment:       1. Hemiparesis affecting left side as late effect of stroke    2. History of weight change    3. Hypertension, well controlled    4. Recurrent major depressive disorder, in partial remission        Plan:       Carol LONGORIA was seen today for extremity weakness.    Diagnoses and all orders for this visit:    Hemiparesis affecting left side as late effect of stroke  Pt is doing well on current therapy. No side effects noted. Will continue current therapy.    History of weight change  Stabilized.  We will continue to watch weight.    Hypertension, well controlled  - Chronic - stable     Pt is doing well on current therapy. No side effects noted. Will continue current therapy.    Recurrent major depressive disorder, in partial remission  Pt is doing well on current therapy. No side effects noted. Will continue current therapy.          Follow-up in about 3 months (around 4/16/2019) for Hypertension, Depression.      Pt verbalized understanding and  agreed with our plan.

## 2019-02-01 DIAGNOSIS — F33.42 RECURRENT MAJOR DEPRESSIVE DISORDER, IN FULL REMISSION: ICD-10-CM

## 2019-02-01 RX ORDER — TRAZODONE HYDROCHLORIDE 50 MG/1
TABLET ORAL
Qty: 30 TABLET | Refills: 0 | Status: SHIPPED | OUTPATIENT
Start: 2019-02-01 | End: 2019-04-01 | Stop reason: SDUPTHER

## 2019-02-25 ENCOUNTER — OFFICE VISIT (OUTPATIENT)
Dept: CARDIOLOGY | Facility: CLINIC | Age: 84
End: 2019-02-25
Payer: MEDICARE

## 2019-02-25 VITALS
RESPIRATION RATE: 16 BRPM | HEART RATE: 75 BPM | BODY MASS INDEX: 27.82 KG/M2 | DIASTOLIC BLOOD PRESSURE: 76 MMHG | WEIGHT: 152.13 LBS | SYSTOLIC BLOOD PRESSURE: 122 MMHG | OXYGEN SATURATION: 96 %

## 2019-02-25 DIAGNOSIS — E78.2 MIXED HYPERLIPIDEMIA: ICD-10-CM

## 2019-02-25 DIAGNOSIS — Z86.73 H/O: CVA (CEREBROVASCULAR ACCIDENT): ICD-10-CM

## 2019-02-25 DIAGNOSIS — R06.09 DYSPNEA ON EXERTION: ICD-10-CM

## 2019-02-25 DIAGNOSIS — R06.02 SHORTNESS OF BREATH: ICD-10-CM

## 2019-02-25 DIAGNOSIS — I10 HYPERTENSION, WELL CONTROLLED: ICD-10-CM

## 2019-02-25 DIAGNOSIS — R00.0 TACHYCARDIA: ICD-10-CM

## 2019-02-25 DIAGNOSIS — Z09 FOLLOW UP: ICD-10-CM

## 2019-02-25 DIAGNOSIS — R42 DIZZINESS: Primary | ICD-10-CM

## 2019-02-25 PROCEDURE — 99999 PR PBB SHADOW E&M-EST. PATIENT-LVL III: CPT | Mod: PBBFAC,,, | Performed by: INTERNAL MEDICINE

## 2019-02-25 PROCEDURE — 93000 ELECTROCARDIOGRAM COMPLETE: CPT | Mod: S$GLB,,, | Performed by: INTERNAL MEDICINE

## 2019-02-25 PROCEDURE — 3078F PR MOST RECENT DIASTOLIC BLOOD PRESSURE < 80 MM HG: ICD-10-PCS | Mod: CPTII,S$GLB,, | Performed by: INTERNAL MEDICINE

## 2019-02-25 PROCEDURE — 93000 EKG 12-LEAD: ICD-10-PCS | Mod: S$GLB,,, | Performed by: INTERNAL MEDICINE

## 2019-02-25 PROCEDURE — 99214 OFFICE O/P EST MOD 30 MIN: CPT | Mod: S$GLB,,, | Performed by: INTERNAL MEDICINE

## 2019-02-25 PROCEDURE — 99499 UNLISTED E&M SERVICE: CPT | Mod: S$GLB,,, | Performed by: INTERNAL MEDICINE

## 2019-02-25 PROCEDURE — 3074F PR MOST RECENT SYSTOLIC BLOOD PRESSURE < 130 MM HG: ICD-10-PCS | Mod: CPTII,S$GLB,, | Performed by: INTERNAL MEDICINE

## 2019-02-25 PROCEDURE — 99214 PR OFFICE/OUTPT VISIT, EST, LEVL IV, 30-39 MIN: ICD-10-PCS | Mod: S$GLB,,, | Performed by: INTERNAL MEDICINE

## 2019-02-25 PROCEDURE — 99999 PR PBB SHADOW E&M-EST. PATIENT-LVL III: ICD-10-PCS | Mod: PBBFAC,,, | Performed by: INTERNAL MEDICINE

## 2019-02-25 PROCEDURE — 1101F PR PT FALLS ASSESS DOC 0-1 FALLS W/OUT INJ PAST YR: ICD-10-PCS | Mod: CPTII,S$GLB,, | Performed by: INTERNAL MEDICINE

## 2019-02-25 PROCEDURE — 99499 RISK ADDL DX/OHS AUDIT: ICD-10-PCS | Mod: S$GLB,,, | Performed by: INTERNAL MEDICINE

## 2019-02-25 PROCEDURE — 3078F DIAST BP <80 MM HG: CPT | Mod: CPTII,S$GLB,, | Performed by: INTERNAL MEDICINE

## 2019-02-25 PROCEDURE — 1101F PT FALLS ASSESS-DOCD LE1/YR: CPT | Mod: CPTII,S$GLB,, | Performed by: INTERNAL MEDICINE

## 2019-02-25 PROCEDURE — 3074F SYST BP LT 130 MM HG: CPT | Mod: CPTII,S$GLB,, | Performed by: INTERNAL MEDICINE

## 2019-02-25 NOTE — PROGRESS NOTES
Subjective:    Patient ID:  Carol Yadav is a 83 y.o. female who presents for follow-up of Follow-up (6 mo weakness)      HPI   Previous history:  Patient is here for follow-up shortness of breath and dizziness.  She underwent diagnostic testing as below.  This essentially within normal limits.  She denies any worsening cardiopulmonary complaints.  She still has some mild residuals that she deals with post stroke but otherwise she's been working with physical exercises through her nursing home.  She denies any worsening chest pain or shortness of breath.  She's expands no PND, orthopnea or lower edema.  She denies any dizziness to the point of presyncope or syncope.  Otherwise she is mainly been in her usual state of health.  She's very active in terms of doing puzzles and activity.  She says she is dealing with some loss of memory issues and does feel depressed from time to time.  She is on antidepressant.    Today:  Here follow-up shortness of breath and dizziness.  She denies any worsening cardiopulmonary complaints.  She mainly just feels little tired and weak at times.  She still doing significant amount of exercises through the nursing home.  She denies any PND, orthopnea or lower extremity edema.  She has not experienced any dizziness, presyncope or syncope.    Review of Systems   Constitution: Negative.   HENT: Negative.    Eyes: Negative.    Cardiovascular: Negative for chest pain, dyspnea on exertion, irregular heartbeat, leg swelling, near-syncope, orthopnea, palpitations, paroxysmal nocturnal dyspnea and syncope.   Respiratory: Negative for shortness of breath.    Skin: Negative.    Musculoskeletal: Negative.    Gastrointestinal: Negative for abdominal pain, constipation and diarrhea.   Genitourinary: Negative for dysuria.   Neurological: Negative.    Psychiatric/Behavioral: Positive for depression.        Objective:    Physical Exam   Constitutional: She is oriented to person, place, and time. She  appears well-developed and well-nourished.   HENT:   Head: Normocephalic and atraumatic.   Eyes: Conjunctivae and EOM are normal. Pupils are equal, round, and reactive to light.   Neck: Normal range of motion. Neck supple. No thyromegaly present.   Cardiovascular: Normal rate and regular rhythm.   No murmur heard.  Pulmonary/Chest: Effort normal and breath sounds normal. No respiratory distress.   Abdominal: Soft. Bowel sounds are normal.   Musculoskeletal: She exhibits no edema.   Neurological: She is alert and oriented to person, place, and time.   Skin: Skin is warm and dry.   Psychiatric: She has a normal mood and affect. Her behavior is normal.       echo:  4-18  CONCLUSIONS     1 - Normal left ventricular systolic function (EF 60-65%).     2 - No wall motion abnormalities.     3 - Impaired LV relaxation, elevated LAP (grade 2 diastolic dysfunction).     4 - Trivial tricuspid regurgitation.     5 - Pulmonary hypertension. The estimated PA systolic pressure is 49 mmHg.     NST:  Impression: NORMAL MYOCARDIAL PERFUSION  1. The perfusion scan is free of evidence for myocardial ischemia or injury.   2. Resting wall motion is physiologic.   3. Visually estimated LV function is normal.   4. The ventricular volumes are normal at rest and stress.   5. The extracardiac distribution of radioactivity is normal.   6. There was no previous study available to compare.    Carotid ultrasound:  CONCLUSIONS   There is 0 - 19% right Internal Carotid stenosis.  There is 0 - 19% left Internal Carotid stenosis.    Holter within normal limits    LDL-89   9-18    EKG today shows normal sinus rhythm with right bundle branch block    Assessment:       1. Dizziness    2. Tachycardia    3. Dyspnea on exertion    4. Shortness of breath    5. Mixed hyperlipidemia    6. Hypertension, well controlled    7. H/O: CVA (cerebrovascular accident)         Plan:       -Only reassurance in light of testing  -Continue physical rehabilitation at  nursing home    Return to clinic in 12 months

## 2019-04-01 DIAGNOSIS — F33.42 RECURRENT MAJOR DEPRESSIVE DISORDER, IN FULL REMISSION: ICD-10-CM

## 2019-04-01 RX ORDER — TRAZODONE HYDROCHLORIDE 50 MG/1
TABLET ORAL
Qty: 30 TABLET | Refills: 0 | Status: SHIPPED | OUTPATIENT
Start: 2019-04-01 | End: 2019-05-01 | Stop reason: SDUPTHER

## 2019-04-03 DIAGNOSIS — H04.203 BILATERAL EPIPHORA: ICD-10-CM

## 2019-04-03 RX ORDER — FLUTICASONE PROPIONATE 50 MCG
SPRAY, SUSPENSION (ML) NASAL
Qty: 16 G | Refills: 9 | Status: SHIPPED | OUTPATIENT
Start: 2019-04-03 | End: 2019-08-14 | Stop reason: SDUPTHER

## 2019-04-03 RX ORDER — OLOPATADINE HYDROCHLORIDE 1 MG/ML
SOLUTION/ DROPS OPHTHALMIC
Qty: 5 ML | Refills: 3 | Status: SHIPPED | OUTPATIENT
Start: 2019-04-03 | End: 2019-08-14 | Stop reason: SDUPTHER

## 2019-04-08 DIAGNOSIS — I10 HYPERTENSION, WELL CONTROLLED: ICD-10-CM

## 2019-04-08 RX ORDER — OMEPRAZOLE 20 MG/1
CAPSULE, DELAYED RELEASE ORAL
Qty: 90 CAPSULE | Refills: 0 | Status: SHIPPED | OUTPATIENT
Start: 2019-04-08 | End: 2019-06-18 | Stop reason: SDUPTHER

## 2019-04-08 RX ORDER — AMLODIPINE BESYLATE AND BENAZEPRIL HYDROCHLORIDE 2.5; 1 MG/1; MG/1
1 CAPSULE ORAL DAILY
Qty: 90 CAPSULE | Refills: 3 | Status: SHIPPED | OUTPATIENT
Start: 2019-04-08 | End: 2020-01-06

## 2019-04-17 ENCOUNTER — LAB VISIT (OUTPATIENT)
Dept: LAB | Facility: HOSPITAL | Age: 84
End: 2019-04-17
Attending: FAMILY MEDICINE
Payer: MEDICARE

## 2019-04-17 ENCOUNTER — OFFICE VISIT (OUTPATIENT)
Dept: FAMILY MEDICINE | Facility: CLINIC | Age: 84
End: 2019-04-17
Payer: MEDICARE

## 2019-04-17 VITALS
OXYGEN SATURATION: 96 % | DIASTOLIC BLOOD PRESSURE: 80 MMHG | HEART RATE: 97 BPM | HEIGHT: 62 IN | WEIGHT: 158.06 LBS | TEMPERATURE: 98 F | BODY MASS INDEX: 29.08 KG/M2 | SYSTOLIC BLOOD PRESSURE: 112 MMHG | RESPIRATION RATE: 20 BRPM

## 2019-04-17 DIAGNOSIS — I10 HYPERTENSION, WELL CONTROLLED: ICD-10-CM

## 2019-04-17 DIAGNOSIS — Z86.73 H/O: CVA (CEREBROVASCULAR ACCIDENT): ICD-10-CM

## 2019-04-17 DIAGNOSIS — Z86.73 H/O: CVA (CEREBROVASCULAR ACCIDENT): Primary | ICD-10-CM

## 2019-04-17 DIAGNOSIS — I69.354 HEMIPARESIS AFFECTING LEFT SIDE AS LATE EFFECT OF STROKE: ICD-10-CM

## 2019-04-17 DIAGNOSIS — F41.8 ANXIETY WITH DEPRESSION: ICD-10-CM

## 2019-04-17 DIAGNOSIS — E78.2 MIXED HYPERLIPIDEMIA: ICD-10-CM

## 2019-04-17 LAB
ALBUMIN SERPL BCP-MCNC: 3.8 G/DL (ref 3.5–5.2)
ALP SERPL-CCNC: 94 U/L (ref 55–135)
ALT SERPL W/O P-5'-P-CCNC: 12 U/L (ref 10–44)
ANION GAP SERPL CALC-SCNC: 10 MMOL/L (ref 8–16)
AST SERPL-CCNC: 17 U/L (ref 10–40)
BILIRUB SERPL-MCNC: 1 MG/DL (ref 0.1–1)
BUN SERPL-MCNC: 12 MG/DL (ref 8–23)
CALCIUM SERPL-MCNC: 9.3 MG/DL (ref 8.7–10.5)
CHLORIDE SERPL-SCNC: 106 MMOL/L (ref 95–110)
CO2 SERPL-SCNC: 24 MMOL/L (ref 23–29)
CREAT SERPL-MCNC: 0.9 MG/DL (ref 0.5–1.4)
EST. GFR  (AFRICAN AMERICAN): >60 ML/MIN/1.73 M^2
EST. GFR  (NON AFRICAN AMERICAN): >60 ML/MIN/1.73 M^2
GLUCOSE SERPL-MCNC: 105 MG/DL (ref 70–110)
POTASSIUM SERPL-SCNC: 3.8 MMOL/L (ref 3.5–5.1)
PROT SERPL-MCNC: 7.4 G/DL (ref 6–8.4)
SODIUM SERPL-SCNC: 140 MMOL/L (ref 136–145)

## 2019-04-17 PROCEDURE — 99499 RISK ADDL DX/OHS AUDIT: ICD-10-PCS | Mod: S$GLB,,, | Performed by: FAMILY MEDICINE

## 2019-04-17 PROCEDURE — 1101F PT FALLS ASSESS-DOCD LE1/YR: CPT | Mod: CPTII,S$GLB,, | Performed by: FAMILY MEDICINE

## 2019-04-17 PROCEDURE — 99999 PR PBB SHADOW E&M-EST. PATIENT-LVL V: ICD-10-PCS | Mod: PBBFAC,,, | Performed by: FAMILY MEDICINE

## 2019-04-17 PROCEDURE — 80053 COMPREHEN METABOLIC PANEL: CPT

## 2019-04-17 PROCEDURE — 3079F PR MOST RECENT DIASTOLIC BLOOD PRESSURE 80-89 MM HG: ICD-10-PCS | Mod: CPTII,S$GLB,, | Performed by: FAMILY MEDICINE

## 2019-04-17 PROCEDURE — 3074F SYST BP LT 130 MM HG: CPT | Mod: CPTII,S$GLB,, | Performed by: FAMILY MEDICINE

## 2019-04-17 PROCEDURE — 99999 PR PBB SHADOW E&M-EST. PATIENT-LVL V: CPT | Mod: PBBFAC,,, | Performed by: FAMILY MEDICINE

## 2019-04-17 PROCEDURE — 3079F DIAST BP 80-89 MM HG: CPT | Mod: CPTII,S$GLB,, | Performed by: FAMILY MEDICINE

## 2019-04-17 PROCEDURE — 36415 COLL VENOUS BLD VENIPUNCTURE: CPT | Mod: PO

## 2019-04-17 PROCEDURE — 3074F PR MOST RECENT SYSTOLIC BLOOD PRESSURE < 130 MM HG: ICD-10-PCS | Mod: CPTII,S$GLB,, | Performed by: FAMILY MEDICINE

## 2019-04-17 PROCEDURE — 99214 PR OFFICE/OUTPT VISIT, EST, LEVL IV, 30-39 MIN: ICD-10-PCS | Mod: S$GLB,,, | Performed by: FAMILY MEDICINE

## 2019-04-17 PROCEDURE — 1101F PR PT FALLS ASSESS DOC 0-1 FALLS W/OUT INJ PAST YR: ICD-10-PCS | Mod: CPTII,S$GLB,, | Performed by: FAMILY MEDICINE

## 2019-04-17 PROCEDURE — 99214 OFFICE O/P EST MOD 30 MIN: CPT | Mod: S$GLB,,, | Performed by: FAMILY MEDICINE

## 2019-04-17 PROCEDURE — 99499 UNLISTED E&M SERVICE: CPT | Mod: S$GLB,,, | Performed by: FAMILY MEDICINE

## 2019-04-17 NOTE — PROGRESS NOTES
Subjective:       Patient ID: Carol Yadav is a 83 y.o. female.    Chief Complaint: Hypertension (3 mo follow up ) and Depression (3 mo follow up )    HPI    H/o CVA - pt is doing well overall, but does state that she feels progressively weaker over the years.     Htn - Chronic - stable - pt is adherent without side effect. No need of refill.   Recent cardiac workup looked good.     HLD - pt is adherent without side effect. No need of refill.     Anxiety with depression - pt is adherent without side effect. No need of refill.         Current Outpatient Medications on File Prior to Visit   Medication Sig Dispense Refill    alendronate (FOSAMAX) 70 MG tablet Take 1 tablet (70 mg total) by mouth every 7 days. 12 tablet 3    betamethasone dipropionate (DIPROLENE) 0.05 % cream Apply topically as needed.       fluticasone (FLONASE) 50 mcg/actuation nasal spray USE 1 SPRAY IN EACH NOSTRIL 2 TIMES DAILY AS NEEDED FOR RHINITIS  **MED WILL NOT AUTOFILL, REORDER WHEN NEEDED DURING REGULAR BUSINESS HOURS 16 g 9    olopatadine (PATANOL) 0.1 % ophthalmic solution Place 1 drop into both eyes 2 (two) times daily.       olopatadine (PATANOL) 0.1 % ophthalmic solution INSTILL 1 DROP IN EACH EYE 2 TIMES DAILY 5 mL 3    oxybutynin (DITROPAN-XL) 10 MG 24 hr tablet Take 1 tablet (10 mg total) by mouth every evening. 90 tablet 3    traZODone (DESYREL) 50 MG tablet TAKE 1 TABLET BY MOUTH AT BEDTIME 30 tablet 0    VITAMIN D3 1,000 unit tablet TAKE 1 TABLET BY MOUTH ONCE DAILY 90 tablet 3    acetaminophen (TYLENOL) 325 MG tablet Take 2 tablets (650 mg total) by mouth every 4 (four) hours as needed.  0    amlodipine-benazepril 2.5-10 mg (LOTREL) 2.5-10 mg per capsule TAKE 1 CAPSULE BY MOUTH ONCE DAILY 90 capsule 3    aspirin (ECOTRIN) 81 MG EC tablet Take 81 mg by mouth once daily.      atorvastatin (LIPITOR) 40 MG tablet TAKE 1 TABLET BY MOUTH ONCE DAILY 90 tablet 3    CALCIUM 600 600 mg calcium (1,500 mg) Tab TAKE 1 TABLET BY  MOUTH ONCE DAILY WITH BREAKFAST 90 tablet 1    clotrimazole (LOTRIMIN) 1 % cream Apply topically.       multivitamin-minerals-lutein (MULTIVITAMIN 50 PLUS) Tab Take 1 tablet by mouth once daily. 90 tablet 3    omeprazole (PRILOSEC) 20 MG capsule TAKE 1 CAPSULE BY MOUTH EVERY MORNING 90 capsule 0    polyethylene glycol (MIRALAX) 17 gram PwPk Take by mouth. Take every other day      venlafaxine (EFFEXOR-XR) 37.5 MG 24 hr capsule TAKE 1 CAPSULE BY MOUTH ONCE DAILY 90 capsule 3     No current facility-administered medications on file prior to visit.        Past Medical History:   Diagnosis Date    Allergic rhinitis     Arthritis     Elevated blood pressure reading without diagnosis of hypertension     Hormone replacement therapy (postmenopausal)     Hyperlipidemia     Hypertension     Stroke        Family History   Problem Relation Age of Onset    Cancer Mother     Breast cancer Mother     Stroke Father     No Known Problems Sister     No Known Problems Brother     No Known Problems Maternal Aunt     No Known Problems Maternal Uncle     No Known Problems Paternal Aunt     No Known Problems Paternal Uncle     No Known Problems Maternal Grandmother     No Known Problems Maternal Grandfather     No Known Problems Paternal Grandmother     No Known Problems Paternal Grandfather     Amblyopia Neg Hx     Blindness Neg Hx     Cataracts Neg Hx     Diabetes Neg Hx     Glaucoma Neg Hx     Hypertension Neg Hx     Macular degeneration Neg Hx     Retinal detachment Neg Hx     Strabismus Neg Hx     Thyroid disease Neg Hx         reports that she has quit smoking. Her smoking use included cigarettes. She quit after 45.00 years of use. She has never used smokeless tobacco. She reports that she drinks about 12.6 oz of alcohol per week. She reports that she does not use drugs.    Review of Systems   Constitutional: Negative for chills and fever.        O   Musculoskeletal: Positive for gait problem.    Neurological: Positive for weakness. Negative for syncope.       Objective:     Vitals:    04/17/19 1338   BP: 112/80   Pulse: 97   Resp: 20   Temp: 98 °F (36.7 °C)        Physical Exam   Constitutional: She appears well-developed. No distress.   HENT:   Head: Normocephalic and atraumatic.   Eyes: Conjunctivae are normal. Right eye exhibits no discharge. Left eye exhibits no discharge. No scleral icterus.   Cardiovascular: Normal rate, regular rhythm and normal heart sounds. Exam reveals no gallop and no friction rub.   No murmur heard.  Pulmonary/Chest: Effort normal and breath sounds normal. No respiratory distress. She has no wheezes. She has no rales.   Abdominal: Soft. Bowel sounds are normal. She exhibits no distension and no mass. There is no tenderness. There is no rebound and no guarding.   Neurological: She is alert.   LUE weakness proximal to distal.     LLE weakness - uses a hemiwalker    L sided facial droop    Skin: She is not diaphoretic.   Psychiatric: She has a normal mood and affect. Thought content normal.   Vitals reviewed.      Assessment:       1. H/O: CVA (cerebrovascular accident)    2. 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 Anxiety with depression    4. Hypertension, well controlled    5. Mixed hyperlipidemia        Plan:       Carol LONGORIA was seen today for hypertension and depression.    Diagnoses and all orders for this visit:    H/O: CVA (cerebrovascular accident)  -     Comprehensive metabolic panel; Future  Chronic - stable  Continue exercises at [facility]    Hemiparesis affecting left side as late effect of stroke  Chronic - stable    Anxiety with depression  - Chronic - stable     Pt is doing well on current therapy. No side effects noted. Will continue current therapy.    Hypertension, well controlled  - Chronic - stable     Pt is doing well on current therapy. No side effects noted. Will continue current therapy.  Mixed hyperlipidemia  - Chronic - stable      Pt is doing well on current therapy. No side effects noted. Will continue current therapy.        Follow up in about 1 month (around 5/15/2019) for MOCA 40.        Pt verbalized understanding and agreed with our plan.

## 2019-04-26 ENCOUNTER — TELEPHONE (OUTPATIENT)
Dept: FAMILY MEDICINE | Facility: CLINIC | Age: 84
End: 2019-04-26

## 2019-04-26 NOTE — TELEPHONE ENCOUNTER
----- Message from Linh Espino sent at 4/26/2019 12:29 PM CDT -----  Type:  Patient Returning Call    Who Called: pt     Who Left Message for Patient: unknown    Does the patient know what this is regarding?: test results    Would the patient rather a call back or a response via [facility]? Call back     Best Call Back Number:[phone]

## 2019-04-29 ENCOUNTER — NURSE TRIAGE (OUTPATIENT)
Dept: ADMINISTRATIVE | Facility: CLINIC | Age: 84
End: 2019-04-29

## 2019-04-29 NOTE — TELEPHONE ENCOUNTER
"    Reason for Disposition   [1] Request for URGENT new prescription or refill of "essential" medication (i.e., likelihood of harm to patient if not taken) AND [2] triager unable to fill per unit policy    Protocols used: MEDICATION QUESTION CALL-A-    Patient is coughing through the night and she has itching in her right eye.  Pleases call Director of nursing at [phone]-Fomfyqez Landry  "

## 2019-05-01 ENCOUNTER — OFFICE VISIT (OUTPATIENT)
Dept: FAMILY MEDICINE | Facility: CLINIC | Age: 84
End: 2019-05-01
Payer: MEDICARE

## 2019-05-01 ENCOUNTER — TELEPHONE (OUTPATIENT)
Dept: FAMILY MEDICINE | Facility: CLINIC | Age: 84
End: 2019-05-01

## 2019-05-01 ENCOUNTER — HOSPITAL ENCOUNTER (OUTPATIENT)
Dept: RADIOLOGY | Facility: HOSPITAL | Age: 84
Discharge: HOME | End: 2019-05-01
Attending: FAMILY MEDICINE
Payer: MEDICARE

## 2019-05-01 VITALS
TEMPERATURE: 98 F | HEIGHT: 62 IN | WEIGHT: 157.88 LBS | SYSTOLIC BLOOD PRESSURE: 120 MMHG | DIASTOLIC BLOOD PRESSURE: 66 MMHG | HEART RATE: 84 BPM | OXYGEN SATURATION: 92 % | BODY MASS INDEX: 29.05 KG/M2 | RESPIRATION RATE: 20 BRPM

## 2019-05-01 DIAGNOSIS — J18.9 COMMUNITY ACQUIRED PNEUMONIA OF LEFT LOWER LOBE OF LUNG: Primary | ICD-10-CM

## 2019-05-01 DIAGNOSIS — R27.0 ATAXIA: ICD-10-CM

## 2019-05-01 DIAGNOSIS — F33.42 RECURRENT MAJOR DEPRESSIVE DISORDER, IN FULL REMISSION: ICD-10-CM

## 2019-05-01 DIAGNOSIS — H10.31 ACUTE BACTERIAL CONJUNCTIVITIS OF RIGHT EYE: ICD-10-CM

## 2019-05-01 DIAGNOSIS — R05.9 COUGH: ICD-10-CM

## 2019-05-01 PROCEDURE — 99999 PR PBB SHADOW E&M-EST. PATIENT-LVL V: CPT | Mod: PBBFAC,,, | Performed by: FAMILY MEDICINE

## 2019-05-01 PROCEDURE — 3078F DIAST BP <80 MM HG: CPT | Mod: CPTII,S$GLB,, | Performed by: FAMILY MEDICINE

## 2019-05-01 PROCEDURE — 99214 PR OFFICE/OUTPT VISIT, EST, LEVL IV, 30-39 MIN: ICD-10-PCS | Mod: S$GLB,,, | Performed by: FAMILY MEDICINE

## 2019-05-01 PROCEDURE — 1101F PT FALLS ASSESS-DOCD LE1/YR: CPT | Mod: CPTII,S$GLB,, | Performed by: FAMILY MEDICINE

## 2019-05-01 PROCEDURE — 99499 RISK ADDL DX/OHS AUDIT: ICD-10-PCS | Mod: S$GLB,,, | Performed by: FAMILY MEDICINE

## 2019-05-01 PROCEDURE — 3074F PR MOST RECENT SYSTOLIC BLOOD PRESSURE < 130 MM HG: ICD-10-PCS | Mod: CPTII,S$GLB,, | Performed by: FAMILY MEDICINE

## 2019-05-01 PROCEDURE — 3074F SYST BP LT 130 MM HG: CPT | Mod: CPTII,S$GLB,, | Performed by: FAMILY MEDICINE

## 2019-05-01 PROCEDURE — 1101F PR PT FALLS ASSESS DOC 0-1 FALLS W/OUT INJ PAST YR: ICD-10-PCS | Mod: CPTII,S$GLB,, | Performed by: FAMILY MEDICINE

## 2019-05-01 PROCEDURE — 99214 OFFICE O/P EST MOD 30 MIN: CPT | Mod: S$GLB,,, | Performed by: FAMILY MEDICINE

## 2019-05-01 PROCEDURE — 99499 UNLISTED E&M SERVICE: CPT | Mod: S$GLB,,, | Performed by: FAMILY MEDICINE

## 2019-05-01 PROCEDURE — 3078F PR MOST RECENT DIASTOLIC BLOOD PRESSURE < 80 MM HG: ICD-10-PCS | Mod: CPTII,S$GLB,, | Performed by: FAMILY MEDICINE

## 2019-05-01 PROCEDURE — 71046 X-RAY EXAM CHEST 2 VIEWS: CPT | Mod: 26,,, | Performed by: RADIOLOGY

## 2019-05-01 PROCEDURE — 71046 XR CHEST PA AND LATERAL: ICD-10-PCS | Mod: 26,,, | Performed by: RADIOLOGY

## 2019-05-01 PROCEDURE — 99999 PR PBB SHADOW E&M-EST. PATIENT-LVL V: ICD-10-PCS | Mod: PBBFAC,,, | Performed by: FAMILY MEDICINE

## 2019-05-01 PROCEDURE — 71046 X-RAY EXAM CHEST 2 VIEWS: CPT | Mod: TC,FY,PO

## 2019-05-01 RX ORDER — CALCIUM CARBONATE 600 MG
TABLET ORAL
Qty: 90 TABLET | Refills: 1 | Status: SHIPPED | OUTPATIENT
Start: 2019-05-01 | End: 2019-10-22 | Stop reason: SDUPTHER

## 2019-05-01 RX ORDER — POLYMYXIN B SULFATE AND TRIMETHOPRIM 1; 10000 MG/ML; [USP'U]/ML
1 SOLUTION OPHTHALMIC EVERY 6 HOURS
Qty: 10 ML | Refills: 0 | Status: SHIPPED | OUTPATIENT
Start: 2019-05-01 | End: 2020-10-05 | Stop reason: SDUPTHER

## 2019-05-01 RX ORDER — LEVOFLOXACIN 750 MG/1
750 TABLET ORAL DAILY
Qty: 7 TABLET | Refills: 0 | Status: SHIPPED | OUTPATIENT
Start: 2019-05-01 | End: 2019-05-15

## 2019-05-01 RX ORDER — TRAZODONE HYDROCHLORIDE 50 MG/1
TABLET ORAL
Qty: 28 TABLET | Refills: 2 | Status: SHIPPED | OUTPATIENT
Start: 2019-05-01 | End: 2019-08-07 | Stop reason: SDUPTHER

## 2019-05-01 NOTE — PROGRESS NOTES
Subjective:       Patient ID: Carol Yadav is a 83 y.o. female.    Chief Complaint: Sinus Problem (eye irritation, cough, and body ache past week )    HPI    Cough - onset one week ago that has worsened since the onset and is productive of clear or very light yellow tinged sputum. A/w redness and crusting and irritation over the last few days.     No fevers, + chills.     + fatigue and myalgias.   Current Outpatient Medications on File Prior to Visit   Medication Sig Dispense Refill    acetaminophen (TYLENOL) 325 MG tablet Take 2 tablets (650 mg total) by mouth every 4 (four) hours as needed.  0    alendronate (FOSAMAX) 70 MG tablet Take 1 tablet (70 mg total) by mouth every 7 days. 12 tablet 3    amlodipine-benazepril 2.5-10 mg (LOTREL) 2.5-10 mg per capsule TAKE 1 CAPSULE BY MOUTH ONCE DAILY 90 capsule 3    aspirin (ECOTRIN) 81 MG EC tablet Take 81 mg by mouth once daily.      atorvastatin (LIPITOR) 40 MG tablet TAKE 1 TABLET BY MOUTH ONCE DAILY 90 tablet 3    CALCIUM 600 600 mg calcium (1,500 mg) Tab TAKE 1 TABLET BY MOUTH ONCE DAILY WITH BREAKFAST 90 tablet 1    fluticasone (FLONASE) 50 mcg/actuation nasal spray USE 1 SPRAY IN EACH NOSTRIL 2 TIMES DAILY AS NEEDED FOR RHINITIS  **MED WILL NOT AUTOFILL, REORDER WHEN NEEDED DURING REGULAR BUSINESS HOURS 16 g 9    multivitamin-minerals-lutein (MULTIVITAMIN 50 PLUS) Tab Take 1 tablet by mouth once daily. 90 tablet 3    olopatadine (PATANOL) 0.1 % ophthalmic solution INSTILL 1 DROP IN EACH EYE 2 TIMES DAILY 5 mL 3    oxybutynin (DITROPAN-XL) 10 MG 24 hr tablet Take 1 tablet (10 mg total) by mouth every evening. 90 tablet 3    polyethylene glycol (MIRALAX) 17 gram PwPk Take by mouth. Take every other day      venlafaxine (EFFEXOR-XR) 37.5 MG 24 hr capsule TAKE 1 CAPSULE BY MOUTH ONCE DAILY 90 capsule 3    VITAMIN D3 1,000 unit tablet TAKE 1 TABLET BY MOUTH ONCE DAILY 90 tablet 3    [DISCONTINUED] olopatadine (PATANOL) 0.1 % ophthalmic solution Place 1 drop  into both eyes 2 (two) times daily.       [DISCONTINUED] traZODone (DESYREL) 50 MG tablet TAKE 1 TABLET BY MOUTH AT BEDTIME 30 tablet 0    betamethasone dipropionate (DIPROLENE) 0.05 % cream Apply topically as needed.       clotrimazole (LOTRIMIN) 1 % cream Apply topically.       omeprazole (PRILOSEC) 20 MG capsule TAKE 1 CAPSULE BY MOUTH EVERY MORNING 90 capsule 0     No current facility-administered medications on file prior to visit.        Past Medical History:   Diagnosis Date    Allergic rhinitis     Arthritis     Elevated blood pressure reading without diagnosis of hypertension     Hormone replacement therapy (postmenopausal)     Hyperlipidemia     Hypertension     Stroke        Family History   Problem Relation Age of Onset    Cancer Mother     Breast cancer Mother     Stroke Father     No Known Problems Sister     No Known Problems Brother     No Known Problems Maternal Aunt     No Known Problems Maternal Uncle     No Known Problems Paternal Aunt     No Known Problems Paternal Uncle     No Known Problems Maternal Grandmother     No Known Problems Maternal Grandfather     No Known Problems Paternal Grandmother     No Known Problems Paternal Grandfather     Amblyopia Neg Hx     Blindness Neg Hx     Cataracts Neg Hx     Diabetes Neg Hx     Glaucoma Neg Hx     Hypertension Neg Hx     Macular degeneration Neg Hx     Retinal detachment Neg Hx     Strabismus Neg Hx     Thyroid disease Neg Hx         reports that she has quit smoking. Her smoking use included cigarettes. She quit after 45.00 years of use. She has never used smokeless tobacco. She reports that she drinks about 12.6 oz of alcohol per week. She reports that she does not use drugs.    Review of Systems   HENT: Negative for congestion and rhinorrhea.    Gastrointestinal: Negative for diarrhea, nausea and vomiting.       Objective:     Vitals:    05/01/19 1406   BP: 120/66   Pulse: 84   Resp: 20   Temp: 98 °F (36.7 °C)         Physical Exam   Constitutional: She appears well-developed. No distress.   HENT:   Head: Normocephalic and atraumatic.   Eyes: Conjunctivae are normal. Right eye exhibits no discharge. Left eye exhibits no discharge. No scleral icterus.   Cardiovascular: Normal rate, regular rhythm and normal heart sounds. Exam reveals no gallop and no friction rub.   No murmur heard.  Pulmonary/Chest: Effort normal. No respiratory distress. She has no wheezes. She has rales (bilateral bases).   Neurological: She is alert.   Skin: She is not diaphoretic.   Psychiatric: She has a normal mood and affect.   Vitals reviewed.      Assessment:       1. Community acquired pneumonia of left lower lobe of lung    2. Acute bacterial conjunctivitis of right eye        Plan:       Carol LONGORIA was seen today for sinus problem.    Diagnoses and all orders for this visit:    Community acquired pneumonia of left lower lobe of lung  -     X-Ray Chest PA And Lateral; Future  New dx - pt educated on disease etiology, prognosis and treatment. Answered pts questions.    Pt lives in SNF. Vitals are stable. Will attempt outpatient abx.   Pt to proceed to ED for interval worsening.     Pt to inform me if they develop any new or worsening sxs. They also have been notified that they can contact me for any other concerns.     Acute bacterial conjunctivitis of right eye  -     polymyxin B sulf-trimethoprim (POLYTRIM) 10,000 unit- 1 mg/mL Drop; Place 1 drop into both eyes every 6 (six) hours.    Other orders  -     levoFLOXacin (LEVAQUIN) 750 MG tablet; Take 1 tablet (750 mg total) by mouth once daily.            Follow up in about 2 weeks (around 5/15/2019) for MOCA - 40 mins.        Pt verbalized understanding and agreed with our plan.

## 2019-05-01 NOTE — TELEPHONE ENCOUNTER
tasaaron states pt told her Dr Hopkins was going to send rx for oral abx to pharmacy for pneumonia and she was just checking to be sure this was correct; informed her per Dr Hopkins he will send abx for pt to take daily for 7 days; she verbalized understanding

## 2019-05-01 NOTE — TELEPHONE ENCOUNTER
----- Message from Meghna Pulido sent at 5/1/2019  3:53 PM CDT -----  Contact: Director of Facility-   Type: Patient Call Back    Who called: Parviz - Director of Living Facility     What is the request in detail:  Asking to speak with the office personal   Can the clinic reply by [facility]?Call     Would the patient rather a call back or a response via [facility]? Call     Best call back number: [phone].

## 2019-05-15 ENCOUNTER — OFFICE VISIT (OUTPATIENT)
Dept: FAMILY MEDICINE | Facility: CLINIC | Age: 84
End: 2019-05-15
Payer: MEDICARE

## 2019-05-15 VITALS
OXYGEN SATURATION: 94 % | RESPIRATION RATE: 20 BRPM | SYSTOLIC BLOOD PRESSURE: 118 MMHG | TEMPERATURE: 98 F | DIASTOLIC BLOOD PRESSURE: 86 MMHG | WEIGHT: 156.5 LBS | HEART RATE: 98 BPM | HEIGHT: 62 IN | BODY MASS INDEX: 28.8 KG/M2

## 2019-05-15 DIAGNOSIS — G31.84 MCI (MILD COGNITIVE IMPAIRMENT): Primary | ICD-10-CM

## 2019-05-15 DIAGNOSIS — J18.9 COMMUNITY ACQUIRED PNEUMONIA, UNSPECIFIED LATERALITY: ICD-10-CM

## 2019-05-15 PROCEDURE — 3079F PR MOST RECENT DIASTOLIC BLOOD PRESSURE 80-89 MM HG: ICD-10-PCS | Mod: CPTII,S$GLB,, | Performed by: FAMILY MEDICINE

## 2019-05-15 PROCEDURE — 99999 PR PBB SHADOW E&M-EST. PATIENT-LVL III: ICD-10-PCS | Mod: PBBFAC,,, | Performed by: FAMILY MEDICINE

## 2019-05-15 PROCEDURE — 3079F DIAST BP 80-89 MM HG: CPT | Mod: CPTII,S$GLB,, | Performed by: FAMILY MEDICINE

## 2019-05-15 PROCEDURE — 3074F SYST BP LT 130 MM HG: CPT | Mod: CPTII,S$GLB,, | Performed by: FAMILY MEDICINE

## 2019-05-15 PROCEDURE — 99214 PR OFFICE/OUTPT VISIT, EST, LEVL IV, 30-39 MIN: ICD-10-PCS | Mod: S$GLB,,, | Performed by: FAMILY MEDICINE

## 2019-05-15 PROCEDURE — 1101F PT FALLS ASSESS-DOCD LE1/YR: CPT | Mod: CPTII,S$GLB,, | Performed by: FAMILY MEDICINE

## 2019-05-15 PROCEDURE — 99999 PR PBB SHADOW E&M-EST. PATIENT-LVL III: CPT | Mod: PBBFAC,,, | Performed by: FAMILY MEDICINE

## 2019-05-15 PROCEDURE — 1101F PR PT FALLS ASSESS DOC 0-1 FALLS W/OUT INJ PAST YR: ICD-10-PCS | Mod: CPTII,S$GLB,, | Performed by: FAMILY MEDICINE

## 2019-05-15 PROCEDURE — 3074F PR MOST RECENT SYSTOLIC BLOOD PRESSURE < 130 MM HG: ICD-10-PCS | Mod: CPTII,S$GLB,, | Performed by: FAMILY MEDICINE

## 2019-05-15 PROCEDURE — 99214 OFFICE O/P EST MOD 30 MIN: CPT | Mod: S$GLB,,, | Performed by: FAMILY MEDICINE

## 2019-05-15 NOTE — PROGRESS NOTES
Subjective:       Patient ID: Carol Yadav is a 83 y.o. female.    Chief Complaint: MOCA    HPI    MOCA - Pt states that she does lose her place in conversation at times. Will have MOCA done today.     CAP - pt sxs have significantly improved after abx --> but still has issues with feeling fatigued. When she is at rest she is doing well. Gets exhausted with exertion.           Current Outpatient Medications on File Prior to Visit   Medication Sig Dispense Refill    acetaminophen (TYLENOL) 325 MG tablet Take 2 tablets (650 mg total) by mouth every 4 (four) hours as needed.  0    alendronate (FOSAMAX) 70 MG tablet Take 1 tablet (70 mg total) by mouth every 7 days. 12 tablet 3    amlodipine-benazepril 2.5-10 mg (LOTREL) 2.5-10 mg per capsule TAKE 1 CAPSULE BY MOUTH ONCE DAILY 90 capsule 3    aspirin (ECOTRIN) 81 MG EC tablet Take 81 mg by mouth once daily.      atorvastatin (LIPITOR) 40 MG tablet TAKE 1 TABLET BY MOUTH ONCE DAILY 90 tablet 3    CALCIUM 600 600 mg calcium (1,500 mg) Tab TAKE 1 TABLET BY MOUTH ONCE DAILY WITH BREAKFAST 90 tablet 1    fluticasone (FLONASE) 50 mcg/actuation nasal spray USE 1 SPRAY IN EACH NOSTRIL 2 TIMES DAILY AS NEEDED FOR RHINITIS  **MED WILL NOT AUTOFILL, REORDER WHEN NEEDED DURING REGULAR BUSINESS HOURS 16 g 9    FLUTICASONE FUROATE NASL by Nasal route 2 (two) times daily as needed.      multivitamin-minerals-lutein (MULTIVITAMIN 50 PLUS) Tab Take 1 tablet by mouth once daily. 90 tablet 3    olopatadine (PATANOL) 0.1 % ophthalmic solution INSTILL 1 DROP IN EACH EYE 2 TIMES DAILY 5 mL 3    omeprazole (PRILOSEC) 20 MG capsule TAKE 1 CAPSULE BY MOUTH EVERY MORNING 90 capsule 0    oxybutynin (DITROPAN-XL) 10 MG 24 hr tablet Take 1 tablet (10 mg total) by mouth every evening. 90 tablet 3    polyethylene glycol (MIRALAX) 17 gram PwPk Take by mouth. Take every other day      traZODone (DESYREL) 50 MG tablet TAKE 1 TABLET BY MOUTH AT BEDTIME 28 tablet 2    venlafaxine  (EFFEXOR-XR) 37.5 MG 24 hr capsule TAKE 1 CAPSULE BY MOUTH ONCE DAILY 90 capsule 3    VITAMIN D3 1,000 unit tablet TAKE 1 TABLET BY MOUTH ONCE DAILY 90 tablet 3    betamethasone dipropionate (DIPROLENE) 0.05 % cream Apply topically as needed.       clotrimazole (LOTRIMIN) 1 % cream Apply topically.       polymyxin B sulf-trimethoprim (POLYTRIM) 10,000 unit- 1 mg/mL Drop Place 1 drop into both eyes every 6 (six) hours. 10 mL 0    [DISCONTINUED] levoFLOXacin (LEVAQUIN) 750 MG tablet Take 1 tablet (750 mg total) by mouth once daily. 7 tablet 0     No current facility-administered medications on file prior to visit.        Past Medical History:   Diagnosis Date    Allergic rhinitis     Arthritis     Elevated blood pressure reading without diagnosis of hypertension     Hormone replacement therapy (postmenopausal)     Hyperlipidemia     Hypertension     Stroke        Family History   Problem Relation Age of Onset    Cancer Mother     Breast cancer Mother     Stroke Father     No Known Problems Sister     No Known Problems Brother     No Known Problems Maternal Aunt     No Known Problems Maternal Uncle     No Known Problems Paternal Aunt     No Known Problems Paternal Uncle     No Known Problems Maternal Grandmother     No Known Problems Maternal Grandfather     No Known Problems Paternal Grandmother     No Known Problems Paternal Grandfather     Amblyopia Neg Hx     Blindness Neg Hx     Cataracts Neg Hx     Diabetes Neg Hx     Glaucoma Neg Hx     Hypertension Neg Hx     Macular degeneration Neg Hx     Retinal detachment Neg Hx     Strabismus Neg Hx     Thyroid disease Neg Hx         reports that she has quit smoking. Her smoking use included cigarettes. She quit after 45.00 years of use. She has never used smokeless tobacco. She reports that she drinks about 12.6 oz of alcohol per week. She reports that she does not use drugs.    Review of Systems   Constitutional: Negative for chills  and fever.   Musculoskeletal: Positive for arthralgias and gait problem.   Neurological: Positive for weakness.       Objective:     Vitals:    05/15/19 1412   BP: 118/86   Pulse: 98   Resp: 20   Temp: 97.9 °F (36.6 °C)        Physical Exam   Constitutional: She appears well-developed. No distress.   HENT:   Head: Normocephalic and atraumatic.   Eyes: Conjunctivae are normal. Right eye exhibits no discharge. Left eye exhibits no discharge. No scleral icterus.   Cardiovascular: Normal rate, regular rhythm and normal heart sounds. Exam reveals no gallop and no friction rub.   No murmur heard.  Pulmonary/Chest: Effort normal. No respiratory distress. She has no wheezes. She has no rales.   Neurological: She is alert.   Skin: She is not diaphoretic.   Psychiatric: She has a normal mood and affect.   Vitals reviewed.      Assessment:       1. MCI (mild cognitive impairment)    2. Community acquired pneumonia, unspecified laterality        Plan:       Carol LONGORIA was seen today for moca.    Diagnoses and all orders for this visit:    MCI (mild cognitive impairment)  Performed MOCA today and pt scored 27/30. Her MOCA from 2 yrs ago was scored as 28/30. Both of these are normal. Given the patients complaints of some int short term memory loss, she likely does have a slight degree fo MCI, probably as a late affect of her stroke.      Community acquired pneumonia, unspecified laterality  Resolved!          Follow up in about 3 months (around 8/15/2019) for F/u htn, depression.        Pt verbalized understanding and agreed with our plan.     At least 25 minutes were spent today with the patient in the office, which more than 50% of the time was spent on evaluation and counseling regarding The primary encounter diagnosis was MCI (mild cognitive impairment). A diagnosis of Community acquired pneumonia, unspecified laterality was also pertinent to this visit..

## 2019-05-29 ENCOUNTER — TELEPHONE (OUTPATIENT)
Dept: FAMILY MEDICINE | Facility: CLINIC | Age: 84
End: 2019-05-29

## 2019-05-29 DIAGNOSIS — R03.0 ELEVATED BLOOD PRESSURE READING: Primary | ICD-10-CM

## 2019-05-29 NOTE — TELEPHONE ENCOUNTER
Pt states she takes xanax as needed when her blood pressure is elevated, she states this weekend her SBP was 180; she is requesting refill of xanax be sent to mail order pharmacy; I did inform her Dr matthew out of office until Monday; she verbalized understanding

## 2019-05-29 NOTE — TELEPHONE ENCOUNTER
----- Message from Leslie Weinberg sent at 5/29/2019  2:12 PM CDT -----  Contact: self  [phone]  .Type: RX Refill Request    Who Called:   self    Refill or New Rx: refill    RX Name and Strength: alprazolam (XANAX) 0.25 MG tab    Is this a 30 day or 90 day RX: 30day    Preferred Pharmacy with phone number: .  [facility] [place] - [address]  [address]  [place] [address]  Phone: [phone] Fax: [phone]        Local or Mail Order: Mail Order      Would the patient rather a call back or a response via My [facility]? Call back    Best Call Back Number: self  [phone]

## 2019-05-30 RX ORDER — CLONIDINE HYDROCHLORIDE 0.1 MG/1
TABLET ORAL
Qty: 30 TABLET | Refills: 0 | Status: SHIPPED | OUTPATIENT
Start: 2019-05-30 | End: 2021-05-26

## 2019-05-30 NOTE — TELEPHONE ENCOUNTER
I agree with clonidine as needed for elevated BP.  I do not see Ativan on her list of current medications, and therefore did not feel we should prescribe it.  I do not feel it is appropriate due to her age etc.

## 2019-06-18 RX ORDER — OMEPRAZOLE 20 MG/1
CAPSULE, DELAYED RELEASE ORAL
Qty: 90 CAPSULE | Refills: 1 | Status: SHIPPED | OUTPATIENT
Start: 2019-06-18 | End: 2020-01-09 | Stop reason: SDUPTHER

## 2019-07-17 DIAGNOSIS — N32.81 OAB (OVERACTIVE BLADDER): ICD-10-CM

## 2019-07-17 RX ORDER — OXYBUTYNIN CHLORIDE 10 MG/1
TABLET, EXTENDED RELEASE ORAL
Qty: 90 TABLET | Refills: 0 | Status: SHIPPED | OUTPATIENT
Start: 2019-07-17 | End: 2019-10-22 | Stop reason: SDUPTHER

## 2019-08-07 DIAGNOSIS — M85.80 OSTEOPENIA, UNSPECIFIED LOCATION: ICD-10-CM

## 2019-08-07 DIAGNOSIS — F33.42 RECURRENT MAJOR DEPRESSIVE DISORDER, IN FULL REMISSION: ICD-10-CM

## 2019-08-08 RX ORDER — TRAZODONE HYDROCHLORIDE 50 MG/1
TABLET ORAL
Qty: 28 TABLET | Refills: 2 | Status: SHIPPED | OUTPATIENT
Start: 2019-08-08 | End: 2019-10-22 | Stop reason: SDUPTHER

## 2019-08-09 ENCOUNTER — TELEPHONE (OUTPATIENT)
Dept: FAMILY MEDICINE | Facility: CLINIC | Age: 84
End: 2019-08-09

## 2019-08-09 RX ORDER — ALENDRONATE SODIUM 70 MG/1
TABLET ORAL
Qty: 4 TABLET | Refills: 3 | Status: SHIPPED | OUTPATIENT
Start: 2019-08-09 | End: 2019-11-25 | Stop reason: SDUPTHER

## 2019-08-14 ENCOUNTER — OFFICE VISIT (OUTPATIENT)
Dept: FAMILY MEDICINE | Facility: CLINIC | Age: 84
End: 2019-08-14
Payer: MEDICARE

## 2019-08-14 VITALS
HEART RATE: 91 BPM | OXYGEN SATURATION: 95 % | BODY MASS INDEX: 28.93 KG/M2 | DIASTOLIC BLOOD PRESSURE: 70 MMHG | WEIGHT: 157.19 LBS | RESPIRATION RATE: 20 BRPM | SYSTOLIC BLOOD PRESSURE: 128 MMHG | HEIGHT: 62 IN | TEMPERATURE: 99 F

## 2019-08-14 DIAGNOSIS — J30.2 SEASONAL ALLERGIC RHINITIS, UNSPECIFIED TRIGGER: ICD-10-CM

## 2019-08-14 DIAGNOSIS — H04.203 BILATERAL EPIPHORA: ICD-10-CM

## 2019-08-14 DIAGNOSIS — E78.2 MIXED HYPERLIPIDEMIA: ICD-10-CM

## 2019-08-14 DIAGNOSIS — Z86.73 H/O: CVA (CEREBROVASCULAR ACCIDENT): ICD-10-CM

## 2019-08-14 DIAGNOSIS — R53.81 PHYSICAL DECONDITIONING: ICD-10-CM

## 2019-08-14 DIAGNOSIS — I69.354 HEMIPARESIS AFFECTING LEFT SIDE AS LATE EFFECT OF STROKE: Primary | ICD-10-CM

## 2019-08-14 DIAGNOSIS — I10 HYPERTENSION, WELL CONTROLLED: ICD-10-CM

## 2019-08-14 DIAGNOSIS — R73.09 ABNORMAL GLUCOSE: ICD-10-CM

## 2019-08-14 DIAGNOSIS — R53.83 FATIGUE, UNSPECIFIED TYPE: ICD-10-CM

## 2019-08-14 PROCEDURE — 3078F PR MOST RECENT DIASTOLIC BLOOD PRESSURE < 80 MM HG: ICD-10-PCS | Mod: CPTII,S$GLB,, | Performed by: FAMILY MEDICINE

## 2019-08-14 PROCEDURE — 1101F PT FALLS ASSESS-DOCD LE1/YR: CPT | Mod: CPTII,S$GLB,, | Performed by: FAMILY MEDICINE

## 2019-08-14 PROCEDURE — 1101F PR PT FALLS ASSESS DOC 0-1 FALLS W/OUT INJ PAST YR: ICD-10-PCS | Mod: CPTII,S$GLB,, | Performed by: FAMILY MEDICINE

## 2019-08-14 PROCEDURE — 99999 PR PBB SHADOW E&M-EST. PATIENT-LVL V: CPT | Mod: PBBFAC,,, | Performed by: FAMILY MEDICINE

## 2019-08-14 PROCEDURE — 3074F PR MOST RECENT SYSTOLIC BLOOD PRESSURE < 130 MM HG: ICD-10-PCS | Mod: CPTII,S$GLB,, | Performed by: FAMILY MEDICINE

## 2019-08-14 PROCEDURE — 3078F DIAST BP <80 MM HG: CPT | Mod: CPTII,S$GLB,, | Performed by: FAMILY MEDICINE

## 2019-08-14 PROCEDURE — 99214 PR OFFICE/OUTPT VISIT, EST, LEVL IV, 30-39 MIN: ICD-10-PCS | Mod: S$GLB,,, | Performed by: FAMILY MEDICINE

## 2019-08-14 PROCEDURE — 99214 OFFICE O/P EST MOD 30 MIN: CPT | Mod: S$GLB,,, | Performed by: FAMILY MEDICINE

## 2019-08-14 PROCEDURE — 99999 PR PBB SHADOW E&M-EST. PATIENT-LVL V: ICD-10-PCS | Mod: PBBFAC,,, | Performed by: FAMILY MEDICINE

## 2019-08-14 PROCEDURE — 3074F SYST BP LT 130 MM HG: CPT | Mod: CPTII,S$GLB,, | Performed by: FAMILY MEDICINE

## 2019-08-14 RX ORDER — FLUTICASONE PROPIONATE 50 MCG
SPRAY, SUSPENSION (ML) NASAL
Qty: 16 G | Refills: 11 | Status: SHIPPED | OUTPATIENT
Start: 2019-08-14 | End: 2021-12-27

## 2019-08-14 RX ORDER — OLOPATADINE HYDROCHLORIDE 1 MG/ML
SOLUTION/ DROPS OPHTHALMIC
Qty: 5 ML | Refills: 11 | Status: SHIPPED | OUTPATIENT
Start: 2019-08-14 | End: 2020-10-05 | Stop reason: SDUPTHER

## 2019-08-14 NOTE — PROGRESS NOTES
Subjective:       Patient ID: Carol Yadav is a 83 y.o. female.    Chief Complaint: Hypertension (3 mo follow up ) and Depression (3 mo follow up )    HPI    htn - chronic - stable - pt is adherent with their medications, and is requesting a refill. No side effects noted. No complaints today.     Depression - chronic - stable - pt is adherent with their medications, and is requesting a refill. No side effects noted. No complaints today.      Hld - chronic - stable - pt is adherent with their medications, and is requesting a refill. No side effects noted. No complaints today.     AR - chronic - stable - pt is adherent with their medications, and is requesting a refill. No side effects noted. No complaints today.     Fatigue - chronic - stable - pt does not wish to f/u neurology for chronic fatigue.     Current Outpatient Medications on File Prior to Visit   Medication Sig Dispense Refill    alendronate (FOSAMAX) 70 MG tablet TAKE 1 TABLET BY MOUTH EVERY 7 DAYS 4 tablet 3    amlodipine-benazepril 2.5-10 mg (LOTREL) 2.5-10 mg per capsule TAKE 1 CAPSULE BY MOUTH ONCE DAILY 90 capsule 3    aspirin (ECOTRIN) 81 MG EC tablet Take 81 mg by mouth once daily.      atorvastatin (LIPITOR) 40 MG tablet TAKE 1 TABLET BY MOUTH ONCE DAILY 90 tablet 3    CALCIUM 600 600 mg calcium (1,500 mg) Tab TAKE 1 TABLET BY MOUTH ONCE DAILY WITH BREAKFAST 90 tablet 1    cloNIDine (CATAPRES) 0.1 MG tablet Take once daily as needed for systolic BP above 160 30 tablet 0    omeprazole (PRILOSEC) 20 MG capsule TAKE 1 CAPSULE BY MOUTH EVERY MORNING 90 capsule 1    oxybutynin (DITROPAN-XL) 10 MG 24 hr tablet TAKE 1 TABLET BY MOUTH EVERY EVENING 90 tablet 0    polyethylene glycol (MIRALAX) 17 gram PwPk Take by mouth. Take every other day      traZODone (DESYREL) 50 MG tablet TAKE 1 TABLET BY MOUTH AT BEDTIME 28 tablet 2    venlafaxine (EFFEXOR-XR) 37.5 MG 24 hr capsule TAKE 1 CAPSULE BY MOUTH ONCE DAILY 90 capsule 3    VITAMIN D3 1,000 unit  tablet TAKE 1 TABLET BY MOUTH ONCE DAILY 90 tablet 3    acetaminophen (TYLENOL) 325 MG tablet Take 2 tablets (650 mg total) by mouth every 4 (four) hours as needed.  0    betamethasone dipropionate (DIPROLENE) 0.05 % cream Apply topically as needed.       clotrimazole (LOTRIMIN) 1 % cream Apply topically.       multivitamin-minerals-lutein (MULTIVITAMIN 50 PLUS) Tab Take 1 tablet by mouth once daily. 90 tablet 3    polymyxin B sulf-trimethoprim (POLYTRIM) 10,000 unit- 1 mg/mL Drop Place 1 drop into both eyes every 6 (six) hours. 10 mL 0    [DISCONTINUED] fluticasone (FLONASE) 50 mcg/actuation nasal spray USE 1 SPRAY IN EACH NOSTRIL 2 TIMES DAILY AS NEEDED FOR RHINITIS  **MED WILL NOT AUTOFILL, REORDER WHEN NEEDED DURING REGULAR BUSINESS HOURS 16 g 9    [DISCONTINUED] FLUTICASONE FUROATE NASL by Nasal route 2 (two) times daily as needed.      [DISCONTINUED] olopatadine (PATANOL) 0.1 % ophthalmic solution INSTILL 1 DROP IN EACH EYE 2 TIMES DAILY 5 mL 3     No current facility-administered medications on file prior to visit.        Past Medical History:   Diagnosis Date    Allergic rhinitis     Arthritis     Elevated blood pressure reading without diagnosis of hypertension     Hormone replacement therapy (postmenopausal)     Hyperlipidemia     Hypertension     Stroke        Family History   Problem Relation Age of Onset    Cancer Mother     Breast cancer Mother     Stroke Father     No Known Problems Sister     No Known Problems Brother     No Known Problems Maternal Aunt     No Known Problems Maternal Uncle     No Known Problems Paternal Aunt     No Known Problems Paternal Uncle     No Known Problems Maternal Grandmother     No Known Problems Maternal Grandfather     No Known Problems Paternal Grandmother     No Known Problems Paternal Grandfather     Amblyopia Neg Hx     Blindness Neg Hx     Cataracts Neg Hx     Diabetes Neg Hx     Glaucoma Neg Hx     Hypertension Neg Hx     Macular  degeneration Neg Hx     Retinal detachment Neg Hx     Strabismus Neg Hx     Thyroid disease Neg Hx         reports that she quit smoking about 32 years ago. Her smoking use included cigarettes. She started smoking about 68 years ago. She has a 72.00 pack-year smoking history. She has never used smokeless tobacco. She reports that she drinks about 12.6 oz of alcohol per week. She reports that she does not use drugs.    Review of Systems   Constitutional: Positive for fatigue.   Musculoskeletal: Positive for arthralgias and gait problem.   Neurological: Positive for weakness and numbness.       Objective:     Vitals:    08/14/19 1530   BP: 128/70   Pulse: 91   Resp: 20   Temp: 98.5 °F (36.9 °C)        Physical Exam   Constitutional: She appears well-developed. No distress.   HENT:   Head: Normocephalic and atraumatic.   Eyes: Conjunctivae are normal. Right eye exhibits no discharge. Left eye exhibits no discharge. No scleral icterus.   Cardiovascular: Normal rate, regular rhythm and normal heart sounds. Exam reveals no gallop and no friction rub.   No murmur heard.  Pulmonary/Chest: Effort normal and breath sounds normal. No respiratory distress. She has no wheezes. She has no rales.   Abdominal: Soft. Bowel sounds are normal. She exhibits no distension and no mass. There is no tenderness. There is no rebound and no guarding.   Neurological: She is alert.   LUE weakness proximal to distal.     LLE weakness - uses a hemiwalker    L sided facial droop    Skin: She is not diaphoretic.   Psychiatric: She has a normal mood and affect. Thought content normal.   Vitals reviewed.      Assessment:       1. Hemiparesis affecting left side as late effect of stroke    2. Bilateral epiphora    3. H/O: CVA (cerebrovascular accident)    4. Physical deconditioning    5. Hypertension, well controlled    6. Mixed hyperlipidemia    7. Seasonal allergic rhinitis, unspecified trigger    8. Fatigue, unspecified type    9. Abnormal  glucose        Plan:       Carol LONGORIA was seen today for hypertension and depression.    Diagnoses and all orders for this visit:    Hemiparesis affecting left side as late effect of stroke  Chronic - stable - pt does not wish to follow with neurology any longer.     Bilateral epiphora  -     olopatadine (PATANOL) 0.1 % ophthalmic solution; INSTILL 1 DROP IN EACH EYE 2 TIMES DAILY  - Chronic - stable     Pt is doing well on current therapy. No side effects noted. Will continue current therapy.    H/O: CVA (cerebrovascular accident)  Chronic - stable    Physical deconditioning  Chronic - stable - pt has completed PT in the past which was not successful.     Hypertension, well controlled  - Chronic - stable     Pt is doing well on current therapy. No side effects noted. Will continue current therapy.    Mixed hyperlipidemia  -     Comprehensive metabolic panel; Future  -     Lipid panel; Future    Seasonal allergic rhinitis, unspecified trigger  -     fluticasone propionate (FLONASE) 50 mcg/actuation nasal spray; USE 1 SPRAY IN EACH NOSTRIL 2 TIMES DAILY AS NEEDED FOR RHINITIS  **MED WILL NOT AUTOFILL, REORDER WHEN NEEDED DURING REGULAR BUSINESS HOURS    Fatigue, unspecified type  -     CBC auto differential; Future  Chronic - will check l;abs today. Likely related to her CVA and lack of activity.     Abnormal glucose  -     Hemoglobin A1c; Future            Follow up in about 3 months (around 11/14/2019) for Establish Care - Htn.        Pt verbalized understanding and agreed with our plan.

## 2019-09-09 DIAGNOSIS — F32.A ANXIETY AND DEPRESSION: ICD-10-CM

## 2019-09-09 DIAGNOSIS — F41.9 ANXIETY AND DEPRESSION: ICD-10-CM

## 2019-09-09 DIAGNOSIS — I63.9 CEREBROVASCULAR ACCIDENT (CVA), UNSPECIFIED MECHANISM: ICD-10-CM

## 2019-09-09 DIAGNOSIS — R27.0 ATAXIA: ICD-10-CM

## 2019-09-09 NOTE — TELEPHONE ENCOUNTER
----- Message from Kkee Dominguezaux sent at 9/9/2019  9:24 AM CDT -----  Contact: [facility]  Type: RX Refill Request    Who Called: Sabrina    Refill or New Rx: refill    RX Name and Strength: venlafaxine (EFFEXOR-XR) 37.5 MG 24 hr capsule                                                   multivitamin-minerals-lutein (MULTIVITAMIN 50 PLUS) Tab                                                      atorvastatin (LIPITOR) 40 MG tablet    Preferred Pharmacy with phone number: [facility] - [address] [phone] (Phone)  [phone] (Fax)        Local or Mail Order: Mail    Ordering Provider: Dr. Claire    Would the patient rather a call back or a response via [facility]? call    Best Call Back Number:[phone]

## 2019-09-10 ENCOUNTER — TELEPHONE (OUTPATIENT)
Dept: FAMILY MEDICINE | Facility: CLINIC | Age: 84
End: 2019-09-10

## 2019-09-10 RX ORDER — ATORVASTATIN CALCIUM 40 MG/1
40 TABLET, FILM COATED ORAL DAILY
Qty: 90 TABLET | Refills: 3 | Status: SHIPPED | OUTPATIENT
Start: 2019-09-10 | End: 2020-08-06

## 2019-09-10 RX ORDER — VENLAFAXINE HYDROCHLORIDE 37.5 MG/1
37.5 CAPSULE, EXTENDED RELEASE ORAL DAILY
Qty: 90 CAPSULE | Refills: 3 | Status: SHIPPED | OUTPATIENT
Start: 2019-09-10 | End: 2020-01-06 | Stop reason: SDUPTHER

## 2019-09-10 NOTE — TELEPHONE ENCOUNTER
----- Message from Jorge Garcia sent at 9/10/2019  9:14 AM CDT -----  Contact: Xuan from [facility] [phone]  Type: RX Refill Request    Who Called: Xuan     Refill or New Rx: refill     RX Name and Strength: venlafaxine (EFFEXOR-XR) 37.5 MG 24 hr capsule, atorvastatin (LIPITOR) 40 MG tablet, 1 a day vitamin    Is this a 30 day or 90 day RX:30 day     Preferred Pharmacy with phone number:[facility] [place] [place] - [address]    Would the patient rather a call back or a response via My [facility]? Call back     Best Call Back Number: [phone]

## 2019-09-11 ENCOUNTER — LAB VISIT (OUTPATIENT)
Dept: LAB | Facility: HOSPITAL | Age: 84
End: 2019-09-11
Attending: FAMILY MEDICINE
Payer: MEDICARE

## 2019-09-11 ENCOUNTER — OFFICE VISIT (OUTPATIENT)
Dept: FAMILY MEDICINE | Facility: CLINIC | Age: 84
End: 2019-09-11
Payer: MEDICARE

## 2019-09-11 VITALS
TEMPERATURE: 98 F | BODY MASS INDEX: 28.61 KG/M2 | HEART RATE: 92 BPM | DIASTOLIC BLOOD PRESSURE: 64 MMHG | OXYGEN SATURATION: 96 % | SYSTOLIC BLOOD PRESSURE: 118 MMHG | RESPIRATION RATE: 20 BRPM | HEIGHT: 62 IN | WEIGHT: 155.44 LBS

## 2019-09-11 DIAGNOSIS — R53.83 FATIGUE, UNSPECIFIED TYPE: Primary | ICD-10-CM

## 2019-09-11 DIAGNOSIS — R73.09 ELEVATED GLUCOSE: ICD-10-CM

## 2019-09-11 DIAGNOSIS — R53.83 FATIGUE, UNSPECIFIED TYPE: ICD-10-CM

## 2019-09-11 DIAGNOSIS — R73.09 ABNORMAL GLUCOSE: ICD-10-CM

## 2019-09-11 DIAGNOSIS — E78.2 MIXED HYPERLIPIDEMIA: ICD-10-CM

## 2019-09-11 DIAGNOSIS — H04.203 BILATERAL EPIPHORA: ICD-10-CM

## 2019-09-11 LAB
ALBUMIN SERPL BCP-MCNC: 3.9 G/DL (ref 3.5–5.2)
ALP SERPL-CCNC: 106 U/L (ref 55–135)
ALT SERPL W/O P-5'-P-CCNC: 12 U/L (ref 10–44)
ANION GAP SERPL CALC-SCNC: 10 MMOL/L (ref 8–16)
AST SERPL-CCNC: 17 U/L (ref 10–40)
BASOPHILS # BLD AUTO: 0.08 K/UL (ref 0–0.2)
BASOPHILS NFR BLD: 0.7 % (ref 0–1.9)
BILIRUB SERPL-MCNC: 0.7 MG/DL (ref 0.1–1)
BUN SERPL-MCNC: 13 MG/DL (ref 8–23)
CALCIUM SERPL-MCNC: 9.3 MG/DL (ref 8.7–10.5)
CHLORIDE SERPL-SCNC: 108 MMOL/L (ref 95–110)
CHOLEST SERPL-MCNC: 158 MG/DL (ref 120–199)
CHOLEST/HDLC SERPL: 3.2 {RATIO} (ref 2–5)
CO2 SERPL-SCNC: 25 MMOL/L (ref 23–29)
CREAT SERPL-MCNC: 0.8 MG/DL (ref 0.5–1.4)
DIFFERENTIAL METHOD: ABNORMAL
EOSINOPHIL # BLD AUTO: 0.2 K/UL (ref 0–0.5)
EOSINOPHIL NFR BLD: 1.6 % (ref 0–8)
ERYTHROCYTE [DISTWIDTH] IN BLOOD BY AUTOMATED COUNT: 13.5 % (ref 11.5–14.5)
EST. GFR  (AFRICAN AMERICAN): >60 ML/MIN/1.73 M^2
EST. GFR  (NON AFRICAN AMERICAN): >60 ML/MIN/1.73 M^2
ESTIMATED AVG GLUCOSE: 117 MG/DL (ref 68–131)
GLUCOSE SERPL-MCNC: 99 MG/DL (ref 70–110)
HBA1C MFR BLD HPLC: 5.7 % (ref 4–5.6)
HCT VFR BLD AUTO: 44.7 % (ref 37–48.5)
HDLC SERPL-MCNC: 50 MG/DL (ref 40–75)
HDLC SERPL: 31.6 % (ref 20–50)
HGB BLD-MCNC: 13.8 G/DL (ref 12–16)
IMM GRANULOCYTES # BLD AUTO: 0.04 K/UL (ref 0–0.04)
IMM GRANULOCYTES NFR BLD AUTO: 0.4 % (ref 0–0.5)
LDLC SERPL CALC-MCNC: 87 MG/DL (ref 63–159)
LYMPHOCYTES # BLD AUTO: 1.9 K/UL (ref 1–4.8)
LYMPHOCYTES NFR BLD: 17.5 % (ref 18–48)
MCH RBC QN AUTO: 29.6 PG (ref 27–31)
MCHC RBC AUTO-ENTMCNC: 30.9 G/DL (ref 32–36)
MCV RBC AUTO: 96 FL (ref 82–98)
MONOCYTES # BLD AUTO: 1 K/UL (ref 0.3–1)
MONOCYTES NFR BLD: 9.6 % (ref 4–15)
NEUTROPHILS # BLD AUTO: 7.5 K/UL (ref 1.8–7.7)
NEUTROPHILS NFR BLD: 70.2 % (ref 38–73)
NONHDLC SERPL-MCNC: 108 MG/DL
NRBC BLD-RTO: 0 /100 WBC
PLATELET # BLD AUTO: 388 K/UL (ref 150–350)
PMV BLD AUTO: 10.2 FL (ref 9.2–12.9)
POTASSIUM SERPL-SCNC: 4 MMOL/L (ref 3.5–5.1)
PROT SERPL-MCNC: 7.5 G/DL (ref 6–8.4)
RBC # BLD AUTO: 4.66 M/UL (ref 4–5.4)
SODIUM SERPL-SCNC: 143 MMOL/L (ref 136–145)
TRIGL SERPL-MCNC: 105 MG/DL (ref 30–150)
TSH SERPL DL<=0.005 MIU/L-ACNC: 2.64 UIU/ML (ref 0.4–4)
WBC # BLD AUTO: 10.72 K/UL (ref 3.9–12.7)

## 2019-09-11 PROCEDURE — 85025 COMPLETE CBC W/AUTO DIFF WBC: CPT

## 2019-09-11 PROCEDURE — 1101F PR PT FALLS ASSESS DOC 0-1 FALLS W/OUT INJ PAST YR: ICD-10-PCS | Mod: CPTII,S$GLB,, | Performed by: FAMILY MEDICINE

## 2019-09-11 PROCEDURE — 99999 PR PBB SHADOW E&M-EST. PATIENT-LVL IV: ICD-10-PCS | Mod: PBBFAC,,, | Performed by: FAMILY MEDICINE

## 2019-09-11 PROCEDURE — 99499 RISK ADDL DX/OHS AUDIT: ICD-10-PCS | Mod: S$GLB,,, | Performed by: FAMILY MEDICINE

## 2019-09-11 PROCEDURE — 80053 COMPREHEN METABOLIC PANEL: CPT

## 2019-09-11 PROCEDURE — 3074F PR MOST RECENT SYSTOLIC BLOOD PRESSURE < 130 MM HG: ICD-10-PCS | Mod: CPTII,S$GLB,, | Performed by: FAMILY MEDICINE

## 2019-09-11 PROCEDURE — 1101F PT FALLS ASSESS-DOCD LE1/YR: CPT | Mod: CPTII,S$GLB,, | Performed by: FAMILY MEDICINE

## 2019-09-11 PROCEDURE — 99214 PR OFFICE/OUTPT VISIT, EST, LEVL IV, 30-39 MIN: ICD-10-PCS | Mod: S$GLB,,, | Performed by: FAMILY MEDICINE

## 2019-09-11 PROCEDURE — 80061 LIPID PANEL: CPT

## 2019-09-11 PROCEDURE — 36415 COLL VENOUS BLD VENIPUNCTURE: CPT | Mod: PO

## 2019-09-11 PROCEDURE — 3078F PR MOST RECENT DIASTOLIC BLOOD PRESSURE < 80 MM HG: ICD-10-PCS | Mod: CPTII,S$GLB,, | Performed by: FAMILY MEDICINE

## 2019-09-11 PROCEDURE — 3078F DIAST BP <80 MM HG: CPT | Mod: CPTII,S$GLB,, | Performed by: FAMILY MEDICINE

## 2019-09-11 PROCEDURE — 99499 UNLISTED E&M SERVICE: CPT | Mod: S$GLB,,, | Performed by: FAMILY MEDICINE

## 2019-09-11 PROCEDURE — 99214 OFFICE O/P EST MOD 30 MIN: CPT | Mod: S$GLB,,, | Performed by: FAMILY MEDICINE

## 2019-09-11 PROCEDURE — 3074F SYST BP LT 130 MM HG: CPT | Mod: CPTII,S$GLB,, | Performed by: FAMILY MEDICINE

## 2019-09-11 PROCEDURE — 99999 PR PBB SHADOW E&M-EST. PATIENT-LVL IV: CPT | Mod: PBBFAC,,, | Performed by: FAMILY MEDICINE

## 2019-09-11 PROCEDURE — 84443 ASSAY THYROID STIM HORMONE: CPT

## 2019-09-11 PROCEDURE — 83036 HEMOGLOBIN GLYCOSYLATED A1C: CPT

## 2019-09-11 RX ORDER — OLOPATADINE HYDROCHLORIDE 1 MG/ML
SOLUTION/ DROPS OPHTHALMIC
Qty: 5 ML | Refills: 11 | OUTPATIENT
Start: 2019-09-11

## 2019-09-11 RX ORDER — LYSINE HCL 500 MG
TABLET ORAL
Qty: 28 TABLET | OUTPATIENT
Start: 2019-09-11

## 2019-10-02 DIAGNOSIS — M81.0 AGE-RELATED OSTEOPOROSIS WITHOUT CURRENT PATHOLOGICAL FRACTURE: ICD-10-CM

## 2019-10-02 DIAGNOSIS — R27.0 ATAXIA: ICD-10-CM

## 2019-10-02 DIAGNOSIS — N32.81 OAB (OVERACTIVE BLADDER): ICD-10-CM

## 2019-10-02 RX ORDER — CHOLECALCIFEROL (VITAMIN D3) 25 MCG
TABLET ORAL
Qty: 28 TABLET | OUTPATIENT
Start: 2019-10-02

## 2019-10-02 RX ORDER — CALCIUM CARBONATE 600 MG
TABLET ORAL
Qty: 28 TABLET | OUTPATIENT
Start: 2019-10-02

## 2019-10-02 RX ORDER — OXYBUTYNIN CHLORIDE 10 MG/1
TABLET, EXTENDED RELEASE ORAL
Qty: 28 TABLET | OUTPATIENT
Start: 2019-10-02

## 2019-10-22 DIAGNOSIS — M81.0 AGE-RELATED OSTEOPOROSIS WITHOUT CURRENT PATHOLOGICAL FRACTURE: ICD-10-CM

## 2019-10-22 DIAGNOSIS — N32.81 OAB (OVERACTIVE BLADDER): ICD-10-CM

## 2019-10-22 DIAGNOSIS — F33.42 RECURRENT MAJOR DEPRESSIVE DISORDER, IN FULL REMISSION: ICD-10-CM

## 2019-10-22 DIAGNOSIS — R27.0 ATAXIA: ICD-10-CM

## 2019-10-22 RX ORDER — CHOLECALCIFEROL (VITAMIN D3) 25 MCG
1000 TABLET ORAL DAILY
Qty: 90 TABLET | Refills: 3 | Status: SHIPPED | OUTPATIENT
Start: 2019-10-22 | End: 2022-06-10

## 2019-10-22 RX ORDER — TRAZODONE HYDROCHLORIDE 50 MG/1
TABLET ORAL
Qty: 28 TABLET | Refills: 2 | Status: SHIPPED | OUTPATIENT
Start: 2019-10-22 | End: 2020-02-13

## 2019-10-22 RX ORDER — CALCIUM CARBONATE 600 MG
1 TABLET ORAL DAILY
Qty: 90 TABLET | Refills: 1 | Status: SHIPPED | OUTPATIENT
Start: 2019-10-22 | End: 2022-07-29 | Stop reason: SDUPTHER

## 2019-10-22 RX ORDER — OXYBUTYNIN CHLORIDE 10 MG/1
10 TABLET, EXTENDED RELEASE ORAL NIGHTLY
Qty: 90 TABLET | Refills: 0 | Status: SHIPPED | OUTPATIENT
Start: 2019-10-22 | End: 2019-12-17 | Stop reason: SDUPTHER

## 2019-10-22 NOTE — TELEPHONE ENCOUNTER
----- Message from Jerri Forde sent at 10/22/2019  8:57 AM CDT -----  Contact: Self [phone]  Type: RX Refill Request    Who Called: Self    Have you contacted your pharmacy: yes    Refill or New Rx:Refill    RX Name and Strength:oxybutynin (DITROPAN-XL) 10 MG 24 hr tablet, VITAMIN D3 1,000 unit tablet & CALCIUM 600 600 mg calcium (1,500 mg) Tab    Is this a 30 day or 90 day RX:90 day    Preferred Pharmacy with phone number:  [facility] [place] - [address]  [address]  [place] [address]  Phone: [phone] Fax: [phone]    Would the patient rather a call back or a response via My [facility]? Call back    Best Call Back Number:[phone]

## 2019-10-29 ENCOUNTER — OFFICE VISIT (OUTPATIENT)
Dept: OBSTETRICS AND GYNECOLOGY | Facility: CLINIC | Age: 84
End: 2019-10-29
Payer: MEDICARE

## 2019-10-29 ENCOUNTER — PATIENT OUTREACH (OUTPATIENT)
Dept: ADMINISTRATIVE | Facility: OTHER | Age: 84
End: 2019-10-29

## 2019-10-29 VITALS
DIASTOLIC BLOOD PRESSURE: 72 MMHG | HEIGHT: 62 IN | WEIGHT: 156 LBS | BODY MASS INDEX: 28.71 KG/M2 | SYSTOLIC BLOOD PRESSURE: 110 MMHG

## 2019-10-29 DIAGNOSIS — N95.1 MENOPAUSAL STATE: ICD-10-CM

## 2019-10-29 DIAGNOSIS — N95.2 ATROPHIC VAGINITIS: ICD-10-CM

## 2019-10-29 DIAGNOSIS — R10.2 VAGINAL PAIN: Primary | ICD-10-CM

## 2019-10-29 PROCEDURE — 99213 PR OFFICE/OUTPT VISIT, EST, LEVL III, 20-29 MIN: ICD-10-PCS | Mod: S$GLB,,, | Performed by: OBSTETRICS & GYNECOLOGY

## 2019-10-29 PROCEDURE — 99999 PR PBB SHADOW E&M-EST. PATIENT-LVL IV: CPT | Mod: PBBFAC,,, | Performed by: OBSTETRICS & GYNECOLOGY

## 2019-10-29 PROCEDURE — 99213 OFFICE O/P EST LOW 20 MIN: CPT | Mod: S$GLB,,, | Performed by: OBSTETRICS & GYNECOLOGY

## 2019-10-29 PROCEDURE — 99999 PR PBB SHADOW E&M-EST. PATIENT-LVL IV: ICD-10-PCS | Mod: PBBFAC,,, | Performed by: OBSTETRICS & GYNECOLOGY

## 2019-10-29 RX ORDER — FLUTICASONE PROPIONATE 50 MCG
SPRAY, SUSPENSION (ML) NASAL
COMMUNITY
Start: 2017-10-30 | End: 2020-09-11 | Stop reason: SDUPTHER

## 2019-10-29 RX ORDER — LISINOPRIL 2.5 MG/1
TABLET ORAL
COMMUNITY
Start: 2017-11-07 | End: 2020-01-06 | Stop reason: SDUPTHER

## 2019-10-29 NOTE — PROGRESS NOTES
Subjective:      Chief Complaint:    Chief Complaint   Patient presents with    Vaginal Pain       Menstrual History:    OB History        3    Para   3    Term   3            AB        Living   3       SAB        TAB        Ectopic        Multiple        Live Births                     Menarche age: 15     No LMP recorded. Patient is postmenopausal.            Objective:        History of Present Illness AND  Examination detailed DICTATE:  Dr. Patel dictating a problem focused exam on Mrs. Das at [facility]  patient is a getting  3 para 3 para 3 mammogram 2018-Pap smear 2018-  past medical history hypertension dyslipidemia and CVA arthritis heart disease osteopenia in herpes  Surgery  appendectomy breast biopsy cataract oophorectomy and T&A    patient presents to the clinic because of a throbbing vaginal pain and wakes her up at night.  It is at times severe                        Physical exam blood pressure 110/72 weight 156 chest chest clear heart regular sinus rhythm abdomen soft no guarding rebound tenderness  pelvic external is normal on mild of [place] genital atrophy but near to [place]'s glands negative  vagina is clear decreased Lugol pattern cervix uterus is present adnexa is negative  do not detect any abnormality in her inflammation infection no ulcerations the genital exam is entirely within normal limit  cannot find any abnormalities that explains the patient pain possible referred pain because of stroke          will see the patient back in 6 months and re-examined and examined the patient if the pro problem continues the patient will return          Assessment:      Diagnosis: vag   Pain   Menopausal    Vag   atrophy.         Plan:      Return in 6  months

## 2019-11-07 ENCOUNTER — OFFICE VISIT (OUTPATIENT)
Dept: FAMILY MEDICINE | Facility: CLINIC | Age: 84
End: 2019-11-07
Payer: MEDICARE

## 2019-11-07 VITALS
HEIGHT: 62 IN | OXYGEN SATURATION: 94 % | SYSTOLIC BLOOD PRESSURE: 108 MMHG | DIASTOLIC BLOOD PRESSURE: 60 MMHG | WEIGHT: 158.06 LBS | RESPIRATION RATE: 18 BRPM | HEART RATE: 76 BPM | BODY MASS INDEX: 29.08 KG/M2 | TEMPERATURE: 98 F

## 2019-11-07 DIAGNOSIS — Z23 NEED FOR INFLUENZA VACCINATION: ICD-10-CM

## 2019-11-07 DIAGNOSIS — R53.83 FATIGUE, UNSPECIFIED TYPE: Primary | ICD-10-CM

## 2019-11-07 PROCEDURE — 96372 PR INJECTION,THERAP/PROPH/DIAG2ST, IM OR SUBCUT: ICD-10-PCS | Mod: S$GLB,,, | Performed by: FAMILY MEDICINE

## 2019-11-07 PROCEDURE — 90662 IIV NO PRSV INCREASED AG IM: CPT | Mod: S$GLB,,, | Performed by: FAMILY MEDICINE

## 2019-11-07 PROCEDURE — 90662 FLU VACCINE - HIGH DOSE (65+) PRESERVATIVE FREE IM: ICD-10-PCS | Mod: S$GLB,,, | Performed by: FAMILY MEDICINE

## 2019-11-07 PROCEDURE — 99214 PR OFFICE/OUTPT VISIT, EST, LEVL IV, 30-39 MIN: ICD-10-PCS | Mod: 25,S$GLB,, | Performed by: FAMILY MEDICINE

## 2019-11-07 PROCEDURE — 1101F PT FALLS ASSESS-DOCD LE1/YR: CPT | Mod: CPTII,S$GLB,, | Performed by: FAMILY MEDICINE

## 2019-11-07 PROCEDURE — 1101F PR PT FALLS ASSESS DOC 0-1 FALLS W/OUT INJ PAST YR: ICD-10-PCS | Mod: CPTII,S$GLB,, | Performed by: FAMILY MEDICINE

## 2019-11-07 PROCEDURE — 3074F SYST BP LT 130 MM HG: CPT | Mod: CPTII,S$GLB,, | Performed by: FAMILY MEDICINE

## 2019-11-07 PROCEDURE — G0008 FLU VACCINE - HIGH DOSE (65+) PRESERVATIVE FREE IM: ICD-10-PCS | Mod: S$GLB,,, | Performed by: FAMILY MEDICINE

## 2019-11-07 PROCEDURE — 3078F DIAST BP <80 MM HG: CPT | Mod: CPTII,S$GLB,, | Performed by: FAMILY MEDICINE

## 2019-11-07 PROCEDURE — 99999 PR PBB SHADOW E&M-EST. PATIENT-LVL III: ICD-10-PCS | Mod: PBBFAC,,, | Performed by: FAMILY MEDICINE

## 2019-11-07 PROCEDURE — 99999 PR PBB SHADOW E&M-EST. PATIENT-LVL III: CPT | Mod: PBBFAC,,, | Performed by: FAMILY MEDICINE

## 2019-11-07 PROCEDURE — 3074F PR MOST RECENT SYSTOLIC BLOOD PRESSURE < 130 MM HG: ICD-10-PCS | Mod: CPTII,S$GLB,, | Performed by: FAMILY MEDICINE

## 2019-11-07 PROCEDURE — 99499 RISK ADDL DX/OHS AUDIT: ICD-10-PCS | Mod: S$GLB,,, | Performed by: FAMILY MEDICINE

## 2019-11-07 PROCEDURE — 3078F PR MOST RECENT DIASTOLIC BLOOD PRESSURE < 80 MM HG: ICD-10-PCS | Mod: CPTII,S$GLB,, | Performed by: FAMILY MEDICINE

## 2019-11-07 PROCEDURE — 99214 OFFICE O/P EST MOD 30 MIN: CPT | Mod: 25,S$GLB,, | Performed by: FAMILY MEDICINE

## 2019-11-07 PROCEDURE — 99499 UNLISTED E&M SERVICE: CPT | Mod: S$GLB,,, | Performed by: FAMILY MEDICINE

## 2019-11-07 PROCEDURE — G0008 ADMIN INFLUENZA VIRUS VAC: HCPCS | Mod: S$GLB,,, | Performed by: FAMILY MEDICINE

## 2019-11-07 PROCEDURE — 96372 THER/PROPH/DIAG INJ SC/IM: CPT | Mod: S$GLB,,, | Performed by: FAMILY MEDICINE

## 2019-11-07 RX ORDER — CYANOCOBALAMIN 1000 UG/ML
1000 INJECTION, SOLUTION INTRAMUSCULAR; SUBCUTANEOUS
Status: COMPLETED | OUTPATIENT
Start: 2019-11-07 | End: 2019-11-07

## 2019-11-07 RX ADMIN — CYANOCOBALAMIN 1000 MCG: 1000 INJECTION, SOLUTION INTRAMUSCULAR; SUBCUTANEOUS at 10:11

## 2019-11-07 NOTE — PROGRESS NOTES
Subjective:       Patient ID: Carol Yadav is a 84 y.o. female.    Chief Complaint: Fatigue; Leg Pain (right at night); and Memory Loss      HPI  84-year-old female presents for fatigue.  Feels she has worsening fatigue since stroke.  Feels every day is worsen.  Complains of right leg pain and nighttime occasionally but does not have pain at this time.  Complains of memory loss but states her mini-mental testing was WNL.  Had vaginal pain but followed up with OB Gyn in testing was normal.  Patient does not have vaginal pain at this time.      Review of Systems   Constitutional: Positive for fatigue.   HENT: Negative.    Respiratory: Negative.    Cardiovascular: Negative.    Gastrointestinal: Negative.    Endocrine: Negative.    Genitourinary: Negative.    Musculoskeletal: Negative.    Neurological: Negative.    Psychiatric/Behavioral: Negative.           Past Medical History:   Diagnosis Date    Allergic rhinitis     Arthritis     Elevated blood pressure reading without diagnosis of hypertension     Hormone replacement therapy (postmenopausal)     Hyperlipidemia     Hypertension     Stroke      Past Surgical History:   Procedure Laterality Date    APPENDECTOMY      BREAST BIOPSY      CATARACT EXTRACTION Bilateral at age 62 or 72    monovision     EYE SURGERY      hai cataract surgery    OOPHORECTOMY      TONSILLECTOMY       Family History   Problem Relation Age of Onset    Cancer Mother     Breast cancer Mother     Stroke Father     No Known Problems Sister     No Known Problems Brother     No Known Problems Maternal Aunt     No Known Problems Maternal Uncle     No Known Problems Paternal Aunt     No Known Problems Paternal Uncle     No Known Problems Maternal Grandmother     No Known Problems Maternal Grandfather     No Known Problems Paternal Grandmother     No Known Problems Paternal Grandfather     Amblyopia Neg Hx     Blindness Neg Hx     Cataracts Neg Hx     Diabetes Neg Hx      Glaucoma Neg Hx     Hypertension Neg Hx     Macular degeneration Neg Hx     Retinal detachment Neg Hx     Strabismus Neg Hx     Thyroid disease Neg Hx      Social History     Socioeconomic History    Marital status:      Spouse name: Not on file    Number of children: Not on file    Years of education: Not on file    Highest education level: Not on file   Occupational History    Not on file   Social Needs    Financial resource strain: Not on file    Food insecurity:     Worry: Not on file     Inability: Not on file    Transportation needs:     Medical: Not on file     Non-medical: Not on file   Tobacco Use    Smoking status: Former Smoker     Packs/day: 2.00     Years: 36.00     Pack years: 72.00     Types: Cigarettes     Start date:      Last attempt to quit:      Years since quittin.8    Smokeless tobacco: Never Used   Substance and Sexual Activity    Alcohol use: Yes     Alcohol/week: 21.0 standard drinks     Types: 7 Glasses of wine, 14 Standard drinks or equivalent per week    Drug use: No    Sexual activity: Not Currently     Partners: Male     Comment: 2017 divorce    Lifestyle    Physical activity:     Days per week: Not on file     Minutes per session: Not on file    Stress: Not on file   Relationships    Social connections:     Talks on phone: Not on file     Gets together: Not on file     Attends Druze service: Not on file     Active member of club or organization: Not on file     Attends meetings of clubs or organizations: Not on file     Relationship status: Not on file   Other Topics Concern    Not on file   Social History Narrative    Not on file       Current Outpatient Medications:     acetaminophen (TYLENOL) 325 MG tablet, Take 2 tablets (650 mg total) by mouth every 4 (four) hours as needed., Disp: , Rfl: 0    alendronate (FOSAMAX) 70 MG tablet, TAKE 1 TABLET BY MOUTH EVERY 7 DAYS, Disp: 4 tablet, Rfl: 3    amlodipine-benazepril 2.5-10 mg  (LOTREL) 2.5-10 mg per capsule, TAKE 1 CAPSULE BY MOUTH ONCE DAILY, Disp: 90 capsule, Rfl: 3    aspirin (ECOTRIN) 81 MG EC tablet, Take 81 mg by mouth once daily., Disp: , Rfl:     atorvastatin (LIPITOR) 40 MG tablet, Take 1 tablet (40 mg total) by mouth once daily., Disp: 90 tablet, Rfl: 3    betamethasone dipropionate (DIPROLENE) 0.05 % cream, Apply topically as needed. , Disp: , Rfl:     calcium carbonate (CALCIUM 600) 600 mg calcium (1,500 mg) Tab, Take 1 tablet (600 mg total) by mouth once daily., Disp: 90 tablet, Rfl: 1    cloNIDine (CATAPRES) 0.1 MG tablet, Take once daily as needed for systolic BP above 160, Disp: 30 tablet, Rfl: 0    clotrimazole (LOTRIMIN) 1 % cream, Apply topically. , Disp: , Rfl:     fluticasone propionate (FLONASE) 50 mcg/actuation nasal spray, USE 1 SPRAY IN EACH NOSTRIL 2 TIMES DAILY AS NEEDED FOR RHINITIS  **MED WILL NOT AUTOFILL, REORDER WHEN NEEDED DURING REGULAR BUSINESS HOURS, Disp: 16 g, Rfl: 11    fluticasone propionate (FLONASE) 50 mcg/actuation nasal spray, USE 1 SPRAY IN EACH NOSTRIL 2 TIMES DAILY FOR RHINITIS, Disp: , Rfl:     lisinopril (PRINIVIL,ZESTRIL) 2.5 MG tablet, TAKE ONE TABLET BY MOUTH ONCE DAILY, Disp: , Rfl:     multivitamin-minerals-lutein (MULTIVITAMIN 50 PLUS) Tab, Take 1 tablet by mouth once daily., Disp: 90 tablet, Rfl: 3    olopatadine (PATANOL) 0.1 % ophthalmic solution, INSTILL 1 DROP IN EACH EYE 2 TIMES DAILY, Disp: 5 mL, Rfl: 11    omeprazole (PRILOSEC) 20 MG capsule, TAKE 1 CAPSULE BY MOUTH EVERY MORNING, Disp: 90 capsule, Rfl: 1    oxybutynin (DITROPAN-XL) 10 MG 24 hr tablet, Take 1 tablet (10 mg total) by mouth every evening., Disp: 90 tablet, Rfl: 0    polyethylene glycol (MIRALAX) 17 gram PwPk, Take by mouth. Take every other day, Disp: , Rfl:     polymyxin B sulf-trimethoprim (POLYTRIM) 10,000 unit- 1 mg/mL Drop, Place 1 drop into both eyes every 6 (six) hours., Disp: 10 mL, Rfl: 0    traZODone (DESYREL) 50 MG tablet, TAKE 1  "TABLET BY MOUTH AT BEDTIME, Disp: 28 tablet, Rfl: 2    venlafaxine (EFFEXOR-XR) 37.5 MG 24 hr capsule, Take 1 capsule (37.5 mg total) by mouth once daily., Disp: 90 capsule, Rfl: 3    vitamin D (VITAMIN D3) 1000 units Tab, Take 1 tablet (1,000 Units total) by mouth once daily., Disp: 90 tablet, Rfl: 3    Current Facility-Administered Medications:     cyanocobalamin injection 1,000 mcg, 1,000 mcg, Intramuscular, 1 time in Clinic/HOD, Jatin Yadav MD   Objective:      Vitals:    11/07/19 0957   BP: 108/60   BP Location: Left arm   Patient Position: Sitting   BP Method: Large (Manual)   Pulse: 76   Resp: 18   Temp: 98.1 °F (36.7 °C)   TempSrc: Oral   SpO2: (!) 94%   Weight: 71.7 kg (158 lb 1.1 oz)   Height: 5' 2" (1.575 m)       Physical Exam   Constitutional: She is oriented to person, place, and time. No distress.   HENT:   Head: Normocephalic and atraumatic.   Eyes: Conjunctivae are normal.   Neck: Neck supple.   Cardiovascular: Normal rate, regular rhythm and normal heart sounds. Exam reveals no gallop and no friction rub.   No murmur heard.  Pulmonary/Chest: Effort normal and breath sounds normal. She has no wheezes. She has no rales.   Neurological: She is alert and oriented to person, place, and time.   Skin: Skin is warm and dry.   Psychiatric: She has a normal mood and affect. Her behavior is normal. Judgment and thought content normal.          Assessment:       1. Fatigue, unspecified type    2. Need for influenza vaccination        Plan:       Fatigue, unspecified type  - No one sided weakness. Will give b12. Pt takes effexor for depression  -     cyanocobalamin injection 1,000 mcg    Need for influenza vaccination  -     Influenza - High Dose (65+) (PF) (IM)      Follow up in about 4 weeks (around 12/5/2019).            Jatin Yadav MD  "

## 2019-11-07 NOTE — PROGRESS NOTES
Administered B-12 injection IM to right upper outer quad gluteus.  Patient tolerated injection well, no adverse reactions noted.   Administered High Dose Flu vaccine IM to right deltoid.  Patient tolerated injection well.  Patient advised to wait in lobby for 15 minutes for observation and to report any adverse reactions immediately.  Patient verbalized understanding.

## 2019-11-11 ENCOUNTER — TELEPHONE (OUTPATIENT)
Dept: FAMILY MEDICINE | Facility: CLINIC | Age: 84
End: 2019-11-11

## 2019-11-11 ENCOUNTER — HOSPITAL ENCOUNTER (EMERGENCY)
Facility: OTHER | Age: 84
Discharge: HOME | End: 2019-11-11
Attending: EMERGENCY MEDICINE
Payer: MEDICARE

## 2019-11-11 VITALS
SYSTOLIC BLOOD PRESSURE: 164 MMHG | HEIGHT: 63 IN | RESPIRATION RATE: 20 BRPM | BODY MASS INDEX: 28.17 KG/M2 | DIASTOLIC BLOOD PRESSURE: 79 MMHG | HEART RATE: 82 BPM | WEIGHT: 159 LBS | OXYGEN SATURATION: 93 % | TEMPERATURE: 98 F

## 2019-11-11 DIAGNOSIS — R04.0 ANTERIOR EPISTAXIS: ICD-10-CM

## 2019-11-11 DIAGNOSIS — R05.9 COUGH: ICD-10-CM

## 2019-11-11 DIAGNOSIS — J40 BRONCHITIS: Primary | ICD-10-CM

## 2019-11-11 LAB
ALBUMIN SERPL BCP-MCNC: 3.8 G/DL (ref 3.5–5.2)
ALP SERPL-CCNC: 84 U/L (ref 55–135)
ALT SERPL W/O P-5'-P-CCNC: 18 U/L (ref 10–44)
ANION GAP SERPL CALC-SCNC: 9 MMOL/L (ref 8–16)
APTT BLDCRRT: 31.8 SEC (ref 21–32)
AST SERPL-CCNC: 23 U/L (ref 10–40)
BASOPHILS # BLD AUTO: 0.05 K/UL (ref 0–0.2)
BASOPHILS NFR BLD: 1 % (ref 0–1.9)
BILIRUB SERPL-MCNC: 0.6 MG/DL (ref 0.1–1)
BILIRUB UR QL STRIP: NEGATIVE
BUN SERPL-MCNC: 11 MG/DL (ref 8–23)
CALCIUM SERPL-MCNC: 9.4 MG/DL (ref 8.7–10.5)
CHLORIDE SERPL-SCNC: 107 MMOL/L (ref 95–110)
CLARITY UR: CLEAR
CO2 SERPL-SCNC: 27 MMOL/L (ref 23–29)
COLOR UR: YELLOW
CREAT SERPL-MCNC: 0.9 MG/DL (ref 0.5–1.4)
DIFFERENTIAL METHOD: ABNORMAL
EOSINOPHIL # BLD AUTO: 0.2 K/UL (ref 0–0.5)
EOSINOPHIL NFR BLD: 3.1 % (ref 0–8)
ERYTHROCYTE [DISTWIDTH] IN BLOOD BY AUTOMATED COUNT: 13.8 % (ref 11.5–14.5)
EST. GFR  (AFRICAN AMERICAN): >60 ML/MIN/1.73 M^2
EST. GFR  (NON AFRICAN AMERICAN): 60 ML/MIN/1.73 M^2
GLUCOSE SERPL-MCNC: 128 MG/DL (ref 70–110)
GLUCOSE UR QL STRIP: NEGATIVE
HCT VFR BLD AUTO: 43.2 % (ref 37–48.5)
HGB BLD-MCNC: 14 G/DL (ref 12–16)
HGB UR QL STRIP: ABNORMAL
IMM GRANULOCYTES # BLD AUTO: 0.03 K/UL (ref 0–0.04)
IMM GRANULOCYTES NFR BLD AUTO: 0.6 % (ref 0–0.5)
INR PPP: 0.9 (ref 0.8–1.2)
KETONES UR QL STRIP: NEGATIVE
LEUKOCYTE ESTERASE UR QL STRIP: NEGATIVE
LYMPHOCYTES # BLD AUTO: 1.5 K/UL (ref 1–4.8)
LYMPHOCYTES NFR BLD: 31 % (ref 18–48)
MCH RBC QN AUTO: 29.7 PG (ref 27–31)
MCHC RBC AUTO-ENTMCNC: 32.4 G/DL (ref 32–36)
MCV RBC AUTO: 92 FL (ref 82–98)
MONOCYTES # BLD AUTO: 0.8 K/UL (ref 0.3–1)
MONOCYTES NFR BLD: 17.5 % (ref 4–15)
NEUTROPHILS # BLD AUTO: 2.3 K/UL (ref 1.8–7.7)
NEUTROPHILS NFR BLD: 46.8 % (ref 38–73)
NITRITE UR QL STRIP: NEGATIVE
NRBC BLD-RTO: 0 /100 WBC
PH UR STRIP: 6 [PH] (ref 5–8)
PLATELET # BLD AUTO: 308 K/UL (ref 150–350)
PMV BLD AUTO: 9.4 FL (ref 9.2–12.9)
POTASSIUM SERPL-SCNC: 3.9 MMOL/L (ref 3.5–5.1)
PROT SERPL-MCNC: 7.1 G/DL (ref 6–8.4)
PROT UR QL STRIP: NEGATIVE
PROTHROMBIN TIME: 9.9 SEC (ref 9–12.5)
RBC # BLD AUTO: 4.72 M/UL (ref 4–5.4)
SODIUM SERPL-SCNC: 143 MMOL/L (ref 136–145)
SP GR UR STRIP: 1.01 (ref 1–1.03)
URN SPEC COLLECT METH UR: ABNORMAL
UROBILINOGEN UR STRIP-ACNC: NEGATIVE EU/DL
WBC # BLD AUTO: 4.81 K/UL (ref 3.9–12.7)

## 2019-11-11 PROCEDURE — 81003 URINALYSIS AUTO W/O SCOPE: CPT

## 2019-11-11 PROCEDURE — 80053 COMPREHEN METABOLIC PANEL: CPT

## 2019-11-11 PROCEDURE — 99284 EMERGENCY DEPT VISIT MOD MDM: CPT | Mod: 25

## 2019-11-11 PROCEDURE — P9612 CATHETERIZE FOR URINE SPEC: HCPCS

## 2019-11-11 PROCEDURE — 85730 THROMBOPLASTIN TIME PARTIAL: CPT

## 2019-11-11 PROCEDURE — 85610 PROTHROMBIN TIME: CPT

## 2019-11-11 PROCEDURE — 85025 COMPLETE CBC W/AUTO DIFF WBC: CPT

## 2019-11-11 RX ORDER — PROMETHAZINE HYDROCHLORIDE 6.25 MG/5ML
12.5 SYRUP ORAL EVERY 6 HOURS PRN
Qty: 118 ML | Refills: 0 | Status: SHIPPED | OUTPATIENT
Start: 2019-11-11 | End: 2021-02-22

## 2019-11-11 RX ORDER — GUAIFENESIN 100 MG/5ML
200 SOLUTION ORAL 3 TIMES DAILY PRN
Qty: 118 ML | Refills: 0 | Status: SHIPPED | OUTPATIENT
Start: 2019-11-11 | End: 2019-12-13 | Stop reason: SDUPTHER

## 2019-11-11 RX ORDER — AZITHROMYCIN 250 MG/1
250 TABLET, FILM COATED ORAL DAILY
Qty: 6 TABLET | Refills: 0 | Status: SHIPPED | OUTPATIENT
Start: 2019-11-11 | End: 2021-02-22

## 2019-11-11 NOTE — TELEPHONE ENCOUNTER
----- Message from Loni Schofield sent at 11/11/2019 11:28 AM CST -----  Contact: Self   Type: Patient Call Back    What is the request in detail: Pt calling to speak to a nurse regarding cough.     Can the clinic reply by [facility]? No    Would the patient rather a call back or a response via [facility]? Call back     Best call back number: [phone]

## 2019-11-11 NOTE — ED TRIAGE NOTES
"Patient presents to ER with c/o cough, and weakness for several weeks.  Patient also reports "a nose bleed" earlier today for about 20mins.  Patient reports living in an assisted living facility at [facility]" and states a nurse held pressure to the top of her nose to help stop the bleed.  Pt states the nurses became concerned and called her PCP and she was instructed to come to the ER for further evaluation.    "

## 2019-11-11 NOTE — ED PROVIDER NOTES
"Encounter Date: 11/11/2019    SCRIBE #1 NOTE: I, Eusebia Blanco, am scribing for, and in the presence of, Dr. Mccarty .       History     Chief Complaint   Patient presents with    Epistaxis     pt report dry hacking cough x2-3 days and epitaxis today. PCP instructed family to bring pt to ED. pt state she is feeling bad and weak.     Cough     Time seen by provider: 4:56 PM    This is a 84 y.o. female who presents with complaint of generalized weakness and associated cough since 3 days ago. She reports that she had a stroke 5 years ago and since then each day she has been "feeling weaker and weaker". She saw her PCP 5 days ago and she received a flu shot and a B12 shot. She mentions that he listened to her chest however she was not told anything regarding her chest but all of her vitals were normal. This morning when the patient woke up her nose was bleeding so she called her doctor who told her to come into the ED. She does have a hx of pneumonia and mentions that her current cough feels similar to when she had pneumonemia. She denies a fever, congestion and sore throat. She states that her nose is dry and she has rhinorrhea. She also reports a small HA located toward the back of her head. She has a loss of appetite and states that the only thing she has a appetite for is ice cream. She reports that she has "weird" bowels and takes Miralax daily. She denies dysuria and she is currently not having any pain elsewhere. She uses her walker to get around and she denies recently falling.            Review of patient's allergies indicates:  No Known Allergies  Past Medical History:   Diagnosis Date    Allergic rhinitis     Arthritis     Elevated blood pressure reading without diagnosis of hypertension     Hormone replacement therapy (postmenopausal)     Hyperlipidemia     Hypertension     Stroke      Past Surgical History:   Procedure Laterality Date    APPENDECTOMY      BREAST BIOPSY      CATARACT EXTRACTION " Bilateral at age 62 or 72    monovision     EYE SURGERY      hai cataract surgery    OOPHORECTOMY      TONSILLECTOMY       Family History   Problem Relation Age of Onset    Cancer Mother     Breast cancer Mother     Stroke Father     No Known Problems Sister     No Known Problems Brother     No Known Problems Maternal Aunt     No Known Problems Maternal Uncle     No Known Problems Paternal Aunt     No Known Problems Paternal Uncle     No Known Problems Maternal Grandmother     No Known Problems Maternal Grandfather     No Known Problems Paternal Grandmother     No Known Problems Paternal Grandfather     Amblyopia Neg Hx     Blindness Neg Hx     Cataracts Neg Hx     Diabetes Neg Hx     Glaucoma Neg Hx     Hypertension Neg Hx     Macular degeneration Neg Hx     Retinal detachment Neg Hx     Strabismus Neg Hx     Thyroid disease Neg Hx      Social History     Tobacco Use    Smoking status: Former Smoker     Packs/day: 2.00     Years: 36.00     Pack years: 72.00     Types: Cigarettes     Start date:      Last attempt to quit:      Years since quittin.8    Smokeless tobacco: Never Used   Substance Use Topics    Alcohol use: Yes     Alcohol/week: 21.0 standard drinks     Types: 7 Glasses of wine, 14 Standard drinks or equivalent per week    Drug use: No     Review of Systems   Constitutional: Positive for appetite change. Negative for fever.   HENT: Positive for nosebleeds and rhinorrhea. Negative for congestion and sore throat.         Positive for dry nose.    Respiratory: Positive for cough. Negative for shortness of breath.    Cardiovascular: Negative for chest pain.   Gastrointestinal: Negative for nausea.   Genitourinary: Negative for dysuria.   Musculoskeletal: Negative for back pain.   Skin: Negative for color change, rash and wound.   Neurological: Positive for weakness and headaches.   Hematological: Does not bruise/bleed easily.   All other systems reviewed and are  negative.      Physical Exam     Initial Vitals [11/11/19 1543]   BP Pulse Resp Temp SpO2   (!) 161/77 100 20 97.9 °F (36.6 °C) 95 %      MAP       --         Physical Exam    Nursing note and vitals reviewed.  Constitutional: She appears well-developed and well-nourished. No distress.   HENT:   Head: Normocephalic and atraumatic.   Right Ear: External ear normal.   Left Ear: External ear normal.   Eyes: Conjunctivae and EOM are normal. Pupils are equal, round, and reactive to light. Right eye exhibits no discharge. Left eye exhibits no discharge. No scleral icterus.   Neck: Normal range of motion. Neck supple.   Cardiovascular: Normal rate, regular rhythm and normal heart sounds. Exam reveals no gallop and no friction rub.    No murmur heard.  Pulmonary/Chest: No stridor. No respiratory distress. She has no wheezes. She has no rhonchi. She has no rales.   Left lower lobe crackles.    Abdominal: Soft. Bowel sounds are normal. She exhibits no distension and no mass. There is no tenderness. There is no rebound and no guarding.   Musculoskeletal: She exhibits no edema.   Neurological: She is alert and oriented to person, place, and time. She has normal strength. No cranial nerve deficit or sensory deficit. GCS score is 15. GCS eye subscore is 4. GCS verbal subscore is 5. GCS motor subscore is 6.   Left sided weakness and left facial droop.    Skin: Skin is warm and dry. Capillary refill takes less than 2 seconds. There is pallor.   Psychiatric: She has a normal mood and affect. Her behavior is normal. Judgment and thought content normal.         ED Course   Procedures  Labs Reviewed   CBC W/ AUTO DIFFERENTIAL - Abnormal; Notable for the following components:       Result Value    Immature Granulocytes 0.6 (*)     Mono% 17.5 (*)     All other components within normal limits   COMPREHENSIVE METABOLIC PANEL - Abnormal; Notable for the following components:    Glucose 128 (*)     All other components within normal limits    URINALYSIS - Abnormal; Notable for the following components:    Occult Blood UA Trace (*)     All other components within normal limits   APTT   PROTIME-INR          Imaging Results          X-Ray Chest PA And Lateral (Final result)  Result time 11/11/19 16:06:20    Final result by Christiano Fitzpatrick MD (11/11/19 16:06:20)                 Impression:      No acute abnormality.      Electronically signed by: Christiano Fitzpatrick MD  Date:    11/11/2019  Time:    16:06             Narrative:    EXAMINATION:  XR CHEST PA AND LATERAL    CLINICAL HISTORY:  Cough    TECHNIQUE:  PA and lateral views of the chest were performed.    COMPARISON:  05/01/2019    FINDINGS:  The lungs are clear, with normal appearance of pulmonary vasculature and no pleural effusion or pneumothorax.    The cardiac silhouette is normal in size. The hilar and mediastinal contours are unremarkable.    Bones are intact. Unchanged degenerative shoulders.                              X-Rays:   Independently Interpreted Readings:   Chest X-Ray: Normal heart size.  No infiltrates.  No acute abnormalities.     Medical Decision Making:   History:   Old Medical Records: I decided to obtain old medical records.  Initial Assessment:   84 year old female with chronic left sided weaknes s/p cva presents with malaise and weakness increased with cough x several days and left lower lobe crackles on chest exam. Plan: flu swab , c- xray, basic labs and iv fluids.    Differential Diagnosis:   Bronchitis, URI, allergies, irritants, pulmonary edema, GERD, laryngitis, tracheitis, asthma, sinusitis, pneumonia, viral, COPD, medication side effect    Independently Interpreted Test(s):   I have ordered and independently interpreted X-rays - see prior notes.  Clinical Tests:   Radiological Study: Ordered and Reviewed            Scribe Attestation:   Scribe #1: I performed the above scribed service and the documentation accurately describes the services I performed. I attest  to the accuracy of the note.    Attending Attestation:           Physician Attestation for Scribe:  Physician Attestation Statement for Scribe #1: I, Dr. Mccarty, reviewed documentation, as scribed by Eusebia Blanco  in my presence, and it is both accurate and complete.                 ED Course as of Nov 12 1437   Mon Nov 11, 2019   1548 Carol Yadav, 84 y.o.  presented to the ED complaining of generalized weakness, persistent cough, and nose bleed this morning that lasted for 20mins, stopped and did not recur. Of note, she lives at an assisted living facility.     Patient seen and medically screened by myself in the Provider in Triage Sort system due to ED crowding.  Appropriate tests and/or medications ordered.  A medical screening exam has been performed.  The care will be assumed by myself or another provider when treatment space becomes available.  I am not assuming care of this patient at this time. 3:48 PM. TING MILLARD        [AM]      ED Course User Index  [AM] Nadja Chaudhry PA-C                Clinical Impression:     1. Bronchitis    2. Cough    3. Anterior epistaxis                                Apple Mccarty MD  11/12/19 8167

## 2019-11-11 NOTE — TELEPHONE ENCOUNTER
----- Message from Leslie Weinberg sent at 11/11/2019 12:53 PM CST -----  Contact:  Oscar  [facility]  [phone]  .Type: Patient Call Back    Who called: Oscar  [facility]     What is the request in detail: Pt had a nose bleed with pressure on the R side of the head. The pt's daughter wont send the pt to the ER and is requesting to hear from the Dr.     Can the clinic reply by [facility]? Call back     Would the patient rather a call back or a response via [facility]? Call back     Best call back number: [phone]

## 2019-11-11 NOTE — TELEPHONE ENCOUNTER
Madeleine the director of nursing with [facility] states that the patient has had a cough with a nose bleed and pressure to the back of the head since yesterday. Nursing staff advised patient to go the ER, however the patient's daughter won't take the patient to the ER unless advised by patient's PCP.  Advise Madeleine per Dr. Yadav patient needs to go to ER to be evaluated.

## 2019-11-25 DIAGNOSIS — M85.80 OSTEOPENIA, UNSPECIFIED LOCATION: ICD-10-CM

## 2019-12-02 RX ORDER — ALENDRONATE SODIUM 70 MG/1
TABLET ORAL
Qty: 4 TABLET | Refills: 3 | Status: SHIPPED | OUTPATIENT
Start: 2019-12-02 | End: 2020-05-19

## 2019-12-03 RX ORDER — OMEPRAZOLE 20 MG/1
20 CAPSULE, DELAYED RELEASE ORAL EVERY MORNING
Qty: 90 CAPSULE | Refills: 1 | Status: CANCELLED | OUTPATIENT
Start: 2019-12-03

## 2019-12-09 ENCOUNTER — OFFICE VISIT (OUTPATIENT)
Dept: FAMILY MEDICINE | Facility: CLINIC | Age: 84
End: 2019-12-09
Payer: MEDICARE

## 2019-12-09 VITALS
RESPIRATION RATE: 20 BRPM | HEART RATE: 100 BPM | DIASTOLIC BLOOD PRESSURE: 78 MMHG | OXYGEN SATURATION: 98 % | TEMPERATURE: 98 F | WEIGHT: 155.88 LBS | SYSTOLIC BLOOD PRESSURE: 140 MMHG | BODY MASS INDEX: 28.05 KG/M2

## 2019-12-09 DIAGNOSIS — J18.9 ATYPICAL PNEUMONIA: Primary | ICD-10-CM

## 2019-12-09 PROCEDURE — 1159F MED LIST DOCD IN RCRD: CPT | Mod: S$GLB,,, | Performed by: FAMILY MEDICINE

## 2019-12-09 PROCEDURE — 3078F DIAST BP <80 MM HG: CPT | Mod: CPTII,S$GLB,, | Performed by: FAMILY MEDICINE

## 2019-12-09 PROCEDURE — 3077F PR MOST RECENT SYSTOLIC BLOOD PRESSURE >= 140 MM HG: ICD-10-PCS | Mod: CPTII,S$GLB,, | Performed by: FAMILY MEDICINE

## 2019-12-09 PROCEDURE — 1125F AMNT PAIN NOTED PAIN PRSNT: CPT | Mod: S$GLB,,, | Performed by: FAMILY MEDICINE

## 2019-12-09 PROCEDURE — 1159F PR MEDICATION LIST DOCUMENTED IN MEDICAL RECORD: ICD-10-PCS | Mod: S$GLB,,, | Performed by: FAMILY MEDICINE

## 2019-12-09 PROCEDURE — 1101F PT FALLS ASSESS-DOCD LE1/YR: CPT | Mod: CPTII,S$GLB,, | Performed by: FAMILY MEDICINE

## 2019-12-09 PROCEDURE — 99214 OFFICE O/P EST MOD 30 MIN: CPT | Mod: S$GLB,,, | Performed by: FAMILY MEDICINE

## 2019-12-09 PROCEDURE — 3078F PR MOST RECENT DIASTOLIC BLOOD PRESSURE < 80 MM HG: ICD-10-PCS | Mod: CPTII,S$GLB,, | Performed by: FAMILY MEDICINE

## 2019-12-09 PROCEDURE — 1101F PR PT FALLS ASSESS DOC 0-1 FALLS W/OUT INJ PAST YR: ICD-10-PCS | Mod: CPTII,S$GLB,, | Performed by: FAMILY MEDICINE

## 2019-12-09 PROCEDURE — 3077F SYST BP >= 140 MM HG: CPT | Mod: CPTII,S$GLB,, | Performed by: FAMILY MEDICINE

## 2019-12-09 PROCEDURE — 1125F PR PAIN SEVERITY QUANTIFIED, PAIN PRESENT: ICD-10-PCS | Mod: S$GLB,,, | Performed by: FAMILY MEDICINE

## 2019-12-09 PROCEDURE — 99999 PR PBB SHADOW E&M-EST. PATIENT-LVL III: ICD-10-PCS | Mod: PBBFAC,,, | Performed by: FAMILY MEDICINE

## 2019-12-09 PROCEDURE — 99999 PR PBB SHADOW E&M-EST. PATIENT-LVL III: CPT | Mod: PBBFAC,,, | Performed by: FAMILY MEDICINE

## 2019-12-09 PROCEDURE — 99214 PR OFFICE/OUTPT VISIT, EST, LEVL IV, 30-39 MIN: ICD-10-PCS | Mod: S$GLB,,, | Performed by: FAMILY MEDICINE

## 2019-12-09 RX ORDER — AMOXICILLIN AND CLAVULANATE POTASSIUM 875; 125 MG/1; MG/1
1 TABLET, FILM COATED ORAL 2 TIMES DAILY WITH MEALS
Qty: 14 TABLET | Refills: 0 | Status: SHIPPED | OUTPATIENT
Start: 2019-12-09 | End: 2019-12-16

## 2019-12-10 NOTE — PROGRESS NOTES
Subjective:       Patient ID: Carol Yadav is a 84 y.o. female.    Chief Complaint: Follow-up      HPI  84-year-old female presents for fatigue follow-up.  Patient states she still feels fatigued.  Went to ED for bronchitis and epitaxis.  Was given see back.  Patient states she initially felt better but again started feeling bad.  Denies any fever chills but states she never gets fever chills.  Last time she f felt like this she was diagnosed with pneumonia.      Review of Systems   Constitutional: Negative.    HENT: Positive for congestion and rhinorrhea.    Respiratory: Positive for shortness of breath.    Cardiovascular: Negative.    Gastrointestinal: Negative.    Endocrine: Negative.    Genitourinary: Negative.    Musculoskeletal: Negative.    Neurological: Negative.    Psychiatric/Behavioral: Negative.           Past Medical History:   Diagnosis Date    Allergic rhinitis     Arthritis     Elevated blood pressure reading without diagnosis of hypertension     Hormone replacement therapy (postmenopausal)     Hyperlipidemia     Hypertension     Stroke      Past Surgical History:   Procedure Laterality Date    APPENDECTOMY      BREAST BIOPSY      CATARACT EXTRACTION Bilateral at age 62 or 72    monovision     EYE SURGERY      hai cataract surgery    OOPHORECTOMY      TONSILLECTOMY       Family History   Problem Relation Age of Onset    Cancer Mother     Breast cancer Mother     Stroke Father     No Known Problems Sister     No Known Problems Brother     No Known Problems Maternal Aunt     No Known Problems Maternal Uncle     No Known Problems Paternal Aunt     No Known Problems Paternal Uncle     No Known Problems Maternal Grandmother     No Known Problems Maternal Grandfather     No Known Problems Paternal Grandmother     No Known Problems Paternal Grandfather     Amblyopia Neg Hx     Blindness Neg Hx     Cataracts Neg Hx     Diabetes Neg Hx     Glaucoma Neg Hx     Hypertension Neg Hx      Macular degeneration Neg Hx     Retinal detachment Neg Hx     Strabismus Neg Hx     Thyroid disease Neg Hx      Social History     Socioeconomic History    Marital status:      Spouse name: Not on file    Number of children: Not on file    Years of education: Not on file    Highest education level: Not on file   Occupational History    Not on file   Social Needs    Financial resource strain: Not on file    Food insecurity:     Worry: Not on file     Inability: Not on file    Transportation needs:     Medical: Not on file     Non-medical: Not on file   Tobacco Use    Smoking status: Former Smoker     Packs/day: 2.00     Years: 36.00     Pack years: 72.00     Types: Cigarettes     Start date:      Last attempt to quit:      Years since quittin.9    Smokeless tobacco: Never Used   Substance and Sexual Activity    Alcohol use: Yes     Alcohol/week: 21.0 standard drinks     Types: 7 Glasses of wine, 14 Standard drinks or equivalent per week    Drug use: No    Sexual activity: Not Currently     Partners: Male     Comment: 2017 divorce    Lifestyle    Physical activity:     Days per week: Not on file     Minutes per session: Not on file    Stress: Not on file   Relationships    Social connections:     Talks on phone: Not on file     Gets together: Not on file     Attends Am[employer] service: Not on file     Active member of club or organization: Not on file     Attends meetings of clubs or organizations: Not on file     Relationship status: Not on file   Other Topics Concern    Not on file   Social History Narrative    Not on file       Current Outpatient Medications:     acetaminophen (TYLENOL) 325 MG tablet, Take 2 tablets (650 mg total) by mouth every 4 (four) hours as needed., Disp: , Rfl: 0    alendronate (FOSAMAX) 70 MG tablet, TAKE 1 TABLET BY MOUTH EVERY 7 DAYS, Disp: 4 tablet, Rfl: 3    amlodipine-benazepril 2.5-10 mg (LOTREL) 2.5-10 mg per capsule, TAKE 1  CAPSULE BY MOUTH ONCE DAILY, Disp: 90 capsule, Rfl: 3    amoxicillin-clavulanate 875-125mg (AUGMENTIN) 875-125 mg per tablet, Take 1 tablet by mouth 2 (two) times daily with meals. for 7 days, Disp: 14 tablet, Rfl: 0    aspirin (ECOTRIN) 81 MG EC tablet, Take 81 mg by mouth once daily., Disp: , Rfl:     atorvastatin (LIPITOR) 40 MG tablet, Take 1 tablet (40 mg total) by mouth once daily., Disp: 90 tablet, Rfl: 3    azithromycin (Z-JACKLYN) 250 MG tablet, Take 1 tablet (250 mg total) by mouth once daily. Take first 2 tablets together, then 1 every day until finished., Disp: 6 tablet, Rfl: 0    betamethasone dipropionate (DIPROLENE) 0.05 % cream, Apply topically as needed. , Disp: , Rfl:     calcium carbonate (CALCIUM 600) 600 mg calcium (1,500 mg) Tab, Take 1 tablet (600 mg total) by mouth once daily., Disp: 90 tablet, Rfl: 1    cloNIDine (CATAPRES) 0.1 MG tablet, Take once daily as needed for systolic BP above 160, Disp: 30 tablet, Rfl: 0    clotrimazole (LOTRIMIN) 1 % cream, Apply topically. , Disp: , Rfl:     fluticasone propionate (FLONASE) 50 mcg/actuation nasal spray, USE 1 SPRAY IN EACH NOSTRIL 2 TIMES DAILY AS NEEDED FOR RHINITIS  **MED WILL NOT AUTOFILL, REORDER WHEN NEEDED DURING REGULAR BUSINESS HOURS, Disp: 16 g, Rfl: 11    fluticasone propionate (FLONASE) 50 mcg/actuation nasal spray, USE 1 SPRAY IN EACH NOSTRIL 2 TIMES DAILY FOR RHINITIS, Disp: , Rfl:     lisinopril (PRINIVIL,ZESTRIL) 2.5 MG tablet, TAKE ONE TABLET BY MOUTH ONCE DAILY, Disp: , Rfl:     multivitamin-minerals-lutein (MULTIVITAMIN 50 PLUS) Tab, Take 1 tablet by mouth once daily., Disp: 90 tablet, Rfl: 3    olopatadine (PATANOL) 0.1 % ophthalmic solution, INSTILL 1 DROP IN EACH EYE 2 TIMES DAILY, Disp: 5 mL, Rfl: 11    omeprazole (PRILOSEC) 20 MG capsule, TAKE 1 CAPSULE BY MOUTH EVERY MORNING, Disp: 90 capsule, Rfl: 1    oxybutynin (DITROPAN-XL) 10 MG 24 hr tablet, Take 1 tablet (10 mg total) by mouth every evening., Disp: 90  tablet, Rfl: 0    polyethylene glycol (MIRALAX) 17 gram PwPk, Take by mouth. Take every other day, Disp: , Rfl:     polymyxin B sulf-trimethoprim (POLYTRIM) 10,000 unit- 1 mg/mL Drop, Place 1 drop into both eyes every 6 (six) hours., Disp: 10 mL, Rfl: 0    promethazine (PHENERGAN) 6.25 mg/5 mL syrup, Take 10 mLs (12.5 mg total) by mouth every 6 (six) hours as needed for Nausea (take with guaifenasin for cough/nausea)., Disp: 118 mL, Rfl: 0    traZODone (DESYREL) 50 MG tablet, TAKE 1 TABLET BY MOUTH AT BEDTIME, Disp: 28 tablet, Rfl: 2    venlafaxine (EFFEXOR-XR) 37.5 MG 24 hr capsule, Take 1 capsule (37.5 mg total) by mouth once daily., Disp: 90 capsule, Rfl: 3    vitamin D (VITAMIN D3) 1000 units Tab, Take 1 tablet (1,000 Units total) by mouth once daily., Disp: 90 tablet, Rfl: 3   Objective:      Vitals:    12/09/19 1451   BP: (!) 140/78   BP Location: Right arm   Patient Position: Sitting   Pulse: 100   Resp: 20   Temp: 98 °F (36.7 °C)   TempSrc: Oral   SpO2: 98%   Weight: 70.7 kg (155 lb 13.8 oz)       Physical Exam   Constitutional: She is oriented to person, place, and time. No distress.   HENT:   Head: Normocephalic and atraumatic.   Eyes: Conjunctivae are normal.   Neck: Neck supple.   Cardiovascular: Normal rate, regular rhythm and normal heart sounds. Exam reveals no gallop and no friction rub.   No murmur heard.  Pulmonary/Chest: Effort normal and breath sounds normal. She has no wheezes. She has no rales.   Crackles in b/l LLs   Neurological: She is alert and oriented to person, place, and time.   Skin: Skin is warm and dry.   Psychiatric: She has a normal mood and affect. Her behavior is normal. Judgment and thought content normal.          Assessment:       1. Atypical pneumonia        Plan:       Atypical pneumonia  - Will tx w/ abx. Advised pt to use otc meds.  -     amoxicillin-clavulanate 875-125mg (AUGMENTIN) 875-125 mg per tablet; Take 1 tablet by mouth 2 (two) times daily with meals. for 7  days  Dispense: 14 tablet; Refill: 0      Follow up in about 4 weeks (around 1/6/2020).            Jatin Yadav MD      Patient note was created using [employer].  Any errors in syntax or even information may not have been identified and edited on initial review prior to signing this note.

## 2019-12-13 RX ORDER — GUAIFENESIN 100 MG/5ML
200 SOLUTION ORAL 3 TIMES DAILY PRN
Qty: 118 ML | Refills: 0 | Status: SHIPPED | OUTPATIENT
Start: 2019-12-13 | End: 2019-12-23

## 2019-12-17 DIAGNOSIS — N32.81 OAB (OVERACTIVE BLADDER): ICD-10-CM

## 2019-12-17 RX ORDER — OXYBUTYNIN CHLORIDE 10 MG/1
TABLET, EXTENDED RELEASE ORAL
Qty: 28 TABLET | Refills: 3 | Status: SHIPPED | OUTPATIENT
Start: 2019-12-17 | End: 2020-04-17

## 2019-12-17 NOTE — TELEPHONE ENCOUNTER
----- Message from Julita Ruffin sent at 12/17/2019  1:57 PM CST -----  Contact: COLIN HOOVER [[id number]]  Name of Who is Calling: COLIN HOOVER [[id number]]      What is the request in detail: patient states she finished her medication for pneumonia and still is not better. Please call to discuss     Can the clinic reply by [facility]: no    [facility]: [phone]

## 2019-12-27 NOTE — TELEPHONE ENCOUNTER
----- Message from Carmencitagracie Borjas sent at 12/27/2019 11:20 AM CST -----  Contact: Sunitha briones/[facility]  [phone]  Type: RX Refill Request    Who Called: Sunitha briones/[facility]    Have you contacted your pharmacy: Yes, pharmacy calling.    Refill or New Rx: Refill    RX Name and Strength: omeprazole (PRILOSEC) 20 MG capsule    Is this a 30 day or 90 day RX: 90 day    Preferred Pharmacy with phone number:    .  [facility] [address]  [address]  [place] [address]  Phone: [phone] Fax: [phone]    Local or Mail Order: Local    Would the patient rather a call back or a response via [facility]? Call back    Best Call Back Number: [phone]

## 2020-01-06 ENCOUNTER — OFFICE VISIT (OUTPATIENT)
Dept: FAMILY MEDICINE | Facility: CLINIC | Age: 85
End: 2020-01-06
Payer: MEDICARE

## 2020-01-06 VITALS
BODY MASS INDEX: 28.72 KG/M2 | HEART RATE: 87 BPM | WEIGHT: 156.06 LBS | SYSTOLIC BLOOD PRESSURE: 112 MMHG | DIASTOLIC BLOOD PRESSURE: 64 MMHG | TEMPERATURE: 98 F | HEIGHT: 62 IN | OXYGEN SATURATION: 96 % | RESPIRATION RATE: 16 BRPM

## 2020-01-06 DIAGNOSIS — Z86.73 H/O: CVA (CEREBROVASCULAR ACCIDENT): ICD-10-CM

## 2020-01-06 DIAGNOSIS — F32.A ANXIETY AND DEPRESSION: ICD-10-CM

## 2020-01-06 DIAGNOSIS — I27.20 PULMONARY HYPERTENSION, UNSPECIFIED: ICD-10-CM

## 2020-01-06 DIAGNOSIS — Z78.9 IMPAIRED MOBILITY AND ADLS: ICD-10-CM

## 2020-01-06 DIAGNOSIS — R53.81 PHYSICAL DECONDITIONING: ICD-10-CM

## 2020-01-06 DIAGNOSIS — F41.9 ANXIETY AND DEPRESSION: ICD-10-CM

## 2020-01-06 DIAGNOSIS — I10 HYPERTENSION, WELL CONTROLLED: Primary | ICD-10-CM

## 2020-01-06 DIAGNOSIS — F33.41 RECURRENT MAJOR DEPRESSIVE DISORDER, IN PARTIAL REMISSION: ICD-10-CM

## 2020-01-06 DIAGNOSIS — Z74.09 IMPAIRED MOBILITY AND ADLS: ICD-10-CM

## 2020-01-06 DIAGNOSIS — I69.354 HEMIPARESIS AFFECTING LEFT SIDE AS LATE EFFECT OF STROKE: ICD-10-CM

## 2020-01-06 PROCEDURE — 1159F MED LIST DOCD IN RCRD: CPT | Mod: S$GLB,,, | Performed by: FAMILY MEDICINE

## 2020-01-06 PROCEDURE — 1126F AMNT PAIN NOTED NONE PRSNT: CPT | Mod: S$GLB,,, | Performed by: FAMILY MEDICINE

## 2020-01-06 PROCEDURE — 99499 RISK ADDL DX/OHS AUDIT: ICD-10-PCS | Mod: S$GLB,,, | Performed by: FAMILY MEDICINE

## 2020-01-06 PROCEDURE — 3078F DIAST BP <80 MM HG: CPT | Mod: CPTII,S$GLB,, | Performed by: FAMILY MEDICINE

## 2020-01-06 PROCEDURE — 99499 UNLISTED E&M SERVICE: CPT | Mod: S$GLB,,, | Performed by: FAMILY MEDICINE

## 2020-01-06 PROCEDURE — 99214 PR OFFICE/OUTPT VISIT, EST, LEVL IV, 30-39 MIN: ICD-10-PCS | Mod: S$GLB,,, | Performed by: FAMILY MEDICINE

## 2020-01-06 PROCEDURE — 1126F PR PAIN SEVERITY QUANTIFIED, NO PAIN PRESENT: ICD-10-PCS | Mod: S$GLB,,, | Performed by: FAMILY MEDICINE

## 2020-01-06 PROCEDURE — 99999 PR PBB SHADOW E&M-EST. PATIENT-LVL V: ICD-10-PCS | Mod: PBBFAC,,, | Performed by: FAMILY MEDICINE

## 2020-01-06 PROCEDURE — 3078F PR MOST RECENT DIASTOLIC BLOOD PRESSURE < 80 MM HG: ICD-10-PCS | Mod: CPTII,S$GLB,, | Performed by: FAMILY MEDICINE

## 2020-01-06 PROCEDURE — 1101F PR PT FALLS ASSESS DOC 0-1 FALLS W/OUT INJ PAST YR: ICD-10-PCS | Mod: CPTII,S$GLB,, | Performed by: FAMILY MEDICINE

## 2020-01-06 PROCEDURE — 1101F PT FALLS ASSESS-DOCD LE1/YR: CPT | Mod: CPTII,S$GLB,, | Performed by: FAMILY MEDICINE

## 2020-01-06 PROCEDURE — 99214 OFFICE O/P EST MOD 30 MIN: CPT | Mod: S$GLB,,, | Performed by: FAMILY MEDICINE

## 2020-01-06 PROCEDURE — 3074F PR MOST RECENT SYSTOLIC BLOOD PRESSURE < 130 MM HG: ICD-10-PCS | Mod: CPTII,S$GLB,, | Performed by: FAMILY MEDICINE

## 2020-01-06 PROCEDURE — 99999 PR PBB SHADOW E&M-EST. PATIENT-LVL V: CPT | Mod: PBBFAC,,, | Performed by: FAMILY MEDICINE

## 2020-01-06 PROCEDURE — 3074F SYST BP LT 130 MM HG: CPT | Mod: CPTII,S$GLB,, | Performed by: FAMILY MEDICINE

## 2020-01-06 PROCEDURE — 1159F PR MEDICATION LIST DOCUMENTED IN MEDICAL RECORD: ICD-10-PCS | Mod: S$GLB,,, | Performed by: FAMILY MEDICINE

## 2020-01-06 RX ORDER — VENLAFAXINE HYDROCHLORIDE 37.5 MG/1
75 CAPSULE, EXTENDED RELEASE ORAL DAILY
Qty: 90 CAPSULE | Refills: 3 | Status: SHIPPED | OUTPATIENT
Start: 2020-01-06 | End: 2020-10-15 | Stop reason: SDUPTHER

## 2020-01-06 RX ORDER — LISINOPRIL 2.5 MG/1
2.5 TABLET ORAL DAILY
Qty: 90 TABLET | Refills: 3 | Status: SHIPPED | OUTPATIENT
Start: 2020-01-06 | End: 2020-12-23

## 2020-01-06 NOTE — PROGRESS NOTES
Subjective:       Patient ID: Carol Yadav is a 84 y.o. female.    Chief Complaint: Follow-up      HPI  10-year-old female presents for weakness.  Patient states her breathing has improved.  Patient feels antibiotics has helped.  Denies any coughing.  Still complains of weakness and fatigue.  Feels that she would like to change her blood pressure medication back to lisinopril from what she is taking now.    States she mainly stays in bed and plays on her iPad.  Does not give physical exercise.  States that she has junk food.      Review of Systems   Constitutional: Negative.    HENT: Negative.    Respiratory: Negative.    Cardiovascular: Negative.    Gastrointestinal: Negative.    Endocrine: Negative.    Genitourinary: Negative.    Musculoskeletal: Negative.    Neurological: Negative.    Psychiatric/Behavioral: Negative.           Past Medical History:   Diagnosis Date    Allergic rhinitis     Arthritis     Elevated blood pressure reading without diagnosis of hypertension     Hormone replacement therapy (postmenopausal)     Hyperlipidemia     Hypertension     Stroke      Past Surgical History:   Procedure Laterality Date    APPENDECTOMY      BREAST BIOPSY      CATARACT EXTRACTION Bilateral at age 62 or 72    monovision     EYE SURGERY      hai cataract surgery    OOPHORECTOMY      TONSILLECTOMY       Family History   Problem Relation Age of Onset    Cancer Mother     Breast cancer Mother     Stroke Father     No Known Problems Sister     No Known Problems Brother     No Known Problems Maternal Aunt     No Known Problems Maternal Uncle     No Known Problems Paternal Aunt     No Known Problems Paternal Uncle     No Known Problems Maternal Grandmother     No Known Problems Maternal Grandfather     No Known Problems Paternal Grandmother     No Known Problems Paternal Grandfather     Amblyopia Neg Hx     Blindness Neg Hx     Cataracts Neg Hx     Diabetes Neg Hx     Glaucoma Neg Hx      Hypertension Neg Hx     Macular degeneration Neg Hx     Retinal detachment Neg Hx     Strabismus Neg Hx     Thyroid disease Neg Hx      Social History     Socioeconomic History    Marital status:      Spouse name: Not on file    Number of children: Not on file    Years of education: Not on file    Highest education level: Not on file   Occupational History    Not on file   Social Needs    Financial resource strain: Not on file    Food insecurity:     Worry: Not on file     Inability: Not on file    Transportation needs:     Medical: Not on file     Non-medical: Not on file   Tobacco Use    Smoking status: Former Smoker     Packs/day: 2.00     Years: 36.00     Pack years: 72.00     Types: Cigarettes     Start date:      Last attempt to quit:      Years since quittin.0    Smokeless tobacco: Never Used   Substance and Sexual Activity    Alcohol use: Yes     Alcohol/week: 21.0 standard drinks     Types: 7 Glasses of wine, 14 Standard drinks or equivalent per week    Drug use: No    Sexual activity: Not Currently     Partners: Male     Comment: 2017 divorce    Lifestyle    Physical activity:     Days per week: Not on file     Minutes per session: Not on file    Stress: Not on file   Relationships    Social connections:     Talks on phone: Not on file     Gets together: Not on file     Attends Buddhism service: Not on file     Active member of club or organization: Not on file     Attends meetings of clubs or organizations: Not on file     Relationship status: Not on file   Other Topics Concern    Not on file   Social History Narrative    Not on file       Current Outpatient Medications:     acetaminophen (TYLENOL) 325 MG tablet, Take 2 tablets (650 mg total) by mouth every 4 (four) hours as needed., Disp: , Rfl: 0    alendronate (FOSAMAX) 70 MG tablet, TAKE 1 TABLET BY MOUTH EVERY 7 DAYS, Disp: 4 tablet, Rfl: 3    aspirin (ECOTRIN) 81 MG EC tablet, Take 81 mg by mouth  once daily., Disp: , Rfl:     atorvastatin (LIPITOR) 40 MG tablet, Take 1 tablet (40 mg total) by mouth once daily., Disp: 90 tablet, Rfl: 3    azithromycin (Z-JACKLYN) 250 MG tablet, Take 1 tablet (250 mg total) by mouth once daily. Take first 2 tablets together, then 1 every day until finished., Disp: 6 tablet, Rfl: 0    calcium carbonate (CALCIUM 600) 600 mg calcium (1,500 mg) Tab, Take 1 tablet (600 mg total) by mouth once daily., Disp: 90 tablet, Rfl: 1    cloNIDine (CATAPRES) 0.1 MG tablet, Take once daily as needed for systolic BP above 160, Disp: 30 tablet, Rfl: 0    fluticasone propionate (FLONASE) 50 mcg/actuation nasal spray, USE 1 SPRAY IN EACH NOSTRIL 2 TIMES DAILY AS NEEDED FOR RHINITIS  **MED WILL NOT AUTOFILL, REORDER WHEN NEEDED DURING REGULAR BUSINESS HOURS, Disp: 16 g, Rfl: 11    fluticasone propionate (FLONASE) 50 mcg/actuation nasal spray, USE 1 SPRAY IN EACH NOSTRIL 2 TIMES DAILY FOR RHINITIS, Disp: , Rfl:     lisinopril (PRINIVIL,ZESTRIL) 2.5 MG tablet, Take 1 tablet (2.5 mg total) by mouth once daily., Disp: 90 tablet, Rfl: 3    multivitamin-minerals-lutein (MULTIVITAMIN 50 PLUS) Tab, Take 1 tablet by mouth once daily., Disp: 90 tablet, Rfl: 3    olopatadine (PATANOL) 0.1 % ophthalmic solution, INSTILL 1 DROP IN EACH EYE 2 TIMES DAILY, Disp: 5 mL, Rfl: 11    omeprazole (PRILOSEC) 20 MG capsule, TAKE 1 CAPSULE BY MOUTH EVERY MORNING, Disp: 90 capsule, Rfl: 1    oxybutynin (DITROPAN-XL) 10 MG 24 hr tablet, TAKE 1 TABLET BY MOUTH EVERY EVENING, Disp: 28 tablet, Rfl: 3    polyethylene glycol (MIRALAX) 17 gram PwPk, Take by mouth. Take every other day, Disp: , Rfl:     promethazine (PHENERGAN) 6.25 mg/5 mL syrup, Take 10 mLs (12.5 mg total) by mouth every 6 (six) hours as needed for Nausea (take with guaifenasin for cough/nausea)., Disp: 118 mL, Rfl: 0    traZODone (DESYREL) 50 MG tablet, TAKE 1 TABLET BY MOUTH AT BEDTIME, Disp: 28 tablet, Rfl: 2    venlafaxine (EFFEXOR-XR) 37.5 MG 24  "hr capsule, Take 2 capsules (75 mg total) by mouth once daily., Disp: 90 capsule, Rfl: 3    vitamin D (VITAMIN D3) 1000 units Tab, Take 1 tablet (1,000 Units total) by mouth once daily., Disp: 90 tablet, Rfl: 3    betamethasone dipropionate (DIPROLENE) 0.05 % cream, Apply topically as needed. , Disp: , Rfl:     clotrimazole (LOTRIMIN) 1 % cream, Apply topically. , Disp: , Rfl:     polymyxin B sulf-trimethoprim (POLYTRIM) 10,000 unit- 1 mg/mL Drop, Place 1 drop into both eyes every 6 (six) hours. (Patient not taking: Reported on 1/6/2020), Disp: 10 mL, Rfl: 0   Objective:      Vitals:    01/06/20 1032   BP: 112/64   BP Location: Right arm   Patient Position: Sitting   BP Method: Large (Manual)   Pulse: 87   Resp: 16   Temp: 98 °F (36.7 °C)   TempSrc: Oral   SpO2: 96%   Weight: 70.8 kg (156 lb 1.4 oz)   Height: 5' 2" (1.575 m)       Physical Exam   Constitutional: She is oriented to person, place, and time. No distress.   HENT:   Head: Normocephalic and atraumatic.   Eyes: Conjunctivae are normal.   Neck: Neck supple.   Cardiovascular: Normal rate, regular rhythm and normal heart sounds. Exam reveals no gallop and no friction rub.   No murmur heard.  Pulmonary/Chest: Effort normal and breath sounds normal. She has no wheezes. She has no rales.   Neurological: She is alert and oriented to person, place, and time.   Skin: Skin is warm and dry.   Psychiatric: She has a normal mood and affect. Her behavior is normal. Judgment and thought content normal.          Assessment:       1. Hypertension, well controlled    2. Anxiety and depression    3. H/O: CVA (cerebrovascular accident)    4. Physical deconditioning    5. Impaired mobility and ADLs    6. Recurrent major depressive disorder, in partial remission    7. Pulmonary hypertension, unspecified    8. Hemiparesis affecting left side as late effect of stroke        Plan:       Hypertension, well controlled  -     lisinopril (PRINIVIL,ZESTRIL) 2.5 MG tablet; Take 1 " tablet (2.5 mg total) by mouth once daily.  Dispense: 90 tablet; Refill: 3    Anxiety and depression  -     venlafaxine (EFFEXOR-XR) 37.5 MG 24 hr capsule; Take 2 capsules (75 mg total) by mouth once daily.  Dispense: 90 capsule; Refill: 3    H/O: CVA (cerebrovascular accident)  Cont meds    Physical deconditioning  Advised about lifestyle changes    Impaired mobility and ADLs  Advised about lifestyle changes  Recurrent major depressive disorder, in partial remission  Increased effexor to 75mg    Pulmonary hypertension, unspecified  Cont to monitor    Hemiparesis affecting left side as late effect of stroke  Cont meds    Follow up in about 3 months (around 4/6/2020).            Jatin Yadav MD      Patient note was created using [employer].  Any errors in syntax or even information may not have been identified and edited on initial review prior to signing this note.

## 2020-01-07 RX ORDER — OMEPRAZOLE 20 MG/1
20 CAPSULE, DELAYED RELEASE ORAL EVERY MORNING
Qty: 90 CAPSULE | Refills: 1 | OUTPATIENT
Start: 2020-01-07

## 2020-01-07 RX ORDER — OMEPRAZOLE 20 MG/1
CAPSULE, DELAYED RELEASE ORAL
Qty: 28 CAPSULE | OUTPATIENT
Start: 2020-01-07

## 2020-01-09 NOTE — TELEPHONE ENCOUNTER
Last Office Visit Info:   The patient's last visit with Jatin Yadav MD was on 1/6/2020.    The patient's last visit in current department was on 1/6/2020.        Last CBC Results:   Lab Results   Component Value Date    WBC 4.81 11/11/2019    HGB 14.0 11/11/2019    HCT 43.2 11/11/2019     11/11/2019       Last CMP Results  Lab Results   Component Value Date     11/11/2019    K 3.9 11/11/2019     11/11/2019    CO2 27 11/11/2019    BUN 11 11/11/2019    CREATININE 0.9 11/11/2019    CALCIUM 9.4 11/11/2019    ALBUMIN 3.8 11/11/2019    AST 23 11/11/2019    ALT 18 11/11/2019       Last Lipids  Lab Results   Component Value Date    CHOL 158 09/11/2019    TRIG 105 09/11/2019    HDL 50 09/11/2019    LDLCALC 87.0 09/11/2019       Last A1C  Lab Results   Component Value Date    HGBA1C 5.7 (H) 09/11/2019       Last TSH  Lab Results   Component Value Date    TSH 2.641 09/11/2019         Current Med Refills  Medication List with Changes/Refills   Current Medications    ACETAMINOPHEN (TYLENOL) 325 MG TABLET    Take 2 tablets (650 mg total) by mouth every 4 (four) hours as needed.       Start Date: 12/16/2014End Date: --    ALENDRONATE (FOSAMAX) 70 MG TABLET    TAKE 1 TABLET BY MOUTH EVERY 7 DAYS       Start Date: 12/2/2019 End Date: --    ASPIRIN (ECOTRIN) 81 MG EC TABLET    Take 81 mg by mouth once daily.       Start Date: --        End Date: --    ATORVASTATIN (LIPITOR) 40 MG TABLET    Take 1 tablet (40 mg total) by mouth once daily.       Start Date: 9/10/2019 End Date: --    AZITHROMYCIN (Z-JACKLYN) 250 MG TABLET    Take 1 tablet (250 mg total) by mouth once daily. Take first 2 tablets together, then 1 every day until finished.       Start Date: 11/11/2019End Date: --    BETAMETHASONE DIPROPIONATE (DIPROLENE) 0.05 % CREAM    Apply topically as needed.        Start Date: 3/20/2018 End Date: --    CALCIUM CARBONATE (CALCIUM 600) 600 MG CALCIUM (1,500 MG) TAB    Take 1 tablet (600 mg total) by mouth once  daily.       Start Date: 10/22/2019End Date: --    CLONIDINE (CATAPRES) 0.1 MG TABLET    Take once daily as needed for systolic BP above 160       Start Date: 5/30/2019 End Date: --    CLOTRIMAZOLE (LOTRIMIN) 1 % CREAM    Apply topically.        Start Date: 3/20/2018 End Date: --    FLUTICASONE PROPIONATE (FLONASE) 50 MCG/ACTUATION NASAL SPRAY    USE 1 SPRAY IN EACH NOSTRIL 2 TIMES DAILY AS NEEDED FOR RHINITIS  **MED WILL NOT AUTOFILL, REORDER WHEN NEEDED DURING REGULAR BUSINESS HOURS       Start Date: 8/14/2019 End Date: --    FLUTICASONE PROPIONATE (FLONASE) 50 MCG/ACTUATION NASAL SPRAY    USE 1 SPRAY IN EACH NOSTRIL 2 TIMES DAILY FOR RHINITIS       Start Date: 10/30/2017End Date: --    LISINOPRIL (PRINIVIL,ZESTRIL) 2.5 MG TABLET    Take 1 tablet (2.5 mg total) by mouth once daily.       Start Date: 1/6/2020  End Date: --    MULTIVITAMIN-MINERALS-LUTEIN (MULTIVITAMIN 50 PLUS) TAB    Take 1 tablet by mouth once daily.       Start Date: 9/10/2019 End Date: 9/9/2020    OLOPATADINE (PATANOL) 0.1 % OPHTHALMIC SOLUTION    INSTILL 1 DROP IN EACH EYE 2 TIMES DAILY       Start Date: 8/14/2019 End Date: --    OMEPRAZOLE (PRILOSEC) 20 MG CAPSULE    TAKE 1 CAPSULE BY MOUTH EVERY MORNING       Start Date: 6/18/2019 End Date: --    OXYBUTYNIN (DITROPAN-XL) 10 MG 24 HR TABLET    TAKE 1 TABLET BY MOUTH EVERY EVENING       Start Date: 12/17/2019End Date: --    POLYETHYLENE GLYCOL (MIRALAX) 17 GRAM PWPK    Take by mouth. Take every other day       Start Date: --        End Date: --    POLYMYXIN B SULF-TRIMETHOPRIM (POLYTRIM) 10,000 UNIT- 1 MG/ML DROP    Place 1 drop into both eyes every 6 (six) hours.       Start Date: 5/1/2019  End Date: --    PROMETHAZINE (PHENERGAN) 6.25 MG/5 ML SYRUP    Take 10 mLs (12.5 mg total) by mouth every 6 (six) hours as needed for Nausea (take with guaifenasin for cough/nausea).       Start Date: 11/11/2019End Date: --    TRAZODONE (DESYREL) 50 MG TABLET    TAKE 1 TABLET BY MOUTH AT BEDTIME       Start  Date: 10/22/2019End Date: --    VENLAFAXINE (EFFEXOR-XR) 37.5 MG 24 HR CAPSULE    Take 2 capsules (75 mg total) by mouth once daily.       Start Date: 1/6/2020  End Date: --    VITAMIN D (VITAMIN D3) 1000 UNITS TAB    Take 1 tablet (1,000 Units total) by mouth once daily.       Start Date: 10/22/2019End Date: --       Order(s) placed per written order guidelines:     Please advise.

## 2020-01-10 RX ORDER — OMEPRAZOLE 20 MG/1
20 CAPSULE, DELAYED RELEASE ORAL EVERY MORNING
Qty: 90 CAPSULE | Refills: 1 | Status: SHIPPED | OUTPATIENT
Start: 2020-01-10 | End: 2020-02-07 | Stop reason: SDUPTHER

## 2020-01-28 ENCOUNTER — TELEPHONE (OUTPATIENT)
Dept: FAMILY MEDICINE | Facility: CLINIC | Age: 85
End: 2020-01-28

## 2020-01-28 NOTE — TELEPHONE ENCOUNTER
----- Message from Wanda Ramirez sent at 1/28/2020  3:40 PM CST -----  Contact: COLIN HOOVER [[id number]]  Name of Who is Calling: COLIN HOOVER [[id number]]      What is the request in detail: Pt is calling to speak to staff in regards to continuous   pain and weakness after meds were changed . Pt would like to speak with someone regarding needing to either schedule an appointment at the office or dentist.... Please call to further assist .       Can the clinic reply by [facility]: N       What Number to Call Back if not in [facility]: [phone]

## 2020-01-28 NOTE — TELEPHONE ENCOUNTER
Pt states that new medication isn't working, and is wondering if she could be put back on previous medication.

## 2020-01-31 ENCOUNTER — TELEPHONE (OUTPATIENT)
Dept: FAMILY MEDICINE | Facility: CLINIC | Age: 85
End: 2020-01-31

## 2020-01-31 NOTE — TELEPHONE ENCOUNTER
----- Message from Linh Espino sent at 1/31/2020  2:09 PM CST -----  Type: Patient Call Back    Who called: pt     What is the request in detail: pt requesting to speak back to nurse she spoke to previously regarding scheduling appt for dentistry through [facility]. She states she spoke to Dr. Yadav's nurse but not certain name.     Can the clinic reply by [facility]? No     Would the patient rather a call back or a response via [facility]? Call back     Best call back number: [phone]    Additional Information:

## 2020-01-31 NOTE — TELEPHONE ENCOUNTER
Pt calling again to see if her medications can be adjusted; she states her stroke medication was changed to lisinopril and she does not like the way it is making her feel, states she feels like she is dying; also c/o soreness and burning to her mouth, wants to know if you can see her for this or does she need to go to dentist

## 2020-02-07 ENCOUNTER — OFFICE VISIT (OUTPATIENT)
Dept: FAMILY MEDICINE | Facility: CLINIC | Age: 85
End: 2020-02-07
Payer: MEDICARE

## 2020-02-07 VITALS
TEMPERATURE: 98 F | OXYGEN SATURATION: 96 % | RESPIRATION RATE: 16 BRPM | HEART RATE: 92 BPM | SYSTOLIC BLOOD PRESSURE: 138 MMHG | HEIGHT: 62 IN | DIASTOLIC BLOOD PRESSURE: 80 MMHG | BODY MASS INDEX: 27.99 KG/M2 | WEIGHT: 152.13 LBS

## 2020-02-07 DIAGNOSIS — K13.79 MOUTH SORE: Primary | ICD-10-CM

## 2020-02-07 PROCEDURE — 99999 PR PBB SHADOW E&M-EST. PATIENT-LVL V: ICD-10-PCS | Mod: PBBFAC,,, | Performed by: FAMILY MEDICINE

## 2020-02-07 PROCEDURE — 3075F PR MOST RECENT SYSTOLIC BLOOD PRESS GE 130-139MM HG: ICD-10-PCS | Mod: CPTII,S$GLB,, | Performed by: FAMILY MEDICINE

## 2020-02-07 PROCEDURE — 99214 PR OFFICE/OUTPT VISIT, EST, LEVL IV, 30-39 MIN: ICD-10-PCS | Mod: S$GLB,,, | Performed by: FAMILY MEDICINE

## 2020-02-07 PROCEDURE — 1125F PR PAIN SEVERITY QUANTIFIED, PAIN PRESENT: ICD-10-PCS | Mod: S$GLB,,, | Performed by: FAMILY MEDICINE

## 2020-02-07 PROCEDURE — 1101F PR PT FALLS ASSESS DOC 0-1 FALLS W/OUT INJ PAST YR: ICD-10-PCS | Mod: CPTII,S$GLB,, | Performed by: FAMILY MEDICINE

## 2020-02-07 PROCEDURE — 1125F AMNT PAIN NOTED PAIN PRSNT: CPT | Mod: S$GLB,,, | Performed by: FAMILY MEDICINE

## 2020-02-07 PROCEDURE — 99999 PR PBB SHADOW E&M-EST. PATIENT-LVL V: CPT | Mod: PBBFAC,,, | Performed by: FAMILY MEDICINE

## 2020-02-07 PROCEDURE — 1101F PT FALLS ASSESS-DOCD LE1/YR: CPT | Mod: CPTII,S$GLB,, | Performed by: FAMILY MEDICINE

## 2020-02-07 PROCEDURE — 1159F PR MEDICATION LIST DOCUMENTED IN MEDICAL RECORD: ICD-10-PCS | Mod: S$GLB,,, | Performed by: FAMILY MEDICINE

## 2020-02-07 PROCEDURE — 3075F SYST BP GE 130 - 139MM HG: CPT | Mod: CPTII,S$GLB,, | Performed by: FAMILY MEDICINE

## 2020-02-07 PROCEDURE — 99214 OFFICE O/P EST MOD 30 MIN: CPT | Mod: S$GLB,,, | Performed by: FAMILY MEDICINE

## 2020-02-07 PROCEDURE — 3079F PR MOST RECENT DIASTOLIC BLOOD PRESSURE 80-89 MM HG: ICD-10-PCS | Mod: CPTII,S$GLB,, | Performed by: FAMILY MEDICINE

## 2020-02-07 PROCEDURE — 1159F MED LIST DOCD IN RCRD: CPT | Mod: S$GLB,,, | Performed by: FAMILY MEDICINE

## 2020-02-07 PROCEDURE — 3079F DIAST BP 80-89 MM HG: CPT | Mod: CPTII,S$GLB,, | Performed by: FAMILY MEDICINE

## 2020-02-07 RX ORDER — OMEPRAZOLE 20 MG/1
20 CAPSULE, DELAYED RELEASE ORAL EVERY MORNING
Qty: 90 CAPSULE | Refills: 1 | Status: SHIPPED | OUTPATIENT
Start: 2020-02-07 | End: 2020-10-15

## 2020-02-12 ENCOUNTER — TELEPHONE (OUTPATIENT)
Dept: FAMILY MEDICINE | Facility: CLINIC | Age: 85
End: 2020-02-12

## 2020-02-12 NOTE — TELEPHONE ENCOUNTER
----- Message from Nba Owens sent at 2/12/2020  3:46 PM CST -----  Contact: Jo ([facility])  Name of Who is Calling: Jo ([facility])      What is the request in detail: Would like to inform staff that in order to initiate dental referral, staff should contact : [phone]      Can the clinic reply by [facility]: no      What Number to Call Back if not in [facility]: [phone]

## 2020-02-12 NOTE — PROGRESS NOTES
Subjective:       Patient ID: Carol Yadav is a 84 y.o. female.    Chief Complaint: Fatigue and Oral Pain      HPI  84-year-old female presents for oral pain.  Patient feels she has not ulcers and pain. Has not seen the dentist in over 5 years.  Patient still complains of fatigue.  Patient usually stays most of the night on her iPad and wakes up late.  Denies any other issues.      Review of Systems   Constitutional: Negative.    HENT: Positive for mouth sores.    Respiratory: Negative.    Cardiovascular: Negative.    Gastrointestinal: Negative.    Endocrine: Negative.    Genitourinary: Negative.    Musculoskeletal: Negative.    Neurological: Negative.    Psychiatric/Behavioral: Negative.           Past Medical History:   Diagnosis Date    Allergic rhinitis     Arthritis     Elevated blood pressure reading without diagnosis of hypertension     Hormone replacement therapy (postmenopausal)     Hyperlipidemia     Hypertension     Stroke      Past Surgical History:   Procedure Laterality Date    APPENDECTOMY      BREAST BIOPSY      CATARACT EXTRACTION Bilateral at age 62 or 72    monovision     EYE SURGERY      hai cataract surgery    OOPHORECTOMY      TONSILLECTOMY       Family History   Problem Relation Age of Onset    Cancer Mother     Breast cancer Mother     Stroke Father     No Known Problems Sister     No Known Problems Brother     No Known Problems Maternal Aunt     No Known Problems Maternal Uncle     No Known Problems Paternal Aunt     No Known Problems Paternal Uncle     No Known Problems Maternal Grandmother     No Known Problems Maternal Grandfather     No Known Problems Paternal Grandmother     No Known Problems Paternal Grandfather     Amblyopia Neg Hx     Blindness Neg Hx     Cataracts Neg Hx     Diabetes Neg Hx     Glaucoma Neg Hx     Hypertension Neg Hx     Macular degeneration Neg Hx     Retinal detachment Neg Hx     Strabismus Neg Hx     Thyroid disease Neg Hx       Social History     Socioeconomic History    Marital status:      Spouse name: Not on file    Number of children: Not on file    Years of education: Not on file    Highest education level: Not on file   Occupational History    Not on file   Social Needs    Financial resource strain: Not on file    Food insecurity:     Worry: Not on file     Inability: Not on file    Transportation needs:     Medical: Not on file     Non-medical: Not on file   Tobacco Use    Smoking status: Former Smoker     Packs/day: 2.00     Years: 36.00     Pack years: 72.00     Types: Cigarettes     Start date:      Last attempt to quit:      Years since quittin.1    Smokeless tobacco: Never Used   Substance and Sexual Activity    Alcohol use: Yes     Alcohol/week: 21.0 standard drinks     Types: 7 Glasses of wine, 14 Standard drinks or equivalent per week    Drug use: No    Sexual activity: Not Currently     Partners: Male     Comment: 2017 divorce    Lifestyle    Physical activity:     Days per week: Not on file     Minutes per session: Not on file    Stress: Not on file   Relationships    Social connections:     Talks on phone: Not on file     Gets together: Not on file     Attends Faith service: Not on file     Active member of club or organization: Not on file     Attends meetings of clubs or organizations: Not on file     Relationship status: Not on file   Other Topics Concern    Not on file   Social History Narrative    Not on file       Current Outpatient Medications:     acetaminophen (TYLENOL) 325 MG tablet, Take 2 tablets (650 mg total) by mouth every 4 (four) hours as needed., Disp: , Rfl: 0    alendronate (FOSAMAX) 70 MG tablet, TAKE 1 TABLET BY MOUTH EVERY 7 DAYS, Disp: 4 tablet, Rfl: 3    aspirin (ECOTRIN) 81 MG EC tablet, Take 81 mg by mouth once daily., Disp: , Rfl:     atorvastatin (LIPITOR) 40 MG tablet, Take 1 tablet (40 mg total) by mouth once daily., Disp: 90 tablet,  Rfl: 3    azithromycin (Z-JACKLYN) 250 MG tablet, Take 1 tablet (250 mg total) by mouth once daily. Take first 2 tablets together, then 1 every day until finished., Disp: 6 tablet, Rfl: 0    calcium carbonate (CALCIUM 600) 600 mg calcium (1,500 mg) Tab, Take 1 tablet (600 mg total) by mouth once daily., Disp: 90 tablet, Rfl: 1    cloNIDine (CATAPRES) 0.1 MG tablet, Take once daily as needed for systolic BP above 160, Disp: 30 tablet, Rfl: 0    fluticasone propionate (FLONASE) 50 mcg/actuation nasal spray, USE 1 SPRAY IN EACH NOSTRIL 2 TIMES DAILY AS NEEDED FOR RHINITIS  **MED WILL NOT AUTOFILL, REORDER WHEN NEEDED DURING REGULAR BUSINESS HOURS, Disp: 16 g, Rfl: 11    fluticasone propionate (FLONASE) 50 mcg/actuation nasal spray, USE 1 SPRAY IN EACH NOSTRIL 2 TIMES DAILY FOR RHINITIS, Disp: , Rfl:     lisinopril (PRINIVIL,ZESTRIL) 2.5 MG tablet, Take 1 tablet (2.5 mg total) by mouth once daily., Disp: 90 tablet, Rfl: 3    multivitamin-minerals-lutein (MULTIVITAMIN 50 PLUS) Tab, Take 1 tablet by mouth once daily., Disp: 90 tablet, Rfl: 3    olopatadine (PATANOL) 0.1 % ophthalmic solution, INSTILL 1 DROP IN EACH EYE 2 TIMES DAILY, Disp: 5 mL, Rfl: 11    omeprazole (PRILOSEC) 20 MG capsule, Take 1 capsule (20 mg total) by mouth every morning., Disp: 90 capsule, Rfl: 1    oxybutynin (DITROPAN-XL) 10 MG 24 hr tablet, TAKE 1 TABLET BY MOUTH EVERY EVENING, Disp: 28 tablet, Rfl: 3    polyethylene glycol (MIRALAX) 17 gram PwPk, Take by mouth. Take every other day, Disp: , Rfl:     promethazine (PHENERGAN) 6.25 mg/5 mL syrup, Take 10 mLs (12.5 mg total) by mouth every 6 (six) hours as needed for Nausea (take with guaifenasin for cough/nausea)., Disp: 118 mL, Rfl: 0    traZODone (DESYREL) 50 MG tablet, TAKE 1 TABLET BY MOUTH AT BEDTIME, Disp: 28 tablet, Rfl: 2    venlafaxine (EFFEXOR-XR) 37.5 MG 24 hr capsule, Take 2 capsules (75 mg total) by mouth once daily., Disp: 90 capsule, Rfl: 3    vitamin D (VITAMIN D3) 1000  "units Tab, Take 1 tablet (1,000 Units total) by mouth once daily., Disp: 90 tablet, Rfl: 3    (Magic mouthwash) 1:1:1 Benadryl 12.5mg/5ml liq, aluminum & magnesium hydroxide-simehticone (Maalox), lidocaine viscous 2%, Swish and spit 15 mLs every 4 (four) hours as needed. for mouth sores, Disp: 450 mL, Rfl: 1    betamethasone dipropionate (DIPROLENE) 0.05 % cream, Apply topically as needed. , Disp: , Rfl:     clotrimazole (LOTRIMIN) 1 % cream, Apply topically. , Disp: , Rfl:     diphenhydrAMINE-aluminum-magnesium hydroxide-simethicone-lidocaine HCl 2%, Swish and spit 15 mLs before meals, at bedtime and at 0200., Disp: 500 mL, Rfl: 1    polymyxin B sulf-trimethoprim (POLYTRIM) 10,000 unit- 1 mg/mL Drop, Place 1 drop into both eyes every 6 (six) hours. (Patient not taking: Reported on 1/6/2020), Disp: 10 mL, Rfl: 0   Objective:      Vitals:    02/07/20 1421   BP: 138/80   BP Location: Right arm   Patient Position: Sitting   BP Method: Medium (Manual)   Pulse: 92   Resp: 16   Temp: 98.4 °F (36.9 °C)   TempSrc: Oral   SpO2: 96%   Weight: 69 kg (152 lb 1.9 oz)   Height: 5' 2" (1.575 m)       Physical Exam   Constitutional: She is oriented to person, place, and time. No distress.   HENT:   Head: Normocephalic and atraumatic.   Mouth/Throat: Oral lesions (small ulcers noted) present. Abnormal dentition. No posterior oropharyngeal erythema.   Eyes: Conjunctivae are normal.   Neck: Neck supple.   Cardiovascular: Normal rate, regular rhythm and normal heart sounds. Exam reveals no gallop and no friction rub.   No murmur heard.  Pulmonary/Chest: Effort normal and breath sounds normal. She has no wheezes. She has no rales.   Neurological: She is alert and oriented to person, place, and time.   Skin: Skin is warm and dry.   Psychiatric: She has a normal mood and affect. Her behavior is normal. Judgment and thought content normal.          Assessment:       1. Mouth sore        Plan:       Mouth sore  -     " diphenhydrAMINE-aluminum-magnesium hydroxide-simethicone-lidocaine HCl 2%; Swish and spit 15 mLs before meals, at bedtime and at 0200.  Dispense: 500 mL; Refill: 1  -     Ambulatory referral/consult to Dentistry; Future; Expected date: 02/14/2020  -     omeprazole (PRILOSEC) 20 MG capsule; Take 1 capsule (20 mg total) by mouth every morning.  Dispense: 90 capsule; Refill: 1  -     (Magic mouthwash) 1:1:1 Benadryl 12.5mg/5ml liq, aluminum & magnesium hydroxide-simehticone (Maalox), lidocaine viscous 2%; Swish and spit 15 mLs every 4 (four) hours as needed. for mouth sores  Dispense: 450 mL; Refill: 1      Follow up in about 4 weeks (around 3/6/2020).            Jatin Yadav MD      Patient note was created using [email or web address].  Any errors in syntax or even information may not have been identified and edited on initial review prior to signing this note.

## 2020-02-13 DIAGNOSIS — F33.42 RECURRENT MAJOR DEPRESSIVE DISORDER, IN FULL REMISSION: ICD-10-CM

## 2020-02-13 RX ORDER — TRAZODONE HYDROCHLORIDE 50 MG/1
TABLET ORAL
Qty: 28 TABLET | Refills: 2 | Status: SHIPPED | OUTPATIENT
Start: 2020-02-13 | End: 2020-05-12

## 2020-03-10 ENCOUNTER — OFFICE VISIT (OUTPATIENT)
Dept: FAMILY MEDICINE | Facility: CLINIC | Age: 85
End: 2020-03-10
Payer: MEDICARE

## 2020-03-10 VITALS
WEIGHT: 142.19 LBS | RESPIRATION RATE: 20 BRPM | HEART RATE: 108 BPM | HEIGHT: 62 IN | DIASTOLIC BLOOD PRESSURE: 62 MMHG | OXYGEN SATURATION: 96 % | SYSTOLIC BLOOD PRESSURE: 114 MMHG | TEMPERATURE: 98 F | BODY MASS INDEX: 26.17 KG/M2

## 2020-03-10 DIAGNOSIS — K13.79 MOUTH SORE: Primary | ICD-10-CM

## 2020-03-10 PROCEDURE — 1159F PR MEDICATION LIST DOCUMENTED IN MEDICAL RECORD: ICD-10-PCS | Mod: S$GLB,,, | Performed by: FAMILY MEDICINE

## 2020-03-10 PROCEDURE — 1101F PR PT FALLS ASSESS DOC 0-1 FALLS W/OUT INJ PAST YR: ICD-10-PCS | Mod: CPTII,S$GLB,, | Performed by: FAMILY MEDICINE

## 2020-03-10 PROCEDURE — 99214 PR OFFICE/OUTPT VISIT, EST, LEVL IV, 30-39 MIN: ICD-10-PCS | Mod: S$GLB,,, | Performed by: FAMILY MEDICINE

## 2020-03-10 PROCEDURE — 3074F SYST BP LT 130 MM HG: CPT | Mod: CPTII,S$GLB,, | Performed by: FAMILY MEDICINE

## 2020-03-10 PROCEDURE — 3074F PR MOST RECENT SYSTOLIC BLOOD PRESSURE < 130 MM HG: ICD-10-PCS | Mod: CPTII,S$GLB,, | Performed by: FAMILY MEDICINE

## 2020-03-10 PROCEDURE — 1126F PR PAIN SEVERITY QUANTIFIED, NO PAIN PRESENT: ICD-10-PCS | Mod: S$GLB,,, | Performed by: FAMILY MEDICINE

## 2020-03-10 PROCEDURE — 3078F PR MOST RECENT DIASTOLIC BLOOD PRESSURE < 80 MM HG: ICD-10-PCS | Mod: CPTII,S$GLB,, | Performed by: FAMILY MEDICINE

## 2020-03-10 PROCEDURE — 3078F DIAST BP <80 MM HG: CPT | Mod: CPTII,S$GLB,, | Performed by: FAMILY MEDICINE

## 2020-03-10 PROCEDURE — 1101F PT FALLS ASSESS-DOCD LE1/YR: CPT | Mod: CPTII,S$GLB,, | Performed by: FAMILY MEDICINE

## 2020-03-10 PROCEDURE — 1159F MED LIST DOCD IN RCRD: CPT | Mod: S$GLB,,, | Performed by: FAMILY MEDICINE

## 2020-03-10 PROCEDURE — 99999 PR PBB SHADOW E&M-EST. PATIENT-LVL V: ICD-10-PCS | Mod: PBBFAC,,, | Performed by: FAMILY MEDICINE

## 2020-03-10 PROCEDURE — 1126F AMNT PAIN NOTED NONE PRSNT: CPT | Mod: S$GLB,,, | Performed by: FAMILY MEDICINE

## 2020-03-10 PROCEDURE — 99999 PR PBB SHADOW E&M-EST. PATIENT-LVL V: CPT | Mod: PBBFAC,,, | Performed by: FAMILY MEDICINE

## 2020-03-10 PROCEDURE — 99214 OFFICE O/P EST MOD 30 MIN: CPT | Mod: S$GLB,,, | Performed by: FAMILY MEDICINE

## 2020-03-10 NOTE — PROGRESS NOTES
"Subjective:       Patient ID: Carol Yadav is a 84 y.o. female.    Chief Complaint: Follow-up      Carol Yadav is an 83yo F who presents with tiredness. Patient reports that she feels "awful" because she has been getting weaker every day. States that this is an ongoing problem. Reports that she has has tried exercising 3 times a week with minimal relief. Has associated muscle aches. States that she is worried that she has mouth cancer or ALS. Reports that she has a lesion on the inside of her left cheek. Has associated mouth burning that worsening when she eats and drinks. Denies that it is growing. "Has been there for ages." Patient also reports worsening vision that has been ongoing for "ages." Denies polyuria, polydipsia, numbness in fingers or toes. Patient reports that she smoked cigarettes between the ages of 16 and 62. Smoked a pack a day. Drinks one glass of wine per day. Lives in assisted living.      Review of Systems   Constitutional: Positive for fatigue. Negative for fever.   HENT: Negative for sore throat.    Eyes: Positive for visual disturbance (ongoing worsening vision).   Respiratory: Negative for cough and shortness of breath.    Cardiovascular: Negative for chest pain.   Gastrointestinal: Negative for abdominal pain, blood in stool, diarrhea, nausea and vomiting.   Endocrine: Negative for polydipsia and polyuria.   Genitourinary: Negative for dysuria and hematuria.   Musculoskeletal: Positive for myalgias.   Neurological: Positive for weakness (generalized). Negative for numbness and headaches.   All other systems reviewed and are negative.         Past Medical History:   Diagnosis Date    Allergic rhinitis     Arthritis     Elevated blood pressure reading without diagnosis of hypertension     Hormone replacement therapy (postmenopausal)     Hyperlipidemia     Hypertension     Stroke      Past Surgical History:   Procedure Laterality Date    APPENDECTOMY      BREAST BIOPSY      CATARACT " EXTRACTION Bilateral at age 62 or 72    monovision     EYE SURGERY      hai cataract surgery    OOPHORECTOMY      TONSILLECTOMY       Family History   Problem Relation Age of Onset    Cancer Mother     Breast cancer Mother     Stroke Father     No Known Problems Sister     No Known Problems Brother     No Known Problems Maternal Aunt     No Known Problems Maternal Uncle     No Known Problems Paternal Aunt     No Known Problems Paternal Uncle     No Known Problems Maternal Grandmother     No Known Problems Maternal Grandfather     No Known Problems Paternal Grandmother     No Known Problems Paternal Grandfather     Amblyopia Neg Hx     Blindness Neg Hx     Cataracts Neg Hx     Diabetes Neg Hx     Glaucoma Neg Hx     Hypertension Neg Hx     Macular degeneration Neg Hx     Retinal detachment Neg Hx     Strabismus Neg Hx     Thyroid disease Neg Hx      Social History     Socioeconomic History    Marital status:      Spouse name: Not on file    Number of children: Not on file    Years of education: Not on file    Highest education level: Not on file   Occupational History    Not on file   Social Needs    Financial resource strain: Not on file    Food insecurity:     Worry: Not on file     Inability: Not on file    Transportation needs:     Medical: Not on file     Non-medical: Not on file   Tobacco Use    Smoking status: Former Smoker     Packs/day: 2.00     Years: 36.00     Pack years: 72.00     Types: Cigarettes     Start date:      Last attempt to quit:      Years since quittin.2    Smokeless tobacco: Never Used   Substance and Sexual Activity    Alcohol use: Yes     Alcohol/week: 21.0 standard drinks     Types: 7 Glasses of wine, 14 Standard drinks or equivalent per week    Drug use: No    Sexual activity: Not Currently     Partners: Male     Comment: 2017 divorce    Lifestyle    Physical activity:     Days per week: Not on file     Minutes per  session: Not on file    Stress: Not on file   Relationships    Social connections:     Talks on phone: Not on file     Gets together: Not on file     Attends Rastafarian service: Not on file     Active member of club or organization: Not on file     Attends meetings of clubs or organizations: Not on file     Relationship status: Not on file   Other Topics Concern    Not on file   Social History Narrative    Not on file       Current Outpatient Medications:     (Magic mouthwash) 1:1:1 Benadryl 12.5mg/5ml liq, aluminum & magnesium hydroxide-simehticone (Maalox), lidocaine viscous 2%, Swish and spit 15 mLs every 4 (four) hours as needed. for mouth sores, Disp: 450 mL, Rfl: 1    acetaminophen (TYLENOL) 325 MG tablet, Take 2 tablets (650 mg total) by mouth every 4 (four) hours as needed., Disp: , Rfl: 0    alendronate (FOSAMAX) 70 MG tablet, TAKE 1 TABLET BY MOUTH EVERY 7 DAYS, Disp: 4 tablet, Rfl: 3    aspirin (ECOTRIN) 81 MG EC tablet, Take 81 mg by mouth once daily., Disp: , Rfl:     atorvastatin (LIPITOR) 40 MG tablet, Take 1 tablet (40 mg total) by mouth once daily., Disp: 90 tablet, Rfl: 3    azithromycin (Z-JACKLYN) 250 MG tablet, Take 1 tablet (250 mg total) by mouth once daily. Take first 2 tablets together, then 1 every day until finished., Disp: 6 tablet, Rfl: 0    calcium carbonate (CALCIUM 600) 600 mg calcium (1,500 mg) Tab, Take 1 tablet (600 mg total) by mouth once daily., Disp: 90 tablet, Rfl: 1    cloNIDine (CATAPRES) 0.1 MG tablet, Take once daily as needed for systolic BP above 160, Disp: 30 tablet, Rfl: 0    diphenhydrAMINE-aluminum-magnesium hydroxide-simethicone-lidocaine HCl 2%, Swish and spit 15 mLs before meals, at bedtime and at 0200., Disp: 500 mL, Rfl: 1    fluticasone propionate (FLONASE) 50 mcg/actuation nasal spray, USE 1 SPRAY IN EACH NOSTRIL 2 TIMES DAILY AS NEEDED FOR RHINITIS  **MED WILL NOT AUTOFILL, REORDER WHEN NEEDED DURING REGULAR BUSINESS HOURS, Disp: 16 g, Rfl: 11     "fluticasone propionate (FLONASE) 50 mcg/actuation nasal spray, USE 1 SPRAY IN EACH NOSTRIL 2 TIMES DAILY FOR RHINITIS, Disp: , Rfl:     lisinopril (PRINIVIL,ZESTRIL) 2.5 MG tablet, Take 1 tablet (2.5 mg total) by mouth once daily., Disp: 90 tablet, Rfl: 3    multivitamin-minerals-lutein (MULTIVITAMIN 50 PLUS) Tab, Take 1 tablet by mouth once daily., Disp: 90 tablet, Rfl: 3    olopatadine (PATANOL) 0.1 % ophthalmic solution, INSTILL 1 DROP IN EACH EYE 2 TIMES DAILY, Disp: 5 mL, Rfl: 11    omeprazole (PRILOSEC) 20 MG capsule, Take 1 capsule (20 mg total) by mouth every morning., Disp: 90 capsule, Rfl: 1    oxybutynin (DITROPAN-XL) 10 MG 24 hr tablet, TAKE 1 TABLET BY MOUTH EVERY EVENING, Disp: 28 tablet, Rfl: 3    polyethylene glycol (MIRALAX) 17 gram PwPk, Take by mouth. Take every other day, Disp: , Rfl:     polymyxin B sulf-trimethoprim (POLYTRIM) 10,000 unit- 1 mg/mL Drop, Place 1 drop into both eyes every 6 (six) hours., Disp: 10 mL, Rfl: 0    promethazine (PHENERGAN) 6.25 mg/5 mL syrup, Take 10 mLs (12.5 mg total) by mouth every 6 (six) hours as needed for Nausea (take with guaifenasin for cough/nausea)., Disp: 118 mL, Rfl: 0    traZODone (DESYREL) 50 MG tablet, TAKE 1 TABLET BY MOUTH AT BEDTIME, Disp: 28 tablet, Rfl: 2    venlafaxine (EFFEXOR-XR) 37.5 MG 24 hr capsule, Take 2 capsules (75 mg total) by mouth once daily., Disp: 90 capsule, Rfl: 3    vitamin D (VITAMIN D3) 1000 units Tab, Take 1 tablet (1,000 Units total) by mouth once daily., Disp: 90 tablet, Rfl: 3    betamethasone dipropionate (DIPROLENE) 0.05 % cream, Apply topically as needed. , Disp: , Rfl:     clotrimazole (LOTRIMIN) 1 % cream, Apply topically. , Disp: , Rfl:    Objective:      Vitals:    03/10/20 1432   BP: 114/62   BP Location: Left arm   Patient Position: Sitting   BP Method: Large (Manual)   Pulse: 108   Resp: 20   Temp: 97.8 °F (36.6 °C)   TempSrc: Oral   SpO2: 96%   Weight: 64.5 kg (142 lb 3.2 oz)   Height: 5' 2" (1.575 " m)       Physical Exam   Constitutional: She appears well-developed and well-nourished. No distress.   HENT:   Head: Normocephalic and atraumatic.   Mouth/Throat: Oropharynx is clear and moist. No oropharyngeal exudate.   Eyes: Pupils are equal, round, and reactive to light. EOM are normal.   Neck: Normal range of motion. Neck supple.   Cardiovascular: Normal rate, regular rhythm and normal heart sounds.   No murmur heard.  Pulmonary/Chest: Effort normal and breath sounds normal. No respiratory distress.   Abdominal: Soft. Bowel sounds are normal. There is no tenderness.   Musculoskeletal: Normal range of motion.   Neurological: She is alert.   Skin: Skin is warm and dry. She is not diaphoretic.   Nursing note and vitals reviewed.         Assessment:       1. Mouth sore        Plan:       Mouth sore  Advised pt to f/u w/ dentist. Had PH nurse talk to patient about scheduling an appt w/ dentis.    Follow up if symptoms worsen or fail to improve.            Jatin Yadav MD      Patient note was created using [employer].  Any errors in syntax or even information may not have been identified and edited on initial review prior to signing this note.

## 2020-04-17 ENCOUNTER — OFFICE VISIT (OUTPATIENT)
Dept: FAMILY MEDICINE | Facility: CLINIC | Age: 85
End: 2020-04-17
Payer: MEDICARE

## 2020-04-17 DIAGNOSIS — I10 HYPERTENSION, WELL CONTROLLED: ICD-10-CM

## 2020-04-17 DIAGNOSIS — N32.81 OAB (OVERACTIVE BLADDER): ICD-10-CM

## 2020-04-17 DIAGNOSIS — Z86.73 H/O: CVA (CEREBROVASCULAR ACCIDENT): ICD-10-CM

## 2020-04-17 DIAGNOSIS — R53.81 PHYSICAL DECONDITIONING: Primary | ICD-10-CM

## 2020-04-17 PROCEDURE — 99442 PR PHYSICIAN TELEPHONE EVALUATION 11-20 MIN: ICD-10-PCS | Mod: 95,,, | Performed by: FAMILY MEDICINE

## 2020-04-17 PROCEDURE — 99442 PR PHYSICIAN TELEPHONE EVALUATION 11-20 MIN: CPT | Mod: 95,,, | Performed by: FAMILY MEDICINE

## 2020-04-17 PROCEDURE — 1159F PR MEDICATION LIST DOCUMENTED IN MEDICAL RECORD: ICD-10-PCS | Mod: 95,,, | Performed by: FAMILY MEDICINE

## 2020-04-17 PROCEDURE — 1101F PT FALLS ASSESS-DOCD LE1/YR: CPT | Mod: CPTII,95,, | Performed by: FAMILY MEDICINE

## 2020-04-17 PROCEDURE — 1101F PR PT FALLS ASSESS DOC 0-1 FALLS W/OUT INJ PAST YR: ICD-10-PCS | Mod: CPTII,95,, | Performed by: FAMILY MEDICINE

## 2020-04-17 PROCEDURE — 1159F MED LIST DOCD IN RCRD: CPT | Mod: 95,,, | Performed by: FAMILY MEDICINE

## 2020-04-17 RX ORDER — OXYBUTYNIN CHLORIDE 10 MG/1
TABLET, EXTENDED RELEASE ORAL
Qty: 28 TABLET | Refills: 3 | Status: SHIPPED | OUTPATIENT
Start: 2020-04-17 | End: 2020-08-04

## 2020-04-17 NOTE — PROGRESS NOTES
Subjective:       Patient ID: Carol Yadav is a 84 y.o. female.    Chief Complaint: No chief complaint on file.      HPI  84-year-old female was seen for audio visit.  Patient states she had an episode increased fatigue about 3 weeks ago.  Went to ED and was found to be negative for COVID.  Patient states she has improved since then but still complains of her deconditioning.  Patient states due to the pain physical exercise classes has been stopped better independent living.  States she has mainly watching TV or playing on her iPad.  Endorses minimal ambulation.  Denies any other issues at this time.      Review of Systems   Constitutional: Positive for fatigue.   HENT: Negative.    Respiratory: Negative.    Cardiovascular: Negative.    Gastrointestinal: Negative.    Endocrine: Negative.    Genitourinary: Negative.    Musculoskeletal: Negative.    Neurological: Negative.    Psychiatric/Behavioral: Negative.           Past Medical History:   Diagnosis Date    Allergic rhinitis     Arthritis     Elevated blood pressure reading without diagnosis of hypertension     Hormone replacement therapy (postmenopausal)     Hyperlipidemia     Hypertension     Stroke      Past Surgical History:   Procedure Laterality Date    APPENDECTOMY      BREAST BIOPSY      CATARACT EXTRACTION Bilateral at age 62 or 72    monovision     EYE SURGERY      hai cataract surgery    OOPHORECTOMY      TONSILLECTOMY       Family History   Problem Relation Age of Onset    Cancer Mother     Breast cancer Mother     Stroke Father     No Known Problems Sister     No Known Problems Brother     No Known Problems Maternal Aunt     No Known Problems Maternal Uncle     No Known Problems Paternal Aunt     No Known Problems Paternal Uncle     No Known Problems Maternal Grandmother     No Known Problems Maternal Grandfather     No Known Problems Paternal Grandmother     No Known Problems Paternal Grandfather     Amblyopia Neg Hx      Blindness Neg Hx     Cataracts Neg Hx     Diabetes Neg Hx     Glaucoma Neg Hx     Hypertension Neg Hx     Macular degeneration Neg Hx     Retinal detachment Neg Hx     Strabismus Neg Hx     Thyroid disease Neg Hx      Social History     Socioeconomic History    Marital status:      Spouse name: Not on file    Number of children: Not on file    Years of education: Not on file    Highest education level: Not on file   Occupational History    Not on file   Social Needs    Financial resource strain: Not on file    Food insecurity:     Worry: Not on file     Inability: Not on file    Transportation needs:     Medical: Not on file     Non-medical: Not on file   Tobacco Use    Smoking status: Former Smoker     Packs/day: 2.00     Years: 36.00     Pack years: 72.00     Types: Cigarettes     Start date:      Last attempt to quit:      Years since quittin.3    Smokeless tobacco: Never Used   Substance and Sexual Activity    Alcohol use: Yes     Alcohol/week: 21.0 standard drinks     Types: 7 Glasses of wine, 14 Standard drinks or equivalent per week    Drug use: No    Sexual activity: Not Currently     Partners: Male     Comment: 2017 divorce    Lifestyle    Physical activity:     Days per week: Not on file     Minutes per session: Not on file    Stress: Not on file   Relationships    Social connections:     Talks on phone: Not on file     Gets together: Not on file     Attends Orthodoxy service: Not on file     Active member of club or organization: Not on file     Attends meetings of clubs or organizations: Not on file     Relationship status: Not on file   Other Topics Concern    Not on file   Social History Narrative    Not on file       Current Outpatient Medications:     (Magic mouthwash) 1:1:1 Benadryl 12.5mg/5ml liq, aluminum & magnesium hydroxide-simehticone (Maalox), lidocaine viscous 2%, Swish and spit 15 mLs every 4 (four) hours as needed. for mouth sores, Disp:  450 mL, Rfl: 1    acetaminophen (TYLENOL) 325 MG tablet, Take 2 tablets (650 mg total) by mouth every 4 (four) hours as needed., Disp: , Rfl: 0    alendronate (FOSAMAX) 70 MG tablet, TAKE 1 TABLET BY MOUTH EVERY 7 DAYS, Disp: 4 tablet, Rfl: 3    aspirin (ECOTRIN) 81 MG EC tablet, Take 81 mg by mouth once daily., Disp: , Rfl:     atorvastatin (LIPITOR) 40 MG tablet, Take 1 tablet (40 mg total) by mouth once daily., Disp: 90 tablet, Rfl: 3    azithromycin (Z-JACKLYN) 250 MG tablet, Take 1 tablet (250 mg total) by mouth once daily. Take first 2 tablets together, then 1 every day until finished., Disp: 6 tablet, Rfl: 0    betamethasone dipropionate (DIPROLENE) 0.05 % cream, Apply topically as needed. , Disp: , Rfl:     calcium carbonate (CALCIUM 600) 600 mg calcium (1,500 mg) Tab, Take 1 tablet (600 mg total) by mouth once daily., Disp: 90 tablet, Rfl: 1    cloNIDine (CATAPRES) 0.1 MG tablet, Take once daily as needed for systolic BP above 160, Disp: 30 tablet, Rfl: 0    clotrimazole (LOTRIMIN) 1 % cream, Apply topically. , Disp: , Rfl:     diphenhydrAMINE-aluminum-magnesium hydroxide-simethicone-lidocaine HCl 2%, Swish and spit 15 mLs before meals, at bedtime and at 0200., Disp: 500 mL, Rfl: 1    fluticasone propionate (FLONASE) 50 mcg/actuation nasal spray, USE 1 SPRAY IN EACH NOSTRIL 2 TIMES DAILY AS NEEDED FOR RHINITIS  **MED WILL NOT AUTOFILL, REORDER WHEN NEEDED DURING REGULAR BUSINESS HOURS, Disp: 16 g, Rfl: 11    fluticasone propionate (FLONASE) 50 mcg/actuation nasal spray, USE 1 SPRAY IN EACH NOSTRIL 2 TIMES DAILY FOR RHINITIS, Disp: , Rfl:     lisinopril (PRINIVIL,ZESTRIL) 2.5 MG tablet, Take 1 tablet (2.5 mg total) by mouth once daily., Disp: 90 tablet, Rfl: 3    multivitamin-minerals-lutein (MULTIVITAMIN 50 PLUS) Tab, Take 1 tablet by mouth once daily., Disp: 90 tablet, Rfl: 3    olopatadine (PATANOL) 0.1 % ophthalmic solution, INSTILL 1 DROP IN EACH EYE 2 TIMES DAILY, Disp: 5 mL, Rfl: 11     omeprazole (PRILOSEC) 20 MG capsule, Take 1 capsule (20 mg total) by mouth every morning., Disp: 90 capsule, Rfl: 1    oxybutynin (DITROPAN-XL) 10 MG 24 hr tablet, TAKE 1 TABLET BY MOUTH EVERY EVENING, Disp: 28 tablet, Rfl: 3    polyethylene glycol (MIRALAX) 17 gram PwPk, Take by mouth. Take every other day, Disp: , Rfl:     polymyxin B sulf-trimethoprim (POLYTRIM) 10,000 unit- 1 mg/mL Drop, Place 1 drop into both eyes every 6 (six) hours., Disp: 10 mL, Rfl: 0    promethazine (PHENERGAN) 6.25 mg/5 mL syrup, Take 10 mLs (12.5 mg total) by mouth every 6 (six) hours as needed for Nausea (take with guaifenasin for cough/nausea)., Disp: 118 mL, Rfl: 0    traZODone (DESYREL) 50 MG tablet, TAKE 1 TABLET BY MOUTH AT BEDTIME, Disp: 28 tablet, Rfl: 2    venlafaxine (EFFEXOR-XR) 37.5 MG 24 hr capsule, Take 2 capsules (75 mg total) by mouth once daily., Disp: 90 capsule, Rfl: 3    vitamin D (VITAMIN D3) 1000 units Tab, Take 1 tablet (1,000 Units total) by mouth once daily., Disp: 90 tablet, Rfl: 3   Objective:      There were no vitals filed for this visit.    Physical Exam   n/a    Assessment:       1. Physical deconditioning    2. Hypertension, well controlled    3. H/O: CVA (cerebrovascular accident)        Plan:       Physical deconditioning  Advised exercise and ambulation due to her fatigue. Was neg for covid19. Stated she would like a f/u XR. Will discuss at next visit.    Hypertension, well controlled  Stable. Continue regimen    H/O: CVA (cerebrovascular accident)  Stable. Continue regimen  Advised pt importance of physical exercise.              Jatin Yadav MD      Patient note was created using [employer].  Any errors in syntax or even information may not have been identified and edited on initial review prior to signing this note.    The patient location is: home  The chief complaint leading to consultation is: fatigue  Visit type: audio only  Total time spent with patient: 15 mins  Each patient to whom he or  she provides medical services by telemedicine is:  (1) informed of the relationship between the physician and patient and the respective role of any other health care provider with respect to management of the patient; and (2) notified that he or she may decline to receive medical services by telemedicine and may withdraw from such care at any time.

## 2020-05-12 DIAGNOSIS — F33.42 RECURRENT MAJOR DEPRESSIVE DISORDER, IN FULL REMISSION: ICD-10-CM

## 2020-05-12 RX ORDER — TRAZODONE HYDROCHLORIDE 50 MG/1
TABLET ORAL
Qty: 30 TABLET | Refills: 2 | Status: SHIPPED | OUTPATIENT
Start: 2020-05-12 | End: 2020-08-05

## 2020-05-19 DIAGNOSIS — M85.80 OSTEOPENIA, UNSPECIFIED LOCATION: ICD-10-CM

## 2020-05-19 RX ORDER — ALENDRONATE SODIUM 70 MG/1
TABLET ORAL
Qty: 4 TABLET | Refills: 3 | Status: SHIPPED | OUTPATIENT
Start: 2020-05-19 | End: 2020-09-29

## 2020-05-22 ENCOUNTER — TELEPHONE (OUTPATIENT)
Dept: FAMILY MEDICINE | Facility: CLINIC | Age: 85
End: 2020-05-22

## 2020-05-22 NOTE — TELEPHONE ENCOUNTER
----- Message from Rhianna Stark sent at 5/22/2020  1:20 PM CDT -----  Contact: COLIN HOOVER [[id number]]  Name of Who is Calling: COLIN OHOVER [[id number]]    What is the request in detail: Patient is requesting orders for annual labs, COVID-19 antibody test and would like to have it done at home. She states she would rather a virtual visit because they are in lockdown. Please contact to further discuss and advise      Can the clinic reply by [facility]: no    What Number to Call Back if not in [facility]: [phone]

## 2020-08-05 NOTE — PROGRESS NOTES
Please notify patient results show no sign of a urinary track infection. She may need to see gyn if she is continuing to have sxs.  Thanks. Low Risk (score 7-11)

## 2020-08-13 ENCOUNTER — OFFICE VISIT (OUTPATIENT)
Dept: FAMILY MEDICINE | Facility: CLINIC | Age: 85
End: 2020-08-13
Payer: MEDICARE

## 2020-08-13 VITALS
BODY MASS INDEX: 30.44 KG/M2 | HEIGHT: 62 IN | DIASTOLIC BLOOD PRESSURE: 66 MMHG | RESPIRATION RATE: 20 BRPM | SYSTOLIC BLOOD PRESSURE: 134 MMHG | TEMPERATURE: 98 F | WEIGHT: 165.38 LBS | HEART RATE: 99 BPM | OXYGEN SATURATION: 97 %

## 2020-08-13 DIAGNOSIS — R53.83 FATIGUE, UNSPECIFIED TYPE: Primary | ICD-10-CM

## 2020-08-13 PROCEDURE — 99214 PR OFFICE/OUTPT VISIT, EST, LEVL IV, 30-39 MIN: ICD-10-PCS | Mod: 25,S$GLB,, | Performed by: FAMILY MEDICINE

## 2020-08-13 PROCEDURE — 1159F MED LIST DOCD IN RCRD: CPT | Mod: S$GLB,,, | Performed by: FAMILY MEDICINE

## 2020-08-13 PROCEDURE — 1126F PR PAIN SEVERITY QUANTIFIED, NO PAIN PRESENT: ICD-10-PCS | Mod: S$GLB,,, | Performed by: FAMILY MEDICINE

## 2020-08-13 PROCEDURE — 99999 PR PBB SHADOW E&M-EST. PATIENT-LVL IV: ICD-10-PCS | Mod: PBBFAC,,, | Performed by: FAMILY MEDICINE

## 2020-08-13 PROCEDURE — 3078F DIAST BP <80 MM HG: CPT | Mod: CPTII,S$GLB,, | Performed by: FAMILY MEDICINE

## 2020-08-13 PROCEDURE — 99999 PR PBB SHADOW E&M-EST. PATIENT-LVL IV: CPT | Mod: PBBFAC,,, | Performed by: FAMILY MEDICINE

## 2020-08-13 PROCEDURE — 96372 PR INJECTION,THERAP/PROPH/DIAG2ST, IM OR SUBCUT: ICD-10-PCS | Mod: S$GLB,,, | Performed by: FAMILY MEDICINE

## 2020-08-13 PROCEDURE — 1101F PT FALLS ASSESS-DOCD LE1/YR: CPT | Mod: CPTII,S$GLB,, | Performed by: FAMILY MEDICINE

## 2020-08-13 PROCEDURE — 3078F PR MOST RECENT DIASTOLIC BLOOD PRESSURE < 80 MM HG: ICD-10-PCS | Mod: CPTII,S$GLB,, | Performed by: FAMILY MEDICINE

## 2020-08-13 PROCEDURE — 96372 THER/PROPH/DIAG INJ SC/IM: CPT | Mod: S$GLB,,, | Performed by: FAMILY MEDICINE

## 2020-08-13 PROCEDURE — 1159F PR MEDICATION LIST DOCUMENTED IN MEDICAL RECORD: ICD-10-PCS | Mod: S$GLB,,, | Performed by: FAMILY MEDICINE

## 2020-08-13 PROCEDURE — 3075F PR MOST RECENT SYSTOLIC BLOOD PRESS GE 130-139MM HG: ICD-10-PCS | Mod: CPTII,S$GLB,, | Performed by: FAMILY MEDICINE

## 2020-08-13 PROCEDURE — 99499 UNLISTED E&M SERVICE: CPT | Mod: S$GLB,,, | Performed by: FAMILY MEDICINE

## 2020-08-13 PROCEDURE — 3075F SYST BP GE 130 - 139MM HG: CPT | Mod: CPTII,S$GLB,, | Performed by: FAMILY MEDICINE

## 2020-08-13 PROCEDURE — 99499 RISK ADDL DX/OHS AUDIT: ICD-10-PCS | Mod: S$GLB,,, | Performed by: FAMILY MEDICINE

## 2020-08-13 PROCEDURE — 1101F PR PT FALLS ASSESS DOC 0-1 FALLS W/OUT INJ PAST YR: ICD-10-PCS | Mod: CPTII,S$GLB,, | Performed by: FAMILY MEDICINE

## 2020-08-13 PROCEDURE — 99214 OFFICE O/P EST MOD 30 MIN: CPT | Mod: 25,S$GLB,, | Performed by: FAMILY MEDICINE

## 2020-08-13 PROCEDURE — 1126F AMNT PAIN NOTED NONE PRSNT: CPT | Mod: S$GLB,,, | Performed by: FAMILY MEDICINE

## 2020-08-13 RX ORDER — CYANOCOBALAMIN 1000 UG/ML
1000 INJECTION, SOLUTION INTRAMUSCULAR; SUBCUTANEOUS
Status: COMPLETED | OUTPATIENT
Start: 2020-08-13 | End: 2020-08-13

## 2020-08-13 RX ADMIN — CYANOCOBALAMIN 1000 MCG: 1000 INJECTION, SOLUTION INTRAMUSCULAR; SUBCUTANEOUS at 04:08

## 2020-08-13 NOTE — PROGRESS NOTES
.Pt received B12. Tolerated well. Pt instructed to wait 15mins to be assessed for adverse reactions. verbalized understanding.

## 2020-08-14 NOTE — PROGRESS NOTES
Subjective:       Patient ID: Carol Yadav is a 84 y.o. female.    Chief Complaint: Hyperlipidemia and Hypertension      Ms Carol Yadav is a 84-year-old female presenting for chronic fatigue and generalized weakness. Patient feels as if she has become weaker and weaker over the past few months and is worried she may have cancer or progression of her stroke in 2014. Patient denies fever, night sweats, and new neurological deficits. Patient lives in assisted living and describes a sedentary lifestyle where she mainly watches TV, completes crossword puzzles, and plays word-based puzzle games on her iPad. Patient remains in bed the entire day, walking only to the bathroom within her home a few times a day. Patient complains of subjective mental slowing but denies memory loss. Patient has been socializing less due to restrictions within her living facility related to the coronavirus outbreak. Patient was counseled on the importance of physical activity and will attempt to walk down the [place] once or twice a day.      Review of Systems   Constitutional: Positive for fatigue. Negative for fever.   HENT: Negative.    Eyes: Negative.    Respiratory: Negative.    Cardiovascular: Negative.    Gastrointestinal: Negative.    Endocrine: Negative.    Genitourinary: Negative.    Musculoskeletal: Negative.    Skin: Negative.    Allergic/Immunologic: Negative.    Neurological: Negative.    Hematological: Negative.    Psychiatric/Behavioral: Negative.           Past Medical History:   Diagnosis Date    Allergic rhinitis     Arthritis     Elevated blood pressure reading without diagnosis of hypertension     Hormone replacement therapy (postmenopausal)     Hyperlipidemia     Hypertension     Stroke      Past Surgical History:   Procedure Laterality Date    APPENDECTOMY      BREAST BIOPSY      CATARACT EXTRACTION Bilateral at age 62 or 72    monovision     EYE SURGERY      hai cataract surgery    OOPHORECTOMY      TONSILLECTOMY        Family History   Problem Relation Age of Onset    Cancer Mother     Breast cancer Mother     Stroke Father     No Known Problems Sister     No Known Problems Brother     No Known Problems Maternal Aunt     No Known Problems Maternal Uncle     No Known Problems Paternal Aunt     No Known Problems Paternal Uncle     No Known Problems Maternal Grandmother     No Known Problems Maternal Grandfather     No Known Problems Paternal Grandmother     No Known Problems Paternal Grandfather     Amblyopia Neg Hx     Blindness Neg Hx     Cataracts Neg Hx     Diabetes Neg Hx     Glaucoma Neg Hx     Hypertension Neg Hx     Macular degeneration Neg Hx     Retinal detachment Neg Hx     Strabismus Neg Hx     Thyroid disease Neg Hx      Social History     Socioeconomic History    Marital status:      Spouse name: Not on file    Number of children: Not on file    Years of education: Not on file    Highest education level: Not on file   Occupational History    Not on file   Social Needs    Financial resource strain: Not on file    Food insecurity     Worry: Not on file     Inability: Not on file    Transportation needs     Medical: Not on file     Non-medical: Not on file   Tobacco Use    Smoking status: Former Smoker     Packs/day: 2.00     Years: 36.00     Pack years: 72.00     Types: Cigarettes     Start date:      Quit date:      Years since quittin.6    Smokeless tobacco: Never Used   Substance and Sexual Activity    Alcohol use: Yes     Alcohol/week: 21.0 standard drinks     Types: 7 Glasses of wine, 14 Standard drinks or equivalent per week    Drug use: No    Sexual activity: Not Currently     Partners: Male     Comment: 2017 divorce    Lifestyle    Physical activity     Days per week: Not on file     Minutes per session: Not on file    Stress: Not on file   Relationships    Social connections     Talks on phone: Not on file     Gets together: Not on file      Attends Hoahaoism service: Not on file     Active member of club or organization: Not on file     Attends meetings of clubs or organizations: Not on file     Relationship status: Not on file   Other Topics Concern    Not on file   Social History Narrative    Not on file       Current Outpatient Medications:     acetaminophen (TYLENOL) 325 MG tablet, Take 2 tablets (650 mg total) by mouth every 4 (four) hours as needed., Disp: , Rfl: 0    alendronate (FOSAMAX) 70 MG tablet, TAKE 1 TABLET BY MOUTH EVERY 7 DAYS, Disp: 4 tablet, Rfl: 3    aspirin (ECOTRIN) 81 MG EC tablet, Take 81 mg by mouth once daily., Disp: , Rfl:     atorvastatin (LIPITOR) 40 MG tablet, TAKE 1 TABLET BY MOUTH ONCE DAILY, Disp: 30 tablet, Rfl: 3    azithromycin (Z-JACKLYN) 250 MG tablet, Take 1 tablet (250 mg total) by mouth once daily. Take first 2 tablets together, then 1 every day until finished., Disp: 6 tablet, Rfl: 0    calcium carbonate (CALCIUM 600) 600 mg calcium (1,500 mg) Tab, Take 1 tablet (600 mg total) by mouth once daily., Disp: 90 tablet, Rfl: 1    cloNIDine (CATAPRES) 0.1 MG tablet, Take once daily as needed for systolic BP above 160, Disp: 30 tablet, Rfl: 0    diphenhydrAMINE-aluminum-magnesium hydroxide-simethicone-lidocaine HCl 2%, Swish and spit 15 mLs before meals, at bedtime and at 0200., Disp: 500 mL, Rfl: 1    fluticasone propionate (FLONASE) 50 mcg/actuation nasal spray, USE 1 SPRAY IN EACH NOSTRIL 2 TIMES DAILY AS NEEDED FOR RHINITIS  **MED WILL NOT AUTOFILL, REORDER WHEN NEEDED DURING REGULAR BUSINESS HOURS, Disp: 16 g, Rfl: 11    fluticasone propionate (FLONASE) 50 mcg/actuation nasal spray, USE 1 SPRAY IN EACH NOSTRIL 2 TIMES DAILY FOR RHINITIS, Disp: , Rfl:     lisinopril (PRINIVIL,ZESTRIL) 2.5 MG tablet, Take 1 tablet (2.5 mg total) by mouth once daily., Disp: 90 tablet, Rfl: 3    multivitamin-minerals-lutein (MULTIVITAMIN 50 PLUS) Tab, Take 1 tablet by mouth once daily., Disp: 90 tablet, Rfl: 3     "olopatadine (PATANOL) 0.1 % ophthalmic solution, INSTILL 1 DROP IN EACH EYE 2 TIMES DAILY, Disp: 5 mL, Rfl: 11    omeprazole (PRILOSEC) 20 MG capsule, Take 1 capsule (20 mg total) by mouth every morning., Disp: 90 capsule, Rfl: 1    polyethylene glycol (MIRALAX) 17 gram PwPk, Take by mouth. Take every other day, Disp: , Rfl:     polymyxin B sulf-trimethoprim (POLYTRIM) 10,000 unit- 1 mg/mL Drop, Place 1 drop into both eyes every 6 (six) hours., Disp: 10 mL, Rfl: 0    promethazine (PHENERGAN) 6.25 mg/5 mL syrup, Take 10 mLs (12.5 mg total) by mouth every 6 (six) hours as needed for Nausea (take with guaifenasin for cough/nausea)., Disp: 118 mL, Rfl: 0    traZODone (DESYREL) 50 MG tablet, TAKE 1 TABLET BY MOUTH AT BEDTIME, Disp: 30 tablet, Rfl: 2    venlafaxine (EFFEXOR-XR) 37.5 MG 24 hr capsule, Take 2 capsules (75 mg total) by mouth once daily., Disp: 90 capsule, Rfl: 3    vitamin D (VITAMIN D3) 1000 units Tab, Take 1 tablet (1,000 Units total) by mouth once daily., Disp: 90 tablet, Rfl: 3    (Magic mouthwash) 1:1:1 Benadryl 12.5mg/5ml liq, aluminum & magnesium hydroxide-simehticone (Maalox), lidocaine viscous 2%, Swish and spit 15 mLs every 4 (four) hours as needed. for mouth sores (Patient not taking: Reported on 8/13/2020), Disp: 450 mL, Rfl: 1    betamethasone dipropionate (DIPROLENE) 0.05 % cream, Apply topically as needed. , Disp: , Rfl:     clotrimazole (LOTRIMIN) 1 % cream, Apply topically. , Disp: , Rfl:     oxybutynin (DITROPAN-XL) 10 MG 24 hr tablet, TAKE 1 TABLET BY MOUTH EVERY EVENING, Disp: 28 tablet, Rfl: 5   Objective:      Vitals:    08/13/20 1442   BP: 134/66   BP Location: Right arm   Patient Position: Sitting   BP Method: Large (Manual)   Pulse: 99   Resp: 20   Temp: 98.4 °F (36.9 °C)   TempSrc: Oral   SpO2: 97%   Weight: 75 kg (165 lb 5.5 oz)   Height: 5' 2" (1.575 m)       Physical Exam  Constitutional:       Appearance: Normal appearance.   Eyes:      Extraocular Movements: " Extraocular movements intact.   Cardiovascular:      Rate and Rhythm: Normal rate and regular rhythm.      Pulses: Normal pulses.      Heart sounds: Normal heart sounds.   Abdominal:      General: Bowel sounds are normal.   Skin:     General: Skin is warm and dry.      Capillary Refill: Capillary refill takes less than 2 seconds.   Neurological:      Mental Status: Mental status is at baseline.      Motor: Weakness present.      Comments: Left sided UL and LL weakness.    Psychiatric:         Mood and Affect: Mood normal.            Assessment:       1. Fatigue, unspecified type        Plan:       Fatigue, unspecified type  Patient has significant physical deconditioning due to minimal daily activity. Patient informed on benefit of low intensity exercise. Will try b12 as well.  -     cyanocobalamin injection 1,000 mcg                Patient note was created using [email or web address].  Any errors in syntax or even information may not have been identified and edited on initial review prior to signing this note.

## 2020-09-11 NOTE — TELEPHONE ENCOUNTER
Last Office Visit Info:   The patient's last visit with Jatin Yadav MD was on 8/13/2020.    The patient's last visit in current department was on 8/13/2020.        Last CBC Results:   Lab Results   Component Value Date    WBC 4.81 11/11/2019    HGB 14.0 11/11/2019    HCT 43.2 11/11/2019     11/11/2019       Last CMP Results  Lab Results   Component Value Date     11/11/2019    K 3.9 11/11/2019     11/11/2019    CO2 27 11/11/2019    BUN 11 11/11/2019    CREATININE 0.9 11/11/2019    CALCIUM 9.4 11/11/2019    ALBUMIN 3.8 11/11/2019    AST 23 11/11/2019    ALT 18 11/11/2019       Last Lipids  Lab Results   Component Value Date    CHOL 158 09/11/2019    TRIG 105 09/11/2019    HDL 50 09/11/2019    LDLCALC 87.0 09/11/2019       Last A1C  Lab Results   Component Value Date    HGBA1C 5.7 (H) 09/11/2019       Last TSH  Lab Results   Component Value Date    TSH 2.641 09/11/2019         Current Med Refills  Medication List with Changes/Refills   Current Medications    (MAGIC MOUTHWASH) 1:1:1 BENADRYL 12.5MG/5ML LIQ, ALUMINUM & MAGNESIUM HYDROXIDE-SIMEHTICONE (MAALOX), LIDOCAINE VISCOUS 2%    Swish and spit 15 mLs every 4 (four) hours as needed. for mouth sores       Start Date: 2/7/2020  End Date: --    ACETAMINOPHEN (TYLENOL) 325 MG TABLET    Take 2 tablets (650 mg total) by mouth every 4 (four) hours as needed.       Start Date: 12/16/2014End Date: --    ALENDRONATE (FOSAMAX) 70 MG TABLET    TAKE 1 TABLET BY MOUTH EVERY 7 DAYS       Start Date: 5/19/2020 End Date: --    ASPIRIN (ECOTRIN) 81 MG EC TABLET    Take 81 mg by mouth once daily.       Start Date: --        End Date: --    ATORVASTATIN (LIPITOR) 40 MG TABLET    TAKE 1 TABLET BY MOUTH ONCE DAILY       Start Date: 8/6/2020  End Date: --    AZITHROMYCIN (Z-JACKLYN) 250 MG TABLET    Take 1 tablet (250 mg total) by mouth once daily. Take first 2 tablets together, then 1 every day until finished.       Start Date: 11/11/2019End Date: --    BETAMETHASONE  DIPROPIONATE (DIPROLENE) 0.05 % CREAM    Apply topically as needed.        Start Date: 3/20/2018 End Date: --    CALCIUM CARBONATE (CALCIUM 600) 600 MG CALCIUM (1,500 MG) TAB    Take 1 tablet (600 mg total) by mouth once daily.       Start Date: 10/22/2019End Date: --    CLONIDINE (CATAPRES) 0.1 MG TABLET    Take once daily as needed for systolic BP above 160       Start Date: 5/30/2019 End Date: --    CLOTRIMAZOLE (LOTRIMIN) 1 % CREAM    Apply topically.        Start Date: 3/20/2018 End Date: --    DIPHENHYDRAMINE-ALUMINUM-MAGNESIUM HYDROXIDE-SIMETHICONE-LIDOCAINE HCL 2%    Swish and spit 15 mLs before meals, at bedtime and at 0200.       Start Date: 2/7/2020  End Date: --    FLUTICASONE PROPIONATE (FLONASE) 50 MCG/ACTUATION NASAL SPRAY    USE 1 SPRAY IN EACH NOSTRIL 2 TIMES DAILY AS NEEDED FOR RHINITIS  **MED WILL NOT AUTOFILL, REORDER WHEN NEEDED DURING REGULAR BUSINESS HOURS       Start Date: 8/14/2019 End Date: --    FLUTICASONE PROPIONATE (FLONASE) 50 MCG/ACTUATION NASAL SPRAY    USE 1 SPRAY IN EACH NOSTRIL 2 TIMES DAILY FOR RHINITIS       Start Date: 10/30/2017End Date: --    LISINOPRIL (PRINIVIL,ZESTRIL) 2.5 MG TABLET    Take 1 tablet (2.5 mg total) by mouth once daily.       Start Date: 1/6/2020  End Date: --    MULTIVITAMIN-MINERALS-LUTEIN (MULTIVITAMIN 50 PLUS) TAB    Take 1 tablet by mouth once daily.       Start Date: 9/10/2019 End Date: 9/9/2020    OLOPATADINE (PATANOL) 0.1 % OPHTHALMIC SOLUTION    INSTILL 1 DROP IN EACH EYE 2 TIMES DAILY       Start Date: 8/14/2019 End Date: --    OMEPRAZOLE (PRILOSEC) 20 MG CAPSULE    Take 1 capsule (20 mg total) by mouth every morning.       Start Date: 2/7/2020  End Date: --    OXYBUTYNIN (DITROPAN-XL) 10 MG 24 HR TABLET    TAKE 1 TABLET BY MOUTH EVERY EVENING       Start Date: 9/2/2020  End Date: --    POLYETHYLENE GLYCOL (MIRALAX) 17 GRAM PWPK    Take by mouth. Take every other day       Start Date: --        End Date: --    POLYMYXIN B SULF-TRIMETHOPRIM  (POLYTRIM) 10,000 UNIT- 1 MG/ML DROP    Place 1 drop into both eyes every 6 (six) hours.       Start Date: 5/1/2019  End Date: --    PROMETHAZINE (PHENERGAN) 6.25 MG/5 ML SYRUP    Take 10 mLs (12.5 mg total) by mouth every 6 (six) hours as needed for Nausea (take with guaifenasin for cough/nausea).       Start Date: 11/11/2019End Date: --    TRAZODONE (DESYREL) 50 MG TABLET    TAKE 1 TABLET BY MOUTH AT BEDTIME       Start Date: 8/5/2020  End Date: --    VENLAFAXINE (EFFEXOR-XR) 37.5 MG 24 HR CAPSULE    Take 2 capsules (75 mg total) by mouth once daily.       Start Date: 1/6/2020  End Date: --    VITAMIN D (VITAMIN D3) 1000 UNITS TAB    Take 1 tablet (1,000 Units total) by mouth once daily.       Start Date: 10/22/2019End Date: --       Order(s) placed per written order guidelines:     Please advise.

## 2020-09-14 RX ORDER — FLUTICASONE PROPIONATE 50 MCG
SPRAY, SUSPENSION (ML) NASAL
Qty: 16 G | Refills: 0 | Status: SHIPPED | OUTPATIENT
Start: 2020-09-14 | End: 2021-06-15 | Stop reason: SDUPTHER

## 2020-09-15 ENCOUNTER — TELEPHONE (OUTPATIENT)
Dept: FAMILY MEDICINE | Facility: CLINIC | Age: 85
End: 2020-09-15

## 2020-09-15 NOTE — TELEPHONE ENCOUNTER
----- Message from Madiha Stevens sent at 9/15/2020  3:27 PM CDT -----  Regarding: Self/    [phone]  Type: Patient Call Back    Who called:  Patient    What is the request in detail:  Patient would like staff to give her a call regarding her taking flu shingle or pneumonia shot at the [facility] close to her.  Thank you      Would the patient rather a call back or a response via [facility]?   Call back    Best call back number:  [phone]          
Advised patient that it is ok to have vaccines done facility.   
2016/mammogram

## 2020-10-05 DIAGNOSIS — H10.31 ACUTE BACTERIAL CONJUNCTIVITIS OF RIGHT EYE: ICD-10-CM

## 2020-10-05 DIAGNOSIS — H04.203 BILATERAL EPIPHORA: ICD-10-CM

## 2020-10-05 NOTE — TELEPHONE ENCOUNTER
Last Office Visit Info:   The patient's last visit with Jatin Yadav MD was on 8/13/2020.    The patient's last visit in current department was on 8/13/2020.        Last CBC Results:   Lab Results   Component Value Date    WBC 4.81 11/11/2019    HGB 14.0 11/11/2019    HCT 43.2 11/11/2019     11/11/2019       Last CMP Results  Lab Results   Component Value Date     11/11/2019    K 3.9 11/11/2019     11/11/2019    CO2 27 11/11/2019    BUN 11 11/11/2019    CREATININE 0.9 11/11/2019    CALCIUM 9.4 11/11/2019    ALBUMIN 3.8 11/11/2019    AST 23 11/11/2019    ALT 18 11/11/2019       Last Lipids  Lab Results   Component Value Date    CHOL 158 09/11/2019    TRIG 105 09/11/2019    HDL 50 09/11/2019    LDLCALC 87.0 09/11/2019       Last A1C  Lab Results   Component Value Date    HGBA1C 5.7 (H) 09/11/2019       Last TSH  Lab Results   Component Value Date    TSH 2.641 09/11/2019         Current Med Refills  Medication List with Changes/Refills   Current Medications    (MAGIC MOUTHWASH) 1:1:1 BENADRYL 12.5MG/5ML LIQ, ALUMINUM & MAGNESIUM HYDROXIDE-SIMEHTICONE (MAALOX), LIDOCAINE VISCOUS 2%    Swish and spit 15 mLs every 4 (four) hours as needed. for mouth sores       Start Date: 2/7/2020  End Date: --    ACETAMINOPHEN (TYLENOL) 325 MG TABLET    Take 2 tablets (650 mg total) by mouth every 4 (four) hours as needed.       Start Date: 12/16/2014End Date: --    ALENDRONATE (FOSAMAX) 70 MG TABLET    TAKE 1 TABLET BY MOUTH EVERY 7 DAYS       Start Date: 9/29/2020 End Date: --    ASPIRIN (ECOTRIN) 81 MG EC TABLET    Take 81 mg by mouth once daily.       Start Date: --        End Date: --    ATORVASTATIN (LIPITOR) 40 MG TABLET    TAKE 1 TABLET BY MOUTH ONCE DAILY       Start Date: 8/6/2020  End Date: --    AZITHROMYCIN (Z-JACKLYN) 250 MG TABLET    Take 1 tablet (250 mg total) by mouth once daily. Take first 2 tablets together, then 1 every day until finished.       Start Date: 11/11/2019End Date: --    BETAMETHASONE  DIPROPIONATE (DIPROLENE) 0.05 % CREAM    Apply topically as needed.        Start Date: 3/20/2018 End Date: --    CALCIUM CARBONATE (CALCIUM 600) 600 MG CALCIUM (1,500 MG) TAB    Take 1 tablet (600 mg total) by mouth once daily.       Start Date: 10/22/2019End Date: --    CLONIDINE (CATAPRES) 0.1 MG TABLET    Take once daily as needed for systolic BP above 160       Start Date: 5/30/2019 End Date: --    CLOTRIMAZOLE (LOTRIMIN) 1 % CREAM    Apply topically.        Start Date: 3/20/2018 End Date: --    DIPHENHYDRAMINE-ALUMINUM-MAGNESIUM HYDROXIDE-SIMETHICONE-LIDOCAINE HCL 2%    Swish and spit 15 mLs before meals, at bedtime and at 0200.       Start Date: 2/7/2020  End Date: --    FLUTICASONE PROPIONATE (FLONASE) 50 MCG/ACTUATION NASAL SPRAY    USE 1 SPRAY IN EACH NOSTRIL 2 TIMES DAILY AS NEEDED FOR RHINITIS  **MED WILL NOT AUTOFILL, REORDER WHEN NEEDED DURING REGULAR BUSINESS HOURS       Start Date: 8/14/2019 End Date: --    FLUTICASONE PROPIONATE (FLONASE) 50 MCG/ACTUATION NASAL SPRAY    USE 1 SPRAY IN EACH NOSTRIL 2 TIMES DAILY FOR RHINITIS       Start Date: 9/14/2020 End Date: --    LISINOPRIL (PRINIVIL,ZESTRIL) 2.5 MG TABLET    Take 1 tablet (2.5 mg total) by mouth once daily.       Start Date: 1/6/2020  End Date: --    MULTIVITAMIN-MINERALS-LUTEIN (MULTIVITAMIN 50 PLUS) TAB    Take 1 tablet by mouth once daily.       Start Date: 9/10/2019 End Date: 9/9/2020    OLOPATADINE (PATANOL) 0.1 % OPHTHALMIC SOLUTION    INSTILL 1 DROP IN EACH EYE 2 TIMES DAILY       Start Date: 8/14/2019 End Date: --    OMEPRAZOLE (PRILOSEC) 20 MG CAPSULE    Take 1 capsule (20 mg total) by mouth every morning.       Start Date: 2/7/2020  End Date: --    OXYBUTYNIN (DITROPAN-XL) 10 MG 24 HR TABLET    TAKE 1 TABLET BY MOUTH EVERY EVENING       Start Date: 9/2/2020  End Date: --    POLYETHYLENE GLYCOL (MIRALAX) 17 GRAM PWPK    Take by mouth. Take every other day       Start Date: --        End Date: --    POLYMYXIN B SULF-TRIMETHOPRIM  (POLYTRIM) 10,000 UNIT- 1 MG/ML DROP    Place 1 drop into both eyes every 6 (six) hours.       Start Date: 5/1/2019  End Date: --    PROMETHAZINE (PHENERGAN) 6.25 MG/5 ML SYRUP    Take 10 mLs (12.5 mg total) by mouth every 6 (six) hours as needed for Nausea (take with guaifenasin for cough/nausea).       Start Date: 11/11/2019End Date: --    TRAZODONE (DESYREL) 50 MG TABLET    TAKE 1 TABLET BY MOUTH AT BEDTIME       Start Date: 8/5/2020  End Date: --    VENLAFAXINE (EFFEXOR-XR) 37.5 MG 24 HR CAPSULE    Take 2 capsules (75 mg total) by mouth once daily.       Start Date: 1/6/2020  End Date: --    VITAMIN D (VITAMIN D3) 1000 UNITS TAB    Take 1 tablet (1,000 Units total) by mouth once daily.       Start Date: 10/22/2019End Date: --       Order(s) placed per written order guidelines:     Please advise.

## 2020-10-07 RX ORDER — POLYMYXIN B SULFATE AND TRIMETHOPRIM 1; 10000 MG/ML; [USP'U]/ML
1 SOLUTION OPHTHALMIC EVERY 6 HOURS
Qty: 10 ML | Refills: 0 | Status: SHIPPED | OUTPATIENT
Start: 2020-10-07 | End: 2021-06-15 | Stop reason: CLARIF

## 2020-10-07 RX ORDER — OLOPATADINE HYDROCHLORIDE 1 MG/ML
SOLUTION/ DROPS OPHTHALMIC
Qty: 5 ML | Refills: 11 | Status: SHIPPED | OUTPATIENT
Start: 2020-10-07 | End: 2021-11-15

## 2020-10-15 ENCOUNTER — OFFICE VISIT (OUTPATIENT)
Dept: FAMILY MEDICINE | Facility: CLINIC | Age: 85
End: 2020-10-15
Payer: MEDICARE

## 2020-10-15 VITALS
DIASTOLIC BLOOD PRESSURE: 72 MMHG | BODY MASS INDEX: 30.73 KG/M2 | HEART RATE: 110 BPM | WEIGHT: 167 LBS | OXYGEN SATURATION: 97 % | HEIGHT: 62 IN | RESPIRATION RATE: 20 BRPM | SYSTOLIC BLOOD PRESSURE: 130 MMHG | TEMPERATURE: 99 F

## 2020-10-15 DIAGNOSIS — R53.81 DEBILITY: ICD-10-CM

## 2020-10-15 DIAGNOSIS — F32.A ANXIETY AND DEPRESSION: ICD-10-CM

## 2020-10-15 DIAGNOSIS — K13.79 MOUTH SORE: ICD-10-CM

## 2020-10-15 DIAGNOSIS — F41.9 ANXIETY AND DEPRESSION: ICD-10-CM

## 2020-10-15 DIAGNOSIS — K21.9 GASTROESOPHAGEAL REFLUX DISEASE, UNSPECIFIED WHETHER ESOPHAGITIS PRESENT: ICD-10-CM

## 2020-10-15 DIAGNOSIS — F33.42 RECURRENT MAJOR DEPRESSIVE DISORDER, IN FULL REMISSION: ICD-10-CM

## 2020-10-15 DIAGNOSIS — I10 HYPERTENSION, WELL CONTROLLED: ICD-10-CM

## 2020-10-15 DIAGNOSIS — Z86.73 H/O: CVA (CEREBROVASCULAR ACCIDENT): ICD-10-CM

## 2020-10-15 DIAGNOSIS — Z23 NEED FOR INFLUENZA VACCINATION: ICD-10-CM

## 2020-10-15 DIAGNOSIS — R30.0 DYSURIA: ICD-10-CM

## 2020-10-15 DIAGNOSIS — E78.2 MIXED HYPERLIPIDEMIA: ICD-10-CM

## 2020-10-15 DIAGNOSIS — Z00.00 ANNUAL PHYSICAL EXAM: Primary | ICD-10-CM

## 2020-10-15 PROCEDURE — G0008 ADMIN INFLUENZA VIRUS VAC: HCPCS | Mod: S$GLB,,, | Performed by: FAMILY MEDICINE

## 2020-10-15 PROCEDURE — 99999 PR PBB SHADOW E&M-EST. PATIENT-LVL V: CPT | Mod: PBBFAC,,, | Performed by: FAMILY MEDICINE

## 2020-10-15 PROCEDURE — 99999 PR PBB SHADOW E&M-EST. PATIENT-LVL V: ICD-10-PCS | Mod: PBBFAC,,, | Performed by: FAMILY MEDICINE

## 2020-10-15 PROCEDURE — 90694 FLU VACCINE - QUADRIVALENT - ADJUVANTED: ICD-10-PCS | Mod: S$GLB,,, | Performed by: FAMILY MEDICINE

## 2020-10-15 PROCEDURE — 90694 VACC AIIV4 NO PRSRV 0.5ML IM: CPT | Mod: S$GLB,,, | Performed by: FAMILY MEDICINE

## 2020-10-15 PROCEDURE — 99214 PR OFFICE/OUTPT VISIT, EST, LEVL IV, 30-39 MIN: ICD-10-PCS | Mod: 25,S$GLB,, | Performed by: FAMILY MEDICINE

## 2020-10-15 PROCEDURE — G0008 FLU VACCINE - QUADRIVALENT - ADJUVANTED: ICD-10-PCS | Mod: S$GLB,,, | Performed by: FAMILY MEDICINE

## 2020-10-15 PROCEDURE — 99214 OFFICE O/P EST MOD 30 MIN: CPT | Mod: 25,S$GLB,, | Performed by: FAMILY MEDICINE

## 2020-10-15 PROCEDURE — 3075F PR MOST RECENT SYSTOLIC BLOOD PRESS GE 130-139MM HG: ICD-10-PCS | Mod: CPTII,S$GLB,, | Performed by: FAMILY MEDICINE

## 2020-10-15 PROCEDURE — 3078F DIAST BP <80 MM HG: CPT | Mod: CPTII,S$GLB,, | Performed by: FAMILY MEDICINE

## 2020-10-15 PROCEDURE — 3075F SYST BP GE 130 - 139MM HG: CPT | Mod: CPTII,S$GLB,, | Performed by: FAMILY MEDICINE

## 2020-10-15 PROCEDURE — 3078F PR MOST RECENT DIASTOLIC BLOOD PRESSURE < 80 MM HG: ICD-10-PCS | Mod: CPTII,S$GLB,, | Performed by: FAMILY MEDICINE

## 2020-10-15 RX ORDER — VENLAFAXINE HYDROCHLORIDE 150 MG/1
150 CAPSULE, EXTENDED RELEASE ORAL DAILY
Qty: 90 CAPSULE | Refills: 1 | Status: SHIPPED | OUTPATIENT
Start: 2020-10-15 | End: 2021-04-16

## 2020-10-15 RX ORDER — FAMOTIDINE 20 MG/1
20 TABLET, FILM COATED ORAL 2 TIMES DAILY PRN
Qty: 60 TABLET | Refills: 2 | Status: SHIPPED | OUTPATIENT
Start: 2020-10-15 | End: 2021-10-15

## 2020-10-15 RX ORDER — TRAZODONE HYDROCHLORIDE 50 MG/1
50 TABLET ORAL NIGHTLY
Qty: 90 TABLET | Refills: 1 | Status: SHIPPED | OUTPATIENT
Start: 2020-10-15 | End: 2021-04-16

## 2020-10-15 NOTE — PROGRESS NOTES
Subjective:       Patient ID: Carol Yadav is a 85 y.o. female.    Chief Complaint: Hyperlipidemia, Hypertension, and Annual Exam      HPI     86 yo F presents to clinic for follow-up. Her fatigue and weakness continues to worsen everyday. She tries to exercise but due to COVID the only time she gets to exercise is her walk to her meals at her assisted living facility. She feels like she has lost all her power and continues to become more forgetful. However, she notes she continues to do word puzzles and sudoku.  She notes she is able to perform her ADL's but does have a  difficulty doing so.  She states she has been eating enough but has been eating a lot of sweets. Her mood has not been great either describing episodes where she yells at facility staff and family members. She denies any suicidal ideation or thoughts of self harm. She also has some burning urination that she noticed. She also continues to feel a burning sensation in her mouth/throat with some loss of taste.         Review of Systems   Constitutional: Positive for fatigue.   HENT:        Burning mouth    Respiratory: Negative.    Cardiovascular: Negative.    Genitourinary: Positive for dysuria.   Skin: Negative.    Neurological: Positive for tremors and weakness.   Psychiatric/Behavioral: Positive for agitation.          Past Medical History:   Diagnosis Date    Allergic rhinitis     Arthritis     Elevated blood pressure reading without diagnosis of hypertension     Hormone replacement therapy (postmenopausal)     Hyperlipidemia     Hypertension     Stroke      Past Surgical History:   Procedure Laterality Date    APPENDECTOMY      BREAST BIOPSY      CATARACT EXTRACTION Bilateral at age 62 or 72    monovision     EYE SURGERY      hai cataract surgery    OOPHORECTOMY      TONSILLECTOMY       Family History   Problem Relation Age of Onset    Cancer Mother     Breast cancer Mother     Stroke Father     No Known Problems Sister     No  Known Problems Brother     No Known Problems Maternal Aunt     No Known Problems Maternal Uncle     No Known Problems Paternal Aunt     No Known Problems Paternal Uncle     No Known Problems Maternal Grandmother     No Known Problems Maternal Grandfather     No Known Problems Paternal Grandmother     No Known Problems Paternal Grandfather     Amblyopia Neg Hx     Blindness Neg Hx     Cataracts Neg Hx     Diabetes Neg Hx     Glaucoma Neg Hx     Hypertension Neg Hx     Macular degeneration Neg Hx     Retinal detachment Neg Hx     Strabismus Neg Hx     Thyroid disease Neg Hx      Social History     Socioeconomic History    Marital status:      Spouse name: Not on file    Number of children: Not on file    Years of education: Not on file    Highest education level: Not on file   Occupational History    Not on file   Social Needs    Financial resource strain: Not on file    Food insecurity     Worry: Not on file     Inability: Not on file    Transportation needs     Medical: Not on file     Non-medical: Not on file   Tobacco Use    Smoking status: Former Smoker     Packs/day: 2.00     Years: 36.00     Pack years: 72.00     Types: Cigarettes     Start date:      Quit date:      Years since quittin.8    Smokeless tobacco: Never Used   Substance and Sexual Activity    Alcohol use: Yes     Alcohol/week: 21.0 standard drinks     Types: 7 Glasses of wine, 14 Standard drinks or equivalent per week    Drug use: No    Sexual activity: Not Currently     Partners: Male     Comment: 2017 divorce    Lifestyle    Physical activity     Days per week: Not on file     Minutes per session: Not on file    Stress: Not on file   Relationships    Social connections     Talks on phone: Not on file     Gets together: Not on file     Attends Caodaism service: Not on file     Active member of club or organization: Not on file     Attends meetings of clubs or organizations: Not on file      Relationship status: Not on file   Other Topics Concern    Not on file   Social History Narrative    Not on file       Current Outpatient Medications:     acetaminophen (TYLENOL) 325 MG tablet, Take 2 tablets (650 mg total) by mouth every 4 (four) hours as needed., Disp: , Rfl: 0    alendronate (FOSAMAX) 70 MG tablet, TAKE 1 TABLET BY MOUTH EVERY 7 DAYS, Disp: 4 tablet, Rfl: 3    aspirin (ECOTRIN) 81 MG EC tablet, Take 81 mg by mouth once daily., Disp: , Rfl:     atorvastatin (LIPITOR) 40 MG tablet, TAKE 1 TABLET BY MOUTH ONCE DAILY, Disp: 30 tablet, Rfl: 3    azithromycin (Z-JACKLYN) 250 MG tablet, Take 1 tablet (250 mg total) by mouth once daily. Take first 2 tablets together, then 1 every day until finished., Disp: 6 tablet, Rfl: 0    calcium carbonate (CALCIUM 600) 600 mg calcium (1,500 mg) Tab, Take 1 tablet (600 mg total) by mouth once daily., Disp: 90 tablet, Rfl: 1    cloNIDine (CATAPRES) 0.1 MG tablet, Take once daily as needed for systolic BP above 160, Disp: 30 tablet, Rfl: 0    diphenhydrAMINE-aluminum-magnesium hydroxide-simethicone-lidocaine HCl 2%, Swish and spit 15 mLs before meals, at bedtime and at 0200., Disp: 500 mL, Rfl: 1    fluticasone propionate (FLONASE) 50 mcg/actuation nasal spray, USE 1 SPRAY IN EACH NOSTRIL 2 TIMES DAILY AS NEEDED FOR RHINITIS  **MED WILL NOT AUTOFILL, REORDER WHEN NEEDED DURING REGULAR BUSINESS HOURS, Disp: 16 g, Rfl: 11    fluticasone propionate (FLONASE) 50 mcg/actuation nasal spray, USE 1 SPRAY IN EACH NOSTRIL 2 TIMES DAILY FOR RHINITIS, Disp: 16 g, Rfl: 0    lisinopril (PRINIVIL,ZESTRIL) 2.5 MG tablet, Take 1 tablet (2.5 mg total) by mouth once daily., Disp: 90 tablet, Rfl: 3    olopatadine (PATANOL) 0.1 % ophthalmic solution, INSTILL 1 DROP IN EACH EYE 2 TIMES DAILY, Disp: 5 mL, Rfl: 11    oxybutynin (DITROPAN-XL) 10 MG 24 hr tablet, TAKE 1 TABLET BY MOUTH EVERY EVENING, Disp: 28 tablet, Rfl: 5    polyethylene glycol (MIRALAX) 17 gram PwPk, Take by  "mouth. Take every other day, Disp: , Rfl:     polymyxin B sulf-trimethoprim (POLYTRIM) 10,000 unit- 1 mg/mL Drop, Place 1 drop into both eyes every 6 (six) hours., Disp: 10 mL, Rfl: 0    promethazine (PHENERGAN) 6.25 mg/5 mL syrup, Take 10 mLs (12.5 mg total) by mouth every 6 (six) hours as needed for Nausea (take with guaifenasin for cough/nausea)., Disp: 118 mL, Rfl: 0    traZODone (DESYREL) 50 MG tablet, Take 1 tablet (50 mg total) by mouth every evening., Disp: 90 tablet, Rfl: 1    venlafaxine (EFFEXOR-XR) 150 MG Cp24, Take 1 capsule (150 mg total) by mouth once daily., Disp: 90 capsule, Rfl: 1    vitamin D (VITAMIN D3) 1000 units Tab, Take 1 tablet (1,000 Units total) by mouth once daily., Disp: 90 tablet, Rfl: 3    (Magic mouthwash) 1:1:1 Benadryl 12.5mg/5ml liq, aluminum & magnesium hydroxide-simehticone (Maalox), lidocaine viscous 2%, Swish and spit 15 mLs every 4 (four) hours as needed. for mouth sores, Disp: 450 mL, Rfl: 1    betamethasone dipropionate (DIPROLENE) 0.05 % cream, Apply topically as needed. , Disp: , Rfl:     clotrimazole (LOTRIMIN) 1 % cream, Apply topically. , Disp: , Rfl:     famotidine (PEPCID) 20 MG tablet, Take 1 tablet (20 mg total) by mouth 2 (two) times daily as needed., Disp: 60 tablet, Rfl: 2    multivitamin-minerals-lutein (MULTIVITAMIN 50 PLUS) Tab, Take 1 tablet by mouth once daily., Disp: 90 tablet, Rfl: 3   Objective:      Vitals:    10/15/20 1432   BP: 130/72   BP Location: Left arm   Patient Position: Sitting   BP Method: Large (Manual)   Pulse: 110   Resp: 20   Temp: 98.5 °F (36.9 °C)   TempSrc: Oral   SpO2: 97%   Weight: 75.8 kg (167 lb)   Height: 5' 2" (1.575 m)       Physical Exam  Constitutional:       Appearance: Normal appearance.   HENT:      Head: Normocephalic and atraumatic.   Neck:      Musculoskeletal: Neck supple.   Cardiovascular:      Rate and Rhythm: Normal rate and regular rhythm.      Heart sounds: Normal heart sounds.   Pulmonary:      Effort: " Pulmonary effort is normal. No respiratory distress.      Breath sounds: Normal breath sounds.   Skin:     General: Skin is warm and dry.   Neurological:      Mental Status: She is alert.      Motor: Weakness present.   Psychiatric:         Mood and Affect: Mood normal.         Behavior: Behavior normal.            Assessment:       1. Annual physical exam    2. Need for influenza vaccination    3. Mixed hyperlipidemia    4. Hypertension, well controlled    5. Dysuria    6. H/O: CVA (cerebrovascular accident)    7. Anxiety and depression    8. Mouth sore    9. Recurrent major depressive disorder, in full remission    10. Gastroesophageal reflux disease, unspecified whether esophagitis present    11. Debility        Plan:       Annual physical exam  -     Lipid Panel; Future; Expected date: 10/15/2020  -     CBC auto differential; Future; Expected date: 10/15/2020  -     Comprehensive Metabolic Panel; Future; Expected date: 10/15/2020  -     TSH; Future; Expected date: 10/15/2020    Fatigue/Weakness       - Pt now not able to be as active at assisted living due to COVID.        - Will order PT referral to visit her at her facility    Need for influenza vaccination  -     Influenza (FLUAD) - Quadrivalent (Adjuvanted) *Preferred* (65+) (PF)    Mixed hyperlipidemia  -     Lipid Panel; Future; Expected date: 10/15/2020    Hypertension, well controlled  -     Comprehensive Metabolic Panel; Future; Expected date: 10/15/2020  -     TSH; Future; Expected date: 10/15/2020    Dysuria  -     Urine culture; Future; Expected date: 10/15/2020    H/O: CVA (cerebrovascular accident)  -     Lipid Panel; Future; Expected date: 10/15/2020  -     CBC auto differential; Future; Expected date: 10/15/2020  -     Comprehensive Metabolic Panel; Future; Expected date: 10/15/2020  -     TSH; Future; Expected date: 10/15/2020    Anxiety and depression  -     venlafaxine (EFFEXOR-XR) 150 MG Cp24; Take 1 capsule (150 mg total) by mouth once  daily.  Dispense: 90 capsule; Refill: 1  - Increased dose due to  persistent mood symptoms and irritability     Mouth sore  -     (Magic mouthwash) 1:1:1 Benadryl 12.5mg/5ml liq, aluminum & magnesium hydroxide-simehticone (Maalox), lidocaine viscous 2%; Swish and spit 15 mLs every 4 (four) hours as needed. for mouth sores  Dispense: 450 mL; Refill: 1    Recurrent major depressive disorder, in full remission  -     traZODone (DESYREL) 50 MG tablet; Take 1 tablet (50 mg total) by mouth every evening.  Dispense: 90 tablet; Refill: 1    Gastroesophageal reflux disease, unspecified whether esophagitis present  -     famotidine (PEPCID) 20 MG tablet; Take 1 tablet (20 mg total) by mouth 2 (two) times daily as needed.  Dispense: 60 tablet; Refill: 2  - Mouth and throat burning likely has a component of GERD. Will try Pepcid for her symptoms.                 Patient note was created using xaitment.  Any errors in syntax or even information may not have been identified and edited on initial review prior to signing this note.

## 2020-10-16 ENCOUNTER — TELEPHONE (OUTPATIENT)
Dept: FAMILY MEDICINE | Facility: CLINIC | Age: 85
End: 2020-10-16

## 2020-10-16 DIAGNOSIS — Z12.31 ENCOUNTER FOR SCREENING MAMMOGRAM FOR BREAST CANCER: Primary | ICD-10-CM

## 2020-10-16 NOTE — TELEPHONE ENCOUNTER
----- Message from Julita Ruffin sent at 10/16/2020  1:35 PM CDT -----  Contact: COLIN HOOVER [[id number]]  Name of Who is Calling: COLIN HOOVER [[id number]]      What is the request in detail: Patient would like orders for mammogram. Please call       Can the clinic reply by [facility]:     [facility] [phone] (home)

## 2020-10-20 ENCOUNTER — HOSPITAL ENCOUNTER (OUTPATIENT)
Dept: RADIOLOGY | Facility: HOSPITAL | Age: 85
Discharge: HOME | End: 2020-10-20
Attending: FAMILY MEDICINE
Payer: MEDICARE

## 2020-10-20 VITALS — BODY MASS INDEX: 30.73 KG/M2 | HEIGHT: 62 IN | WEIGHT: 167 LBS

## 2020-10-20 DIAGNOSIS — Z12.31 ENCOUNTER FOR SCREENING MAMMOGRAM FOR BREAST CANCER: ICD-10-CM

## 2020-10-20 PROCEDURE — 77067 SCR MAMMO BI INCL CAD: CPT | Mod: TC

## 2020-10-20 PROCEDURE — 77063 MAMMO DIGITAL SCREENING BILAT WITH TOMO: ICD-10-PCS | Mod: 26,,, | Performed by: RADIOLOGY

## 2020-10-20 PROCEDURE — 77063 BREAST TOMOSYNTHESIS BI: CPT | Mod: 26,,, | Performed by: RADIOLOGY

## 2020-10-20 PROCEDURE — 77067 MAMMO DIGITAL SCREENING BILAT WITH TOMO: ICD-10-PCS | Mod: 26,,, | Performed by: RADIOLOGY

## 2020-10-20 PROCEDURE — 77067 SCR MAMMO BI INCL CAD: CPT | Mod: 26,,, | Performed by: RADIOLOGY

## 2020-10-21 ENCOUNTER — TELEPHONE (OUTPATIENT)
Dept: FAMILY MEDICINE | Facility: CLINIC | Age: 85
End: 2020-10-21

## 2020-10-21 NOTE — TELEPHONE ENCOUNTER
----- Message from Myriam Ni sent at 10/21/2020  2:58 PM CDT -----  Regarding: Patient Returning Call  Contact: Patient  Type:  Patient Returning Call    Who Called: Patient     Who Left Message for Patient:  Jenny Sandhu MA    Does the patient know what this is regarding?: Results     Would the patient rather a call back or a response via [facility]?call back     Best Call Back Number: [phone]

## 2020-10-26 ENCOUNTER — TELEPHONE (OUTPATIENT)
Dept: FAMILY MEDICINE | Facility: CLINIC | Age: 85
End: 2020-10-26

## 2020-10-26 NOTE — TELEPHONE ENCOUNTER
----- Message from Sobia Machuca sent at 10/26/2020 11:11 AM CDT -----  Type: Patient Call Back    Who called: Self     What is the request in detail: patient wanted to let the doctor know she received her Mammogram results but she has not did the labs because she does not have a ride .     Can the clinic reply by [facility]? No     Would the patient rather a call back or a response via [facility]?  Call     Best call back number:.[phone] (home)

## 2020-12-02 ENCOUNTER — PATIENT OUTREACH (OUTPATIENT)
Dept: ADMINISTRATIVE | Facility: HOSPITAL | Age: 85
End: 2020-12-02

## 2020-12-04 DIAGNOSIS — K13.79 MOUTH SORE: ICD-10-CM

## 2020-12-04 DIAGNOSIS — I63.9 CEREBROVASCULAR ACCIDENT (CVA), UNSPECIFIED MECHANISM: ICD-10-CM

## 2020-12-04 RX ORDER — OMEPRAZOLE 20 MG/1
CAPSULE, DELAYED RELEASE ORAL
Qty: 28 CAPSULE | OUTPATIENT
Start: 2020-12-04

## 2020-12-04 RX ORDER — ATORVASTATIN CALCIUM 40 MG/1
TABLET, FILM COATED ORAL
Qty: 90 TABLET | Refills: 1 | Status: SHIPPED | OUTPATIENT
Start: 2020-12-04 | End: 2021-06-15

## 2020-12-16 ENCOUNTER — OFFICE VISIT (OUTPATIENT)
Dept: FAMILY MEDICINE | Facility: CLINIC | Age: 85
End: 2020-12-16
Payer: MEDICARE

## 2020-12-16 ENCOUNTER — LAB VISIT (OUTPATIENT)
Dept: LAB | Facility: HOSPITAL | Age: 85
End: 2020-12-16
Attending: FAMILY MEDICINE
Payer: MEDICARE

## 2020-12-16 VITALS
HEART RATE: 113 BPM | SYSTOLIC BLOOD PRESSURE: 110 MMHG | TEMPERATURE: 98 F | DIASTOLIC BLOOD PRESSURE: 68 MMHG | BODY MASS INDEX: 31.97 KG/M2 | HEIGHT: 62 IN | OXYGEN SATURATION: 96 % | RESPIRATION RATE: 20 BRPM | WEIGHT: 173.75 LBS

## 2020-12-16 DIAGNOSIS — K02.9 DENTAL CAVITY: ICD-10-CM

## 2020-12-16 DIAGNOSIS — Z86.73 H/O: CVA (CEREBROVASCULAR ACCIDENT): ICD-10-CM

## 2020-12-16 DIAGNOSIS — R53.83 FATIGUE, UNSPECIFIED TYPE: ICD-10-CM

## 2020-12-16 DIAGNOSIS — R20.8 BURNING SENSATION OF MOUTH: ICD-10-CM

## 2020-12-16 DIAGNOSIS — Z20.822 EXPOSURE TO COVID-19 VIRUS: Primary | ICD-10-CM

## 2020-12-16 DIAGNOSIS — Z00.00 ANNUAL PHYSICAL EXAM: ICD-10-CM

## 2020-12-16 DIAGNOSIS — Z20.822 EXPOSURE TO COVID-19 VIRUS: ICD-10-CM

## 2020-12-16 DIAGNOSIS — I10 HYPERTENSION, WELL CONTROLLED: ICD-10-CM

## 2020-12-16 DIAGNOSIS — E78.2 MIXED HYPERLIPIDEMIA: ICD-10-CM

## 2020-12-16 LAB
ALBUMIN SERPL BCP-MCNC: 3.4 G/DL (ref 3.5–5.2)
ALP SERPL-CCNC: 111 U/L (ref 55–135)
ALT SERPL W/O P-5'-P-CCNC: 13 U/L (ref 10–44)
ANION GAP SERPL CALC-SCNC: 12 MMOL/L (ref 8–16)
AST SERPL-CCNC: 16 U/L (ref 10–40)
BASOPHILS # BLD AUTO: 0.1 K/UL (ref 0–0.2)
BASOPHILS NFR BLD: 1 % (ref 0–1.9)
BILIRUB SERPL-MCNC: 0.5 MG/DL (ref 0.1–1)
BUN SERPL-MCNC: 10 MG/DL (ref 8–23)
CALCIUM SERPL-MCNC: 8.9 MG/DL (ref 8.7–10.5)
CHLORIDE SERPL-SCNC: 107 MMOL/L (ref 95–110)
CHOLEST SERPL-MCNC: 142 MG/DL (ref 120–199)
CHOLEST/HDLC SERPL: 3 {RATIO} (ref 2–5)
CO2 SERPL-SCNC: 20 MMOL/L (ref 23–29)
CREAT SERPL-MCNC: 0.9 MG/DL (ref 0.5–1.4)
DIFFERENTIAL METHOD: ABNORMAL
EOSINOPHIL # BLD AUTO: 0.2 K/UL (ref 0–0.5)
EOSINOPHIL NFR BLD: 1.7 % (ref 0–8)
ERYTHROCYTE [DISTWIDTH] IN BLOOD BY AUTOMATED COUNT: 13.8 % (ref 11.5–14.5)
EST. GFR  (AFRICAN AMERICAN): >60 ML/MIN/1.73 M^2
EST. GFR  (NON AFRICAN AMERICAN): 59.3 ML/MIN/1.73 M^2
GLUCOSE SERPL-MCNC: 219 MG/DL (ref 70–110)
HCT VFR BLD AUTO: 43 % (ref 37–48.5)
HDLC SERPL-MCNC: 48 MG/DL (ref 40–75)
HDLC SERPL: 33.8 % (ref 20–50)
HGB BLD-MCNC: 14 G/DL (ref 12–16)
IMM GRANULOCYTES # BLD AUTO: 0.04 K/UL (ref 0–0.04)
IMM GRANULOCYTES NFR BLD AUTO: 0.4 % (ref 0–0.5)
LDLC SERPL CALC-MCNC: 61.4 MG/DL (ref 63–159)
LYMPHOCYTES # BLD AUTO: 1.7 K/UL (ref 1–4.8)
LYMPHOCYTES NFR BLD: 17.1 % (ref 18–48)
MCH RBC QN AUTO: 29.5 PG (ref 27–31)
MCHC RBC AUTO-ENTMCNC: 32.6 G/DL (ref 32–36)
MCV RBC AUTO: 91 FL (ref 82–98)
MONOCYTES # BLD AUTO: 0.7 K/UL (ref 0.3–1)
MONOCYTES NFR BLD: 6.9 % (ref 4–15)
NEUTROPHILS # BLD AUTO: 7.1 K/UL (ref 1.8–7.7)
NEUTROPHILS NFR BLD: 72.9 % (ref 38–73)
NONHDLC SERPL-MCNC: 94 MG/DL
NRBC BLD-RTO: 0 /100 WBC
PLATELET # BLD AUTO: 339 K/UL (ref 150–350)
PMV BLD AUTO: 9.5 FL (ref 9.2–12.9)
POTASSIUM SERPL-SCNC: 4.3 MMOL/L (ref 3.5–5.1)
PROT SERPL-MCNC: 7 G/DL (ref 6–8.4)
RBC # BLD AUTO: 4.75 M/UL (ref 4–5.4)
SARS-COV-2 IGG SERPLBLD QL IA.RAPID: NEGATIVE
SODIUM SERPL-SCNC: 139 MMOL/L (ref 136–145)
TRIGL SERPL-MCNC: 163 MG/DL (ref 30–150)
TSH SERPL DL<=0.005 MIU/L-ACNC: 3.29 UIU/ML (ref 0.4–4)
WBC # BLD AUTO: 9.79 K/UL (ref 3.9–12.7)

## 2020-12-16 PROCEDURE — 80053 COMPREHEN METABOLIC PANEL: CPT

## 2020-12-16 PROCEDURE — 3288F FALL RISK ASSESSMENT DOCD: CPT | Mod: CPTII,S$GLB,, | Performed by: FAMILY MEDICINE

## 2020-12-16 PROCEDURE — 86769 SARS-COV-2 COVID-19 ANTIBODY: CPT

## 2020-12-16 PROCEDURE — 1101F PR PT FALLS ASSESS DOC 0-1 FALLS W/OUT INJ PAST YR: ICD-10-PCS | Mod: CPTII,S$GLB,, | Performed by: FAMILY MEDICINE

## 2020-12-16 PROCEDURE — 99214 PR OFFICE/OUTPT VISIT, EST, LEVL IV, 30-39 MIN: ICD-10-PCS | Mod: 25,S$GLB,, | Performed by: FAMILY MEDICINE

## 2020-12-16 PROCEDURE — 1101F PT FALLS ASSESS-DOCD LE1/YR: CPT | Mod: CPTII,S$GLB,, | Performed by: FAMILY MEDICINE

## 2020-12-16 PROCEDURE — 3074F PR MOST RECENT SYSTOLIC BLOOD PRESSURE < 130 MM HG: ICD-10-PCS | Mod: CPTII,S$GLB,, | Performed by: FAMILY MEDICINE

## 2020-12-16 PROCEDURE — 3074F SYST BP LT 130 MM HG: CPT | Mod: CPTII,S$GLB,, | Performed by: FAMILY MEDICINE

## 2020-12-16 PROCEDURE — 96372 THER/PROPH/DIAG INJ SC/IM: CPT | Mod: S$GLB,,, | Performed by: FAMILY MEDICINE

## 2020-12-16 PROCEDURE — 99214 OFFICE O/P EST MOD 30 MIN: CPT | Mod: 25,S$GLB,, | Performed by: FAMILY MEDICINE

## 2020-12-16 PROCEDURE — 3288F PR FALLS RISK ASSESSMENT DOCUMENTED: ICD-10-PCS | Mod: CPTII,S$GLB,, | Performed by: FAMILY MEDICINE

## 2020-12-16 PROCEDURE — 99999 PR PBB SHADOW E&M-EST. PATIENT-LVL V: CPT | Mod: PBBFAC,,, | Performed by: FAMILY MEDICINE

## 2020-12-16 PROCEDURE — 85025 COMPLETE CBC W/AUTO DIFF WBC: CPT

## 2020-12-16 PROCEDURE — 1159F MED LIST DOCD IN RCRD: CPT | Mod: S$GLB,,, | Performed by: FAMILY MEDICINE

## 2020-12-16 PROCEDURE — 96372 PR INJECTION,THERAP/PROPH/DIAG2ST, IM OR SUBCUT: ICD-10-PCS | Mod: S$GLB,,, | Performed by: FAMILY MEDICINE

## 2020-12-16 PROCEDURE — 84443 ASSAY THYROID STIM HORMONE: CPT

## 2020-12-16 PROCEDURE — 1126F PR PAIN SEVERITY QUANTIFIED, NO PAIN PRESENT: ICD-10-PCS | Mod: S$GLB,,, | Performed by: FAMILY MEDICINE

## 2020-12-16 PROCEDURE — 3078F PR MOST RECENT DIASTOLIC BLOOD PRESSURE < 80 MM HG: ICD-10-PCS | Mod: CPTII,S$GLB,, | Performed by: FAMILY MEDICINE

## 2020-12-16 PROCEDURE — 36415 COLL VENOUS BLD VENIPUNCTURE: CPT | Mod: PO

## 2020-12-16 PROCEDURE — 99999 PR PBB SHADOW E&M-EST. PATIENT-LVL V: ICD-10-PCS | Mod: PBBFAC,,, | Performed by: FAMILY MEDICINE

## 2020-12-16 PROCEDURE — 80061 LIPID PANEL: CPT

## 2020-12-16 PROCEDURE — 1126F AMNT PAIN NOTED NONE PRSNT: CPT | Mod: S$GLB,,, | Performed by: FAMILY MEDICINE

## 2020-12-16 PROCEDURE — 1159F PR MEDICATION LIST DOCUMENTED IN MEDICAL RECORD: ICD-10-PCS | Mod: S$GLB,,, | Performed by: FAMILY MEDICINE

## 2020-12-16 PROCEDURE — 3078F DIAST BP <80 MM HG: CPT | Mod: CPTII,S$GLB,, | Performed by: FAMILY MEDICINE

## 2020-12-16 RX ORDER — CYANOCOBALAMIN 1000 UG/ML
1000 INJECTION, SOLUTION INTRAMUSCULAR; SUBCUTANEOUS
Status: SHIPPED | OUTPATIENT
Start: 2020-12-16 | End: 2021-03-16

## 2020-12-16 RX ADMIN — CYANOCOBALAMIN 1000 MCG: 1000 INJECTION, SOLUTION INTRAMUSCULAR; SUBCUTANEOUS at 02:12

## 2020-12-17 ENCOUNTER — TELEPHONE (OUTPATIENT)
Dept: FAMILY MEDICINE | Facility: CLINIC | Age: 85
End: 2020-12-17

## 2020-12-17 NOTE — TELEPHONE ENCOUNTER
Pt states her daughter took her AVS form yesterdays visit and she is needing to know dates of nurse visit and ENT appt; info given topt

## 2020-12-17 NOTE — TELEPHONE ENCOUNTER
----- Message from Keke Fine sent at 12/17/2020  4:11 PM CST -----  Regarding: self  Type: Patient Call Back    Who called: self    What is the request in detail:please call the patient about her referrals placed    Can the clinic reply by [facility]? no    Would the patient rather a call back or a response via [facility]? Call     Best call back number:[phone]

## 2021-01-08 ENCOUNTER — TELEPHONE (OUTPATIENT)
Dept: FAMILY MEDICINE | Facility: CLINIC | Age: 86
End: 2021-01-08

## 2021-01-08 ENCOUNTER — PATIENT MESSAGE (OUTPATIENT)
Dept: FAMILY MEDICINE | Facility: CLINIC | Age: 86
End: 2021-01-08

## 2021-01-10 ENCOUNTER — IMMUNIZATION (OUTPATIENT)
Dept: OBSTETRICS AND GYNECOLOGY | Facility: CLINIC | Age: 86
End: 2021-01-10
Payer: MEDICARE

## 2021-01-10 DIAGNOSIS — Z23 NEED FOR VACCINATION: ICD-10-CM

## 2021-01-10 PROCEDURE — 91300 COVID-19, MRNA, LNP-S, PF, 30 MCG/0.3 ML DOSE VACCINE: CPT | Mod: PBBFAC | Performed by: FAMILY MEDICINE

## 2021-01-27 ENCOUNTER — TELEPHONE (OUTPATIENT)
Dept: OTOLARYNGOLOGY | Facility: CLINIC | Age: 86
End: 2021-01-27

## 2021-01-27 DIAGNOSIS — K13.79 MOUTH PAIN: Primary | ICD-10-CM

## 2021-01-28 ENCOUNTER — OFFICE VISIT (OUTPATIENT)
Dept: FAMILY MEDICINE | Facility: CLINIC | Age: 86
End: 2021-01-28
Payer: MEDICARE

## 2021-01-28 VITALS
TEMPERATURE: 98 F | HEART RATE: 94 BPM | BODY MASS INDEX: 29.57 KG/M2 | RESPIRATION RATE: 18 BRPM | DIASTOLIC BLOOD PRESSURE: 72 MMHG | SYSTOLIC BLOOD PRESSURE: 118 MMHG | HEIGHT: 62 IN | WEIGHT: 160.69 LBS | OXYGEN SATURATION: 97 %

## 2021-01-28 DIAGNOSIS — I27.20 PULMONARY HYPERTENSION, UNSPECIFIED: ICD-10-CM

## 2021-01-28 DIAGNOSIS — I69.354 HEMIPARESIS AFFECTING LEFT SIDE AS LATE EFFECT OF STROKE: ICD-10-CM

## 2021-01-28 DIAGNOSIS — K61.2 ABSCESS OF ANAL OR RECTAL REGION: Primary | ICD-10-CM

## 2021-01-28 DIAGNOSIS — F33.42 RECURRENT MAJOR DEPRESSIVE DISORDER, IN FULL REMISSION: ICD-10-CM

## 2021-01-28 PROCEDURE — 99499 RISK ADDL DX/OHS AUDIT: ICD-10-PCS | Mod: S$GLB,,, | Performed by: PHYSICIAN ASSISTANT

## 2021-01-28 PROCEDURE — 1101F PR PT FALLS ASSESS DOC 0-1 FALLS W/OUT INJ PAST YR: ICD-10-PCS | Mod: CPTII,S$GLB,, | Performed by: PHYSICIAN ASSISTANT

## 2021-01-28 PROCEDURE — 3074F PR MOST RECENT SYSTOLIC BLOOD PRESSURE < 130 MM HG: ICD-10-PCS | Mod: CPTII,S$GLB,, | Performed by: PHYSICIAN ASSISTANT

## 2021-01-28 PROCEDURE — 3288F PR FALLS RISK ASSESSMENT DOCUMENTED: ICD-10-PCS | Mod: CPTII,S$GLB,, | Performed by: PHYSICIAN ASSISTANT

## 2021-01-28 PROCEDURE — 99499 UNLISTED E&M SERVICE: CPT | Mod: S$GLB,,, | Performed by: PHYSICIAN ASSISTANT

## 2021-01-28 PROCEDURE — 99999 PR PBB SHADOW E&M-EST. PATIENT-LVL V: CPT | Mod: PBBFAC,,, | Performed by: PHYSICIAN ASSISTANT

## 2021-01-28 PROCEDURE — 3078F DIAST BP <80 MM HG: CPT | Mod: CPTII,S$GLB,, | Performed by: PHYSICIAN ASSISTANT

## 2021-01-28 PROCEDURE — 3288F FALL RISK ASSESSMENT DOCD: CPT | Mod: CPTII,S$GLB,, | Performed by: PHYSICIAN ASSISTANT

## 2021-01-28 PROCEDURE — 1159F PR MEDICATION LIST DOCUMENTED IN MEDICAL RECORD: ICD-10-PCS | Mod: S$GLB,,, | Performed by: PHYSICIAN ASSISTANT

## 2021-01-28 PROCEDURE — 99214 OFFICE O/P EST MOD 30 MIN: CPT | Mod: S$GLB,,, | Performed by: PHYSICIAN ASSISTANT

## 2021-01-28 PROCEDURE — 3078F PR MOST RECENT DIASTOLIC BLOOD PRESSURE < 80 MM HG: ICD-10-PCS | Mod: CPTII,S$GLB,, | Performed by: PHYSICIAN ASSISTANT

## 2021-01-28 PROCEDURE — 3074F SYST BP LT 130 MM HG: CPT | Mod: CPTII,S$GLB,, | Performed by: PHYSICIAN ASSISTANT

## 2021-01-28 PROCEDURE — 1101F PT FALLS ASSESS-DOCD LE1/YR: CPT | Mod: CPTII,S$GLB,, | Performed by: PHYSICIAN ASSISTANT

## 2021-01-28 PROCEDURE — 1126F AMNT PAIN NOTED NONE PRSNT: CPT | Mod: S$GLB,,, | Performed by: PHYSICIAN ASSISTANT

## 2021-01-28 PROCEDURE — 99214 PR OFFICE/OUTPT VISIT, EST, LEVL IV, 30-39 MIN: ICD-10-PCS | Mod: S$GLB,,, | Performed by: PHYSICIAN ASSISTANT

## 2021-01-28 PROCEDURE — 1159F MED LIST DOCD IN RCRD: CPT | Mod: S$GLB,,, | Performed by: PHYSICIAN ASSISTANT

## 2021-01-28 PROCEDURE — 99999 PR PBB SHADOW E&M-EST. PATIENT-LVL V: ICD-10-PCS | Mod: PBBFAC,,, | Performed by: PHYSICIAN ASSISTANT

## 2021-01-28 PROCEDURE — 1126F PR PAIN SEVERITY QUANTIFIED, NO PAIN PRESENT: ICD-10-PCS | Mod: S$GLB,,, | Performed by: PHYSICIAN ASSISTANT

## 2021-01-28 RX ORDER — SULFAMETHOXAZOLE AND TRIMETHOPRIM 800; 160 MG/1; MG/1
1 TABLET ORAL 2 TIMES DAILY
Qty: 20 TABLET | Refills: 0 | Status: SHIPPED | OUTPATIENT
Start: 2021-01-28 | End: 2021-02-07

## 2021-01-28 RX ORDER — OMEPRAZOLE 20 MG/1
CAPSULE, DELAYED RELEASE ORAL
COMMUNITY
Start: 2021-01-26 | End: 2021-02-22

## 2021-01-29 ENCOUNTER — TELEPHONE (OUTPATIENT)
Dept: FAMILY MEDICINE | Facility: CLINIC | Age: 86
End: 2021-01-29

## 2021-01-31 ENCOUNTER — IMMUNIZATION (OUTPATIENT)
Dept: OBSTETRICS AND GYNECOLOGY | Facility: CLINIC | Age: 86
End: 2021-01-31
Payer: MEDICARE

## 2021-01-31 DIAGNOSIS — Z23 NEED FOR VACCINATION: Primary | ICD-10-CM

## 2021-01-31 PROCEDURE — 91300 COVID-19, MRNA, LNP-S, PF, 30 MCG/0.3 ML DOSE VACCINE: CPT | Mod: PBBFAC | Performed by: FAMILY MEDICINE

## 2021-01-31 PROCEDURE — 0002A COVID-19, MRNA, LNP-S, PF, 30 MCG/0.3 ML DOSE VACCINE: CPT | Mod: PBBFAC | Performed by: FAMILY MEDICINE

## 2021-02-01 ENCOUNTER — LAB VISIT (OUTPATIENT)
Dept: LAB | Facility: HOSPITAL | Age: 86
End: 2021-02-01
Attending: OTOLARYNGOLOGY
Payer: MEDICARE

## 2021-02-01 ENCOUNTER — OFFICE VISIT (OUTPATIENT)
Dept: OTOLARYNGOLOGY | Facility: CLINIC | Age: 86
End: 2021-02-01
Payer: MEDICARE

## 2021-02-01 VITALS
SYSTOLIC BLOOD PRESSURE: 122 MMHG | BODY MASS INDEX: 29.57 KG/M2 | TEMPERATURE: 98 F | WEIGHT: 160.69 LBS | HEIGHT: 62 IN | DIASTOLIC BLOOD PRESSURE: 70 MMHG

## 2021-02-01 DIAGNOSIS — Z86.73 HISTORY OF STROKE: ICD-10-CM

## 2021-02-01 DIAGNOSIS — K13.79 RECURRENT ORAL ULCERS: ICD-10-CM

## 2021-02-01 DIAGNOSIS — K14.6 BURNING MOUTH SYNDROME: Primary | ICD-10-CM

## 2021-02-01 DIAGNOSIS — R41.3 MEMORY LOSS: ICD-10-CM

## 2021-02-01 DIAGNOSIS — R68.2 DRY MOUTH: ICD-10-CM

## 2021-02-01 DIAGNOSIS — K14.0 GLOSSITIS: ICD-10-CM

## 2021-02-01 DIAGNOSIS — R20.8 BURNING SENSATION OF MOUTH: ICD-10-CM

## 2021-02-01 PROCEDURE — 83090 ASSAY OF HOMOCYSTEINE: CPT

## 2021-02-01 PROCEDURE — 36415 COLL VENOUS BLD VENIPUNCTURE: CPT

## 2021-02-01 PROCEDURE — 99204 PR OFFICE/OUTPT VISIT, NEW, LEVL IV, 45-59 MIN: ICD-10-PCS | Mod: S$GLB,,, | Performed by: OTOLARYNGOLOGY

## 2021-02-01 PROCEDURE — 99204 OFFICE O/P NEW MOD 45 MIN: CPT | Mod: S$GLB,,, | Performed by: OTOLARYNGOLOGY

## 2021-02-01 PROCEDURE — 1159F PR MEDICATION LIST DOCUMENTED IN MEDICAL RECORD: ICD-10-PCS | Mod: S$GLB,,, | Performed by: OTOLARYNGOLOGY

## 2021-02-01 PROCEDURE — 1126F PR PAIN SEVERITY QUANTIFIED, NO PAIN PRESENT: ICD-10-PCS | Mod: S$GLB,,, | Performed by: OTOLARYNGOLOGY

## 2021-02-01 PROCEDURE — 82746 ASSAY OF FOLIC ACID SERUM: CPT

## 2021-02-01 PROCEDURE — 3078F PR MOST RECENT DIASTOLIC BLOOD PRESSURE < 80 MM HG: ICD-10-PCS | Mod: CPTII,S$GLB,, | Performed by: OTOLARYNGOLOGY

## 2021-02-01 PROCEDURE — 86038 ANTINUCLEAR ANTIBODIES: CPT

## 2021-02-01 PROCEDURE — 1159F MED LIST DOCD IN RCRD: CPT | Mod: S$GLB,,, | Performed by: OTOLARYNGOLOGY

## 2021-02-01 PROCEDURE — 1101F PR PT FALLS ASSESS DOC 0-1 FALLS W/OUT INJ PAST YR: ICD-10-PCS | Mod: CPTII,S$GLB,, | Performed by: OTOLARYNGOLOGY

## 2021-02-01 PROCEDURE — 3288F PR FALLS RISK ASSESSMENT DOCUMENTED: ICD-10-PCS | Mod: CPTII,S$GLB,, | Performed by: OTOLARYNGOLOGY

## 2021-02-01 PROCEDURE — 3074F PR MOST RECENT SYSTOLIC BLOOD PRESSURE < 130 MM HG: ICD-10-PCS | Mod: CPTII,S$GLB,, | Performed by: OTOLARYNGOLOGY

## 2021-02-01 PROCEDURE — 3078F DIAST BP <80 MM HG: CPT | Mod: CPTII,S$GLB,, | Performed by: OTOLARYNGOLOGY

## 2021-02-01 PROCEDURE — 86235 NUCLEAR ANTIGEN ANTIBODY: CPT

## 2021-02-01 PROCEDURE — 3288F FALL RISK ASSESSMENT DOCD: CPT | Mod: CPTII,S$GLB,, | Performed by: OTOLARYNGOLOGY

## 2021-02-01 PROCEDURE — 1126F AMNT PAIN NOTED NONE PRSNT: CPT | Mod: S$GLB,,, | Performed by: OTOLARYNGOLOGY

## 2021-02-01 PROCEDURE — 1101F PT FALLS ASSESS-DOCD LE1/YR: CPT | Mod: CPTII,S$GLB,, | Performed by: OTOLARYNGOLOGY

## 2021-02-01 PROCEDURE — 86235 NUCLEAR ANTIGEN ANTIBODY: CPT | Mod: 59

## 2021-02-01 PROCEDURE — 82607 VITAMIN B-12: CPT

## 2021-02-01 PROCEDURE — 3074F SYST BP LT 130 MM HG: CPT | Mod: CPTII,S$GLB,, | Performed by: OTOLARYNGOLOGY

## 2021-02-02 LAB
ANA SER QL IF: NORMAL
ANTI-SSA ANTIBODY: 0.06 RATIO (ref 0–0.99)
ANTI-SSA INTERPRETATION: NEGATIVE
ANTI-SSB ANTIBODY: 0.05 RATIO (ref 0–0.99)
ANTI-SSB INTERPRETATION: NEGATIVE
FOLATE SERPL-MCNC: 14 NG/ML (ref 4–24)

## 2021-02-03 ENCOUNTER — TELEPHONE (OUTPATIENT)
Dept: FAMILY MEDICINE | Facility: CLINIC | Age: 86
End: 2021-02-03

## 2021-02-03 LAB
HCYS SERPL-SCNC: 6.5 UMOL/L (ref 4–15.5)
VIT B12 SERPL-MCNC: 441 NG/L (ref 180–914)

## 2021-02-11 ENCOUNTER — TELEPHONE (OUTPATIENT)
Dept: FAMILY MEDICINE | Facility: CLINIC | Age: 86
End: 2021-02-11

## 2021-02-11 ENCOUNTER — TELEPHONE (OUTPATIENT)
Dept: SURGERY | Facility: CLINIC | Age: 86
End: 2021-02-11

## 2021-02-15 ENCOUNTER — TELEPHONE (OUTPATIENT)
Dept: FAMILY MEDICINE | Facility: CLINIC | Age: 86
End: 2021-02-15

## 2021-02-22 ENCOUNTER — OFFICE VISIT (OUTPATIENT)
Dept: SURGERY | Facility: CLINIC | Age: 86
End: 2021-02-22
Payer: MEDICARE

## 2021-02-22 VITALS — BODY MASS INDEX: 32.09 KG/M2 | HEIGHT: 62 IN | WEIGHT: 174.38 LBS

## 2021-02-22 DIAGNOSIS — K61.0 PERIANAL ABSCESS: Primary | ICD-10-CM

## 2021-02-22 PROCEDURE — 99204 OFFICE O/P NEW MOD 45 MIN: CPT | Mod: 25,S$GLB,, | Performed by: NURSE PRACTITIONER

## 2021-02-22 PROCEDURE — 46050 PR I&D PERIANAL ABSCESS,SUPERFICIAL: ICD-10-PCS | Mod: S$GLB,,, | Performed by: NURSE PRACTITIONER

## 2021-02-22 PROCEDURE — 1101F PR PT FALLS ASSESS DOC 0-1 FALLS W/OUT INJ PAST YR: ICD-10-PCS | Mod: CPTII,S$GLB,, | Performed by: NURSE PRACTITIONER

## 2021-02-22 PROCEDURE — 99999 PR PBB SHADOW E&M-EST. PATIENT-LVL IV: CPT | Mod: PBBFAC,,, | Performed by: NURSE PRACTITIONER

## 2021-02-22 PROCEDURE — 1126F AMNT PAIN NOTED NONE PRSNT: CPT | Mod: S$GLB,,, | Performed by: NURSE PRACTITIONER

## 2021-02-22 PROCEDURE — 3074F SYST BP LT 130 MM HG: CPT | Mod: CPTII,S$GLB,, | Performed by: NURSE PRACTITIONER

## 2021-02-22 PROCEDURE — 1101F PT FALLS ASSESS-DOCD LE1/YR: CPT | Mod: CPTII,S$GLB,, | Performed by: NURSE PRACTITIONER

## 2021-02-22 PROCEDURE — 1159F MED LIST DOCD IN RCRD: CPT | Mod: S$GLB,,, | Performed by: NURSE PRACTITIONER

## 2021-02-22 PROCEDURE — 3288F PR FALLS RISK ASSESSMENT DOCUMENTED: ICD-10-PCS | Mod: CPTII,S$GLB,, | Performed by: NURSE PRACTITIONER

## 2021-02-22 PROCEDURE — 3078F DIAST BP <80 MM HG: CPT | Mod: CPTII,S$GLB,, | Performed by: NURSE PRACTITIONER

## 2021-02-22 PROCEDURE — 99999 PR PBB SHADOW E&M-EST. PATIENT-LVL IV: ICD-10-PCS | Mod: PBBFAC,,, | Performed by: NURSE PRACTITIONER

## 2021-02-22 PROCEDURE — 3078F PR MOST RECENT DIASTOLIC BLOOD PRESSURE < 80 MM HG: ICD-10-PCS | Mod: CPTII,S$GLB,, | Performed by: NURSE PRACTITIONER

## 2021-02-22 PROCEDURE — 99204 PR OFFICE/OUTPT VISIT, NEW, LEVL IV, 45-59 MIN: ICD-10-PCS | Mod: 25,S$GLB,, | Performed by: NURSE PRACTITIONER

## 2021-02-22 PROCEDURE — 3074F PR MOST RECENT SYSTOLIC BLOOD PRESSURE < 130 MM HG: ICD-10-PCS | Mod: CPTII,S$GLB,, | Performed by: NURSE PRACTITIONER

## 2021-02-22 PROCEDURE — 3288F FALL RISK ASSESSMENT DOCD: CPT | Mod: CPTII,S$GLB,, | Performed by: NURSE PRACTITIONER

## 2021-02-22 PROCEDURE — 1126F PR PAIN SEVERITY QUANTIFIED, NO PAIN PRESENT: ICD-10-PCS | Mod: S$GLB,,, | Performed by: NURSE PRACTITIONER

## 2021-02-22 PROCEDURE — 46050 I&D PERIANAL ABSCESS SUPFC: CPT | Mod: S$GLB,,, | Performed by: NURSE PRACTITIONER

## 2021-02-22 PROCEDURE — 1159F PR MEDICATION LIST DOCUMENTED IN MEDICAL RECORD: ICD-10-PCS | Mod: S$GLB,,, | Performed by: NURSE PRACTITIONER

## 2021-02-22 RX ORDER — AMOXICILLIN AND CLAVULANATE POTASSIUM 875; 125 MG/1; MG/1
1 TABLET, FILM COATED ORAL 2 TIMES DAILY
Qty: 10 TABLET | Refills: 0 | Status: SHIPPED | OUTPATIENT
Start: 2021-02-22 | End: 2021-02-27

## 2021-03-02 ENCOUNTER — TELEPHONE (OUTPATIENT)
Dept: FAMILY MEDICINE | Facility: CLINIC | Age: 86
End: 2021-03-02

## 2021-03-02 ENCOUNTER — TELEPHONE (OUTPATIENT)
Dept: ENDOSCOPY | Facility: HOSPITAL | Age: 86
End: 2021-03-02

## 2021-03-02 DIAGNOSIS — R32 URINARY INCONTINENCE, UNSPECIFIED TYPE: Primary | ICD-10-CM

## 2021-03-09 ENCOUNTER — OFFICE VISIT (OUTPATIENT)
Dept: FAMILY MEDICINE | Facility: CLINIC | Age: 86
End: 2021-03-09
Payer: MEDICARE

## 2021-03-09 VITALS
HEIGHT: 62 IN | HEART RATE: 99 BPM | DIASTOLIC BLOOD PRESSURE: 73 MMHG | BODY MASS INDEX: 31.9 KG/M2 | TEMPERATURE: 98 F | OXYGEN SATURATION: 93 % | RESPIRATION RATE: 20 BRPM | SYSTOLIC BLOOD PRESSURE: 130 MMHG

## 2021-03-09 DIAGNOSIS — R32 URINARY INCONTINENCE, UNSPECIFIED TYPE: ICD-10-CM

## 2021-03-09 DIAGNOSIS — F41.9 ANXIETY: ICD-10-CM

## 2021-03-09 DIAGNOSIS — F32.A DEPRESSION, UNSPECIFIED DEPRESSION TYPE: Primary | ICD-10-CM

## 2021-03-09 PROCEDURE — 99215 OFFICE O/P EST HI 40 MIN: CPT | Mod: S$GLB,,, | Performed by: FAMILY MEDICINE

## 2021-03-09 PROCEDURE — 3078F DIAST BP <80 MM HG: CPT | Mod: CPTII,S$GLB,, | Performed by: FAMILY MEDICINE

## 2021-03-09 PROCEDURE — 99999 PR PBB SHADOW E&M-EST. PATIENT-LVL IV: ICD-10-PCS | Mod: PBBFAC,,, | Performed by: FAMILY MEDICINE

## 2021-03-09 PROCEDURE — 99215 PR OFFICE/OUTPT VISIT, EST, LEVL V, 40-54 MIN: ICD-10-PCS | Mod: S$GLB,,, | Performed by: FAMILY MEDICINE

## 2021-03-09 PROCEDURE — 99999 PR PBB SHADOW E&M-EST. PATIENT-LVL IV: CPT | Mod: PBBFAC,,, | Performed by: FAMILY MEDICINE

## 2021-03-09 PROCEDURE — 1101F PR PT FALLS ASSESS DOC 0-1 FALLS W/OUT INJ PAST YR: ICD-10-PCS | Mod: CPTII,S$GLB,, | Performed by: FAMILY MEDICINE

## 2021-03-09 PROCEDURE — 1159F MED LIST DOCD IN RCRD: CPT | Mod: S$GLB,,, | Performed by: FAMILY MEDICINE

## 2021-03-09 PROCEDURE — 3075F PR MOST RECENT SYSTOLIC BLOOD PRESS GE 130-139MM HG: ICD-10-PCS | Mod: CPTII,S$GLB,, | Performed by: FAMILY MEDICINE

## 2021-03-09 PROCEDURE — 3078F PR MOST RECENT DIASTOLIC BLOOD PRESSURE < 80 MM HG: ICD-10-PCS | Mod: CPTII,S$GLB,, | Performed by: FAMILY MEDICINE

## 2021-03-09 PROCEDURE — 3075F SYST BP GE 130 - 139MM HG: CPT | Mod: CPTII,S$GLB,, | Performed by: FAMILY MEDICINE

## 2021-03-09 PROCEDURE — 3288F PR FALLS RISK ASSESSMENT DOCUMENTED: ICD-10-PCS | Mod: CPTII,S$GLB,, | Performed by: FAMILY MEDICINE

## 2021-03-09 PROCEDURE — 1159F PR MEDICATION LIST DOCUMENTED IN MEDICAL RECORD: ICD-10-PCS | Mod: S$GLB,,, | Performed by: FAMILY MEDICINE

## 2021-03-09 PROCEDURE — 1101F PT FALLS ASSESS-DOCD LE1/YR: CPT | Mod: CPTII,S$GLB,, | Performed by: FAMILY MEDICINE

## 2021-03-09 PROCEDURE — 3288F FALL RISK ASSESSMENT DOCD: CPT | Mod: CPTII,S$GLB,, | Performed by: FAMILY MEDICINE

## 2021-03-09 RX ORDER — ALPRAZOLAM 0.25 MG/1
0.25 TABLET ORAL 2 TIMES DAILY PRN
Qty: 15 TABLET | Refills: 0 | Status: ON HOLD | OUTPATIENT
Start: 2021-03-09 | End: 2021-07-06 | Stop reason: HOSPADM

## 2021-03-09 RX ORDER — ACETAMINOPHEN 325 MG/1
650 TABLET ORAL EVERY 4 HOURS PRN
Refills: 0 | COMMUNITY
Start: 2021-03-09 | End: 2023-12-13 | Stop reason: SDUPTHER

## 2021-03-09 RX ORDER — BUPROPION HYDROCHLORIDE 150 MG/1
150 TABLET ORAL DAILY
Qty: 30 TABLET | Refills: 0 | Status: SHIPPED | OUTPATIENT
Start: 2021-03-09 | End: 2021-03-16

## 2021-03-10 ENCOUNTER — TELEPHONE (OUTPATIENT)
Dept: FAMILY MEDICINE | Facility: CLINIC | Age: 86
End: 2021-03-10

## 2021-03-16 DIAGNOSIS — F32.A DEPRESSION, UNSPECIFIED DEPRESSION TYPE: ICD-10-CM

## 2021-03-16 RX ORDER — BUPROPION HYDROCHLORIDE 150 MG/1
TABLET ORAL
Qty: 28 TABLET | Refills: 0 | Status: SHIPPED | OUTPATIENT
Start: 2021-03-16 | End: 2021-04-16

## 2021-03-16 RX ORDER — OMEPRAZOLE 20 MG/1
CAPSULE, DELAYED RELEASE ORAL
Qty: 28 CAPSULE | Refills: 0 | Status: SHIPPED | OUTPATIENT
Start: 2021-03-16 | End: 2021-04-16

## 2021-03-18 ENCOUNTER — OFFICE VISIT (OUTPATIENT)
Dept: UROLOGY | Facility: CLINIC | Age: 86
End: 2021-03-18
Payer: MEDICARE

## 2021-03-18 VITALS — BODY MASS INDEX: 32.46 KG/M2 | WEIGHT: 176.38 LBS | HEIGHT: 62 IN

## 2021-03-18 DIAGNOSIS — N39.41 URGE INCONTINENCE OF URINE: Primary | ICD-10-CM

## 2021-03-18 DIAGNOSIS — R35.1 NOCTURIA: ICD-10-CM

## 2021-03-18 DIAGNOSIS — R82.90 MALODOROUS URINE: ICD-10-CM

## 2021-03-18 PROCEDURE — 1101F PT FALLS ASSESS-DOCD LE1/YR: CPT | Mod: CPTII,S$GLB,, | Performed by: UROLOGY

## 2021-03-18 PROCEDURE — 1159F PR MEDICATION LIST DOCUMENTED IN MEDICAL RECORD: ICD-10-PCS | Mod: S$GLB,,, | Performed by: UROLOGY

## 2021-03-18 PROCEDURE — 99999 PR PBB SHADOW E&M-EST. PATIENT-LVL IV: CPT | Mod: PBBFAC,,, | Performed by: UROLOGY

## 2021-03-18 PROCEDURE — 1159F MED LIST DOCD IN RCRD: CPT | Mod: S$GLB,,, | Performed by: UROLOGY

## 2021-03-18 PROCEDURE — 1101F PR PT FALLS ASSESS DOC 0-1 FALLS W/OUT INJ PAST YR: ICD-10-PCS | Mod: CPTII,S$GLB,, | Performed by: UROLOGY

## 2021-03-18 PROCEDURE — 3288F PR FALLS RISK ASSESSMENT DOCUMENTED: ICD-10-PCS | Mod: CPTII,S$GLB,, | Performed by: UROLOGY

## 2021-03-18 PROCEDURE — 1126F AMNT PAIN NOTED NONE PRSNT: CPT | Mod: S$GLB,,, | Performed by: UROLOGY

## 2021-03-18 PROCEDURE — 99204 OFFICE O/P NEW MOD 45 MIN: CPT | Mod: S$GLB,,, | Performed by: UROLOGY

## 2021-03-18 PROCEDURE — 99999 PR PBB SHADOW E&M-EST. PATIENT-LVL IV: ICD-10-PCS | Mod: PBBFAC,,, | Performed by: UROLOGY

## 2021-03-18 PROCEDURE — 1126F PR PAIN SEVERITY QUANTIFIED, NO PAIN PRESENT: ICD-10-PCS | Mod: S$GLB,,, | Performed by: UROLOGY

## 2021-03-18 PROCEDURE — 99204 PR OFFICE/OUTPT VISIT, NEW, LEVL IV, 45-59 MIN: ICD-10-PCS | Mod: S$GLB,,, | Performed by: UROLOGY

## 2021-03-18 PROCEDURE — 3288F FALL RISK ASSESSMENT DOCD: CPT | Mod: CPTII,S$GLB,, | Performed by: UROLOGY

## 2021-03-23 ENCOUNTER — TELEPHONE (OUTPATIENT)
Dept: UROLOGY | Facility: CLINIC | Age: 86
End: 2021-03-23

## 2021-04-13 DIAGNOSIS — F32.A ANXIETY AND DEPRESSION: ICD-10-CM

## 2021-04-13 DIAGNOSIS — F41.9 ANXIETY AND DEPRESSION: ICD-10-CM

## 2021-04-13 DIAGNOSIS — F32.A DEPRESSION, UNSPECIFIED DEPRESSION TYPE: ICD-10-CM

## 2021-04-13 DIAGNOSIS — F33.42 RECURRENT MAJOR DEPRESSIVE DISORDER, IN FULL REMISSION: ICD-10-CM

## 2021-04-15 ENCOUNTER — TELEPHONE (OUTPATIENT)
Dept: UROLOGY | Facility: CLINIC | Age: 86
End: 2021-04-15

## 2021-04-15 ENCOUNTER — LAB VISIT (OUTPATIENT)
Dept: LAB | Facility: HOSPITAL | Age: 86
End: 2021-04-15
Attending: UROLOGY
Payer: MEDICARE

## 2021-04-15 DIAGNOSIS — N39.41 URGE INCONTINENCE OF URINE: ICD-10-CM

## 2021-04-15 PROCEDURE — 87086 URINE CULTURE/COLONY COUNT: CPT | Performed by: UROLOGY

## 2021-04-16 RX ORDER — BUPROPION HYDROCHLORIDE 150 MG/1
TABLET ORAL
Qty: 28 TABLET | Refills: 0 | Status: SHIPPED | OUTPATIENT
Start: 2021-04-16 | End: 2021-05-12

## 2021-04-16 RX ORDER — VENLAFAXINE HYDROCHLORIDE 150 MG/1
CAPSULE, EXTENDED RELEASE ORAL
Qty: 28 CAPSULE | Refills: 2 | Status: SHIPPED | OUTPATIENT
Start: 2021-04-16 | End: 2021-04-23 | Stop reason: SDUPTHER

## 2021-04-16 RX ORDER — OMEPRAZOLE 20 MG/1
CAPSULE, DELAYED RELEASE ORAL
Qty: 28 CAPSULE | Refills: 0 | Status: SHIPPED | OUTPATIENT
Start: 2021-04-16 | End: 2021-05-12

## 2021-04-16 RX ORDER — TRAZODONE HYDROCHLORIDE 50 MG/1
TABLET ORAL
Qty: 28 TABLET | Refills: 2 | Status: SHIPPED | OUTPATIENT
Start: 2021-04-16 | End: 2021-04-23

## 2021-04-17 LAB — BACTERIA UR CULT: NORMAL

## 2021-04-20 ENCOUNTER — TELEPHONE (OUTPATIENT)
Dept: UROLOGY | Facility: CLINIC | Age: 86
End: 2021-04-20

## 2021-04-21 ENCOUNTER — PATIENT MESSAGE (OUTPATIENT)
Dept: UROLOGY | Facility: CLINIC | Age: 86
End: 2021-04-21

## 2021-04-23 ENCOUNTER — OFFICE VISIT (OUTPATIENT)
Dept: FAMILY MEDICINE | Facility: CLINIC | Age: 86
End: 2021-04-23
Payer: MEDICARE

## 2021-04-23 VITALS
DIASTOLIC BLOOD PRESSURE: 75 MMHG | TEMPERATURE: 98 F | HEART RATE: 96 BPM | SYSTOLIC BLOOD PRESSURE: 135 MMHG | OXYGEN SATURATION: 91 % | RESPIRATION RATE: 16 BRPM

## 2021-04-23 DIAGNOSIS — R53.83 FATIGUE, UNSPECIFIED TYPE: ICD-10-CM

## 2021-04-23 DIAGNOSIS — F41.9 ANXIETY AND DEPRESSION: ICD-10-CM

## 2021-04-23 DIAGNOSIS — R32 URINARY INCONTINENCE, UNSPECIFIED TYPE: ICD-10-CM

## 2021-04-23 DIAGNOSIS — R53.81 PHYSICAL DECONDITIONING: ICD-10-CM

## 2021-04-23 DIAGNOSIS — G47.00 INSOMNIA, UNSPECIFIED TYPE: Primary | ICD-10-CM

## 2021-04-23 DIAGNOSIS — F32.A ANXIETY AND DEPRESSION: ICD-10-CM

## 2021-04-23 DIAGNOSIS — R26.81 GAIT INSTABILITY: ICD-10-CM

## 2021-04-23 PROCEDURE — 96372 THER/PROPH/DIAG INJ SC/IM: CPT | Mod: S$GLB,,, | Performed by: FAMILY MEDICINE

## 2021-04-23 PROCEDURE — 99499 RISK ADDL DX/OHS AUDIT: ICD-10-PCS | Mod: S$GLB,,, | Performed by: FAMILY MEDICINE

## 2021-04-23 PROCEDURE — 1126F AMNT PAIN NOTED NONE PRSNT: CPT | Mod: S$GLB,,, | Performed by: FAMILY MEDICINE

## 2021-04-23 PROCEDURE — 1159F MED LIST DOCD IN RCRD: CPT | Mod: S$GLB,,, | Performed by: FAMILY MEDICINE

## 2021-04-23 PROCEDURE — 99999 PR PBB SHADOW E&M-EST. PATIENT-LVL IV: CPT | Mod: PBBFAC,,, | Performed by: FAMILY MEDICINE

## 2021-04-23 PROCEDURE — 1159F PR MEDICATION LIST DOCUMENTED IN MEDICAL RECORD: ICD-10-PCS | Mod: S$GLB,,, | Performed by: FAMILY MEDICINE

## 2021-04-23 PROCEDURE — 99999 PR PBB SHADOW E&M-EST. PATIENT-LVL IV: ICD-10-PCS | Mod: PBBFAC,,, | Performed by: FAMILY MEDICINE

## 2021-04-23 PROCEDURE — 99499 UNLISTED E&M SERVICE: CPT | Mod: S$GLB,,, | Performed by: FAMILY MEDICINE

## 2021-04-23 PROCEDURE — 1126F PR PAIN SEVERITY QUANTIFIED, NO PAIN PRESENT: ICD-10-PCS | Mod: S$GLB,,, | Performed by: FAMILY MEDICINE

## 2021-04-23 PROCEDURE — 99214 PR OFFICE/OUTPT VISIT, EST, LEVL IV, 30-39 MIN: ICD-10-PCS | Mod: 25,S$GLB,, | Performed by: FAMILY MEDICINE

## 2021-04-23 PROCEDURE — 99214 OFFICE O/P EST MOD 30 MIN: CPT | Mod: 25,S$GLB,, | Performed by: FAMILY MEDICINE

## 2021-04-23 PROCEDURE — 96372 PR INJECTION,THERAP/PROPH/DIAG2ST, IM OR SUBCUT: ICD-10-PCS | Mod: S$GLB,,, | Performed by: FAMILY MEDICINE

## 2021-04-23 RX ORDER — VENLAFAXINE HYDROCHLORIDE 150 MG/1
150 CAPSULE, EXTENDED RELEASE ORAL DAILY
Qty: 90 CAPSULE | Refills: 1 | Status: SHIPPED | OUTPATIENT
Start: 2021-04-23 | End: 2022-01-18 | Stop reason: SDUPTHER

## 2021-04-23 RX ORDER — CYANOCOBALAMIN 1000 UG/ML
1000 INJECTION, SOLUTION INTRAMUSCULAR; SUBCUTANEOUS
Status: COMPLETED | OUTPATIENT
Start: 2021-04-23 | End: 2021-04-23

## 2021-04-23 RX ORDER — MIRTAZAPINE 7.5 MG/1
7.5 TABLET, FILM COATED ORAL NIGHTLY
Qty: 30 TABLET | Refills: 2 | Status: SHIPPED | OUTPATIENT
Start: 2021-04-23 | End: 2021-08-04

## 2021-04-23 RX ADMIN — CYANOCOBALAMIN 1000 MCG: 1000 INJECTION, SOLUTION INTRAMUSCULAR; SUBCUTANEOUS at 11:04

## 2021-05-03 ENCOUNTER — HOSPITAL ENCOUNTER (EMERGENCY)
Facility: OTHER | Age: 86
Discharge: HOME | End: 2021-05-03
Attending: EMERGENCY MEDICINE
Payer: MEDICARE

## 2021-05-03 VITALS
TEMPERATURE: 98 F | BODY MASS INDEX: 32.19 KG/M2 | DIASTOLIC BLOOD PRESSURE: 58 MMHG | WEIGHT: 176 LBS | HEART RATE: 98 BPM | OXYGEN SATURATION: 95 % | SYSTOLIC BLOOD PRESSURE: 122 MMHG | RESPIRATION RATE: 12 BRPM
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
## 2021-05-03 DIAGNOSIS — M25.552 ACUTE HIP PAIN, LEFT: ICD-10-CM

## 2021-05-03 DIAGNOSIS — M53.3 SACRAL BACK PAIN: ICD-10-CM

## 2021-05-03 PROCEDURE — 25000003 PHARM REV CODE 250: Performed by: EMERGENCY MEDICINE

## 2021-05-03 PROCEDURE — 99284 EMERGENCY DEPT VISIT MOD MDM: CPT | Mod: 25

## 2021-05-03 RX ORDER — IBUPROFEN 600 MG/1
600 TABLET ORAL EVERY 8 HOURS PRN
Qty: 20 TABLET | Refills: 0 | Status: SHIPPED | OUTPATIENT
Start: 2021-05-03 | End: 2021-06-15 | Stop reason: CLARIF

## 2021-05-03 RX ORDER — IBUPROFEN 400 MG/1
800 TABLET ORAL
Status: COMPLETED | OUTPATIENT
Start: 2021-05-03 | End: 2021-05-03

## 2021-05-03 RX ORDER — ACETAMINOPHEN 500 MG
1000 TABLET ORAL
Status: COMPLETED | OUTPATIENT
Start: 2021-05-03 | End: 2021-05-03

## 2021-05-03 RX ADMIN — ACETAMINOPHEN 1000 MG: 500 TABLET, FILM COATED ORAL at 01:05

## 2021-05-03 RX ADMIN — IBUPROFEN 800 MG: 400 TABLET, FILM COATED ORAL at 01:05

## 2021-05-18 ENCOUNTER — PROCEDURE VISIT (OUTPATIENT)
Dept: UROLOGY | Facility: CLINIC | Age: 86
End: 2021-05-18
Payer: MEDICARE

## 2021-05-18 DIAGNOSIS — N39.41 URGE INCONTINENCE OF URINE: ICD-10-CM

## 2021-05-18 PROCEDURE — 52000 CYSTOURETHROSCOPY: CPT | Mod: S$GLB,,, | Performed by: UROLOGY

## 2021-05-18 PROCEDURE — 52000 CYSTOSCOPY: ICD-10-PCS | Mod: S$GLB,,, | Performed by: UROLOGY

## 2021-05-24 ENCOUNTER — TELEPHONE (OUTPATIENT)
Dept: FAMILY MEDICINE | Facility: CLINIC | Age: 86
End: 2021-05-24

## 2021-05-26 ENCOUNTER — OFFICE VISIT (OUTPATIENT)
Dept: FAMILY MEDICINE | Facility: CLINIC | Age: 86
End: 2021-05-26
Payer: MEDICARE

## 2021-05-26 VITALS
BODY MASS INDEX: 32.19 KG/M2 | HEART RATE: 102 BPM | DIASTOLIC BLOOD PRESSURE: 62 MMHG | SYSTOLIC BLOOD PRESSURE: 116 MMHG | RESPIRATION RATE: 18 BRPM | TEMPERATURE: 98 F | HEIGHT: 62 IN | OXYGEN SATURATION: 97 %

## 2021-05-26 DIAGNOSIS — R53.83 FATIGUE, UNSPECIFIED TYPE: ICD-10-CM

## 2021-05-26 DIAGNOSIS — S30.0XXD TRAUMATIC HEMATOMA OF BUTTOCK, SUBSEQUENT ENCOUNTER: ICD-10-CM

## 2021-05-26 DIAGNOSIS — I10 HYPERTENSION, WELL CONTROLLED: ICD-10-CM
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
## 2021-05-26 DIAGNOSIS — R54 AGE-RELATED PHYSICAL DEBILITY: ICD-10-CM

## 2021-05-26 DIAGNOSIS — K59.00 CONSTIPATION, UNSPECIFIED CONSTIPATION TYPE: ICD-10-CM

## 2021-05-26 DIAGNOSIS — R53.81 PHYSICAL DECONDITIONING: ICD-10-CM

## 2021-05-26 DIAGNOSIS — R26.81 GAIT INSTABILITY: ICD-10-CM

## 2021-05-26 DIAGNOSIS — L89.311 PRESSURE INJURY OF RIGHT BUTTOCK, STAGE 1: ICD-10-CM

## 2021-05-26 DIAGNOSIS — M85.80 OSTEOPENIA, UNSPECIFIED LOCATION: ICD-10-CM

## 2021-05-26 PROCEDURE — 99999 PR PBB SHADOW E&M-EST. PATIENT-LVL III: ICD-10-PCS | Mod: PBBFAC,,, | Performed by: FAMILY MEDICINE

## 2021-05-26 PROCEDURE — 99214 OFFICE O/P EST MOD 30 MIN: CPT | Mod: 25,S$GLB,, | Performed by: FAMILY MEDICINE

## 2021-05-26 PROCEDURE — 1101F PR PT FALLS ASSESS DOC 0-1 FALLS W/OUT INJ PAST YR: ICD-10-PCS | Mod: CPTII,S$GLB,, | Performed by: FAMILY MEDICINE

## 2021-05-26 PROCEDURE — 99999 PR PBB SHADOW E&M-EST. PATIENT-LVL III: CPT | Mod: PBBFAC,,, | Performed by: FAMILY MEDICINE

## 2021-05-26 PROCEDURE — 1126F PR PAIN SEVERITY QUANTIFIED, NO PAIN PRESENT: ICD-10-PCS | Mod: S$GLB,,, | Performed by: FAMILY MEDICINE

## 2021-05-26 PROCEDURE — 99214 PR OFFICE/OUTPT VISIT, EST, LEVL IV, 30-39 MIN: ICD-10-PCS | Mod: 25,S$GLB,, | Performed by: FAMILY MEDICINE

## 2021-05-26 PROCEDURE — 96372 PR INJECTION,THERAP/PROPH/DIAG2ST, IM OR SUBCUT: ICD-10-PCS | Mod: S$GLB,,, | Performed by: FAMILY MEDICINE

## 2021-05-26 PROCEDURE — 1159F PR MEDICATION LIST DOCUMENTED IN MEDICAL RECORD: ICD-10-PCS | Mod: S$GLB,,, | Performed by: FAMILY MEDICINE

## 2021-05-26 PROCEDURE — 3288F FALL RISK ASSESSMENT DOCD: CPT | Mod: CPTII,S$GLB,, | Performed by: FAMILY MEDICINE

## 2021-05-26 PROCEDURE — 96372 THER/PROPH/DIAG INJ SC/IM: CPT | Mod: S$GLB,,, | Performed by: FAMILY MEDICINE

## 2021-05-26 PROCEDURE — 1101F PT FALLS ASSESS-DOCD LE1/YR: CPT | Mod: CPTII,S$GLB,, | Performed by: FAMILY MEDICINE

## 2021-05-26 PROCEDURE — 1159F MED LIST DOCD IN RCRD: CPT | Mod: S$GLB,,, | Performed by: FAMILY MEDICINE

## 2021-05-26 PROCEDURE — 1126F AMNT PAIN NOTED NONE PRSNT: CPT | Mod: S$GLB,,, | Performed by: FAMILY MEDICINE

## 2021-05-26 PROCEDURE — 3288F PR FALLS RISK ASSESSMENT DOCUMENTED: ICD-10-PCS | Mod: CPTII,S$GLB,, | Performed by: FAMILY MEDICINE

## 2021-05-26 RX ORDER — ALENDRONATE SODIUM 70 MG/1
70 TABLET ORAL
Qty: 12 TABLET | Refills: 1 | Status: SHIPPED | OUTPATIENT
Start: 2021-05-26 | End: 2021-11-23

## 2021-05-26 RX ORDER — CYANOCOBALAMIN 1000 UG/ML
1000 INJECTION, SOLUTION INTRAMUSCULAR; SUBCUTANEOUS
Status: SHIPPED | OUTPATIENT
Start: 2021-05-26 | End: 2021-11-22

## 2021-05-26 RX ORDER — LISINOPRIL 2.5 MG/1
2.5 TABLET ORAL DAILY
Qty: 28 TABLET | Refills: 2 | Status: SHIPPED | OUTPATIENT
Start: 2021-05-26 | End: 2021-10-29

## 2021-05-26 RX ORDER — POLYETHYLENE GLYCOL 3350 17 G/17G
17 POWDER, FOR SOLUTION ORAL DAILY PRN
Qty: 30 PACKET | Refills: 11 | Status: SHIPPED | OUTPATIENT
Start: 2021-05-26

## 2021-05-26 RX ADMIN — CYANOCOBALAMIN 1000 MCG: 1000 INJECTION, SOLUTION INTRAMUSCULAR; SUBCUTANEOUS at 03:05

## 2021-05-31 ENCOUNTER — TELEPHONE (OUTPATIENT)
Dept: FAMILY MEDICINE | Facility: CLINIC | Age: 86
End: 2021-05-31

## 2021-06-02 ENCOUNTER — TELEPHONE (OUTPATIENT)
Dept: FAMILY MEDICINE | Facility: CLINIC | Age: 86
End: 2021-06-02

## 2021-06-03 PROCEDURE — G0180 MD CERTIFICATION HHA PATIENT: HCPCS | Mod: ,,, | Performed by: FAMILY MEDICINE

## 2021-06-03 PROCEDURE — G0180 PR HOME HEALTH MD CERTIFICATION: ICD-10-PCS | Mod: ,,, | Performed by: FAMILY MEDICINE

## 2021-06-10 ENCOUNTER — HOSPITAL ENCOUNTER (EMERGENCY)
Facility: OTHER | Age: 86
Discharge: HOME | End: 2021-06-10
Attending: EMERGENCY MEDICINE
Payer: MEDICARE

## 2021-06-10 VITALS
OXYGEN SATURATION: 96 % | TEMPERATURE: 98 F | SYSTOLIC BLOOD PRESSURE: 154 MMHG | DIASTOLIC BLOOD PRESSURE: 84 MMHG | RESPIRATION RATE: 20 BRPM | HEART RATE: 104 BPM | BODY MASS INDEX: 29.44 KG/M2 | HEIGHT: 62 IN | WEIGHT: 160 LBS

## 2021-06-10 DIAGNOSIS — S63.502A SPRAIN OF LEFT WRIST, INITIAL ENCOUNTER: ICD-10-CM

## 2021-06-10 DIAGNOSIS — S60.222A CONTUSION OF LEFT HAND, INITIAL ENCOUNTER: ICD-10-CM

## 2021-06-10 DIAGNOSIS — S09.90XA CLOSED HEAD INJURY, INITIAL ENCOUNTER: Primary | ICD-10-CM

## 2021-06-10 DIAGNOSIS — T14.90XA TRAUMA: ICD-10-CM

## 2021-06-10 DIAGNOSIS — S40.022A CONTUSION OF LEFT UPPER ARM, INITIAL ENCOUNTER: ICD-10-CM

## 2021-06-10 PROCEDURE — 99284 EMERGENCY DEPT VISIT MOD MDM: CPT | Mod: 25

## 2021-06-15 ENCOUNTER — HOSPITAL ENCOUNTER (INPATIENT)
Facility: OTHER | Age: 86
LOS: 2 days | Discharge: NURSING, REHAB OR LONG-TERM CARE | DRG: 481 | End: 2021-06-18
Attending: EMERGENCY MEDICINE | Admitting: EMERGENCY MEDICINE
Payer: MEDICARE

## 2021-06-15 DIAGNOSIS — M25.552 LEFT HIP PAIN: ICD-10-CM

## 2021-06-15 DIAGNOSIS — N28.9 RENAL INSUFFICIENCY: ICD-10-CM

## 2021-06-15 DIAGNOSIS — I10 ESSENTIAL HYPERTENSION: ICD-10-CM

## 2021-06-15 DIAGNOSIS — S72.002A CLOSED FRACTURE OF LEFT HIP, INITIAL ENCOUNTER: Primary | ICD-10-CM

## 2021-06-15 DIAGNOSIS — S72.002A CLOSED FRACTURE OF LEFT HIP: ICD-10-CM

## 2021-06-15 DIAGNOSIS — Z51.5 ENCOUNTER FOR PALLIATIVE CARE: ICD-10-CM

## 2021-06-15 DIAGNOSIS — Z74.09 IMPAIRED MOBILITY AND ADLS: ICD-10-CM

## 2021-06-15 DIAGNOSIS — I69.354 HEMIPARESIS AFFECTING LEFT SIDE AS LATE EFFECT OF STROKE: ICD-10-CM

## 2021-06-15 DIAGNOSIS — Z78.9 IMPAIRED MOBILITY AND ADLS: ICD-10-CM

## 2021-06-15 DIAGNOSIS — I50.32 CHRONIC DIASTOLIC HEART FAILURE: ICD-10-CM

## 2021-06-15 LAB
ABO + RH BLD: NORMAL
ALBUMIN SERPL BCP-MCNC: 3.7 G/DL (ref 3.5–5.2)
ALP SERPL-CCNC: 131 U/L (ref 55–135)
ALT SERPL W/O P-5'-P-CCNC: 22 U/L (ref 10–44)
ANION GAP SERPL CALC-SCNC: 11 MMOL/L (ref 8–16)
AST SERPL-CCNC: 27 U/L (ref 10–40)
BASOPHILS # BLD AUTO: 0.06 K/UL (ref 0–0.2)
BASOPHILS NFR BLD: 0.6 % (ref 0–1.9)
BILIRUB SERPL-MCNC: 1 MG/DL (ref 0.1–1)
BLD GP AB SCN CELLS X3 SERPL QL: NORMAL
BUN SERPL-MCNC: 11 MG/DL (ref 8–23)
CALCIUM SERPL-MCNC: 10 MG/DL (ref 8.7–10.5)
CHLORIDE SERPL-SCNC: 106 MMOL/L (ref 95–110)
CO2 SERPL-SCNC: 26 MMOL/L (ref 23–29)
CREAT SERPL-MCNC: 1 MG/DL (ref 0.5–1.4)
DIFFERENTIAL METHOD: ABNORMAL
EOSINOPHIL # BLD AUTO: 0.2 K/UL (ref 0–0.5)
EOSINOPHIL NFR BLD: 1.8 % (ref 0–8)
ERYTHROCYTE [DISTWIDTH] IN BLOOD BY AUTOMATED COUNT: 14.2 % (ref 11.5–14.5)
EST. GFR  (AFRICAN AMERICAN): >60 ML/MIN/1.73 M^2
EST. GFR  (NON AFRICAN AMERICAN): 52 ML/MIN/1.73 M^2
GLUCOSE SERPL-MCNC: 152 MG/DL (ref 70–110)
HCT VFR BLD AUTO: 42.3 % (ref 37–48.5)
HGB BLD-MCNC: 13.5 G/DL (ref 12–16)
IMM GRANULOCYTES # BLD AUTO: 0.1 K/UL (ref 0–0.04)
IMM GRANULOCYTES NFR BLD AUTO: 1 % (ref 0–0.5)
LYMPHOCYTES # BLD AUTO: 1.5 K/UL (ref 1–4.8)
LYMPHOCYTES NFR BLD: 15.2 % (ref 18–48)
MCH RBC QN AUTO: 30.2 PG (ref 27–31)
MCHC RBC AUTO-ENTMCNC: 31.9 G/DL (ref 32–36)
MCV RBC AUTO: 95 FL (ref 82–98)
MONOCYTES # BLD AUTO: 0.8 K/UL (ref 0.3–1)
MONOCYTES NFR BLD: 7.9 % (ref 4–15)
NEUTROPHILS # BLD AUTO: 7.3 K/UL (ref 1.8–7.7)
NEUTROPHILS NFR BLD: 73.5 % (ref 38–73)
NRBC BLD-RTO: 0 /100 WBC
PLATELET # BLD AUTO: 349 K/UL (ref 150–450)
PMV BLD AUTO: 9.2 FL (ref 9.2–12.9)
POTASSIUM SERPL-SCNC: 4.1 MMOL/L (ref 3.5–5.1)
PROT SERPL-MCNC: 7.6 G/DL (ref 6–8.4)
RBC # BLD AUTO: 4.47 M/UL (ref 4–5.4)
SODIUM SERPL-SCNC: 143 MMOL/L (ref 136–145)
WBC # BLD AUTO: 9.94 K/UL (ref 3.9–12.7)

## 2021-06-15 PROCEDURE — 85025 COMPLETE CBC W/AUTO DIFF WBC: CPT | Performed by: EMERGENCY MEDICINE

## 2021-06-15 PROCEDURE — 25000003 PHARM REV CODE 250: Performed by: HOSPITALIST

## 2021-06-15 PROCEDURE — G0378 HOSPITAL OBSERVATION PER HR: HCPCS

## 2021-06-15 PROCEDURE — 63600175 PHARM REV CODE 636 W HCPCS: Performed by: EMERGENCY MEDICINE

## 2021-06-15 PROCEDURE — 51702 INSERT TEMP BLADDER CATH: CPT

## 2021-06-15 PROCEDURE — 86900 BLOOD TYPING SEROLOGIC ABO: CPT | Performed by: EMERGENCY MEDICINE

## 2021-06-15 PROCEDURE — 99220 PR INITIAL OBSERVATION CARE,LEVL III: ICD-10-PCS | Mod: ,,, | Performed by: HOSPITALIST

## 2021-06-15 PROCEDURE — 94761 N-INVAS EAR/PLS OXIMETRY MLT: CPT

## 2021-06-15 PROCEDURE — 96374 THER/PROPH/DIAG INJ IV PUSH: CPT

## 2021-06-15 PROCEDURE — 99220 PR INITIAL OBSERVATION CARE,LEVL III: CPT | Mod: ,,, | Performed by: HOSPITALIST

## 2021-06-15 PROCEDURE — 96361 HYDRATE IV INFUSION ADD-ON: CPT

## 2021-06-15 PROCEDURE — 36415 COLL VENOUS BLD VENIPUNCTURE: CPT | Performed by: EMERGENCY MEDICINE

## 2021-06-15 PROCEDURE — 25000003 PHARM REV CODE 250: Performed by: EMERGENCY MEDICINE

## 2021-06-15 PROCEDURE — 99285 EMERGENCY DEPT VISIT HI MDM: CPT | Mod: 25

## 2021-06-15 PROCEDURE — 80053 COMPREHEN METABOLIC PANEL: CPT | Performed by: EMERGENCY MEDICINE

## 2021-06-15 RX ORDER — LISINOPRIL 2.5 MG/1
2.5 TABLET ORAL DAILY
Status: DISCONTINUED | OUTPATIENT
Start: 2021-06-16 | End: 2021-06-18 | Stop reason: HOSPADM

## 2021-06-15 RX ORDER — TALC
6 POWDER (GRAM) TOPICAL NIGHTLY PRN
Status: DISCONTINUED | OUTPATIENT
Start: 2021-06-15 | End: 2021-06-18 | Stop reason: HOSPADM

## 2021-06-15 RX ORDER — MORPHINE SULFATE 2 MG/ML
2 INJECTION, SOLUTION INTRAMUSCULAR; INTRAVENOUS
Status: COMPLETED | OUTPATIENT
Start: 2021-06-15 | End: 2021-06-15

## 2021-06-15 RX ORDER — SODIUM CHLORIDE 0.9 % (FLUSH) 0.9 %
10 SYRINGE (ML) INJECTION
Status: DISCONTINUED | OUTPATIENT
Start: 2021-06-15 | End: 2021-06-18 | Stop reason: HOSPADM

## 2021-06-15 RX ORDER — TALC
6 POWDER (GRAM) TOPICAL NIGHTLY PRN
Status: DISCONTINUED | OUTPATIENT
Start: 2021-06-15 | End: 2021-06-15 | Stop reason: SDUPTHER

## 2021-06-15 RX ORDER — MORPHINE SULFATE 4 MG/ML
4 INJECTION, SOLUTION INTRAMUSCULAR; INTRAVENOUS EVERY 4 HOURS PRN
Status: DISCONTINUED | OUTPATIENT
Start: 2021-06-15 | End: 2021-06-18 | Stop reason: HOSPADM

## 2021-06-15 RX ORDER — ATORVASTATIN CALCIUM 20 MG/1
40 TABLET, FILM COATED ORAL DAILY
Status: DISCONTINUED | OUTPATIENT
Start: 2021-06-16 | End: 2021-06-18 | Stop reason: HOSPADM

## 2021-06-15 RX ORDER — SODIUM CHLORIDE 0.9 % (FLUSH) 0.9 %
10 SYRINGE (ML) INJECTION
Status: DISCONTINUED | OUTPATIENT
Start: 2021-06-15 | End: 2021-06-18

## 2021-06-15 RX ORDER — FAMOTIDINE 20 MG/1
20 TABLET, FILM COATED ORAL DAILY PRN
Status: DISCONTINUED | OUTPATIENT
Start: 2021-06-15 | End: 2021-06-18 | Stop reason: HOSPADM

## 2021-06-15 RX ORDER — HYDROCODONE BITARTRATE AND ACETAMINOPHEN 5; 325 MG/1; MG/1
1 TABLET ORAL EVERY 6 HOURS PRN
Status: DISCONTINUED | OUTPATIENT
Start: 2021-06-15 | End: 2021-06-18 | Stop reason: HOSPADM

## 2021-06-15 RX ORDER — VENLAFAXINE HYDROCHLORIDE 75 MG/1
150 CAPSULE, EXTENDED RELEASE ORAL DAILY
Status: DISCONTINUED | OUTPATIENT
Start: 2021-06-16 | End: 2021-06-18 | Stop reason: HOSPADM

## 2021-06-15 RX ORDER — FLUORIDE TOOTHPASTE
TOOTHPASTE DENTAL
COMMUNITY

## 2021-06-15 RX ORDER — MIRTAZAPINE 7.5 MG/1
7.5 TABLET, FILM COATED ORAL NIGHTLY
Status: DISCONTINUED | OUTPATIENT
Start: 2021-06-15 | End: 2021-06-18 | Stop reason: HOSPADM

## 2021-06-15 RX ORDER — ACETAMINOPHEN 325 MG/1
650 TABLET ORAL EVERY 4 HOURS PRN
Status: DISCONTINUED | OUTPATIENT
Start: 2021-06-15 | End: 2021-06-18 | Stop reason: HOSPADM

## 2021-06-15 RX ADMIN — HYDROCODONE BITARTRATE AND ACETAMINOPHEN 1 TABLET: 5; 325 TABLET ORAL at 06:06

## 2021-06-15 RX ADMIN — MORPHINE SULFATE 2 MG: 2 INJECTION, SOLUTION INTRAMUSCULAR; INTRAVENOUS at 04:06

## 2021-06-15 RX ADMIN — MIRTAZAPINE 7.5 MG: 7.5 TABLET ORAL at 09:06

## 2021-06-15 RX ADMIN — SODIUM CHLORIDE 1000 ML: 0.9 INJECTION, SOLUTION INTRAVENOUS at 02:06

## 2021-06-16 ENCOUNTER — ANESTHESIA EVENT (OUTPATIENT)
Dept: SURGERY | Facility: OTHER | Age: 86
DRG: 481 | End: 2021-06-16
Payer: MEDICARE

## 2021-06-16 ENCOUNTER — ANESTHESIA (OUTPATIENT)
Dept: SURGERY | Facility: OTHER | Age: 86
DRG: 481 | End: 2021-06-16
Payer: MEDICARE

## 2021-06-16 LAB
ANION GAP SERPL CALC-SCNC: 8 MMOL/L (ref 8–16)
BASOPHILS # BLD AUTO: 0.06 K/UL (ref 0–0.2)
BASOPHILS NFR BLD: 0.5 % (ref 0–1.9)
BUN SERPL-MCNC: 13 MG/DL (ref 8–23)
CALCIUM SERPL-MCNC: 8.5 MG/DL (ref 8.7–10.5)
CHLORIDE SERPL-SCNC: 107 MMOL/L (ref 95–110)
CO2 SERPL-SCNC: 25 MMOL/L (ref 23–29)
CREAT SERPL-MCNC: 0.9 MG/DL (ref 0.5–1.4)
DIFFERENTIAL METHOD: ABNORMAL
EOSINOPHIL # BLD AUTO: 0.2 K/UL (ref 0–0.5)
EOSINOPHIL NFR BLD: 1.7 % (ref 0–8)
ERYTHROCYTE [DISTWIDTH] IN BLOOD BY AUTOMATED COUNT: 14.5 % (ref 11.5–14.5)
EST. GFR  (AFRICAN AMERICAN): >60 ML/MIN/1.73 M^2
EST. GFR  (NON AFRICAN AMERICAN): 59 ML/MIN/1.73 M^2
GLUCOSE SERPL-MCNC: 147 MG/DL (ref 70–110)
HCT VFR BLD AUTO: 39.4 % (ref 37–48.5)
HGB BLD-MCNC: 12.3 G/DL (ref 12–16)
IMM GRANULOCYTES # BLD AUTO: 0.04 K/UL (ref 0–0.04)
IMM GRANULOCYTES NFR BLD AUTO: 0.3 % (ref 0–0.5)
LYMPHOCYTES # BLD AUTO: 1.7 K/UL (ref 1–4.8)
LYMPHOCYTES NFR BLD: 14.6 % (ref 18–48)
MAGNESIUM SERPL-MCNC: 1.5 MG/DL (ref 1.6–2.6)
MCH RBC QN AUTO: 29.9 PG (ref 27–31)
MCHC RBC AUTO-ENTMCNC: 31.2 G/DL (ref 32–36)
MCV RBC AUTO: 96 FL (ref 82–98)
MONOCYTES # BLD AUTO: 1.1 K/UL (ref 0.3–1)
MONOCYTES NFR BLD: 9 % (ref 4–15)
NEUTROPHILS # BLD AUTO: 8.7 K/UL (ref 1.8–7.7)
NEUTROPHILS NFR BLD: 73.9 % (ref 38–73)
NRBC BLD-RTO: 0 /100 WBC
PHOSPHATE SERPL-MCNC: 3.7 MG/DL (ref 2.7–4.5)
PLATELET # BLD AUTO: 326 K/UL (ref 150–450)
PMV BLD AUTO: 9.6 FL (ref 9.2–12.9)
POTASSIUM SERPL-SCNC: 4 MMOL/L (ref 3.5–5.1)
RBC # BLD AUTO: 4.12 M/UL (ref 4–5.4)
SODIUM SERPL-SCNC: 140 MMOL/L (ref 136–145)
WBC # BLD AUTO: 11.81 K/UL (ref 3.9–12.7)

## 2021-06-16 PROCEDURE — 71000039 HC RECOVERY, EACH ADD'L HOUR: Performed by: ORTHOPAEDIC SURGERY

## 2021-06-16 PROCEDURE — 27000221 HC OXYGEN, UP TO 24 HOURS

## 2021-06-16 PROCEDURE — 63600175 PHARM REV CODE 636 W HCPCS: Performed by: SPECIALIST

## 2021-06-16 PROCEDURE — C1713 ANCHOR/SCREW BN/BN,TIS/BN: HCPCS | Performed by: ORTHOPAEDIC SURGERY

## 2021-06-16 PROCEDURE — 36415 COLL VENOUS BLD VENIPUNCTURE: CPT | Performed by: HOSPITALIST

## 2021-06-16 PROCEDURE — 94761 N-INVAS EAR/PLS OXIMETRY MLT: CPT

## 2021-06-16 PROCEDURE — C1769 GUIDE WIRE: HCPCS | Performed by: ORTHOPAEDIC SURGERY

## 2021-06-16 PROCEDURE — 97530 THERAPEUTIC ACTIVITIES: CPT

## 2021-06-16 PROCEDURE — 37000009 HC ANESTHESIA EA ADD 15 MINS: Performed by: ORTHOPAEDIC SURGERY

## 2021-06-16 PROCEDURE — 97162 PT EVAL MOD COMPLEX 30 MIN: CPT

## 2021-06-16 PROCEDURE — 63600175 PHARM REV CODE 636 W HCPCS: Performed by: ORTHOPAEDIC SURGERY

## 2021-06-16 PROCEDURE — 94799 UNLISTED PULMONARY SVC/PX: CPT

## 2021-06-16 PROCEDURE — 25000003 PHARM REV CODE 250: Performed by: EMERGENCY MEDICINE

## 2021-06-16 PROCEDURE — 99232 SBSQ HOSP IP/OBS MODERATE 35: CPT | Mod: ,,, | Performed by: HOSPITALIST

## 2021-06-16 PROCEDURE — 99232 PR SUBSEQUENT HOSPITAL CARE,LEVL II: ICD-10-PCS | Mod: ,,, | Performed by: HOSPITALIST

## 2021-06-16 PROCEDURE — 37000008 HC ANESTHESIA 1ST 15 MINUTES: Performed by: ORTHOPAEDIC SURGERY

## 2021-06-16 PROCEDURE — 96376 TX/PRO/DX INJ SAME DRUG ADON: CPT

## 2021-06-16 PROCEDURE — 99900035 HC TECH TIME PER 15 MIN (STAT)

## 2021-06-16 PROCEDURE — 83735 ASSAY OF MAGNESIUM: CPT | Performed by: HOSPITALIST

## 2021-06-16 PROCEDURE — 63600175 PHARM REV CODE 636 W HCPCS

## 2021-06-16 PROCEDURE — 36000711: Performed by: ORTHOPAEDIC SURGERY

## 2021-06-16 PROCEDURE — 63600175 PHARM REV CODE 636 W HCPCS: Performed by: HOSPITALIST

## 2021-06-16 PROCEDURE — 25000003 PHARM REV CODE 250: Performed by: ORTHOPAEDIC SURGERY

## 2021-06-16 PROCEDURE — 84100 ASSAY OF PHOSPHORUS: CPT | Performed by: HOSPITALIST

## 2021-06-16 PROCEDURE — 80048 BASIC METABOLIC PNL TOTAL CA: CPT | Performed by: HOSPITALIST

## 2021-06-16 PROCEDURE — 25000003 PHARM REV CODE 250: Performed by: HOSPITALIST

## 2021-06-16 PROCEDURE — 85025 COMPLETE CBC W/AUTO DIFF WBC: CPT | Performed by: HOSPITALIST

## 2021-06-16 PROCEDURE — 71000033 HC RECOVERY, INTIAL HOUR: Performed by: ORTHOPAEDIC SURGERY

## 2021-06-16 PROCEDURE — 36000710: Performed by: ORTHOPAEDIC SURGERY

## 2021-06-16 PROCEDURE — G0378 HOSPITAL OBSERVATION PER HR: HCPCS

## 2021-06-16 PROCEDURE — 21400001 HC TELEMETRY ROOM

## 2021-06-16 PROCEDURE — 25000003 PHARM REV CODE 250: Performed by: NURSE ANESTHETIST, CERTIFIED REGISTERED

## 2021-06-16 PROCEDURE — 63600175 PHARM REV CODE 636 W HCPCS: Performed by: NURSE ANESTHETIST, CERTIFIED REGISTERED

## 2021-06-16 RX ORDER — POLYETHYLENE GLYCOL 3350 17 G/17G
17 POWDER, FOR SOLUTION ORAL DAILY
Status: DISCONTINUED | OUTPATIENT
Start: 2021-06-16 | End: 2021-06-18 | Stop reason: HOSPADM

## 2021-06-16 RX ORDER — METOCLOPRAMIDE HYDROCHLORIDE 5 MG/ML
5 INJECTION INTRAMUSCULAR; INTRAVENOUS EVERY 6 HOURS PRN
Status: DISCONTINUED | OUTPATIENT
Start: 2021-06-16 | End: 2021-06-18 | Stop reason: HOSPADM

## 2021-06-16 RX ORDER — MUPIROCIN 20 MG/G
1 OINTMENT TOPICAL 2 TIMES DAILY
Status: DISCONTINUED | OUTPATIENT
Start: 2021-06-16 | End: 2021-06-18 | Stop reason: HOSPADM

## 2021-06-16 RX ORDER — BETAMETHASONE DIPROPIONATE 0.5 MG/G
1 CREAM TOPICAL
Status: ON HOLD | COMMUNITY
End: 2021-07-06 | Stop reason: HOSPADM

## 2021-06-16 RX ORDER — MEPERIDINE HYDROCHLORIDE 25 MG/ML
12.5 INJECTION INTRAMUSCULAR; INTRAVENOUS; SUBCUTANEOUS ONCE AS NEEDED
Status: DISCONTINUED | OUTPATIENT
Start: 2021-06-16 | End: 2021-06-16 | Stop reason: HOSPADM

## 2021-06-16 RX ORDER — MORPHINE SULFATE 2 MG/ML
2 INJECTION, SOLUTION INTRAMUSCULAR; INTRAVENOUS ONCE
Status: COMPLETED | OUTPATIENT
Start: 2021-06-16 | End: 2021-06-16

## 2021-06-16 RX ORDER — ACETAMINOPHEN 500 MG
1000 TABLET ORAL EVERY 8 HOURS
Status: COMPLETED | OUTPATIENT
Start: 2021-06-16 | End: 2021-06-17

## 2021-06-16 RX ORDER — PROPOFOL 10 MG/ML
VIAL (ML) INTRAVENOUS CONTINUOUS PRN
Status: DISCONTINUED | OUTPATIENT
Start: 2021-06-16 | End: 2021-06-16

## 2021-06-16 RX ORDER — ONDANSETRON 2 MG/ML
4 INJECTION INTRAMUSCULAR; INTRAVENOUS DAILY PRN
Status: DISCONTINUED | OUTPATIENT
Start: 2021-06-16 | End: 2021-06-16 | Stop reason: HOSPADM

## 2021-06-16 RX ORDER — ONDANSETRON 2 MG/ML
INJECTION INTRAMUSCULAR; INTRAVENOUS
Status: DISCONTINUED | OUTPATIENT
Start: 2021-06-16 | End: 2021-06-16

## 2021-06-16 RX ORDER — OXYCODONE HYDROCHLORIDE 5 MG/1
5 TABLET ORAL
Status: DISCONTINUED | OUTPATIENT
Start: 2021-06-16 | End: 2021-06-16 | Stop reason: HOSPADM

## 2021-06-16 RX ORDER — DEXTROSE MONOHYDRATE AND SODIUM CHLORIDE 5; .9 G/100ML; G/100ML
INJECTION, SOLUTION INTRAVENOUS CONTINUOUS
Status: DISCONTINUED | OUTPATIENT
Start: 2021-06-16 | End: 2021-06-18

## 2021-06-16 RX ORDER — PHENYLEPHRINE HYDROCHLORIDE 10 MG/ML
INJECTION INTRAVENOUS
Status: DISCONTINUED | OUTPATIENT
Start: 2021-06-16 | End: 2021-06-16

## 2021-06-16 RX ORDER — CEFAZOLIN SODIUM 2 G/50ML
2 SOLUTION INTRAVENOUS
Status: COMPLETED | OUTPATIENT
Start: 2021-06-16 | End: 2021-06-17

## 2021-06-16 RX ORDER — OXYCODONE HYDROCHLORIDE 5 MG/1
10 TABLET ORAL EVERY 4 HOURS PRN
Status: DISCONTINUED | OUTPATIENT
Start: 2021-06-16 | End: 2021-06-18 | Stop reason: HOSPADM

## 2021-06-16 RX ORDER — LIDOCAINE HYDROCHLORIDE 10 MG/ML
INJECTION, SOLUTION INTRAVENOUS
Status: DISCONTINUED | OUTPATIENT
Start: 2021-06-16 | End: 2021-06-16

## 2021-06-16 RX ORDER — DIPHENHYDRAMINE HYDROCHLORIDE 50 MG/ML
25 INJECTION INTRAMUSCULAR; INTRAVENOUS EVERY 6 HOURS PRN
Status: DISCONTINUED | OUTPATIENT
Start: 2021-06-16 | End: 2021-06-16 | Stop reason: HOSPADM

## 2021-06-16 RX ORDER — ONDANSETRON 2 MG/ML
8 INJECTION INTRAMUSCULAR; INTRAVENOUS EVERY 8 HOURS PRN
Status: DISCONTINUED | OUTPATIENT
Start: 2021-06-16 | End: 2021-06-18 | Stop reason: HOSPADM

## 2021-06-16 RX ORDER — FENTANYL CITRATE 50 UG/ML
INJECTION, SOLUTION INTRAMUSCULAR; INTRAVENOUS
Status: DISCONTINUED | OUTPATIENT
Start: 2021-06-16 | End: 2021-06-16

## 2021-06-16 RX ORDER — DOCUSATE SODIUM 100 MG/1
100 CAPSULE, LIQUID FILLED ORAL EVERY 12 HOURS
Status: DISCONTINUED | OUTPATIENT
Start: 2021-06-16 | End: 2021-06-18 | Stop reason: HOSPADM

## 2021-06-16 RX ORDER — GUAIFENESIN 100 MG/5ML
200 SOLUTION ORAL 3 TIMES DAILY PRN
COMMUNITY

## 2021-06-16 RX ORDER — BISACODYL 10 MG
10 SUPPOSITORY, RECTAL RECTAL DAILY PRN
Status: DISCONTINUED | OUTPATIENT
Start: 2021-06-16 | End: 2021-06-18 | Stop reason: HOSPADM

## 2021-06-16 RX ORDER — CELECOXIB 200 MG/1
200 CAPSULE ORAL 2 TIMES DAILY
Status: DISCONTINUED | OUTPATIENT
Start: 2021-06-16 | End: 2021-06-18 | Stop reason: HOSPADM

## 2021-06-16 RX ORDER — HYDROMORPHONE HYDROCHLORIDE 2 MG/ML
0.4 INJECTION, SOLUTION INTRAMUSCULAR; INTRAVENOUS; SUBCUTANEOUS EVERY 5 MIN PRN
Status: DISCONTINUED | OUTPATIENT
Start: 2021-06-16 | End: 2021-06-16 | Stop reason: HOSPADM

## 2021-06-16 RX ORDER — CEFAZOLIN SODIUM 1 G/3ML
2 INJECTION, POWDER, FOR SOLUTION INTRAMUSCULAR; INTRAVENOUS ONCE
Status: COMPLETED | OUTPATIENT
Start: 2021-06-16 | End: 2021-06-16

## 2021-06-16 RX ORDER — ROPIVACAINE HYDROCHLORIDE 5 MG/ML
INJECTION, SOLUTION EPIDURAL; INFILTRATION; PERINEURAL
Status: COMPLETED | OUTPATIENT
Start: 2021-06-16 | End: 2021-06-16

## 2021-06-16 RX ORDER — PROPOFOL 10 MG/ML
VIAL (ML) INTRAVENOUS
Status: DISCONTINUED | OUTPATIENT
Start: 2021-06-16 | End: 2021-06-16

## 2021-06-16 RX ORDER — OXYCODONE HYDROCHLORIDE 5 MG/1
5 TABLET ORAL EVERY 4 HOURS PRN
Status: DISCONTINUED | OUTPATIENT
Start: 2021-06-16 | End: 2021-06-18 | Stop reason: HOSPADM

## 2021-06-16 RX ADMIN — VENLAFAXINE HYDROCHLORIDE 150 MG: 75 CAPSULE, EXTENDED RELEASE ORAL at 09:06

## 2021-06-16 RX ADMIN — ATORVASTATIN CALCIUM 40 MG: 20 TABLET, FILM COATED ORAL at 09:06

## 2021-06-16 RX ADMIN — MIRTAZAPINE 7.5 MG: 7.5 TABLET ORAL at 08:06

## 2021-06-16 RX ADMIN — LISINOPRIL 2.5 MG: 2.5 TABLET ORAL at 09:06

## 2021-06-16 RX ADMIN — PHENYLEPHRINE HYDROCHLORIDE 0.5 MCG/KG/MIN: 10 INJECTION INTRAVENOUS at 01:06

## 2021-06-16 RX ADMIN — PHENYLEPHRINE HYDROCHLORIDE 200 MCG: 10 INJECTION INTRAVENOUS at 12:06

## 2021-06-16 RX ADMIN — LIDOCAINE HYDROCHLORIDE 50 MG: 10 INJECTION, SOLUTION INTRAVENOUS at 12:06

## 2021-06-16 RX ADMIN — FENTANYL CITRATE 50 MCG: 50 INJECTION, SOLUTION INTRAMUSCULAR; INTRAVENOUS at 12:06

## 2021-06-16 RX ADMIN — PHENYLEPHRINE HYDROCHLORIDE 200 MCG: 10 INJECTION INTRAVENOUS at 01:06

## 2021-06-16 RX ADMIN — DEXTROSE AND SODIUM CHLORIDE: 5; .9 INJECTION, SOLUTION INTRAVENOUS at 05:06

## 2021-06-16 RX ADMIN — PROPOFOL 30 MG: 10 INJECTION, EMULSION INTRAVENOUS at 12:06

## 2021-06-16 RX ADMIN — GLYCOPYRROLATE 0.2 MG: 0.2 INJECTION, SOLUTION INTRAMUSCULAR; INTRAVITREAL at 12:06

## 2021-06-16 RX ADMIN — MORPHINE SULFATE 2 MG: 2 INJECTION, SOLUTION INTRAMUSCULAR; INTRAVENOUS at 10:06

## 2021-06-16 RX ADMIN — ACETAMINOPHEN 1000 MG: 500 TABLET, FILM COATED ORAL at 05:06

## 2021-06-16 RX ADMIN — ACETAMINOPHEN 1000 MG: 500 TABLET, FILM COATED ORAL at 09:06

## 2021-06-16 RX ADMIN — DEXTROSE 2 G: 50 INJECTION, SOLUTION INTRAVENOUS at 09:06

## 2021-06-16 RX ADMIN — PROPOFOL 50 MCG/KG/MIN: 10 INJECTION, EMULSION INTRAVENOUS at 12:06

## 2021-06-16 RX ADMIN — CELECOXIB 200 MG: 200 CAPSULE ORAL at 08:06

## 2021-06-16 RX ADMIN — HYDROCODONE BITARTRATE AND ACETAMINOPHEN 1 TABLET: 5; 325 TABLET ORAL at 01:06

## 2021-06-16 RX ADMIN — CEFAZOLIN 2 G: 330 INJECTION, POWDER, FOR SOLUTION INTRAMUSCULAR; INTRAVENOUS at 12:06

## 2021-06-16 RX ADMIN — POLYETHYLENE GLYCOL 3350 17 G: 17 POWDER, FOR SOLUTION ORAL at 05:06

## 2021-06-16 RX ADMIN — Medication 6 MG: at 01:06

## 2021-06-16 RX ADMIN — ROPIVACAINE HYDROCHLORIDE 3 ML: 5 INJECTION, SOLUTION EPIDURAL; INFILTRATION; PERINEURAL at 12:06

## 2021-06-16 RX ADMIN — DOCUSATE SODIUM 100 MG: 100 CAPSULE, LIQUID FILLED ORAL at 09:06

## 2021-06-16 RX ADMIN — ONDANSETRON HYDROCHLORIDE 4 MG: 2 INJECTION INTRAMUSCULAR; INTRAVENOUS at 12:06

## 2021-06-16 RX ADMIN — MORPHINE SULFATE 4 MG: 4 INJECTION, SOLUTION INTRAMUSCULAR; INTRAVENOUS at 07:06

## 2021-06-16 RX ADMIN — MORPHINE SULFATE 4 MG: 4 INJECTION, SOLUTION INTRAMUSCULAR; INTRAVENOUS at 05:06

## 2021-06-16 RX ADMIN — MUPIROCIN 1 G: 20 OINTMENT TOPICAL at 08:06

## 2021-06-17 ENCOUNTER — TELEPHONE (OUTPATIENT)
Dept: FAMILY MEDICINE | Facility: CLINIC | Age: 86
End: 2021-06-17

## 2021-06-17 PROBLEM — Z51.5 ENCOUNTER FOR PALLIATIVE CARE: Status: ACTIVE | Noted: 2021-06-17

## 2021-06-17 LAB
ANION GAP SERPL CALC-SCNC: 9 MMOL/L (ref 8–16)
BASOPHILS # BLD AUTO: 0.07 K/UL (ref 0–0.2)
BASOPHILS NFR BLD: 0.8 % (ref 0–1.9)
BUN SERPL-MCNC: 13 MG/DL (ref 8–23)
CALCIUM SERPL-MCNC: 7.7 MG/DL (ref 8.7–10.5)
CHLORIDE SERPL-SCNC: 107 MMOL/L (ref 95–110)
CO2 SERPL-SCNC: 22 MMOL/L (ref 23–29)
CREAT SERPL-MCNC: 0.9 MG/DL (ref 0.5–1.4)
DIFFERENTIAL METHOD: ABNORMAL
EOSINOPHIL # BLD AUTO: 0.3 K/UL (ref 0–0.5)
EOSINOPHIL NFR BLD: 3.1 % (ref 0–8)
ERYTHROCYTE [DISTWIDTH] IN BLOOD BY AUTOMATED COUNT: 14.5 % (ref 11.5–14.5)
ERYTHROCYTE [DISTWIDTH] IN BLOOD BY AUTOMATED COUNT: 14.5 % (ref 11.5–14.5)
EST. GFR  (AFRICAN AMERICAN): >60 ML/MIN/1.73 M^2
EST. GFR  (NON AFRICAN AMERICAN): 59 ML/MIN/1.73 M^2
GLUCOSE SERPL-MCNC: 158 MG/DL (ref 70–110)
HCT VFR BLD AUTO: 33.2 % (ref 37–48.5)
HCT VFR BLD AUTO: 33.4 % (ref 37–48.5)
HGB BLD-MCNC: 10.5 G/DL (ref 12–16)
HGB BLD-MCNC: 10.5 G/DL (ref 12–16)
IMM GRANULOCYTES # BLD AUTO: 0.03 K/UL (ref 0–0.04)
IMM GRANULOCYTES NFR BLD AUTO: 0.4 % (ref 0–0.5)
LYMPHOCYTES # BLD AUTO: 1.9 K/UL (ref 1–4.8)
LYMPHOCYTES NFR BLD: 22.1 % (ref 18–48)
MAGNESIUM SERPL-MCNC: 1.5 MG/DL (ref 1.6–2.6)
MCH RBC QN AUTO: 30.2 PG (ref 27–31)
MCH RBC QN AUTO: 30.3 PG (ref 27–31)
MCHC RBC AUTO-ENTMCNC: 31.4 G/DL (ref 32–36)
MCHC RBC AUTO-ENTMCNC: 31.6 G/DL (ref 32–36)
MCV RBC AUTO: 96 FL (ref 82–98)
MCV RBC AUTO: 96 FL (ref 82–98)
MONOCYTES # BLD AUTO: 0.8 K/UL (ref 0.3–1)
MONOCYTES NFR BLD: 10 % (ref 4–15)
NEUTROPHILS # BLD AUTO: 5.3 K/UL (ref 1.8–7.7)
NEUTROPHILS NFR BLD: 63.6 % (ref 38–73)
NRBC BLD-RTO: 0 /100 WBC
PHOSPHATE SERPL-MCNC: 2.9 MG/DL (ref 2.7–4.5)
PLATELET # BLD AUTO: 279 K/UL (ref 150–450)
PLATELET # BLD AUTO: 280 K/UL (ref 150–450)
PMV BLD AUTO: 9.5 FL (ref 9.2–12.9)
PMV BLD AUTO: 9.7 FL (ref 9.2–12.9)
POTASSIUM SERPL-SCNC: 3.7 MMOL/L (ref 3.5–5.1)
RBC # BLD AUTO: 3.47 M/UL (ref 4–5.4)
RBC # BLD AUTO: 3.48 M/UL (ref 4–5.4)
SODIUM SERPL-SCNC: 138 MMOL/L (ref 136–145)
WBC # BLD AUTO: 8.39 K/UL (ref 3.9–12.7)
WBC # BLD AUTO: 8.4 K/UL (ref 3.9–12.7)

## 2021-06-17 PROCEDURE — 11000001 HC ACUTE MED/SURG PRIVATE ROOM

## 2021-06-17 PROCEDURE — 94799 UNLISTED PULMONARY SVC/PX: CPT

## 2021-06-17 PROCEDURE — 99233 PR SUBSEQUENT HOSPITAL CARE,LEVL III: ICD-10-PCS | Mod: ,,, | Performed by: HOSPITALIST

## 2021-06-17 PROCEDURE — 99233 SBSQ HOSP IP/OBS HIGH 50: CPT | Mod: ,,, | Performed by: HOSPITALIST

## 2021-06-17 PROCEDURE — 36415 COLL VENOUS BLD VENIPUNCTURE: CPT | Performed by: ORTHOPAEDIC SURGERY

## 2021-06-17 PROCEDURE — 84100 ASSAY OF PHOSPHORUS: CPT | Performed by: HOSPITALIST

## 2021-06-17 PROCEDURE — 94761 N-INVAS EAR/PLS OXIMETRY MLT: CPT

## 2021-06-17 PROCEDURE — 85025 COMPLETE CBC W/AUTO DIFF WBC: CPT | Performed by: HOSPITALIST

## 2021-06-17 PROCEDURE — 80048 BASIC METABOLIC PNL TOTAL CA: CPT | Performed by: HOSPITALIST

## 2021-06-17 PROCEDURE — 99900035 HC TECH TIME PER 15 MIN (STAT)

## 2021-06-17 PROCEDURE — 97166 OT EVAL MOD COMPLEX 45 MIN: CPT

## 2021-06-17 PROCEDURE — 25000003 PHARM REV CODE 250: Performed by: HOSPITALIST

## 2021-06-17 PROCEDURE — 99223 PR INITIAL HOSPITAL CARE,LEVL III: ICD-10-PCS | Mod: ,,, | Performed by: FAMILY MEDICINE

## 2021-06-17 PROCEDURE — 97530 THERAPEUTIC ACTIVITIES: CPT

## 2021-06-17 PROCEDURE — 99223 1ST HOSP IP/OBS HIGH 75: CPT | Mod: ,,, | Performed by: FAMILY MEDICINE

## 2021-06-17 PROCEDURE — 25000003 PHARM REV CODE 250: Performed by: ORTHOPAEDIC SURGERY

## 2021-06-17 PROCEDURE — 27000221 HC OXYGEN, UP TO 24 HOURS

## 2021-06-17 PROCEDURE — 63600175 PHARM REV CODE 636 W HCPCS: Performed by: ORTHOPAEDIC SURGERY

## 2021-06-17 PROCEDURE — 85027 COMPLETE CBC AUTOMATED: CPT | Performed by: ORTHOPAEDIC SURGERY

## 2021-06-17 PROCEDURE — 83735 ASSAY OF MAGNESIUM: CPT | Performed by: HOSPITALIST

## 2021-06-17 RX ORDER — NAPROXEN SODIUM 220 MG/1
81 TABLET, FILM COATED ORAL 2 TIMES DAILY
Status: DISCONTINUED | OUTPATIENT
Start: 2021-06-17 | End: 2021-06-18 | Stop reason: HOSPADM

## 2021-06-17 RX ORDER — SODIUM CHLORIDE 0.9 % (FLUSH) 0.9 %
2 SYRINGE (ML) INJECTION EVERY 6 HOURS PRN
Status: DISCONTINUED | OUTPATIENT
Start: 2021-06-17 | End: 2021-06-18 | Stop reason: HOSPADM

## 2021-06-17 RX ADMIN — CELECOXIB 200 MG: 200 CAPSULE ORAL at 09:06

## 2021-06-17 RX ADMIN — MUPIROCIN 1 G: 20 OINTMENT TOPICAL at 09:06

## 2021-06-17 RX ADMIN — DEXTROSE 2 G: 50 INJECTION, SOLUTION INTRAVENOUS at 04:06

## 2021-06-17 RX ADMIN — ACETAMINOPHEN 1000 MG: 500 TABLET, FILM COATED ORAL at 06:06

## 2021-06-17 RX ADMIN — ASPIRIN 81 MG CHEWABLE TABLET 81 MG: 81 TABLET CHEWABLE at 09:06

## 2021-06-17 RX ADMIN — VENLAFAXINE HYDROCHLORIDE 150 MG: 75 CAPSULE, EXTENDED RELEASE ORAL at 09:06

## 2021-06-17 RX ADMIN — LISINOPRIL 2.5 MG: 2.5 TABLET ORAL at 09:06

## 2021-06-17 RX ADMIN — MIRTAZAPINE 7.5 MG: 7.5 TABLET ORAL at 09:06

## 2021-06-17 RX ADMIN — ATORVASTATIN CALCIUM 40 MG: 20 TABLET, FILM COATED ORAL at 09:06

## 2021-06-17 RX ADMIN — DOCUSATE SODIUM 100 MG: 100 CAPSULE, LIQUID FILLED ORAL at 09:06

## 2021-06-17 RX ADMIN — OXYCODONE HYDROCHLORIDE 5 MG: 5 TABLET ORAL at 04:06

## 2021-06-17 RX ADMIN — POLYETHYLENE GLYCOL 3350 17 G: 17 POWDER, FOR SOLUTION ORAL at 09:06

## 2021-06-18 ENCOUNTER — HOSPITAL ENCOUNTER (INPATIENT)
Facility: HOSPITAL | Age: 86
LOS: 21 days | Discharge: HOME HEALTH | DRG: 560 | End: 2021-07-09
Attending: HOSPITALIST | Admitting: HOSPITALIST
Payer: MEDICARE

## 2021-06-18 VITALS
HEART RATE: 92 BPM | RESPIRATION RATE: 16 BRPM | DIASTOLIC BLOOD PRESSURE: 61 MMHG | WEIGHT: 160 LBS | OXYGEN SATURATION: 92 % | SYSTOLIC BLOOD PRESSURE: 132 MMHG | TEMPERATURE: 97 F | HEIGHT: 62 IN | BODY MASS INDEX: 29.44 KG/M2

## 2021-06-18 DIAGNOSIS — S72.002A CLOSED FRACTURE OF LEFT HIP, INITIAL ENCOUNTER: Primary | ICD-10-CM

## 2021-06-18 LAB
ANION GAP SERPL CALC-SCNC: 8 MMOL/L (ref 8–16)
ANISOCYTOSIS BLD QL SMEAR: SLIGHT
BASOPHILS # BLD AUTO: 0.05 K/UL (ref 0–0.2)
BASOPHILS NFR BLD: 0.5 % (ref 0–1.9)
BUN SERPL-MCNC: 14 MG/DL (ref 8–23)
CALCIUM SERPL-MCNC: 7.5 MG/DL (ref 8.7–10.5)
CHLORIDE SERPL-SCNC: 111 MMOL/L (ref 95–110)
CO2 SERPL-SCNC: 24 MMOL/L (ref 23–29)
CREAT SERPL-MCNC: 0.8 MG/DL (ref 0.5–1.4)
DIFFERENTIAL METHOD: ABNORMAL
EOSINOPHIL # BLD AUTO: 0.2 K/UL (ref 0–0.5)
EOSINOPHIL NFR BLD: 2.1 % (ref 0–8)
ERYTHROCYTE [DISTWIDTH] IN BLOOD BY AUTOMATED COUNT: 14.4 % (ref 11.5–14.5)
ERYTHROCYTE [DISTWIDTH] IN BLOOD BY AUTOMATED COUNT: 14.4 % (ref 11.5–14.5)
EST. GFR  (AFRICAN AMERICAN): >60 ML/MIN/1.73 M^2
EST. GFR  (NON AFRICAN AMERICAN): >60 ML/MIN/1.73 M^2
GLUCOSE SERPL-MCNC: 149 MG/DL (ref 70–110)
HCT VFR BLD AUTO: 31.5 % (ref 37–48.5)
HCT VFR BLD AUTO: 31.6 % (ref 37–48.5)
HGB BLD-MCNC: 9.9 G/DL (ref 12–16)
HGB BLD-MCNC: 9.9 G/DL (ref 12–16)
IMM GRANULOCYTES # BLD AUTO: 0.05 K/UL (ref 0–0.04)
IMM GRANULOCYTES NFR BLD AUTO: 0.5 % (ref 0–0.5)
LYMPHOCYTES # BLD AUTO: 1.7 K/UL (ref 1–4.8)
LYMPHOCYTES NFR BLD: 15.6 % (ref 18–48)
MAGNESIUM SERPL-MCNC: 1.6 MG/DL (ref 1.6–2.6)
MCH RBC QN AUTO: 29.7 PG (ref 27–31)
MCH RBC QN AUTO: 29.8 PG (ref 27–31)
MCHC RBC AUTO-ENTMCNC: 31.3 G/DL (ref 32–36)
MCHC RBC AUTO-ENTMCNC: 31.4 G/DL (ref 32–36)
MCV RBC AUTO: 95 FL (ref 82–98)
MCV RBC AUTO: 95 FL (ref 82–98)
MONOCYTES # BLD AUTO: 0.9 K/UL (ref 0.3–1)
MONOCYTES NFR BLD: 8.5 % (ref 4–15)
NEUTROPHILS # BLD AUTO: 7.8 K/UL (ref 1.8–7.7)
NEUTROPHILS NFR BLD: 72.8 % (ref 38–73)
NRBC BLD-RTO: 0 /100 WBC
PHOSPHATE SERPL-MCNC: 2.6 MG/DL (ref 2.7–4.5)
PLATELET # BLD AUTO: 254 K/UL (ref 150–450)
PLATELET # BLD AUTO: 258 K/UL (ref 150–450)
PLATELET BLD QL SMEAR: ABNORMAL
PMV BLD AUTO: 9.6 FL (ref 9.2–12.9)
PMV BLD AUTO: 9.6 FL (ref 9.2–12.9)
POTASSIUM SERPL-SCNC: 3.7 MMOL/L (ref 3.5–5.1)
RBC # BLD AUTO: 3.32 M/UL (ref 4–5.4)
RBC # BLD AUTO: 3.33 M/UL (ref 4–5.4)
SODIUM SERPL-SCNC: 143 MMOL/L (ref 136–145)
WBC # BLD AUTO: 10.66 K/UL (ref 3.9–12.7)
WBC # BLD AUTO: 10.67 K/UL (ref 3.9–12.7)

## 2021-06-18 PROCEDURE — 83735 ASSAY OF MAGNESIUM: CPT | Performed by: HOSPITALIST

## 2021-06-18 PROCEDURE — 25000003 PHARM REV CODE 250: Performed by: ORTHOPAEDIC SURGERY

## 2021-06-18 PROCEDURE — 85027 COMPLETE CBC AUTOMATED: CPT | Performed by: ORTHOPAEDIC SURGERY

## 2021-06-18 PROCEDURE — 25000003 PHARM REV CODE 250: Performed by: EMERGENCY MEDICINE

## 2021-06-18 PROCEDURE — 11000004 HC SNF PRIVATE

## 2021-06-18 PROCEDURE — 99238 PR HOSPITAL DISCHARGE DAY,<30 MIN: ICD-10-PCS | Mod: ,,, | Performed by: HOSPITALIST

## 2021-06-18 PROCEDURE — 99238 HOSP IP/OBS DSCHRG MGMT 30/<: CPT | Mod: ,,, | Performed by: HOSPITALIST

## 2021-06-18 PROCEDURE — 84100 ASSAY OF PHOSPHORUS: CPT | Performed by: HOSPITALIST

## 2021-06-18 PROCEDURE — 97535 SELF CARE MNGMENT TRAINING: CPT

## 2021-06-18 PROCEDURE — 27000221 HC OXYGEN, UP TO 24 HOURS

## 2021-06-18 PROCEDURE — 97530 THERAPEUTIC ACTIVITIES: CPT

## 2021-06-18 PROCEDURE — 36415 COLL VENOUS BLD VENIPUNCTURE: CPT | Performed by: ORTHOPAEDIC SURGERY

## 2021-06-18 PROCEDURE — 80048 BASIC METABOLIC PNL TOTAL CA: CPT | Performed by: HOSPITALIST

## 2021-06-18 PROCEDURE — 85025 COMPLETE CBC W/AUTO DIFF WBC: CPT | Performed by: HOSPITALIST

## 2021-06-18 PROCEDURE — 25000003 PHARM REV CODE 250: Performed by: HOSPITALIST

## 2021-06-18 RX ORDER — BISACODYL 10 MG
10 SUPPOSITORY, RECTAL RECTAL DAILY PRN
Status: DISCONTINUED | OUTPATIENT
Start: 2021-06-18 | End: 2021-07-09 | Stop reason: HOSPADM

## 2021-06-18 RX ORDER — CELECOXIB 200 MG/1
200 CAPSULE ORAL 2 TIMES DAILY
Status: CANCELLED | OUTPATIENT
Start: 2021-06-18

## 2021-06-18 RX ORDER — HYDROCODONE BITARTRATE AND ACETAMINOPHEN 5; 325 MG/1; MG/1
1 TABLET ORAL EVERY 6 HOURS PRN
Status: DISCONTINUED | OUTPATIENT
Start: 2021-06-18 | End: 2021-07-09 | Stop reason: HOSPADM

## 2021-06-18 RX ORDER — MIRTAZAPINE 7.5 MG/1
7.5 TABLET, FILM COATED ORAL NIGHTLY
Status: CANCELLED | OUTPATIENT
Start: 2021-06-18

## 2021-06-18 RX ORDER — AMOXICILLIN 250 MG
1 CAPSULE ORAL 2 TIMES DAILY
Status: CANCELLED | OUTPATIENT
Start: 2021-06-18

## 2021-06-18 RX ORDER — DOCUSATE SODIUM 100 MG/1
100 CAPSULE, LIQUID FILLED ORAL EVERY 12 HOURS
Status: CANCELLED | OUTPATIENT
Start: 2021-06-18

## 2021-06-18 RX ORDER — TALC
6 POWDER (GRAM) TOPICAL NIGHTLY PRN
Status: CANCELLED | OUTPATIENT
Start: 2021-06-18

## 2021-06-18 RX ORDER — NAPROXEN SODIUM 220 MG/1
81 TABLET, FILM COATED ORAL 2 TIMES DAILY
Status: CANCELLED | OUTPATIENT
Start: 2021-06-18

## 2021-06-18 RX ORDER — HYDROCODONE BITARTRATE AND ACETAMINOPHEN 5; 325 MG/1; MG/1
1 TABLET ORAL EVERY 6 HOURS PRN
Status: CANCELLED | OUTPATIENT
Start: 2021-06-18

## 2021-06-18 RX ORDER — BISACODYL 10 MG
10 SUPPOSITORY, RECTAL RECTAL DAILY PRN
Status: CANCELLED | OUTPATIENT
Start: 2021-06-18

## 2021-06-18 RX ORDER — ACETAMINOPHEN 325 MG/1
650 TABLET ORAL EVERY 4 HOURS PRN
Status: DISCONTINUED | OUTPATIENT
Start: 2021-06-18 | End: 2021-07-09 | Stop reason: HOSPADM

## 2021-06-18 RX ORDER — VENLAFAXINE HYDROCHLORIDE 75 MG/1
150 CAPSULE, EXTENDED RELEASE ORAL DAILY
Status: DISCONTINUED | OUTPATIENT
Start: 2021-06-19 | End: 2021-07-09 | Stop reason: HOSPADM

## 2021-06-18 RX ORDER — FAMOTIDINE 20 MG/1
20 TABLET, FILM COATED ORAL DAILY PRN
Status: DISCONTINUED | OUTPATIENT
Start: 2021-06-18 | End: 2021-07-09 | Stop reason: HOSPADM

## 2021-06-18 RX ORDER — ONDANSETRON 4 MG/1
4 TABLET, ORALLY DISINTEGRATING ORAL EVERY 8 HOURS PRN
Status: DISCONTINUED | OUTPATIENT
Start: 2021-06-18 | End: 2021-07-09 | Stop reason: HOSPADM

## 2021-06-18 RX ORDER — ATORVASTATIN CALCIUM 20 MG/1
40 TABLET, FILM COATED ORAL DAILY
Status: DISCONTINUED | OUTPATIENT
Start: 2021-06-19 | End: 2021-07-09 | Stop reason: HOSPADM

## 2021-06-18 RX ORDER — POLYETHYLENE GLYCOL 3350 17 G/17G
17 POWDER, FOR SOLUTION ORAL DAILY
Status: CANCELLED | OUTPATIENT
Start: 2021-06-19

## 2021-06-18 RX ORDER — FAMOTIDINE 20 MG/1
20 TABLET, FILM COATED ORAL DAILY PRN
Status: CANCELLED | OUTPATIENT
Start: 2021-06-18

## 2021-06-18 RX ORDER — AMOXICILLIN 250 MG
1 CAPSULE ORAL 2 TIMES DAILY
Status: DISCONTINUED | OUTPATIENT
Start: 2021-06-18 | End: 2021-06-25

## 2021-06-18 RX ORDER — CALCIUM CARBONATE 200(500)MG
500 TABLET,CHEWABLE ORAL 2 TIMES DAILY PRN
Status: CANCELLED | OUTPATIENT
Start: 2021-06-18

## 2021-06-18 RX ORDER — DOCUSATE SODIUM 100 MG/1
100 CAPSULE, LIQUID FILLED ORAL EVERY 12 HOURS
Status: DISCONTINUED | OUTPATIENT
Start: 2021-06-18 | End: 2021-06-18

## 2021-06-18 RX ORDER — VENLAFAXINE HYDROCHLORIDE 75 MG/1
150 CAPSULE, EXTENDED RELEASE ORAL DAILY
Status: CANCELLED | OUTPATIENT
Start: 2021-06-19

## 2021-06-18 RX ORDER — ONDANSETRON 4 MG/1
4 TABLET, ORALLY DISINTEGRATING ORAL EVERY 8 HOURS PRN
Status: CANCELLED | OUTPATIENT
Start: 2021-06-18

## 2021-06-18 RX ORDER — ATORVASTATIN CALCIUM 20 MG/1
40 TABLET, FILM COATED ORAL DAILY
Status: CANCELLED | OUTPATIENT
Start: 2021-06-19

## 2021-06-18 RX ORDER — CELECOXIB 200 MG/1
200 CAPSULE ORAL 2 TIMES DAILY
Status: DISCONTINUED | OUTPATIENT
Start: 2021-06-18 | End: 2021-07-09 | Stop reason: HOSPADM

## 2021-06-18 RX ORDER — MIRTAZAPINE 7.5 MG/1
7.5 TABLET, FILM COATED ORAL NIGHTLY
Status: DISCONTINUED | OUTPATIENT
Start: 2021-06-18 | End: 2021-07-09 | Stop reason: HOSPADM

## 2021-06-18 RX ORDER — LISINOPRIL 2.5 MG/1
2.5 TABLET ORAL DAILY
Status: DISCONTINUED | OUTPATIENT
Start: 2021-06-19 | End: 2021-07-09 | Stop reason: HOSPADM

## 2021-06-18 RX ORDER — ACETAMINOPHEN 325 MG/1
650 TABLET ORAL EVERY 4 HOURS PRN
Status: CANCELLED | OUTPATIENT
Start: 2021-06-18

## 2021-06-18 RX ORDER — POLYETHYLENE GLYCOL 3350 17 G/17G
17 POWDER, FOR SOLUTION ORAL DAILY
Status: DISCONTINUED | OUTPATIENT
Start: 2021-06-19 | End: 2021-06-25

## 2021-06-18 RX ORDER — NAPROXEN SODIUM 220 MG/1
81 TABLET, FILM COATED ORAL 2 TIMES DAILY
Status: DISCONTINUED | OUTPATIENT
Start: 2021-06-18 | End: 2021-07-09 | Stop reason: HOSPADM

## 2021-06-18 RX ORDER — ACETAMINOPHEN 325 MG/1
650 TABLET ORAL EVERY 6 HOURS PRN
Status: DISCONTINUED | OUTPATIENT
Start: 2021-06-18 | End: 2021-06-18

## 2021-06-18 RX ORDER — TALC
6 POWDER (GRAM) TOPICAL NIGHTLY PRN
Status: DISCONTINUED | OUTPATIENT
Start: 2021-06-18 | End: 2021-06-18

## 2021-06-18 RX ORDER — CALCIUM CARBONATE 200(500)MG
500 TABLET,CHEWABLE ORAL 2 TIMES DAILY PRN
Status: DISCONTINUED | OUTPATIENT
Start: 2021-06-18 | End: 2021-07-09 | Stop reason: HOSPADM

## 2021-06-18 RX ORDER — TALC
6 POWDER (GRAM) TOPICAL NIGHTLY PRN
Status: DISCONTINUED | OUTPATIENT
Start: 2021-06-18 | End: 2021-07-09 | Stop reason: HOSPADM

## 2021-06-18 RX ORDER — ACETAMINOPHEN 325 MG/1
650 TABLET ORAL EVERY 6 HOURS PRN
Status: CANCELLED | OUTPATIENT
Start: 2021-06-18

## 2021-06-18 RX ORDER — LISINOPRIL 2.5 MG/1
2.5 TABLET ORAL DAILY
Status: CANCELLED | OUTPATIENT
Start: 2021-06-19

## 2021-06-18 RX ADMIN — Medication 6 MG: at 02:06

## 2021-06-18 RX ADMIN — CELECOXIB 200 MG: 200 CAPSULE ORAL at 09:06

## 2021-06-18 RX ADMIN — CELECOXIB 200 MG: 200 CAPSULE ORAL at 08:06

## 2021-06-18 RX ADMIN — OXYCODONE HYDROCHLORIDE 10 MG: 5 TABLET ORAL at 01:06

## 2021-06-18 RX ADMIN — ATORVASTATIN CALCIUM 40 MG: 20 TABLET, FILM COATED ORAL at 10:06

## 2021-06-18 RX ADMIN — HYDROCODONE BITARTRATE AND ACETAMINOPHEN 1 TABLET: 5; 325 TABLET ORAL at 10:06

## 2021-06-18 RX ADMIN — LISINOPRIL 2.5 MG: 2.5 TABLET ORAL at 10:06

## 2021-06-18 RX ADMIN — MIRTAZAPINE 7.5 MG: 7.5 TABLET ORAL at 08:06

## 2021-06-18 RX ADMIN — ASPIRIN 81 MG CHEWABLE TABLET 81 MG: 81 TABLET CHEWABLE at 08:06

## 2021-06-18 RX ADMIN — ASPIRIN 81 MG CHEWABLE TABLET 81 MG: 81 TABLET CHEWABLE at 10:06

## 2021-06-19 PROCEDURE — 97535 SELF CARE MNGMENT TRAINING: CPT

## 2021-06-19 PROCEDURE — 94761 N-INVAS EAR/PLS OXIMETRY MLT: CPT

## 2021-06-19 PROCEDURE — 97162 PT EVAL MOD COMPLEX 30 MIN: CPT

## 2021-06-19 PROCEDURE — 27000221 HC OXYGEN, UP TO 24 HOURS

## 2021-06-19 PROCEDURE — 97110 THERAPEUTIC EXERCISES: CPT

## 2021-06-19 PROCEDURE — 25000003 PHARM REV CODE 250: Performed by: HOSPITALIST

## 2021-06-19 PROCEDURE — 97166 OT EVAL MOD COMPLEX 45 MIN: CPT

## 2021-06-19 PROCEDURE — 11000004 HC SNF PRIVATE

## 2021-06-19 PROCEDURE — 97530 THERAPEUTIC ACTIVITIES: CPT

## 2021-06-19 RX ADMIN — CELECOXIB 200 MG: 200 CAPSULE ORAL at 10:06

## 2021-06-19 RX ADMIN — MIRTAZAPINE 7.5 MG: 7.5 TABLET ORAL at 08:06

## 2021-06-19 RX ADMIN — CELECOXIB 200 MG: 200 CAPSULE ORAL at 08:06

## 2021-06-19 RX ADMIN — VENLAFAXINE HYDROCHLORIDE 150 MG: 75 CAPSULE, EXTENDED RELEASE ORAL at 10:06

## 2021-06-19 RX ADMIN — ATORVASTATIN CALCIUM 40 MG: 20 TABLET, FILM COATED ORAL at 10:06

## 2021-06-19 RX ADMIN — MELATONIN TAB 3 MG 6 MG: 3 TAB at 08:06

## 2021-06-19 RX ADMIN — ASPIRIN 81 MG CHEWABLE TABLET 81 MG: 81 TABLET CHEWABLE at 10:06

## 2021-06-19 RX ADMIN — ASPIRIN 81 MG CHEWABLE TABLET 81 MG: 81 TABLET CHEWABLE at 08:06

## 2021-06-19 RX ADMIN — LISINOPRIL 2.5 MG: 2.5 TABLET ORAL at 10:06

## 2021-06-19 RX ADMIN — HYDROCODONE BITARTRATE AND ACETAMINOPHEN 1 TABLET: 5; 325 TABLET ORAL at 08:06

## 2021-06-20 PROCEDURE — 94761 N-INVAS EAR/PLS OXIMETRY MLT: CPT

## 2021-06-20 PROCEDURE — 11000004 HC SNF PRIVATE

## 2021-06-20 PROCEDURE — 25000003 PHARM REV CODE 250: Performed by: HOSPITALIST

## 2021-06-20 PROCEDURE — 27000221 HC OXYGEN, UP TO 24 HOURS

## 2021-06-20 RX ADMIN — DOCUSATE SODIUM 50MG AND SENNOSIDES 8.6MG 1 TABLET: 8.6; 5 TABLET, FILM COATED ORAL at 09:06

## 2021-06-20 RX ADMIN — VENLAFAXINE HYDROCHLORIDE 150 MG: 75 CAPSULE, EXTENDED RELEASE ORAL at 09:06

## 2021-06-20 RX ADMIN — ATORVASTATIN CALCIUM 40 MG: 20 TABLET, FILM COATED ORAL at 09:06

## 2021-06-20 RX ADMIN — HYDROCODONE BITARTRATE AND ACETAMINOPHEN 1 TABLET: 5; 325 TABLET ORAL at 03:06

## 2021-06-20 RX ADMIN — CELECOXIB 200 MG: 200 CAPSULE ORAL at 09:06

## 2021-06-20 RX ADMIN — ASPIRIN 81 MG CHEWABLE TABLET 81 MG: 81 TABLET CHEWABLE at 09:06

## 2021-06-20 RX ADMIN — HYDROCODONE BITARTRATE AND ACETAMINOPHEN 1 TABLET: 5; 325 TABLET ORAL at 12:06

## 2021-06-20 RX ADMIN — MELATONIN TAB 3 MG 6 MG: 3 TAB at 09:06

## 2021-06-20 RX ADMIN — MIRTAZAPINE 7.5 MG: 7.5 TABLET ORAL at 09:06

## 2021-06-20 RX ADMIN — LISINOPRIL 2.5 MG: 2.5 TABLET ORAL at 09:06

## 2021-06-20 RX ADMIN — HYDROCODONE BITARTRATE AND ACETAMINOPHEN 1 TABLET: 5; 325 TABLET ORAL at 09:06

## 2021-06-21 PROBLEM — D62 ACUTE BLOOD LOSS ANEMIA: Status: ACTIVE | Noted: 2021-06-21

## 2021-06-21 LAB
ANION GAP SERPL CALC-SCNC: 11 MMOL/L (ref 8–16)
BASOPHILS # BLD AUTO: 0.06 K/UL (ref 0–0.2)
BASOPHILS NFR BLD: 0.6 % (ref 0–1.9)
BUN SERPL-MCNC: 13 MG/DL (ref 8–23)
CALCIUM SERPL-MCNC: 8.7 MG/DL (ref 8.7–10.5)
CHLORIDE SERPL-SCNC: 108 MMOL/L (ref 95–110)
CO2 SERPL-SCNC: 23 MMOL/L (ref 23–29)
CREAT SERPL-MCNC: 0.7 MG/DL (ref 0.5–1.4)
DIFFERENTIAL METHOD: ABNORMAL
EOSINOPHIL # BLD AUTO: 0.2 K/UL (ref 0–0.5)
EOSINOPHIL NFR BLD: 2.4 % (ref 0–8)
ERYTHROCYTE [DISTWIDTH] IN BLOOD BY AUTOMATED COUNT: 13.9 % (ref 11.5–14.5)
EST. GFR  (AFRICAN AMERICAN): >60 ML/MIN/1.73 M^2
EST. GFR  (NON AFRICAN AMERICAN): >60 ML/MIN/1.73 M^2
GLUCOSE SERPL-MCNC: 118 MG/DL (ref 70–110)
HCT VFR BLD AUTO: 33.6 % (ref 37–48.5)
HGB BLD-MCNC: 10.3 G/DL (ref 12–16)
IMM GRANULOCYTES # BLD AUTO: 0.07 K/UL (ref 0–0.04)
IMM GRANULOCYTES NFR BLD AUTO: 0.7 % (ref 0–0.5)
LYMPHOCYTES # BLD AUTO: 2.4 K/UL (ref 1–4.8)
LYMPHOCYTES NFR BLD: 24.6 % (ref 18–48)
MAGNESIUM SERPL-MCNC: 1.9 MG/DL (ref 1.6–2.6)
MCH RBC QN AUTO: 29.1 PG (ref 27–31)
MCHC RBC AUTO-ENTMCNC: 30.7 G/DL (ref 32–36)
MCV RBC AUTO: 95 FL (ref 82–98)
MONOCYTES # BLD AUTO: 1 K/UL (ref 0.3–1)
MONOCYTES NFR BLD: 10.3 % (ref 4–15)
NEUTROPHILS # BLD AUTO: 6 K/UL (ref 1.8–7.7)
NEUTROPHILS NFR BLD: 61.4 % (ref 38–73)
NRBC BLD-RTO: 0 /100 WBC
PHOSPHATE SERPL-MCNC: 3.7 MG/DL (ref 2.7–4.5)
PLATELET # BLD AUTO: 359 K/UL (ref 150–450)
PMV BLD AUTO: 9.7 FL (ref 9.2–12.9)
POTASSIUM SERPL-SCNC: 3.9 MMOL/L (ref 3.5–5.1)
RBC # BLD AUTO: 3.54 M/UL (ref 4–5.4)
SODIUM SERPL-SCNC: 142 MMOL/L (ref 136–145)
WBC # BLD AUTO: 9.73 K/UL (ref 3.9–12.7)

## 2021-06-21 PROCEDURE — 84100 ASSAY OF PHOSPHORUS: CPT | Performed by: HOSPITALIST

## 2021-06-21 PROCEDURE — 11000004 HC SNF PRIVATE

## 2021-06-21 PROCEDURE — 97530 THERAPEUTIC ACTIVITIES: CPT | Mod: CQ

## 2021-06-21 PROCEDURE — 99900035 HC TECH TIME PER 15 MIN (STAT)

## 2021-06-21 PROCEDURE — 97110 THERAPEUTIC EXERCISES: CPT | Mod: CQ

## 2021-06-21 PROCEDURE — 25000003 PHARM REV CODE 250: Performed by: HOSPITALIST

## 2021-06-21 PROCEDURE — 85025 COMPLETE CBC W/AUTO DIFF WBC: CPT | Performed by: HOSPITALIST

## 2021-06-21 PROCEDURE — 27000221 HC OXYGEN, UP TO 24 HOURS

## 2021-06-21 PROCEDURE — 36415 COLL VENOUS BLD VENIPUNCTURE: CPT | Performed by: HOSPITALIST

## 2021-06-21 PROCEDURE — 83735 ASSAY OF MAGNESIUM: CPT | Performed by: HOSPITALIST

## 2021-06-21 PROCEDURE — 97535 SELF CARE MNGMENT TRAINING: CPT | Mod: CO

## 2021-06-21 PROCEDURE — 94761 N-INVAS EAR/PLS OXIMETRY MLT: CPT

## 2021-06-21 PROCEDURE — 80048 BASIC METABOLIC PNL TOTAL CA: CPT | Performed by: HOSPITALIST

## 2021-06-21 RX ADMIN — ASPIRIN 81 MG CHEWABLE TABLET 81 MG: 81 TABLET CHEWABLE at 09:06

## 2021-06-21 RX ADMIN — DOCUSATE SODIUM 50MG AND SENNOSIDES 8.6MG 1 TABLET: 8.6; 5 TABLET, FILM COATED ORAL at 09:06

## 2021-06-21 RX ADMIN — CELECOXIB 200 MG: 200 CAPSULE ORAL at 09:06

## 2021-06-21 RX ADMIN — LISINOPRIL 2.5 MG: 2.5 TABLET ORAL at 09:06

## 2021-06-21 RX ADMIN — HYDROCODONE BITARTRATE AND ACETAMINOPHEN 1 TABLET: 5; 325 TABLET ORAL at 10:06

## 2021-06-21 RX ADMIN — MIRTAZAPINE 7.5 MG: 7.5 TABLET ORAL at 08:06

## 2021-06-21 RX ADMIN — ATORVASTATIN CALCIUM 40 MG: 20 TABLET, FILM COATED ORAL at 09:06

## 2021-06-21 RX ADMIN — ASPIRIN 81 MG CHEWABLE TABLET 81 MG: 81 TABLET CHEWABLE at 08:06

## 2021-06-21 RX ADMIN — MELATONIN TAB 3 MG 6 MG: 3 TAB at 08:06

## 2021-06-21 RX ADMIN — DOCUSATE SODIUM 50MG AND SENNOSIDES 8.6MG 1 TABLET: 8.6; 5 TABLET, FILM COATED ORAL at 08:06

## 2021-06-21 RX ADMIN — HYDROCODONE BITARTRATE AND ACETAMINOPHEN 1 TABLET: 5; 325 TABLET ORAL at 09:06

## 2021-06-21 RX ADMIN — CELECOXIB 200 MG: 200 CAPSULE ORAL at 08:06

## 2021-06-21 RX ADMIN — VENLAFAXINE HYDROCHLORIDE 150 MG: 75 CAPSULE, EXTENDED RELEASE ORAL at 09:06

## 2021-06-22 PROCEDURE — 97535 SELF CARE MNGMENT TRAINING: CPT | Mod: CO

## 2021-06-22 PROCEDURE — 99900035 HC TECH TIME PER 15 MIN (STAT)

## 2021-06-22 PROCEDURE — 11000004 HC SNF PRIVATE

## 2021-06-22 PROCEDURE — 94761 N-INVAS EAR/PLS OXIMETRY MLT: CPT

## 2021-06-22 PROCEDURE — 27000221 HC OXYGEN, UP TO 24 HOURS

## 2021-06-22 PROCEDURE — 97110 THERAPEUTIC EXERCISES: CPT | Mod: CO

## 2021-06-22 PROCEDURE — 97530 THERAPEUTIC ACTIVITIES: CPT | Mod: CQ

## 2021-06-22 PROCEDURE — 97110 THERAPEUTIC EXERCISES: CPT | Mod: CQ

## 2021-06-22 PROCEDURE — 25000003 PHARM REV CODE 250: Performed by: HOSPITALIST

## 2021-06-22 RX ADMIN — HYDROCODONE BITARTRATE AND ACETAMINOPHEN 1 TABLET: 5; 325 TABLET ORAL at 10:06

## 2021-06-22 RX ADMIN — MIRTAZAPINE 7.5 MG: 7.5 TABLET ORAL at 08:06

## 2021-06-22 RX ADMIN — ATORVASTATIN CALCIUM 40 MG: 20 TABLET, FILM COATED ORAL at 10:06

## 2021-06-22 RX ADMIN — CELECOXIB 200 MG: 200 CAPSULE ORAL at 10:06

## 2021-06-22 RX ADMIN — DOCUSATE SODIUM 50MG AND SENNOSIDES 8.6MG 1 TABLET: 8.6; 5 TABLET, FILM COATED ORAL at 08:06

## 2021-06-22 RX ADMIN — HYDROCODONE BITARTRATE AND ACETAMINOPHEN 1 TABLET: 5; 325 TABLET ORAL at 04:06

## 2021-06-22 RX ADMIN — ASPIRIN 81 MG CHEWABLE TABLET 81 MG: 81 TABLET CHEWABLE at 08:06

## 2021-06-22 RX ADMIN — CELECOXIB 200 MG: 200 CAPSULE ORAL at 08:06

## 2021-06-22 RX ADMIN — LISINOPRIL 2.5 MG: 2.5 TABLET ORAL at 10:06

## 2021-06-22 RX ADMIN — VENLAFAXINE HYDROCHLORIDE 150 MG: 75 CAPSULE, EXTENDED RELEASE ORAL at 10:06

## 2021-06-22 RX ADMIN — ASPIRIN 81 MG CHEWABLE TABLET 81 MG: 81 TABLET CHEWABLE at 10:06

## 2021-06-23 PROCEDURE — 97110 THERAPEUTIC EXERCISES: CPT

## 2021-06-23 PROCEDURE — 99305 PR NURSING FACILITY CARE, INIT, MOD SEVERITY: ICD-10-PCS | Mod: AI,,, | Performed by: HOSPITALIST

## 2021-06-23 PROCEDURE — 97535 SELF CARE MNGMENT TRAINING: CPT | Mod: CO

## 2021-06-23 PROCEDURE — 25000003 PHARM REV CODE 250: Performed by: HOSPITALIST

## 2021-06-23 PROCEDURE — 97530 THERAPEUTIC ACTIVITIES: CPT

## 2021-06-23 PROCEDURE — 11000004 HC SNF PRIVATE

## 2021-06-23 PROCEDURE — 99305 1ST NF CARE MODERATE MDM 35: CPT | Mod: AI,,, | Performed by: HOSPITALIST

## 2021-06-23 RX ADMIN — CELECOXIB 200 MG: 200 CAPSULE ORAL at 09:06

## 2021-06-23 RX ADMIN — ASPIRIN 81 MG CHEWABLE TABLET 81 MG: 81 TABLET CHEWABLE at 08:06

## 2021-06-23 RX ADMIN — ASPIRIN 81 MG CHEWABLE TABLET 81 MG: 81 TABLET CHEWABLE at 09:06

## 2021-06-23 RX ADMIN — VENLAFAXINE HYDROCHLORIDE 150 MG: 75 CAPSULE, EXTENDED RELEASE ORAL at 09:06

## 2021-06-23 RX ADMIN — DOCUSATE SODIUM 50MG AND SENNOSIDES 8.6MG 1 TABLET: 8.6; 5 TABLET, FILM COATED ORAL at 09:06

## 2021-06-23 RX ADMIN — POLYETHYLENE GLYCOL 3350 17 G: 17 POWDER, FOR SOLUTION ORAL at 09:06

## 2021-06-23 RX ADMIN — LISINOPRIL 2.5 MG: 2.5 TABLET ORAL at 09:06

## 2021-06-23 RX ADMIN — DOCUSATE SODIUM 50MG AND SENNOSIDES 8.6MG 1 TABLET: 8.6; 5 TABLET, FILM COATED ORAL at 08:06

## 2021-06-23 RX ADMIN — HYDROCODONE BITARTRATE AND ACETAMINOPHEN 1 TABLET: 5; 325 TABLET ORAL at 02:06

## 2021-06-23 RX ADMIN — ATORVASTATIN CALCIUM 40 MG: 20 TABLET, FILM COATED ORAL at 09:06

## 2021-06-23 RX ADMIN — CELECOXIB 200 MG: 200 CAPSULE ORAL at 08:06

## 2021-06-23 RX ADMIN — MIRTAZAPINE 7.5 MG: 7.5 TABLET ORAL at 08:06

## 2021-06-24 LAB
ANION GAP SERPL CALC-SCNC: 12 MMOL/L (ref 8–16)
ANISOCYTOSIS BLD QL SMEAR: SLIGHT
BASOPHILS # BLD AUTO: 0.11 K/UL (ref 0–0.2)
BASOPHILS NFR BLD: 0.9 % (ref 0–1.9)
BUN SERPL-MCNC: 16 MG/DL (ref 8–23)
CALCIUM SERPL-MCNC: 8.9 MG/DL (ref 8.7–10.5)
CHLORIDE SERPL-SCNC: 109 MMOL/L (ref 95–110)
CO2 SERPL-SCNC: 22 MMOL/L (ref 23–29)
CREAT SERPL-MCNC: 0.8 MG/DL (ref 0.5–1.4)
DIFFERENTIAL METHOD: ABNORMAL
EOSINOPHIL # BLD AUTO: 0.4 K/UL (ref 0–0.5)
EOSINOPHIL NFR BLD: 3.1 % (ref 0–8)
ERYTHROCYTE [DISTWIDTH] IN BLOOD BY AUTOMATED COUNT: 14.2 % (ref 11.5–14.5)
EST. GFR  (AFRICAN AMERICAN): >60 ML/MIN/1.73 M^2
EST. GFR  (NON AFRICAN AMERICAN): >60 ML/MIN/1.73 M^2
GLUCOSE SERPL-MCNC: 125 MG/DL (ref 70–110)
HCT VFR BLD AUTO: 33.3 % (ref 37–48.5)
HGB BLD-MCNC: 10.6 G/DL (ref 12–16)
IMM GRANULOCYTES # BLD AUTO: 0.24 K/UL (ref 0–0.04)
IMM GRANULOCYTES NFR BLD AUTO: 2 % (ref 0–0.5)
LYMPHOCYTES # BLD AUTO: 2.8 K/UL (ref 1–4.8)
LYMPHOCYTES NFR BLD: 23.5 % (ref 18–48)
MAGNESIUM SERPL-MCNC: 1.8 MG/DL (ref 1.6–2.6)
MCH RBC QN AUTO: 30.3 PG (ref 27–31)
MCHC RBC AUTO-ENTMCNC: 31.8 G/DL (ref 32–36)
MCV RBC AUTO: 95 FL (ref 82–98)
MONOCYTES # BLD AUTO: 1.2 K/UL (ref 0.3–1)
MONOCYTES NFR BLD: 9.7 % (ref 4–15)
NEUTROPHILS # BLD AUTO: 7.3 K/UL (ref 1.8–7.7)
NEUTROPHILS NFR BLD: 60.8 % (ref 38–73)
NRBC BLD-RTO: 0 /100 WBC
PHOSPHATE SERPL-MCNC: 3.1 MG/DL (ref 2.7–4.5)
PLATELET # BLD AUTO: 407 K/UL (ref 150–450)
PLATELET BLD QL SMEAR: ABNORMAL
PMV BLD AUTO: 9.5 FL (ref 9.2–12.9)
POLYCHROMASIA BLD QL SMEAR: ABNORMAL
POTASSIUM SERPL-SCNC: 3.8 MMOL/L (ref 3.5–5.1)
RBC # BLD AUTO: 3.5 M/UL (ref 4–5.4)
SODIUM SERPL-SCNC: 143 MMOL/L (ref 136–145)
WBC # BLD AUTO: 11.93 K/UL (ref 3.9–12.7)

## 2021-06-24 PROCEDURE — 97535 SELF CARE MNGMENT TRAINING: CPT | Mod: CO

## 2021-06-24 PROCEDURE — 83735 ASSAY OF MAGNESIUM: CPT | Performed by: HOSPITALIST

## 2021-06-24 PROCEDURE — 36415 COLL VENOUS BLD VENIPUNCTURE: CPT | Performed by: HOSPITALIST

## 2021-06-24 PROCEDURE — 25000003 PHARM REV CODE 250: Performed by: HOSPITALIST

## 2021-06-24 PROCEDURE — 84100 ASSAY OF PHOSPHORUS: CPT | Performed by: HOSPITALIST

## 2021-06-24 PROCEDURE — 80048 BASIC METABOLIC PNL TOTAL CA: CPT | Performed by: HOSPITALIST

## 2021-06-24 PROCEDURE — 94761 N-INVAS EAR/PLS OXIMETRY MLT: CPT

## 2021-06-24 PROCEDURE — 11000004 HC SNF PRIVATE

## 2021-06-24 PROCEDURE — 85025 COMPLETE CBC W/AUTO DIFF WBC: CPT | Performed by: HOSPITALIST

## 2021-06-24 RX ADMIN — MIRTAZAPINE 7.5 MG: 7.5 TABLET ORAL at 10:06

## 2021-06-24 RX ADMIN — CELECOXIB 200 MG: 200 CAPSULE ORAL at 08:06

## 2021-06-24 RX ADMIN — HYDROCODONE BITARTRATE AND ACETAMINOPHEN 1 TABLET: 5; 325 TABLET ORAL at 11:06

## 2021-06-24 RX ADMIN — VENLAFAXINE HYDROCHLORIDE 150 MG: 75 CAPSULE, EXTENDED RELEASE ORAL at 08:06

## 2021-06-24 RX ADMIN — LISINOPRIL 2.5 MG: 2.5 TABLET ORAL at 08:06

## 2021-06-24 RX ADMIN — DOCUSATE SODIUM 50MG AND SENNOSIDES 8.6MG 1 TABLET: 8.6; 5 TABLET, FILM COATED ORAL at 08:06

## 2021-06-24 RX ADMIN — ATORVASTATIN CALCIUM 40 MG: 20 TABLET, FILM COATED ORAL at 08:06

## 2021-06-24 RX ADMIN — ASPIRIN 81 MG CHEWABLE TABLET 81 MG: 81 TABLET CHEWABLE at 10:06

## 2021-06-24 RX ADMIN — CELECOXIB 200 MG: 200 CAPSULE ORAL at 10:06

## 2021-06-24 RX ADMIN — MELATONIN TAB 3 MG 6 MG: 3 TAB at 10:06

## 2021-06-24 RX ADMIN — ASPIRIN 81 MG CHEWABLE TABLET 81 MG: 81 TABLET CHEWABLE at 08:06

## 2021-06-25 PROCEDURE — 97110 THERAPEUTIC EXERCISES: CPT

## 2021-06-25 PROCEDURE — 97535 SELF CARE MNGMENT TRAINING: CPT | Mod: CO

## 2021-06-25 PROCEDURE — 11000004 HC SNF PRIVATE

## 2021-06-25 PROCEDURE — 97530 THERAPEUTIC ACTIVITIES: CPT

## 2021-06-25 PROCEDURE — 25000003 PHARM REV CODE 250: Performed by: HOSPITALIST

## 2021-06-25 PROCEDURE — 27000221 HC OXYGEN, UP TO 24 HOURS

## 2021-06-25 PROCEDURE — 94761 N-INVAS EAR/PLS OXIMETRY MLT: CPT

## 2021-06-25 RX ADMIN — MIRTAZAPINE 7.5 MG: 7.5 TABLET ORAL at 08:06

## 2021-06-25 RX ADMIN — MELATONIN TAB 3 MG 6 MG: 3 TAB at 08:06

## 2021-06-25 RX ADMIN — HYDROCODONE BITARTRATE AND ACETAMINOPHEN 1 TABLET: 5; 325 TABLET ORAL at 08:06

## 2021-06-25 RX ADMIN — ATORVASTATIN CALCIUM 40 MG: 20 TABLET, FILM COATED ORAL at 08:06

## 2021-06-25 RX ADMIN — ASPIRIN 81 MG CHEWABLE TABLET 81 MG: 81 TABLET CHEWABLE at 08:06

## 2021-06-25 RX ADMIN — LISINOPRIL 2.5 MG: 2.5 TABLET ORAL at 08:06

## 2021-06-25 RX ADMIN — VENLAFAXINE HYDROCHLORIDE 150 MG: 75 CAPSULE, EXTENDED RELEASE ORAL at 08:06

## 2021-06-25 RX ADMIN — CELECOXIB 200 MG: 200 CAPSULE ORAL at 08:06

## 2021-06-26 PROCEDURE — 99900035 HC TECH TIME PER 15 MIN (STAT)

## 2021-06-26 PROCEDURE — 25000003 PHARM REV CODE 250: Performed by: HOSPITALIST

## 2021-06-26 PROCEDURE — 94761 N-INVAS EAR/PLS OXIMETRY MLT: CPT

## 2021-06-26 PROCEDURE — 11000004 HC SNF PRIVATE

## 2021-06-26 PROCEDURE — 27000221 HC OXYGEN, UP TO 24 HOURS

## 2021-06-26 RX ADMIN — LISINOPRIL 2.5 MG: 2.5 TABLET ORAL at 09:06

## 2021-06-26 RX ADMIN — CELECOXIB 200 MG: 200 CAPSULE ORAL at 08:06

## 2021-06-26 RX ADMIN — ATORVASTATIN CALCIUM 40 MG: 20 TABLET, FILM COATED ORAL at 09:06

## 2021-06-26 RX ADMIN — ASPIRIN 81 MG CHEWABLE TABLET 81 MG: 81 TABLET CHEWABLE at 09:06

## 2021-06-26 RX ADMIN — ASPIRIN 81 MG CHEWABLE TABLET 81 MG: 81 TABLET CHEWABLE at 08:06

## 2021-06-26 RX ADMIN — VENLAFAXINE HYDROCHLORIDE 150 MG: 75 CAPSULE, EXTENDED RELEASE ORAL at 09:06

## 2021-06-26 RX ADMIN — CELECOXIB 200 MG: 200 CAPSULE ORAL at 09:06

## 2021-06-26 RX ADMIN — MIRTAZAPINE 7.5 MG: 7.5 TABLET ORAL at 08:06

## 2021-06-27 PROCEDURE — 94761 N-INVAS EAR/PLS OXIMETRY MLT: CPT

## 2021-06-27 PROCEDURE — 25000003 PHARM REV CODE 250: Performed by: HOSPITALIST

## 2021-06-27 PROCEDURE — 27000221 HC OXYGEN, UP TO 24 HOURS

## 2021-06-27 PROCEDURE — 11000004 HC SNF PRIVATE

## 2021-06-27 PROCEDURE — 99900035 HC TECH TIME PER 15 MIN (STAT)

## 2021-06-27 RX ADMIN — ASPIRIN 81 MG CHEWABLE TABLET 81 MG: 81 TABLET CHEWABLE at 09:06

## 2021-06-27 RX ADMIN — HYDROCODONE BITARTRATE AND ACETAMINOPHEN 1 TABLET: 5; 325 TABLET ORAL at 03:06

## 2021-06-27 RX ADMIN — LISINOPRIL 2.5 MG: 2.5 TABLET ORAL at 09:06

## 2021-06-27 RX ADMIN — ATORVASTATIN CALCIUM 40 MG: 20 TABLET, FILM COATED ORAL at 09:06

## 2021-06-27 RX ADMIN — CELECOXIB 200 MG: 200 CAPSULE ORAL at 08:06

## 2021-06-27 RX ADMIN — VENLAFAXINE HYDROCHLORIDE 150 MG: 75 CAPSULE, EXTENDED RELEASE ORAL at 09:06

## 2021-06-27 RX ADMIN — ASPIRIN 81 MG CHEWABLE TABLET 81 MG: 81 TABLET CHEWABLE at 08:06

## 2021-06-27 RX ADMIN — MIRTAZAPINE 7.5 MG: 7.5 TABLET ORAL at 08:06

## 2021-06-27 RX ADMIN — CELECOXIB 200 MG: 200 CAPSULE ORAL at 09:06

## 2021-06-28 ENCOUNTER — EXTERNAL HOME HEALTH (OUTPATIENT)
Dept: HOME HEALTH SERVICES | Facility: HOSPITAL | Age: 86
End: 2021-06-28
Payer: MEDICARE

## 2021-06-28 LAB
ANION GAP SERPL CALC-SCNC: 9 MMOL/L (ref 8–16)
BASOPHILS # BLD AUTO: 0.12 K/UL (ref 0–0.2)
BASOPHILS NFR BLD: 0.9 % (ref 0–1.9)
BUN SERPL-MCNC: 15 MG/DL (ref 8–23)
CALCIUM SERPL-MCNC: 9.4 MG/DL (ref 8.7–10.5)
CHLORIDE SERPL-SCNC: 107 MMOL/L (ref 95–110)
CO2 SERPL-SCNC: 24 MMOL/L (ref 23–29)
CREAT SERPL-MCNC: 0.7 MG/DL (ref 0.5–1.4)
DIFFERENTIAL METHOD: ABNORMAL
EOSINOPHIL # BLD AUTO: 0.4 K/UL (ref 0–0.5)
EOSINOPHIL NFR BLD: 2.9 % (ref 0–8)
ERYTHROCYTE [DISTWIDTH] IN BLOOD BY AUTOMATED COUNT: 14.5 % (ref 11.5–14.5)
EST. GFR  (AFRICAN AMERICAN): >60 ML/MIN/1.73 M^2
EST. GFR  (NON AFRICAN AMERICAN): >60 ML/MIN/1.73 M^2
GLUCOSE SERPL-MCNC: 120 MG/DL (ref 70–110)
HCT VFR BLD AUTO: 36.7 % (ref 37–48.5)
HGB BLD-MCNC: 11.6 G/DL (ref 12–16)
IMM GRANULOCYTES # BLD AUTO: 0.08 K/UL (ref 0–0.04)
IMM GRANULOCYTES NFR BLD AUTO: 0.6 % (ref 0–0.5)
LYMPHOCYTES # BLD AUTO: 3 K/UL (ref 1–4.8)
LYMPHOCYTES NFR BLD: 21.6 % (ref 18–48)
MAGNESIUM SERPL-MCNC: 2 MG/DL (ref 1.6–2.6)
MCH RBC QN AUTO: 29.9 PG (ref 27–31)
MCHC RBC AUTO-ENTMCNC: 31.6 G/DL (ref 32–36)
MCV RBC AUTO: 95 FL (ref 82–98)
MONOCYTES # BLD AUTO: 1.1 K/UL (ref 0.3–1)
MONOCYTES NFR BLD: 7.7 % (ref 4–15)
NEUTROPHILS # BLD AUTO: 9.1 K/UL (ref 1.8–7.7)
NEUTROPHILS NFR BLD: 66.3 % (ref 38–73)
NRBC BLD-RTO: 0 /100 WBC
PHOSPHATE SERPL-MCNC: 3.5 MG/DL (ref 2.7–4.5)
PLATELET # BLD AUTO: 470 K/UL (ref 150–450)
PMV BLD AUTO: 9.7 FL (ref 9.2–12.9)
POTASSIUM SERPL-SCNC: 3.7 MMOL/L (ref 3.5–5.1)
RBC # BLD AUTO: 3.88 M/UL (ref 4–5.4)
SODIUM SERPL-SCNC: 140 MMOL/L (ref 136–145)
WBC # BLD AUTO: 13.76 K/UL (ref 3.9–12.7)

## 2021-06-28 PROCEDURE — 63600175 PHARM REV CODE 636 W HCPCS: Performed by: NURSE PRACTITIONER

## 2021-06-28 PROCEDURE — 25000003 PHARM REV CODE 250: Performed by: HOSPITALIST

## 2021-06-28 PROCEDURE — 36415 COLL VENOUS BLD VENIPUNCTURE: CPT | Performed by: HOSPITALIST

## 2021-06-28 PROCEDURE — 85025 COMPLETE CBC W/AUTO DIFF WBC: CPT | Performed by: HOSPITALIST

## 2021-06-28 PROCEDURE — 11000004 HC SNF PRIVATE

## 2021-06-28 PROCEDURE — 81001 URINALYSIS AUTO W/SCOPE: CPT | Performed by: NURSE PRACTITIONER

## 2021-06-28 PROCEDURE — 87086 URINE CULTURE/COLONY COUNT: CPT | Performed by: NURSE PRACTITIONER

## 2021-06-28 PROCEDURE — 97110 THERAPEUTIC EXERCISES: CPT

## 2021-06-28 PROCEDURE — 94761 N-INVAS EAR/PLS OXIMETRY MLT: CPT

## 2021-06-28 PROCEDURE — 80048 BASIC METABOLIC PNL TOTAL CA: CPT | Performed by: HOSPITALIST

## 2021-06-28 PROCEDURE — 97535 SELF CARE MNGMENT TRAINING: CPT

## 2021-06-28 PROCEDURE — 83735 ASSAY OF MAGNESIUM: CPT | Performed by: HOSPITALIST

## 2021-06-28 PROCEDURE — 97110 THERAPEUTIC EXERCISES: CPT | Mod: CQ

## 2021-06-28 PROCEDURE — 84100 ASSAY OF PHOSPHORUS: CPT | Performed by: HOSPITALIST

## 2021-06-28 PROCEDURE — 97530 THERAPEUTIC ACTIVITIES: CPT

## 2021-06-28 RX ORDER — CYANOCOBALAMIN 1000 UG/ML
1000 INJECTION, SOLUTION INTRAMUSCULAR; SUBCUTANEOUS ONCE
Status: COMPLETED | OUTPATIENT
Start: 2021-06-28 | End: 2021-06-28

## 2021-06-28 RX ADMIN — CYANOCOBALAMIN 1000 MCG: 1000 INJECTION, SOLUTION INTRAMUSCULAR; SUBCUTANEOUS at 08:06

## 2021-06-28 RX ADMIN — CELECOXIB 200 MG: 200 CAPSULE ORAL at 09:06

## 2021-06-28 RX ADMIN — HYDROCODONE BITARTRATE AND ACETAMINOPHEN 1 TABLET: 5; 325 TABLET ORAL at 06:06

## 2021-06-28 RX ADMIN — LISINOPRIL 2.5 MG: 2.5 TABLET ORAL at 09:06

## 2021-06-28 RX ADMIN — MIRTAZAPINE 7.5 MG: 7.5 TABLET ORAL at 08:06

## 2021-06-28 RX ADMIN — ATORVASTATIN CALCIUM 40 MG: 20 TABLET, FILM COATED ORAL at 09:06

## 2021-06-28 RX ADMIN — CELECOXIB 200 MG: 200 CAPSULE ORAL at 08:06

## 2021-06-28 RX ADMIN — ASPIRIN 81 MG CHEWABLE TABLET 81 MG: 81 TABLET CHEWABLE at 09:06

## 2021-06-28 RX ADMIN — ASPIRIN 81 MG CHEWABLE TABLET 81 MG: 81 TABLET CHEWABLE at 08:06

## 2021-06-28 RX ADMIN — VENLAFAXINE HYDROCHLORIDE 150 MG: 75 CAPSULE, EXTENDED RELEASE ORAL at 09:06

## 2021-06-29 LAB
BACTERIA #/AREA URNS AUTO: ABNORMAL /HPF
BILIRUB UR QL STRIP: NEGATIVE
CAOX CRY UR QL COMP ASSIST: ABNORMAL
CLARITY UR REFRACT.AUTO: ABNORMAL
COLOR UR AUTO: YELLOW
GLUCOSE UR QL STRIP: NEGATIVE
HGB UR QL STRIP: NEGATIVE
HYALINE CASTS UR QL AUTO: 3 /LPF
KETONES UR QL STRIP: NEGATIVE
LEUKOCYTE ESTERASE UR QL STRIP: ABNORMAL
MICROSCOPIC COMMENT: ABNORMAL
NITRITE UR QL STRIP: NEGATIVE
PH UR STRIP: 5 [PH] (ref 5–8)
PROT UR QL STRIP: NEGATIVE
RBC #/AREA URNS AUTO: 2 /HPF (ref 0–4)
SP GR UR STRIP: 1.02 (ref 1–1.03)
SQUAMOUS #/AREA URNS AUTO: 8 /HPF
URN SPEC COLLECT METH UR: ABNORMAL
WBC #/AREA URNS AUTO: 19 /HPF (ref 0–5)

## 2021-06-29 PROCEDURE — 11000004 HC SNF PRIVATE

## 2021-06-29 PROCEDURE — 25000003 PHARM REV CODE 250: Performed by: HOSPITALIST

## 2021-06-29 PROCEDURE — 25000003 PHARM REV CODE 250: Performed by: NURSE PRACTITIONER

## 2021-06-29 RX ORDER — AMOXICILLIN AND CLAVULANATE POTASSIUM 875; 125 MG/1; MG/1
1 TABLET, FILM COATED ORAL EVERY 12 HOURS
Status: DISCONTINUED | OUTPATIENT
Start: 2021-06-29 | End: 2021-07-04

## 2021-06-29 RX ADMIN — AMOXICILLIN AND CLAVULANATE POTASSIUM 1 TABLET: 875; 125 TABLET, FILM COATED ORAL at 10:06

## 2021-06-29 RX ADMIN — CELECOXIB 200 MG: 200 CAPSULE ORAL at 10:06

## 2021-06-29 RX ADMIN — ATORVASTATIN CALCIUM 40 MG: 20 TABLET, FILM COATED ORAL at 08:06

## 2021-06-29 RX ADMIN — ASPIRIN 81 MG CHEWABLE TABLET 81 MG: 81 TABLET CHEWABLE at 08:06

## 2021-06-29 RX ADMIN — LISINOPRIL 2.5 MG: 2.5 TABLET ORAL at 08:06

## 2021-06-29 RX ADMIN — VENLAFAXINE HYDROCHLORIDE 150 MG: 75 CAPSULE, EXTENDED RELEASE ORAL at 08:06

## 2021-06-29 RX ADMIN — CELECOXIB 200 MG: 200 CAPSULE ORAL at 08:06

## 2021-06-29 RX ADMIN — ASPIRIN 81 MG CHEWABLE TABLET 81 MG: 81 TABLET CHEWABLE at 10:06

## 2021-06-29 RX ADMIN — MIRTAZAPINE 7.5 MG: 7.5 TABLET ORAL at 10:06

## 2021-06-30 LAB
BACTERIA UR CULT: NORMAL
BACTERIA UR CULT: NORMAL

## 2021-06-30 PROCEDURE — 97110 THERAPEUTIC EXERCISES: CPT | Mod: CQ

## 2021-06-30 PROCEDURE — 11000004 HC SNF PRIVATE

## 2021-06-30 PROCEDURE — 97535 SELF CARE MNGMENT TRAINING: CPT | Mod: CO

## 2021-06-30 PROCEDURE — 25000003 PHARM REV CODE 250: Performed by: NURSE PRACTITIONER

## 2021-06-30 PROCEDURE — 25000003 PHARM REV CODE 250: Performed by: HOSPITALIST

## 2021-06-30 RX ADMIN — ASPIRIN 81 MG CHEWABLE TABLET 81 MG: 81 TABLET CHEWABLE at 10:06

## 2021-06-30 RX ADMIN — CELECOXIB 200 MG: 200 CAPSULE ORAL at 08:06

## 2021-06-30 RX ADMIN — HYDROCODONE BITARTRATE AND ACETAMINOPHEN 1 TABLET: 5; 325 TABLET ORAL at 08:06

## 2021-06-30 RX ADMIN — MIRTAZAPINE 7.5 MG: 7.5 TABLET ORAL at 08:06

## 2021-06-30 RX ADMIN — CELECOXIB 200 MG: 200 CAPSULE ORAL at 10:06

## 2021-06-30 RX ADMIN — ATORVASTATIN CALCIUM 40 MG: 20 TABLET, FILM COATED ORAL at 10:06

## 2021-06-30 RX ADMIN — LISINOPRIL 2.5 MG: 2.5 TABLET ORAL at 10:06

## 2021-06-30 RX ADMIN — AMOXICILLIN AND CLAVULANATE POTASSIUM 1 TABLET: 875; 125 TABLET, FILM COATED ORAL at 08:06

## 2021-06-30 RX ADMIN — AMOXICILLIN AND CLAVULANATE POTASSIUM 1 TABLET: 875; 125 TABLET, FILM COATED ORAL at 10:06

## 2021-06-30 RX ADMIN — ASPIRIN 81 MG CHEWABLE TABLET 81 MG: 81 TABLET CHEWABLE at 08:06

## 2021-06-30 RX ADMIN — VENLAFAXINE HYDROCHLORIDE 150 MG: 75 CAPSULE, EXTENDED RELEASE ORAL at 10:06

## 2021-06-30 RX ADMIN — MELATONIN TAB 3 MG 6 MG: 3 TAB at 08:06

## 2021-07-01 LAB
ANION GAP SERPL CALC-SCNC: 13 MMOL/L (ref 8–16)
BASOPHILS # BLD AUTO: 0.05 K/UL (ref 0–0.2)
BASOPHILS NFR BLD: 0.3 % (ref 0–1.9)
BUN SERPL-MCNC: 21 MG/DL (ref 8–23)
CALCIUM SERPL-MCNC: 8.9 MG/DL (ref 8.7–10.5)
CHLORIDE SERPL-SCNC: 110 MMOL/L (ref 95–110)
CO2 SERPL-SCNC: 19 MMOL/L (ref 23–29)
CREAT SERPL-MCNC: 0.8 MG/DL (ref 0.5–1.4)
DIFFERENTIAL METHOD: ABNORMAL
EOSINOPHIL # BLD AUTO: 0.4 K/UL (ref 0–0.5)
EOSINOPHIL NFR BLD: 2.3 % (ref 0–8)
ERYTHROCYTE [DISTWIDTH] IN BLOOD BY AUTOMATED COUNT: 14.4 % (ref 11.5–14.5)
EST. GFR  (AFRICAN AMERICAN): >60 ML/MIN/1.73 M^2
EST. GFR  (NON AFRICAN AMERICAN): >60 ML/MIN/1.73 M^2
GLUCOSE SERPL-MCNC: 117 MG/DL (ref 70–110)
HCT VFR BLD AUTO: 36.5 % (ref 37–48.5)
HGB BLD-MCNC: 11.5 G/DL (ref 12–16)
IMM GRANULOCYTES # BLD AUTO: 0.05 K/UL (ref 0–0.04)
IMM GRANULOCYTES NFR BLD AUTO: 0.3 % (ref 0–0.5)
LYMPHOCYTES # BLD AUTO: 2.7 K/UL (ref 1–4.8)
LYMPHOCYTES NFR BLD: 18.1 % (ref 18–48)
MAGNESIUM SERPL-MCNC: 1.8 MG/DL (ref 1.6–2.6)
MCH RBC QN AUTO: 30.3 PG (ref 27–31)
MCHC RBC AUTO-ENTMCNC: 31.5 G/DL (ref 32–36)
MCV RBC AUTO: 96 FL (ref 82–98)
MONOCYTES # BLD AUTO: 1.3 K/UL (ref 0.3–1)
MONOCYTES NFR BLD: 8.9 % (ref 4–15)
NEUTROPHILS # BLD AUTO: 10.6 K/UL (ref 1.8–7.7)
NEUTROPHILS NFR BLD: 70.1 % (ref 38–73)
NRBC BLD-RTO: 0 /100 WBC
PHOSPHATE SERPL-MCNC: 3.2 MG/DL (ref 2.7–4.5)
PLATELET # BLD AUTO: 508 K/UL (ref 150–450)
PMV BLD AUTO: 10.2 FL (ref 9.2–12.9)
POTASSIUM SERPL-SCNC: 3.7 MMOL/L (ref 3.5–5.1)
RBC # BLD AUTO: 3.79 M/UL (ref 4–5.4)
SODIUM SERPL-SCNC: 142 MMOL/L (ref 136–145)
WBC # BLD AUTO: 15.11 K/UL (ref 3.9–12.7)

## 2021-07-01 PROCEDURE — 97112 NEUROMUSCULAR REEDUCATION: CPT

## 2021-07-01 PROCEDURE — 85025 COMPLETE CBC W/AUTO DIFF WBC: CPT | Performed by: HOSPITALIST

## 2021-07-01 PROCEDURE — 25000003 PHARM REV CODE 250: Performed by: HOSPITALIST

## 2021-07-01 PROCEDURE — 97530 THERAPEUTIC ACTIVITIES: CPT | Mod: CQ

## 2021-07-01 PROCEDURE — 25000003 PHARM REV CODE 250: Performed by: NURSE PRACTITIONER

## 2021-07-01 PROCEDURE — 97110 THERAPEUTIC EXERCISES: CPT | Mod: CQ

## 2021-07-01 PROCEDURE — 11000004 HC SNF PRIVATE

## 2021-07-01 PROCEDURE — 80048 BASIC METABOLIC PNL TOTAL CA: CPT | Performed by: HOSPITALIST

## 2021-07-01 PROCEDURE — 84100 ASSAY OF PHOSPHORUS: CPT | Performed by: HOSPITALIST

## 2021-07-01 PROCEDURE — 97535 SELF CARE MNGMENT TRAINING: CPT

## 2021-07-01 PROCEDURE — 83735 ASSAY OF MAGNESIUM: CPT | Performed by: HOSPITALIST

## 2021-07-01 PROCEDURE — 36415 COLL VENOUS BLD VENIPUNCTURE: CPT | Performed by: HOSPITALIST

## 2021-07-01 PROCEDURE — 97530 THERAPEUTIC ACTIVITIES: CPT

## 2021-07-01 RX ADMIN — HYDROCODONE BITARTRATE AND ACETAMINOPHEN 1 TABLET: 5; 325 TABLET ORAL at 09:07

## 2021-07-01 RX ADMIN — LISINOPRIL 2.5 MG: 2.5 TABLET ORAL at 09:07

## 2021-07-01 RX ADMIN — MIRTAZAPINE 7.5 MG: 7.5 TABLET ORAL at 09:07

## 2021-07-01 RX ADMIN — VENLAFAXINE HYDROCHLORIDE 150 MG: 75 CAPSULE, EXTENDED RELEASE ORAL at 09:07

## 2021-07-01 RX ADMIN — CELECOXIB 200 MG: 200 CAPSULE ORAL at 09:07

## 2021-07-01 RX ADMIN — AMOXICILLIN AND CLAVULANATE POTASSIUM 1 TABLET: 875; 125 TABLET, FILM COATED ORAL at 09:07

## 2021-07-01 RX ADMIN — ASPIRIN 81 MG CHEWABLE TABLET 81 MG: 81 TABLET CHEWABLE at 09:07

## 2021-07-01 RX ADMIN — MELATONIN TAB 3 MG 6 MG: 3 TAB at 09:07

## 2021-07-01 RX ADMIN — ATORVASTATIN CALCIUM 40 MG: 20 TABLET, FILM COATED ORAL at 09:07

## 2021-07-02 PROCEDURE — 81001 URINALYSIS AUTO W/SCOPE: CPT | Performed by: NURSE PRACTITIONER

## 2021-07-02 PROCEDURE — 25000003 PHARM REV CODE 250: Performed by: HOSPITALIST

## 2021-07-02 PROCEDURE — 11000004 HC SNF PRIVATE

## 2021-07-02 PROCEDURE — 97530 THERAPEUTIC ACTIVITIES: CPT | Mod: CQ

## 2021-07-02 PROCEDURE — 97530 THERAPEUTIC ACTIVITIES: CPT | Mod: CO

## 2021-07-02 PROCEDURE — 97110 THERAPEUTIC EXERCISES: CPT | Mod: CQ

## 2021-07-02 PROCEDURE — 25000003 PHARM REV CODE 250: Performed by: NURSE PRACTITIONER

## 2021-07-02 PROCEDURE — 87086 URINE CULTURE/COLONY COUNT: CPT | Performed by: NURSE PRACTITIONER

## 2021-07-02 RX ADMIN — AMOXICILLIN AND CLAVULANATE POTASSIUM 1 TABLET: 875; 125 TABLET, FILM COATED ORAL at 10:07

## 2021-07-02 RX ADMIN — HYDROCODONE BITARTRATE AND ACETAMINOPHEN 1 TABLET: 5; 325 TABLET ORAL at 10:07

## 2021-07-02 RX ADMIN — ASPIRIN 81 MG CHEWABLE TABLET 81 MG: 81 TABLET CHEWABLE at 08:07

## 2021-07-02 RX ADMIN — HYDROCODONE BITARTRATE AND ACETAMINOPHEN 1 TABLET: 5; 325 TABLET ORAL at 08:07

## 2021-07-02 RX ADMIN — CELECOXIB 200 MG: 200 CAPSULE ORAL at 08:07

## 2021-07-02 RX ADMIN — AMOXICILLIN AND CLAVULANATE POTASSIUM 1 TABLET: 875; 125 TABLET, FILM COATED ORAL at 08:07

## 2021-07-02 RX ADMIN — MIRTAZAPINE 7.5 MG: 7.5 TABLET ORAL at 10:07

## 2021-07-02 RX ADMIN — CELECOXIB 200 MG: 200 CAPSULE ORAL at 10:07

## 2021-07-02 RX ADMIN — LISINOPRIL 2.5 MG: 2.5 TABLET ORAL at 08:07

## 2021-07-02 RX ADMIN — ATORVASTATIN CALCIUM 40 MG: 20 TABLET, FILM COATED ORAL at 08:07

## 2021-07-02 RX ADMIN — ASPIRIN 81 MG CHEWABLE TABLET 81 MG: 81 TABLET CHEWABLE at 10:07

## 2021-07-02 RX ADMIN — MELATONIN TAB 3 MG 6 MG: 3 TAB at 10:07

## 2021-07-02 RX ADMIN — VENLAFAXINE HYDROCHLORIDE 150 MG: 75 CAPSULE, EXTENDED RELEASE ORAL at 08:07

## 2021-07-03 LAB
AMORPH CRY UR QL COMP ASSIST: ABNORMAL
BACTERIA #/AREA URNS AUTO: ABNORMAL /HPF
BILIRUB UR QL STRIP: NEGATIVE
CLARITY UR REFRACT.AUTO: ABNORMAL
COLOR UR AUTO: YELLOW
GLUCOSE UR QL STRIP: NEGATIVE
HGB UR QL STRIP: ABNORMAL
HYALINE CASTS UR QL AUTO: 3 /LPF
KETONES UR QL STRIP: NEGATIVE
LEUKOCYTE ESTERASE UR QL STRIP: ABNORMAL
MICROSCOPIC COMMENT: ABNORMAL
NITRITE UR QL STRIP: NEGATIVE
PH UR STRIP: 5 [PH] (ref 5–8)
PROT UR QL STRIP: ABNORMAL
RBC #/AREA URNS AUTO: 4 /HPF (ref 0–4)
SP GR UR STRIP: 1.02 (ref 1–1.03)
SQUAMOUS #/AREA URNS AUTO: 12 /HPF
URN SPEC COLLECT METH UR: ABNORMAL
WBC #/AREA URNS AUTO: 12 /HPF (ref 0–5)

## 2021-07-03 PROCEDURE — 97530 THERAPEUTIC ACTIVITIES: CPT | Mod: CQ

## 2021-07-03 PROCEDURE — 25000003 PHARM REV CODE 250: Performed by: HOSPITALIST

## 2021-07-03 PROCEDURE — 97535 SELF CARE MNGMENT TRAINING: CPT

## 2021-07-03 PROCEDURE — 25000003 PHARM REV CODE 250: Performed by: NURSE PRACTITIONER

## 2021-07-03 PROCEDURE — 11000004 HC SNF PRIVATE

## 2021-07-03 RX ADMIN — VENLAFAXINE HYDROCHLORIDE 150 MG: 75 CAPSULE, EXTENDED RELEASE ORAL at 08:07

## 2021-07-03 RX ADMIN — CELECOXIB 200 MG: 200 CAPSULE ORAL at 08:07

## 2021-07-03 RX ADMIN — MIRTAZAPINE 7.5 MG: 7.5 TABLET ORAL at 08:07

## 2021-07-03 RX ADMIN — AMOXICILLIN AND CLAVULANATE POTASSIUM 1 TABLET: 875; 125 TABLET, FILM COATED ORAL at 08:07

## 2021-07-03 RX ADMIN — ASPIRIN 81 MG CHEWABLE TABLET 81 MG: 81 TABLET CHEWABLE at 08:07

## 2021-07-03 RX ADMIN — LISINOPRIL 2.5 MG: 2.5 TABLET ORAL at 08:07

## 2021-07-03 RX ADMIN — MELATONIN TAB 3 MG 6 MG: 3 TAB at 08:07

## 2021-07-03 RX ADMIN — ATORVASTATIN CALCIUM 40 MG: 20 TABLET, FILM COATED ORAL at 08:07

## 2021-07-04 LAB — BACTERIA UR CULT: NO GROWTH

## 2021-07-04 PROCEDURE — 63600175 PHARM REV CODE 636 W HCPCS: Performed by: HOSPITALIST

## 2021-07-04 PROCEDURE — 25000003 PHARM REV CODE 250: Performed by: HOSPITALIST

## 2021-07-04 PROCEDURE — 11000004 HC SNF PRIVATE

## 2021-07-04 PROCEDURE — 25000003 PHARM REV CODE 250: Performed by: NURSE PRACTITIONER

## 2021-07-04 RX ORDER — CEFTRIAXONE 1 G/1
1 INJECTION, POWDER, FOR SOLUTION INTRAMUSCULAR; INTRAVENOUS
Status: DISCONTINUED | OUTPATIENT
Start: 2021-07-04 | End: 2021-07-04

## 2021-07-04 RX ORDER — CEFTRIAXONE 1 G/50ML
1 INJECTION, SOLUTION INTRAVENOUS
Status: DISCONTINUED | OUTPATIENT
Start: 2021-07-04 | End: 2021-07-07

## 2021-07-04 RX ADMIN — AMOXICILLIN AND CLAVULANATE POTASSIUM 1 TABLET: 875; 125 TABLET, FILM COATED ORAL at 08:07

## 2021-07-04 RX ADMIN — CELECOXIB 200 MG: 200 CAPSULE ORAL at 08:07

## 2021-07-04 RX ADMIN — LISINOPRIL 2.5 MG: 2.5 TABLET ORAL at 08:07

## 2021-07-04 RX ADMIN — ATORVASTATIN CALCIUM 40 MG: 20 TABLET, FILM COATED ORAL at 08:07

## 2021-07-04 RX ADMIN — HYDROCODONE BITARTRATE AND ACETAMINOPHEN 1 TABLET: 5; 325 TABLET ORAL at 02:07

## 2021-07-04 RX ADMIN — MIRTAZAPINE 7.5 MG: 7.5 TABLET ORAL at 09:07

## 2021-07-04 RX ADMIN — MELATONIN TAB 3 MG 6 MG: 3 TAB at 08:07

## 2021-07-04 RX ADMIN — CEFTRIAXONE 1 G: 1 INJECTION, SOLUTION INTRAVENOUS at 05:07

## 2021-07-04 RX ADMIN — ASPIRIN 81 MG CHEWABLE TABLET 81 MG: 81 TABLET CHEWABLE at 08:07

## 2021-07-04 RX ADMIN — VENLAFAXINE HYDROCHLORIDE 150 MG: 75 CAPSULE, EXTENDED RELEASE ORAL at 08:07

## 2021-07-05 LAB
ANION GAP SERPL CALC-SCNC: 11 MMOL/L (ref 8–16)
BASOPHILS # BLD AUTO: 0.11 K/UL (ref 0–0.2)
BASOPHILS NFR BLD: 1 % (ref 0–1.9)
BUN SERPL-MCNC: 14 MG/DL (ref 8–23)
CALCIUM SERPL-MCNC: 9.1 MG/DL (ref 8.7–10.5)
CHLORIDE SERPL-SCNC: 110 MMOL/L (ref 95–110)
CO2 SERPL-SCNC: 22 MMOL/L (ref 23–29)
CREAT SERPL-MCNC: 0.8 MG/DL (ref 0.5–1.4)
DIFFERENTIAL METHOD: ABNORMAL
EOSINOPHIL # BLD AUTO: 0.4 K/UL (ref 0–0.5)
EOSINOPHIL NFR BLD: 3.5 % (ref 0–8)
ERYTHROCYTE [DISTWIDTH] IN BLOOD BY AUTOMATED COUNT: 14 % (ref 11.5–14.5)
EST. GFR  (AFRICAN AMERICAN): >60 ML/MIN/1.73 M^2
EST. GFR  (NON AFRICAN AMERICAN): >60 ML/MIN/1.73 M^2
GLUCOSE SERPL-MCNC: 130 MG/DL (ref 70–110)
HCT VFR BLD AUTO: 36.9 % (ref 37–48.5)
HGB BLD-MCNC: 11.5 G/DL (ref 12–16)
IMM GRANULOCYTES # BLD AUTO: 0.03 K/UL (ref 0–0.04)
IMM GRANULOCYTES NFR BLD AUTO: 0.3 % (ref 0–0.5)
LYMPHOCYTES # BLD AUTO: 2.4 K/UL (ref 1–4.8)
LYMPHOCYTES NFR BLD: 20.3 % (ref 18–48)
MAGNESIUM SERPL-MCNC: 1.9 MG/DL (ref 1.6–2.6)
MCH RBC QN AUTO: 29.2 PG (ref 27–31)
MCHC RBC AUTO-ENTMCNC: 31.2 G/DL (ref 32–36)
MCV RBC AUTO: 94 FL (ref 82–98)
MONOCYTES # BLD AUTO: 1.3 K/UL (ref 0.3–1)
MONOCYTES NFR BLD: 11.1 % (ref 4–15)
NEUTROPHILS # BLD AUTO: 7.4 K/UL (ref 1.8–7.7)
NEUTROPHILS NFR BLD: 63.8 % (ref 38–73)
NRBC BLD-RTO: 0 /100 WBC
PHOSPHATE SERPL-MCNC: 3.3 MG/DL (ref 2.7–4.5)
PLATELET # BLD AUTO: 525 K/UL (ref 150–450)
PMV BLD AUTO: 10 FL (ref 9.2–12.9)
POTASSIUM SERPL-SCNC: 4.3 MMOL/L (ref 3.5–5.1)
RBC # BLD AUTO: 3.94 M/UL (ref 4–5.4)
SODIUM SERPL-SCNC: 143 MMOL/L (ref 136–145)
WBC # BLD AUTO: 11.55 K/UL (ref 3.9–12.7)

## 2021-07-05 PROCEDURE — 80048 BASIC METABOLIC PNL TOTAL CA: CPT | Performed by: HOSPITALIST

## 2021-07-05 PROCEDURE — 85025 COMPLETE CBC W/AUTO DIFF WBC: CPT | Performed by: HOSPITALIST

## 2021-07-05 PROCEDURE — 25000003 PHARM REV CODE 250: Performed by: HOSPITALIST

## 2021-07-05 PROCEDURE — 83735 ASSAY OF MAGNESIUM: CPT | Performed by: HOSPITALIST

## 2021-07-05 PROCEDURE — 11000004 HC SNF PRIVATE

## 2021-07-05 PROCEDURE — 63600175 PHARM REV CODE 636 W HCPCS: Performed by: HOSPITALIST

## 2021-07-05 PROCEDURE — 84100 ASSAY OF PHOSPHORUS: CPT | Performed by: HOSPITALIST

## 2021-07-05 RX ADMIN — ATORVASTATIN CALCIUM 40 MG: 20 TABLET, FILM COATED ORAL at 08:07

## 2021-07-05 RX ADMIN — VENLAFAXINE HYDROCHLORIDE 150 MG: 75 CAPSULE, EXTENDED RELEASE ORAL at 08:07

## 2021-07-05 RX ADMIN — MIRTAZAPINE 7.5 MG: 7.5 TABLET ORAL at 08:07

## 2021-07-05 RX ADMIN — LISINOPRIL 2.5 MG: 2.5 TABLET ORAL at 08:07

## 2021-07-05 RX ADMIN — ASPIRIN 81 MG CHEWABLE TABLET 81 MG: 81 TABLET CHEWABLE at 08:07

## 2021-07-05 RX ADMIN — CELECOXIB 200 MG: 200 CAPSULE ORAL at 08:07

## 2021-07-05 RX ADMIN — HYDROCODONE BITARTRATE AND ACETAMINOPHEN 1 TABLET: 5; 325 TABLET ORAL at 07:07

## 2021-07-05 RX ADMIN — CELECOXIB 200 MG: 200 CAPSULE ORAL at 09:07

## 2021-07-05 RX ADMIN — CEFTRIAXONE 1 G: 1 INJECTION, SOLUTION INTRAVENOUS at 04:07

## 2021-07-06 PROCEDURE — 97110 THERAPEUTIC EXERCISES: CPT

## 2021-07-06 PROCEDURE — 97530 THERAPEUTIC ACTIVITIES: CPT

## 2021-07-06 PROCEDURE — 63600175 PHARM REV CODE 636 W HCPCS: Performed by: HOSPITALIST

## 2021-07-06 PROCEDURE — 11000004 HC SNF PRIVATE

## 2021-07-06 PROCEDURE — 25000003 PHARM REV CODE 250: Performed by: HOSPITALIST

## 2021-07-06 PROCEDURE — 97535 SELF CARE MNGMENT TRAINING: CPT

## 2021-07-06 RX ORDER — CELECOXIB 200 MG/1
200 CAPSULE ORAL 2 TIMES DAILY
Qty: 60 CAPSULE | Refills: 0 | Status: SHIPPED | OUTPATIENT
Start: 2021-07-06 | End: 2021-08-04

## 2021-07-06 RX ORDER — FAMOTIDINE 20 MG/1
20 TABLET, FILM COATED ORAL DAILY PRN
Qty: 30 TABLET | Refills: 0 | Status: SHIPPED | OUTPATIENT
Start: 2021-07-06 | End: 2022-06-10

## 2021-07-06 RX ORDER — CALCIUM CARBONATE 200(500)MG
500 TABLET,CHEWABLE ORAL 2 TIMES DAILY PRN
COMMUNITY
Start: 2021-07-06 | End: 2022-07-07 | Stop reason: SDUPTHER

## 2021-07-06 RX ORDER — HYDROCODONE BITARTRATE AND ACETAMINOPHEN 5; 325 MG/1; MG/1
1 TABLET ORAL EVERY 6 HOURS PRN
Qty: 30 TABLET | Refills: 0 | Status: SHIPPED | OUTPATIENT
Start: 2021-07-06 | End: 2021-07-06

## 2021-07-06 RX ORDER — TALC
6 POWDER (GRAM) TOPICAL NIGHTLY PRN
Qty: 30 TABLET | Refills: 0 | COMMUNITY
Start: 2021-07-06 | End: 2022-06-30

## 2021-07-06 RX ORDER — NAPROXEN SODIUM 220 MG/1
81 TABLET, FILM COATED ORAL 2 TIMES DAILY
Refills: 0 | COMMUNITY
Start: 2021-07-06 | End: 2022-05-31

## 2021-07-06 RX ORDER — TALC
6 POWDER (GRAM) TOPICAL NIGHTLY PRN
Qty: 30 TABLET | Refills: 0 | COMMUNITY
Start: 2021-07-06 | End: 2021-07-06

## 2021-07-06 RX ORDER — HYDROCODONE BITARTRATE AND ACETAMINOPHEN 5; 325 MG/1; MG/1
1 TABLET ORAL EVERY 6 HOURS PRN
Qty: 30 TABLET | Refills: 0 | Status: SHIPPED | OUTPATIENT
Start: 2021-07-06 | End: 2021-07-09

## 2021-07-06 RX ADMIN — CELECOXIB 200 MG: 200 CAPSULE ORAL at 08:07

## 2021-07-06 RX ADMIN — VENLAFAXINE HYDROCHLORIDE 150 MG: 75 CAPSULE, EXTENDED RELEASE ORAL at 08:07

## 2021-07-06 RX ADMIN — ATORVASTATIN CALCIUM 40 MG: 20 TABLET, FILM COATED ORAL at 08:07

## 2021-07-06 RX ADMIN — CEFTRIAXONE 1 G: 1 INJECTION, SOLUTION INTRAVENOUS at 04:07

## 2021-07-06 RX ADMIN — MELATONIN TAB 3 MG 6 MG: 3 TAB at 12:07

## 2021-07-06 RX ADMIN — HYDROCODONE BITARTRATE AND ACETAMINOPHEN 1 TABLET: 5; 325 TABLET ORAL at 08:07

## 2021-07-06 RX ADMIN — ASPIRIN 81 MG CHEWABLE TABLET 81 MG: 81 TABLET CHEWABLE at 08:07

## 2021-07-06 RX ADMIN — ASPIRIN 81 MG CHEWABLE TABLET 81 MG: 81 TABLET CHEWABLE at 09:07

## 2021-07-06 RX ADMIN — LISINOPRIL 2.5 MG: 2.5 TABLET ORAL at 08:07

## 2021-07-06 RX ADMIN — MIRTAZAPINE 7.5 MG: 7.5 TABLET ORAL at 09:07

## 2021-07-06 RX ADMIN — CELECOXIB 200 MG: 200 CAPSULE ORAL at 09:07

## 2021-07-06 RX ADMIN — MELATONIN TAB 3 MG 6 MG: 3 TAB at 09:07

## 2021-07-07 PROCEDURE — 11000004 HC SNF PRIVATE

## 2021-07-07 PROCEDURE — 25000003 PHARM REV CODE 250: Performed by: HOSPITALIST

## 2021-07-07 PROCEDURE — 97110 THERAPEUTIC EXERCISES: CPT | Mod: CO

## 2021-07-07 PROCEDURE — 97535 SELF CARE MNGMENT TRAINING: CPT | Mod: CO

## 2021-07-07 PROCEDURE — 97110 THERAPEUTIC EXERCISES: CPT | Mod: CQ

## 2021-07-07 PROCEDURE — 63600175 PHARM REV CODE 636 W HCPCS: Performed by: HOSPITALIST

## 2021-07-07 RX ADMIN — VENLAFAXINE HYDROCHLORIDE 150 MG: 75 CAPSULE, EXTENDED RELEASE ORAL at 08:07

## 2021-07-07 RX ADMIN — ASPIRIN 81 MG CHEWABLE TABLET 81 MG: 81 TABLET CHEWABLE at 08:07

## 2021-07-07 RX ADMIN — CELECOXIB 200 MG: 200 CAPSULE ORAL at 08:07

## 2021-07-07 RX ADMIN — CEFTRIAXONE 1 G: 1 INJECTION, SOLUTION INTRAVENOUS at 04:07

## 2021-07-07 RX ADMIN — LISINOPRIL 2.5 MG: 2.5 TABLET ORAL at 08:07

## 2021-07-07 RX ADMIN — MELATONIN TAB 3 MG 6 MG: 3 TAB at 08:07

## 2021-07-07 RX ADMIN — ATORVASTATIN CALCIUM 40 MG: 20 TABLET, FILM COATED ORAL at 08:07

## 2021-07-07 RX ADMIN — MIRTAZAPINE 7.5 MG: 7.5 TABLET ORAL at 08:07

## 2021-07-08 LAB
ANION GAP SERPL CALC-SCNC: 11 MMOL/L (ref 8–16)
BASOPHILS # BLD AUTO: 0.1 K/UL (ref 0–0.2)
BASOPHILS NFR BLD: 0.9 % (ref 0–1.9)
BUN SERPL-MCNC: 19 MG/DL (ref 8–23)
CALCIUM SERPL-MCNC: 8.8 MG/DL (ref 8.7–10.5)
CHLORIDE SERPL-SCNC: 111 MMOL/L (ref 95–110)
CO2 SERPL-SCNC: 23 MMOL/L (ref 23–29)
CREAT SERPL-MCNC: 0.8 MG/DL (ref 0.5–1.4)
DIFFERENTIAL METHOD: ABNORMAL
EOSINOPHIL # BLD AUTO: 0.4 K/UL (ref 0–0.5)
EOSINOPHIL NFR BLD: 3.9 % (ref 0–8)
ERYTHROCYTE [DISTWIDTH] IN BLOOD BY AUTOMATED COUNT: 14.2 % (ref 11.5–14.5)
EST. GFR  (AFRICAN AMERICAN): >60 ML/MIN/1.73 M^2
EST. GFR  (NON AFRICAN AMERICAN): >60 ML/MIN/1.73 M^2
GLUCOSE SERPL-MCNC: 107 MG/DL (ref 70–110)
HCT VFR BLD AUTO: 34.8 % (ref 37–48.5)
HGB BLD-MCNC: 10.9 G/DL (ref 12–16)
IMM GRANULOCYTES # BLD AUTO: 0.03 K/UL (ref 0–0.04)
IMM GRANULOCYTES NFR BLD AUTO: 0.3 % (ref 0–0.5)
LYMPHOCYTES # BLD AUTO: 2.6 K/UL (ref 1–4.8)
LYMPHOCYTES NFR BLD: 22.8 % (ref 18–48)
MAGNESIUM SERPL-MCNC: 2 MG/DL (ref 1.6–2.6)
MCH RBC QN AUTO: 29.3 PG (ref 27–31)
MCHC RBC AUTO-ENTMCNC: 31.3 G/DL (ref 32–36)
MCV RBC AUTO: 94 FL (ref 82–98)
MONOCYTES # BLD AUTO: 1.2 K/UL (ref 0.3–1)
MONOCYTES NFR BLD: 10.9 % (ref 4–15)
NEUTROPHILS # BLD AUTO: 6.9 K/UL (ref 1.8–7.7)
NEUTROPHILS NFR BLD: 61.2 % (ref 38–73)
NRBC BLD-RTO: 0 /100 WBC
PHOSPHATE SERPL-MCNC: 3.5 MG/DL (ref 2.7–4.5)
PLATELET # BLD AUTO: 550 K/UL (ref 150–450)
PMV BLD AUTO: 10.2 FL (ref 9.2–12.9)
POTASSIUM SERPL-SCNC: 3.8 MMOL/L (ref 3.5–5.1)
RBC # BLD AUTO: 3.72 M/UL (ref 4–5.4)
SODIUM SERPL-SCNC: 145 MMOL/L (ref 136–145)
WBC # BLD AUTO: 11.33 K/UL (ref 3.9–12.7)

## 2021-07-08 PROCEDURE — 11000004 HC SNF PRIVATE

## 2021-07-08 PROCEDURE — 36415 COLL VENOUS BLD VENIPUNCTURE: CPT | Performed by: HOSPITALIST

## 2021-07-08 PROCEDURE — 80048 BASIC METABOLIC PNL TOTAL CA: CPT | Performed by: HOSPITALIST

## 2021-07-08 PROCEDURE — 97530 THERAPEUTIC ACTIVITIES: CPT

## 2021-07-08 PROCEDURE — 25000003 PHARM REV CODE 250: Performed by: HOSPITALIST

## 2021-07-08 PROCEDURE — 85025 COMPLETE CBC W/AUTO DIFF WBC: CPT | Performed by: HOSPITALIST

## 2021-07-08 PROCEDURE — 84100 ASSAY OF PHOSPHORUS: CPT | Performed by: HOSPITALIST

## 2021-07-08 PROCEDURE — 83735 ASSAY OF MAGNESIUM: CPT | Performed by: HOSPITALIST

## 2021-07-08 RX ADMIN — CELECOXIB 200 MG: 200 CAPSULE ORAL at 09:07

## 2021-07-08 RX ADMIN — LISINOPRIL 2.5 MG: 2.5 TABLET ORAL at 09:07

## 2021-07-08 RX ADMIN — MIRTAZAPINE 7.5 MG: 7.5 TABLET ORAL at 09:07

## 2021-07-08 RX ADMIN — VENLAFAXINE HYDROCHLORIDE 150 MG: 75 CAPSULE, EXTENDED RELEASE ORAL at 09:07

## 2021-07-08 RX ADMIN — ATORVASTATIN CALCIUM 40 MG: 20 TABLET, FILM COATED ORAL at 09:07

## 2021-07-08 RX ADMIN — ASPIRIN 81 MG CHEWABLE TABLET 81 MG: 81 TABLET CHEWABLE at 09:07

## 2021-07-08 RX ADMIN — MELATONIN TAB 3 MG 6 MG: 3 TAB at 09:07

## 2021-07-09 VITALS
SYSTOLIC BLOOD PRESSURE: 150 MMHG | RESPIRATION RATE: 18 BRPM | HEIGHT: 62 IN | OXYGEN SATURATION: 94 % | HEART RATE: 75 BPM | TEMPERATURE: 98 F | BODY MASS INDEX: 30.39 KG/M2 | DIASTOLIC BLOOD PRESSURE: 66 MMHG | WEIGHT: 165.13 LBS

## 2021-07-09 PROCEDURE — 25000003 PHARM REV CODE 250: Performed by: HOSPITALIST

## 2021-07-09 PROCEDURE — 97530 THERAPEUTIC ACTIVITIES: CPT

## 2021-07-09 RX ORDER — HYDROCODONE BITARTRATE AND ACETAMINOPHEN 5; 325 MG/1; MG/1
1 TABLET ORAL EVERY 6 HOURS PRN
Qty: 30 TABLET | Refills: 0 | OUTPATIENT
Start: 2021-07-09 | End: 2022-12-07

## 2021-07-09 RX ADMIN — VENLAFAXINE HYDROCHLORIDE 150 MG: 75 CAPSULE, EXTENDED RELEASE ORAL at 08:07

## 2021-07-09 RX ADMIN — ASPIRIN 81 MG CHEWABLE TABLET 81 MG: 81 TABLET CHEWABLE at 08:07

## 2021-07-09 RX ADMIN — CELECOXIB 200 MG: 200 CAPSULE ORAL at 08:07

## 2021-07-09 RX ADMIN — LISINOPRIL 2.5 MG: 2.5 TABLET ORAL at 08:07

## 2021-07-09 RX ADMIN — ATORVASTATIN CALCIUM 40 MG: 20 TABLET, FILM COATED ORAL at 08:07

## 2021-07-20 ENCOUNTER — TELEPHONE (OUTPATIENT)
Dept: FAMILY MEDICINE | Facility: CLINIC | Age: 86
End: 2021-07-20

## 2021-07-28 ENCOUNTER — OFFICE VISIT (OUTPATIENT)
Dept: FAMILY MEDICINE | Facility: CLINIC | Age: 86
End: 2021-07-28
Payer: MEDICARE

## 2021-07-28 VITALS
BODY MASS INDEX: 30.79 KG/M2 | RESPIRATION RATE: 16 BRPM | DIASTOLIC BLOOD PRESSURE: 70 MMHG | SYSTOLIC BLOOD PRESSURE: 102 MMHG | OXYGEN SATURATION: 98 % | WEIGHT: 167.31 LBS | HEIGHT: 62 IN | TEMPERATURE: 98 F | HEART RATE: 76 BPM

## 2021-07-28 DIAGNOSIS — I63.9 CEREBROVASCULAR ACCIDENT (CVA), UNSPECIFIED MECHANISM: ICD-10-CM

## 2021-07-28 DIAGNOSIS — R53.81 PHYSICAL DECONDITIONING: ICD-10-CM

## 2021-07-28 DIAGNOSIS — S72.002D CLOSED FRACTURE OF LEFT HIP WITH ROUTINE HEALING, SUBSEQUENT ENCOUNTER: Primary | ICD-10-CM

## 2021-07-28 PROCEDURE — 3288F PR FALLS RISK ASSESSMENT DOCUMENTED: ICD-10-PCS | Mod: CPTII,S$GLB,, | Performed by: FAMILY MEDICINE

## 2021-07-28 PROCEDURE — 99999 PR PBB SHADOW E&M-EST. PATIENT-LVL III: ICD-10-PCS | Mod: PBBFAC,,, | Performed by: FAMILY MEDICINE

## 2021-07-28 PROCEDURE — 3288F FALL RISK ASSESSMENT DOCD: CPT | Mod: CPTII,S$GLB,, | Performed by: FAMILY MEDICINE

## 2021-07-28 PROCEDURE — 1126F PR PAIN SEVERITY QUANTIFIED, NO PAIN PRESENT: ICD-10-PCS | Mod: CPTII,S$GLB,, | Performed by: FAMILY MEDICINE

## 2021-07-28 PROCEDURE — 3074F SYST BP LT 130 MM HG: CPT | Mod: CPTII,S$GLB,, | Performed by: FAMILY MEDICINE

## 2021-07-28 PROCEDURE — 1126F AMNT PAIN NOTED NONE PRSNT: CPT | Mod: CPTII,S$GLB,, | Performed by: FAMILY MEDICINE

## 2021-07-28 PROCEDURE — 1101F PT FALLS ASSESS-DOCD LE1/YR: CPT | Mod: CPTII,S$GLB,, | Performed by: FAMILY MEDICINE

## 2021-07-28 PROCEDURE — 99214 OFFICE O/P EST MOD 30 MIN: CPT | Mod: S$GLB,,, | Performed by: FAMILY MEDICINE

## 2021-07-28 PROCEDURE — 1101F PR PT FALLS ASSESS DOC 0-1 FALLS W/OUT INJ PAST YR: ICD-10-PCS | Mod: CPTII,S$GLB,, | Performed by: FAMILY MEDICINE

## 2021-07-28 PROCEDURE — 1111F PR DISCHARGE MEDS RECONCILED W/ CURRENT OUTPATIENT MED LIST: ICD-10-PCS | Mod: CPTII,S$GLB,, | Performed by: FAMILY MEDICINE

## 2021-07-28 PROCEDURE — 3078F PR MOST RECENT DIASTOLIC BLOOD PRESSURE < 80 MM HG: ICD-10-PCS | Mod: CPTII,S$GLB,, | Performed by: FAMILY MEDICINE

## 2021-07-28 PROCEDURE — 99999 PR PBB SHADOW E&M-EST. PATIENT-LVL III: CPT | Mod: PBBFAC,,, | Performed by: FAMILY MEDICINE

## 2021-07-28 PROCEDURE — 3074F PR MOST RECENT SYSTOLIC BLOOD PRESSURE < 130 MM HG: ICD-10-PCS | Mod: CPTII,S$GLB,, | Performed by: FAMILY MEDICINE

## 2021-07-28 PROCEDURE — 3078F DIAST BP <80 MM HG: CPT | Mod: CPTII,S$GLB,, | Performed by: FAMILY MEDICINE

## 2021-07-28 PROCEDURE — 1111F DSCHRG MED/CURRENT MED MERGE: CPT | Mod: CPTII,S$GLB,, | Performed by: FAMILY MEDICINE

## 2021-07-28 PROCEDURE — 99214 PR OFFICE/OUTPT VISIT, EST, LEVL IV, 30-39 MIN: ICD-10-PCS | Mod: S$GLB,,, | Performed by: FAMILY MEDICINE

## 2021-07-28 RX ORDER — AMOXICILLIN AND CLAVULANATE POTASSIUM 875; 125 MG/1; MG/1
1 TABLET, FILM COATED ORAL 2 TIMES DAILY
COMMUNITY
Start: 2021-05-09 | End: 2023-03-28

## 2021-07-28 RX ORDER — TRAZODONE HYDROCHLORIDE 50 MG/1
50 TABLET ORAL NIGHTLY
COMMUNITY
Start: 2021-05-18 | End: 2022-03-18

## 2021-08-02 PROCEDURE — G0179 MD RECERTIFICATION HHA PT: HCPCS | Mod: ,,, | Performed by: FAMILY MEDICINE

## 2021-08-02 PROCEDURE — G0179 PR HOME HEALTH MD RECERTIFICATION: ICD-10-PCS | Mod: ,,, | Performed by: FAMILY MEDICINE

## 2021-08-10 ENCOUNTER — EXTERNAL HOME HEALTH (OUTPATIENT)
Dept: HOME HEALTH SERVICES | Facility: HOSPITAL | Age: 86
End: 2021-08-10
Payer: MEDICARE

## 2021-08-11 ENCOUNTER — DOCUMENT SCAN (OUTPATIENT)
Dept: HOME HEALTH SERVICES | Facility: HOSPITAL | Age: 86
End: 2021-08-11
Payer: MEDICARE

## 2021-09-09 ENCOUNTER — OFFICE VISIT (OUTPATIENT)
Dept: FAMILY MEDICINE | Facility: CLINIC | Age: 86
End: 2021-09-09
Payer: MEDICARE

## 2021-09-09 ENCOUNTER — LAB VISIT (OUTPATIENT)
Dept: LAB | Facility: HOSPITAL | Age: 86
End: 2021-09-09
Attending: INTERNAL MEDICINE
Payer: MEDICARE

## 2021-09-09 VITALS
HEIGHT: 62 IN | SYSTOLIC BLOOD PRESSURE: 136 MMHG | TEMPERATURE: 98 F | BODY MASS INDEX: 31.11 KG/M2 | WEIGHT: 169.06 LBS | HEART RATE: 81 BPM | OXYGEN SATURATION: 97 % | RESPIRATION RATE: 16 BRPM | DIASTOLIC BLOOD PRESSURE: 82 MMHG

## 2021-09-09 DIAGNOSIS — N30.00 ACUTE CYSTITIS WITHOUT HEMATURIA: ICD-10-CM

## 2021-09-09 DIAGNOSIS — F41.9 ANXIETY AND DEPRESSION: ICD-10-CM

## 2021-09-09 DIAGNOSIS — G47.00 INSOMNIA, UNSPECIFIED TYPE: ICD-10-CM

## 2021-09-09 DIAGNOSIS — R41.0 CONFUSION: Primary | ICD-10-CM

## 2021-09-09 DIAGNOSIS — I63.9 CEREBROVASCULAR ACCIDENT (CVA), UNSPECIFIED MECHANISM: ICD-10-CM

## 2021-09-09 DIAGNOSIS — R41.0 CONFUSION: ICD-10-CM

## 2021-09-09 DIAGNOSIS — I10 HYPERTENSION, WELL CONTROLLED: ICD-10-CM

## 2021-09-09 DIAGNOSIS — R53.81 PHYSICAL DECONDITIONING: ICD-10-CM

## 2021-09-09 DIAGNOSIS — F32.A ANXIETY AND DEPRESSION: ICD-10-CM

## 2021-09-09 LAB
ALBUMIN SERPL BCP-MCNC: 3.4 G/DL (ref 3.5–5.2)
ALP SERPL-CCNC: 98 U/L (ref 55–135)
ALT SERPL W/O P-5'-P-CCNC: 15 U/L (ref 10–44)
ANION GAP SERPL CALC-SCNC: 10 MMOL/L (ref 8–16)
AST SERPL-CCNC: 19 U/L (ref 10–40)
BASOPHILS # BLD AUTO: 0.11 K/UL (ref 0–0.2)
BASOPHILS NFR BLD: 1 % (ref 0–1.9)
BILIRUB SERPL-MCNC: 0.5 MG/DL (ref 0.1–1)
BILIRUB SERPL-MCNC: ABNORMAL MG/DL
BILIRUB UR QL STRIP: NEGATIVE
BLOOD URINE, POC: ABNORMAL
BUN SERPL-MCNC: 11 MG/DL (ref 8–23)
CALCIUM SERPL-MCNC: 9.2 MG/DL (ref 8.7–10.5)
CHLORIDE SERPL-SCNC: 104 MMOL/L (ref 95–110)
CLARITY UR REFRACT.AUTO: ABNORMAL
CLARITY, POC UA: CLEAR
CO2 SERPL-SCNC: 25 MMOL/L (ref 23–29)
COLOR UR AUTO: YELLOW
COLOR, POC UA: ABNORMAL
CREAT SERPL-MCNC: 0.9 MG/DL (ref 0.5–1.4)
DIFFERENTIAL METHOD: ABNORMAL
EOSINOPHIL # BLD AUTO: 0.4 K/UL (ref 0–0.5)
EOSINOPHIL NFR BLD: 3.4 % (ref 0–8)
ERYTHROCYTE [DISTWIDTH] IN BLOOD BY AUTOMATED COUNT: 14.2 % (ref 11.5–14.5)
EST. GFR  (AFRICAN AMERICAN): >60 ML/MIN/1.73 M^2
EST. GFR  (NON AFRICAN AMERICAN): 59.3 ML/MIN/1.73 M^2
GLUCOSE SERPL-MCNC: 146 MG/DL (ref 70–110)
GLUCOSE UR QL STRIP: NEGATIVE
GLUCOSE UR QL STRIP: NORMAL
HCT VFR BLD AUTO: 42.3 % (ref 37–48.5)
HGB BLD-MCNC: 13.2 G/DL (ref 12–16)
HGB UR QL STRIP: NEGATIVE
IMM GRANULOCYTES # BLD AUTO: 0.04 K/UL (ref 0–0.04)
IMM GRANULOCYTES NFR BLD AUTO: 0.4 % (ref 0–0.5)
KETONES UR QL STRIP: ABNORMAL
KETONES UR QL STRIP: NEGATIVE
LEUKOCYTE ESTERASE UR QL STRIP: ABNORMAL
LEUKOCYTE ESTERASE URINE, POC: ABNORMAL
LYMPHOCYTES # BLD AUTO: 2 K/UL (ref 1–4.8)
LYMPHOCYTES NFR BLD: 18.5 % (ref 18–48)
MCH RBC QN AUTO: 28.4 PG (ref 27–31)
MCHC RBC AUTO-ENTMCNC: 31.2 G/DL (ref 32–36)
MCV RBC AUTO: 91 FL (ref 82–98)
MICROSCOPIC COMMENT: NORMAL
MONOCYTES # BLD AUTO: 0.8 K/UL (ref 0.3–1)
MONOCYTES NFR BLD: 7.4 % (ref 4–15)
NEUTROPHILS # BLD AUTO: 7.3 K/UL (ref 1.8–7.7)
NEUTROPHILS NFR BLD: 69.3 % (ref 38–73)
NITRITE UR QL STRIP: NEGATIVE
NITRITE, POC UA: ABNORMAL
NRBC BLD-RTO: 0 /100 WBC
PH UR STRIP: 5 [PH] (ref 5–8)
PH, POC UA: 5
PLATELET # BLD AUTO: 392 K/UL (ref 150–450)
PMV BLD AUTO: 10.4 FL (ref 9.2–12.9)
POTASSIUM SERPL-SCNC: 4.5 MMOL/L (ref 3.5–5.1)
PROT SERPL-MCNC: 7 G/DL (ref 6–8.4)
PROT UR QL STRIP: NEGATIVE
PROTEIN, POC: ABNORMAL
RBC # BLD AUTO: 4.64 M/UL (ref 4–5.4)
RBC #/AREA URNS AUTO: 1 /HPF (ref 0–4)
SODIUM SERPL-SCNC: 139 MMOL/L (ref 136–145)
SP GR UR STRIP: 1.01 (ref 1–1.03)
SPECIFIC GRAVITY, POC UA: 1.01
SQUAMOUS #/AREA URNS AUTO: 6 /HPF
T3FREE SERPL-MCNC: 2.3 PG/ML (ref 2.3–4.2)
T4 FREE SERPL-MCNC: 0.74 NG/DL (ref 0.71–1.51)
TSH SERPL DL<=0.005 MIU/L-ACNC: 4.63 UIU/ML (ref 0.4–4)
URN SPEC COLLECT METH UR: ABNORMAL
UROBILINOGEN, POC UA: NORMAL
WBC # BLD AUTO: 10.59 K/UL (ref 3.9–12.7)
WBC #/AREA URNS AUTO: 4 /HPF (ref 0–5)

## 2021-09-09 PROCEDURE — 3288F PR FALLS RISK ASSESSMENT DOCUMENTED: ICD-10-PCS | Mod: CPTII,S$GLB,, | Performed by: INTERNAL MEDICINE

## 2021-09-09 PROCEDURE — 1159F MED LIST DOCD IN RCRD: CPT | Mod: CPTII,S$GLB,, | Performed by: INTERNAL MEDICINE

## 2021-09-09 PROCEDURE — 1159F PR MEDICATION LIST DOCUMENTED IN MEDICAL RECORD: ICD-10-PCS | Mod: CPTII,S$GLB,, | Performed by: INTERNAL MEDICINE

## 2021-09-09 PROCEDURE — 80053 COMPREHEN METABOLIC PANEL: CPT | Performed by: INTERNAL MEDICINE

## 2021-09-09 PROCEDURE — 85025 COMPLETE CBC W/AUTO DIFF WBC: CPT | Performed by: INTERNAL MEDICINE

## 2021-09-09 PROCEDURE — 1160F RVW MEDS BY RX/DR IN RCRD: CPT | Mod: CPTII,S$GLB,, | Performed by: INTERNAL MEDICINE

## 2021-09-09 PROCEDURE — 99999 PR PBB SHADOW E&M-EST. PATIENT-LVL III: ICD-10-PCS | Mod: PBBFAC,,, | Performed by: INTERNAL MEDICINE

## 2021-09-09 PROCEDURE — 99215 PR OFFICE/OUTPT VISIT, EST, LEVL V, 40-54 MIN: ICD-10-PCS | Mod: 25,S$GLB,, | Performed by: INTERNAL MEDICINE

## 2021-09-09 PROCEDURE — 84481 FREE ASSAY (FT-3): CPT | Performed by: INTERNAL MEDICINE

## 2021-09-09 PROCEDURE — 3288F FALL RISK ASSESSMENT DOCD: CPT | Mod: CPTII,S$GLB,, | Performed by: INTERNAL MEDICINE

## 2021-09-09 PROCEDURE — 99215 OFFICE O/P EST HI 40 MIN: CPT | Mod: 25,S$GLB,, | Performed by: INTERNAL MEDICINE

## 2021-09-09 PROCEDURE — 84439 ASSAY OF FREE THYROXINE: CPT | Performed by: INTERNAL MEDICINE

## 2021-09-09 PROCEDURE — 1101F PT FALLS ASSESS-DOCD LE1/YR: CPT | Mod: CPTII,S$GLB,, | Performed by: INTERNAL MEDICINE

## 2021-09-09 PROCEDURE — 1126F PR PAIN SEVERITY QUANTIFIED, NO PAIN PRESENT: ICD-10-PCS | Mod: CPTII,S$GLB,, | Performed by: INTERNAL MEDICINE

## 2021-09-09 PROCEDURE — 81002 POCT URINE DIPSTICK WITHOUT MICROSCOPE: ICD-10-PCS | Mod: S$GLB,,, | Performed by: INTERNAL MEDICINE

## 2021-09-09 PROCEDURE — 99499 RISK ADDL DX/OHS AUDIT: ICD-10-PCS | Mod: S$GLB,,, | Performed by: INTERNAL MEDICINE

## 2021-09-09 PROCEDURE — 99499 UNLISTED E&M SERVICE: CPT | Mod: S$GLB,,, | Performed by: INTERNAL MEDICINE

## 2021-09-09 PROCEDURE — 1101F PR PT FALLS ASSESS DOC 0-1 FALLS W/OUT INJ PAST YR: ICD-10-PCS | Mod: CPTII,S$GLB,, | Performed by: INTERNAL MEDICINE

## 2021-09-09 PROCEDURE — 81001 URINALYSIS AUTO W/SCOPE: CPT | Performed by: INTERNAL MEDICINE

## 2021-09-09 PROCEDURE — 81002 URINALYSIS NONAUTO W/O SCOPE: CPT | Mod: S$GLB,,, | Performed by: INTERNAL MEDICINE

## 2021-09-09 PROCEDURE — 84443 ASSAY THYROID STIM HORMONE: CPT | Performed by: INTERNAL MEDICINE

## 2021-09-09 PROCEDURE — 36415 COLL VENOUS BLD VENIPUNCTURE: CPT | Mod: PO | Performed by: INTERNAL MEDICINE

## 2021-09-09 PROCEDURE — 1160F PR REVIEW ALL MEDS BY PRESCRIBER/CLIN PHARMACIST DOCUMENTED: ICD-10-PCS | Mod: CPTII,S$GLB,, | Performed by: INTERNAL MEDICINE

## 2021-09-09 PROCEDURE — 99999 PR PBB SHADOW E&M-EST. PATIENT-LVL III: CPT | Mod: PBBFAC,,, | Performed by: INTERNAL MEDICINE

## 2021-09-09 PROCEDURE — 1126F AMNT PAIN NOTED NONE PRSNT: CPT | Mod: CPTII,S$GLB,, | Performed by: INTERNAL MEDICINE

## 2021-09-09 PROCEDURE — 3075F PR MOST RECENT SYSTOLIC BLOOD PRESS GE 130-139MM HG: ICD-10-PCS | Mod: CPTII,S$GLB,, | Performed by: INTERNAL MEDICINE

## 2021-09-09 PROCEDURE — 3079F DIAST BP 80-89 MM HG: CPT | Mod: CPTII,S$GLB,, | Performed by: INTERNAL MEDICINE

## 2021-09-09 PROCEDURE — 3075F SYST BP GE 130 - 139MM HG: CPT | Mod: CPTII,S$GLB,, | Performed by: INTERNAL MEDICINE

## 2021-09-09 PROCEDURE — 3079F PR MOST RECENT DIASTOLIC BLOOD PRESSURE 80-89 MM HG: ICD-10-PCS | Mod: CPTII,S$GLB,, | Performed by: INTERNAL MEDICINE

## 2021-09-10 ENCOUNTER — TELEPHONE (OUTPATIENT)
Dept: FAMILY MEDICINE | Facility: CLINIC | Age: 86
End: 2021-09-10

## 2021-09-10 ENCOUNTER — PATIENT MESSAGE (OUTPATIENT)
Dept: FAMILY MEDICINE | Facility: CLINIC | Age: 86
End: 2021-09-10

## 2021-09-20 ENCOUNTER — DOCUMENT SCAN (OUTPATIENT)
Dept: HOME HEALTH SERVICES | Facility: HOSPITAL | Age: 86
End: 2021-09-20
Payer: MEDICARE

## 2021-09-28 ENCOUNTER — OFFICE VISIT (OUTPATIENT)
Dept: FAMILY MEDICINE | Facility: CLINIC | Age: 86
End: 2021-09-28
Payer: MEDICARE

## 2021-09-28 VITALS
BODY MASS INDEX: 31.28 KG/M2 | HEIGHT: 62 IN | HEART RATE: 92 BPM | OXYGEN SATURATION: 96 % | DIASTOLIC BLOOD PRESSURE: 82 MMHG | WEIGHT: 170 LBS | RESPIRATION RATE: 16 BRPM | SYSTOLIC BLOOD PRESSURE: 136 MMHG | TEMPERATURE: 98 F

## 2021-09-28 DIAGNOSIS — E03.8 SUBCLINICAL HYPOTHYROIDISM: ICD-10-CM

## 2021-09-28 DIAGNOSIS — R53.81 PHYSICAL DECONDITIONING: ICD-10-CM

## 2021-09-28 DIAGNOSIS — S72.002D CLOSED FRACTURE OF LEFT HIP WITH ROUTINE HEALING, SUBSEQUENT ENCOUNTER: ICD-10-CM

## 2021-09-28 DIAGNOSIS — R41.0 CONFUSION: Primary | ICD-10-CM

## 2021-09-28 DIAGNOSIS — R29.898 BILATERAL LEG WEAKNESS: ICD-10-CM

## 2021-09-28 PROCEDURE — 3079F PR MOST RECENT DIASTOLIC BLOOD PRESSURE 80-89 MM HG: ICD-10-PCS | Mod: CPTII,S$GLB,, | Performed by: FAMILY MEDICINE

## 2021-09-28 PROCEDURE — 99999 PR PBB SHADOW E&M-EST. PATIENT-LVL III: ICD-10-PCS | Mod: PBBFAC,,, | Performed by: FAMILY MEDICINE

## 2021-09-28 PROCEDURE — 99214 PR OFFICE/OUTPT VISIT, EST, LEVL IV, 30-39 MIN: ICD-10-PCS | Mod: S$GLB,,, | Performed by: FAMILY MEDICINE

## 2021-09-28 PROCEDURE — 1159F MED LIST DOCD IN RCRD: CPT | Mod: CPTII,S$GLB,, | Performed by: FAMILY MEDICINE

## 2021-09-28 PROCEDURE — 99999 PR PBB SHADOW E&M-EST. PATIENT-LVL III: CPT | Mod: PBBFAC,,, | Performed by: FAMILY MEDICINE

## 2021-09-28 PROCEDURE — 3079F DIAST BP 80-89 MM HG: CPT | Mod: CPTII,S$GLB,, | Performed by: FAMILY MEDICINE

## 2021-09-28 PROCEDURE — 3075F PR MOST RECENT SYSTOLIC BLOOD PRESS GE 130-139MM HG: ICD-10-PCS | Mod: CPTII,S$GLB,, | Performed by: FAMILY MEDICINE

## 2021-09-28 PROCEDURE — 3288F PR FALLS RISK ASSESSMENT DOCUMENTED: ICD-10-PCS | Mod: CPTII,S$GLB,, | Performed by: FAMILY MEDICINE

## 2021-09-28 PROCEDURE — 99214 OFFICE O/P EST MOD 30 MIN: CPT | Mod: S$GLB,,, | Performed by: FAMILY MEDICINE

## 2021-09-28 PROCEDURE — 1126F PR PAIN SEVERITY QUANTIFIED, NO PAIN PRESENT: ICD-10-PCS | Mod: CPTII,S$GLB,, | Performed by: FAMILY MEDICINE

## 2021-09-28 PROCEDURE — 1101F PT FALLS ASSESS-DOCD LE1/YR: CPT | Mod: CPTII,S$GLB,, | Performed by: FAMILY MEDICINE

## 2021-09-28 PROCEDURE — 3075F SYST BP GE 130 - 139MM HG: CPT | Mod: CPTII,S$GLB,, | Performed by: FAMILY MEDICINE

## 2021-09-28 PROCEDURE — 1126F AMNT PAIN NOTED NONE PRSNT: CPT | Mod: CPTII,S$GLB,, | Performed by: FAMILY MEDICINE

## 2021-09-28 PROCEDURE — 1159F PR MEDICATION LIST DOCUMENTED IN MEDICAL RECORD: ICD-10-PCS | Mod: CPTII,S$GLB,, | Performed by: FAMILY MEDICINE

## 2021-09-28 PROCEDURE — 3288F FALL RISK ASSESSMENT DOCD: CPT | Mod: CPTII,S$GLB,, | Performed by: FAMILY MEDICINE

## 2021-09-28 PROCEDURE — 1101F PR PT FALLS ASSESS DOC 0-1 FALLS W/OUT INJ PAST YR: ICD-10-PCS | Mod: CPTII,S$GLB,, | Performed by: FAMILY MEDICINE

## 2021-09-28 RX ORDER — LEVOTHYROXINE SODIUM 25 UG/1
25 TABLET ORAL
Qty: 90 TABLET | Refills: 1 | Status: SHIPPED | OUTPATIENT
Start: 2021-09-28 | End: 2022-01-18 | Stop reason: SDUPTHER

## 2021-10-05 ENCOUNTER — IMMUNIZATION (OUTPATIENT)
Dept: OBSTETRICS AND GYNECOLOGY | Facility: CLINIC | Age: 86
End: 2021-10-05
Payer: MEDICARE

## 2021-10-05 DIAGNOSIS — Z23 NEED FOR VACCINATION: Primary | ICD-10-CM

## 2021-10-05 PROCEDURE — 91300 COVID-19, MRNA, LNP-S, PF, 30 MCG/0.3 ML DOSE VACCINE: CPT | Mod: PBBFAC | Performed by: FAMILY MEDICINE

## 2021-10-05 PROCEDURE — 0003A COVID-19, MRNA, LNP-S, PF, 30 MCG/0.3 ML DOSE VACCINE: CPT | Mod: PBBFAC | Performed by: FAMILY MEDICINE

## 2021-10-28 ENCOUNTER — OFFICE VISIT (OUTPATIENT)
Dept: FAMILY MEDICINE | Facility: CLINIC | Age: 86
End: 2021-10-28
Payer: MEDICARE

## 2021-10-28 VITALS
RESPIRATION RATE: 20 BRPM | OXYGEN SATURATION: 96 % | HEIGHT: 62 IN | BODY MASS INDEX: 31.24 KG/M2 | HEART RATE: 88 BPM | TEMPERATURE: 98 F | SYSTOLIC BLOOD PRESSURE: 132 MMHG | WEIGHT: 169.75 LBS | DIASTOLIC BLOOD PRESSURE: 68 MMHG

## 2021-10-28 DIAGNOSIS — E03.9 HYPOTHYROIDISM, UNSPECIFIED TYPE: Primary | ICD-10-CM

## 2021-10-28 DIAGNOSIS — R41.0 CONFUSION: ICD-10-CM

## 2021-10-28 DIAGNOSIS — Z23 INFLUENZA VACCINE NEEDED: ICD-10-CM

## 2021-10-28 PROCEDURE — 1101F PR PT FALLS ASSESS DOC 0-1 FALLS W/OUT INJ PAST YR: ICD-10-PCS | Mod: CPTII,S$GLB,, | Performed by: FAMILY MEDICINE

## 2021-10-28 PROCEDURE — 1126F PR PAIN SEVERITY QUANTIFIED, NO PAIN PRESENT: ICD-10-PCS | Mod: CPTII,S$GLB,, | Performed by: FAMILY MEDICINE

## 2021-10-28 PROCEDURE — 3075F SYST BP GE 130 - 139MM HG: CPT | Mod: CPTII,S$GLB,, | Performed by: FAMILY MEDICINE

## 2021-10-28 PROCEDURE — 1126F AMNT PAIN NOTED NONE PRSNT: CPT | Mod: CPTII,S$GLB,, | Performed by: FAMILY MEDICINE

## 2021-10-28 PROCEDURE — 1101F PT FALLS ASSESS-DOCD LE1/YR: CPT | Mod: CPTII,S$GLB,, | Performed by: FAMILY MEDICINE

## 2021-10-28 PROCEDURE — 3078F PR MOST RECENT DIASTOLIC BLOOD PRESSURE < 80 MM HG: ICD-10-PCS | Mod: CPTII,S$GLB,, | Performed by: FAMILY MEDICINE

## 2021-10-28 PROCEDURE — 3078F DIAST BP <80 MM HG: CPT | Mod: CPTII,S$GLB,, | Performed by: FAMILY MEDICINE

## 2021-10-28 PROCEDURE — 99214 PR OFFICE/OUTPT VISIT, EST, LEVL IV, 30-39 MIN: ICD-10-PCS | Mod: S$GLB,,, | Performed by: FAMILY MEDICINE

## 2021-10-28 PROCEDURE — 90694 VACC AIIV4 NO PRSRV 0.5ML IM: CPT | Mod: S$GLB,,, | Performed by: FAMILY MEDICINE

## 2021-10-28 PROCEDURE — G0008 FLU VACCINE - QUADRIVALENT - ADJUVANTED: ICD-10-PCS | Mod: S$GLB,,, | Performed by: FAMILY MEDICINE

## 2021-10-28 PROCEDURE — 99999 PR PBB SHADOW E&M-EST. PATIENT-LVL III: CPT | Mod: PBBFAC,,, | Performed by: FAMILY MEDICINE

## 2021-10-28 PROCEDURE — 3288F FALL RISK ASSESSMENT DOCD: CPT | Mod: CPTII,S$GLB,, | Performed by: FAMILY MEDICINE

## 2021-10-28 PROCEDURE — G0008 ADMIN INFLUENZA VIRUS VAC: HCPCS | Mod: S$GLB,,, | Performed by: FAMILY MEDICINE

## 2021-10-28 PROCEDURE — 1159F PR MEDICATION LIST DOCUMENTED IN MEDICAL RECORD: ICD-10-PCS | Mod: CPTII,S$GLB,, | Performed by: FAMILY MEDICINE

## 2021-10-28 PROCEDURE — 3288F PR FALLS RISK ASSESSMENT DOCUMENTED: ICD-10-PCS | Mod: CPTII,S$GLB,, | Performed by: FAMILY MEDICINE

## 2021-10-28 PROCEDURE — 3075F PR MOST RECENT SYSTOLIC BLOOD PRESS GE 130-139MM HG: ICD-10-PCS | Mod: CPTII,S$GLB,, | Performed by: FAMILY MEDICINE

## 2021-10-28 PROCEDURE — 99214 OFFICE O/P EST MOD 30 MIN: CPT | Mod: S$GLB,,, | Performed by: FAMILY MEDICINE

## 2021-10-28 PROCEDURE — 90694 FLU VACCINE - QUADRIVALENT - ADJUVANTED: ICD-10-PCS | Mod: S$GLB,,, | Performed by: FAMILY MEDICINE

## 2021-10-28 PROCEDURE — 1159F MED LIST DOCD IN RCRD: CPT | Mod: CPTII,S$GLB,, | Performed by: FAMILY MEDICINE

## 2021-10-28 PROCEDURE — 99999 PR PBB SHADOW E&M-EST. PATIENT-LVL III: ICD-10-PCS | Mod: PBBFAC,,, | Performed by: FAMILY MEDICINE

## 2022-01-14 ENCOUNTER — LAB VISIT (OUTPATIENT)
Dept: LAB | Facility: HOSPITAL | Age: 87
End: 2022-01-14
Attending: FAMILY MEDICINE
Payer: MEDICARE

## 2022-01-14 DIAGNOSIS — E03.9 HYPOTHYROIDISM, UNSPECIFIED TYPE: ICD-10-CM

## 2022-01-14 LAB
T4 FREE SERPL-MCNC: 0.86 NG/DL (ref 0.71–1.51)
TSH SERPL DL<=0.005 MIU/L-ACNC: 2.41 UIU/ML (ref 0.4–4)

## 2022-01-14 PROCEDURE — 36415 COLL VENOUS BLD VENIPUNCTURE: CPT | Performed by: FAMILY MEDICINE

## 2022-01-14 PROCEDURE — 84443 ASSAY THYROID STIM HORMONE: CPT | Performed by: FAMILY MEDICINE

## 2022-01-14 PROCEDURE — 84439 ASSAY OF FREE THYROXINE: CPT | Performed by: FAMILY MEDICINE

## 2022-01-17 DIAGNOSIS — F41.9 ANXIETY AND DEPRESSION: ICD-10-CM

## 2022-01-17 DIAGNOSIS — G47.00 INSOMNIA, UNSPECIFIED TYPE: ICD-10-CM

## 2022-01-17 DIAGNOSIS — I10 HYPERTENSION, WELL CONTROLLED: ICD-10-CM

## 2022-01-17 DIAGNOSIS — F32.A ANXIETY AND DEPRESSION: ICD-10-CM

## 2022-01-17 NOTE — TELEPHONE ENCOUNTER
No new care gaps identified.  Powered by [employer]. Reference number: [id number].   1/17/2022 10:11:47 AM CST

## 2022-01-18 ENCOUNTER — OFFICE VISIT (OUTPATIENT)
Dept: FAMILY MEDICINE | Facility: CLINIC | Age: 87
End: 2022-01-18
Payer: MEDICARE

## 2022-01-18 VITALS
BODY MASS INDEX: 32.49 KG/M2 | TEMPERATURE: 98 F | OXYGEN SATURATION: 98 % | HEIGHT: 62 IN | HEART RATE: 89 BPM | SYSTOLIC BLOOD PRESSURE: 126 MMHG | WEIGHT: 176.56 LBS | DIASTOLIC BLOOD PRESSURE: 72 MMHG | RESPIRATION RATE: 18 BRPM

## 2022-01-18 DIAGNOSIS — G47.00 INSOMNIA, UNSPECIFIED TYPE: ICD-10-CM

## 2022-01-18 DIAGNOSIS — E03.8 SUBCLINICAL HYPOTHYROIDISM: ICD-10-CM

## 2022-01-18 DIAGNOSIS — I10 HYPERTENSION, WELL CONTROLLED: ICD-10-CM

## 2022-01-18 DIAGNOSIS — I69.354 HEMIPLEGIA AND HEMIPARESIS FOLLOWING CEREBRAL INFARCTION AFFECTING LEFT NON-DOMINANT SIDE: ICD-10-CM

## 2022-01-18 DIAGNOSIS — R53.81 PHYSICAL DECONDITIONING: Primary | ICD-10-CM

## 2022-01-18 DIAGNOSIS — I50.32 CHRONIC DIASTOLIC HEART FAILURE: ICD-10-CM

## 2022-01-18 DIAGNOSIS — K21.9 GASTROESOPHAGEAL REFLUX DISEASE, UNSPECIFIED WHETHER ESOPHAGITIS PRESENT: ICD-10-CM

## 2022-01-18 DIAGNOSIS — F41.9 ANXIETY AND DEPRESSION: ICD-10-CM

## 2022-01-18 DIAGNOSIS — F32.A ANXIETY AND DEPRESSION: ICD-10-CM

## 2022-01-18 DIAGNOSIS — F33.41 RECURRENT MAJOR DEPRESSIVE DISORDER, IN PARTIAL REMISSION: ICD-10-CM

## 2022-01-18 PROCEDURE — 99999 PR PBB SHADOW E&M-EST. PATIENT-LVL V: ICD-10-PCS | Mod: PBBFAC,,, | Performed by: FAMILY MEDICINE

## 2022-01-18 PROCEDURE — 99999 PR PBB SHADOW E&M-EST. PATIENT-LVL V: CPT | Mod: PBBFAC,,, | Performed by: FAMILY MEDICINE

## 2022-01-18 PROCEDURE — 3074F SYST BP LT 130 MM HG: CPT | Mod: CPTII,S$GLB,, | Performed by: FAMILY MEDICINE

## 2022-01-18 PROCEDURE — 1159F PR MEDICATION LIST DOCUMENTED IN MEDICAL RECORD: ICD-10-PCS | Mod: CPTII,S$GLB,, | Performed by: FAMILY MEDICINE

## 2022-01-18 PROCEDURE — 1126F AMNT PAIN NOTED NONE PRSNT: CPT | Mod: CPTII,S$GLB,, | Performed by: FAMILY MEDICINE

## 2022-01-18 PROCEDURE — 3074F PR MOST RECENT SYSTOLIC BLOOD PRESSURE < 130 MM HG: ICD-10-PCS | Mod: CPTII,S$GLB,, | Performed by: FAMILY MEDICINE

## 2022-01-18 PROCEDURE — 3288F FALL RISK ASSESSMENT DOCD: CPT | Mod: CPTII,S$GLB,, | Performed by: FAMILY MEDICINE

## 2022-01-18 PROCEDURE — 1101F PR PT FALLS ASSESS DOC 0-1 FALLS W/OUT INJ PAST YR: ICD-10-PCS | Mod: CPTII,S$GLB,, | Performed by: FAMILY MEDICINE

## 2022-01-18 PROCEDURE — 99214 OFFICE O/P EST MOD 30 MIN: CPT | Mod: S$GLB,,, | Performed by: FAMILY MEDICINE

## 2022-01-18 PROCEDURE — 3078F PR MOST RECENT DIASTOLIC BLOOD PRESSURE < 80 MM HG: ICD-10-PCS | Mod: CPTII,S$GLB,, | Performed by: FAMILY MEDICINE

## 2022-01-18 PROCEDURE — 3288F PR FALLS RISK ASSESSMENT DOCUMENTED: ICD-10-PCS | Mod: CPTII,S$GLB,, | Performed by: FAMILY MEDICINE

## 2022-01-18 PROCEDURE — 3078F DIAST BP <80 MM HG: CPT | Mod: CPTII,S$GLB,, | Performed by: FAMILY MEDICINE

## 2022-01-18 PROCEDURE — 99214 PR OFFICE/OUTPT VISIT, EST, LEVL IV, 30-39 MIN: ICD-10-PCS | Mod: S$GLB,,, | Performed by: FAMILY MEDICINE

## 2022-01-18 PROCEDURE — 1159F MED LIST DOCD IN RCRD: CPT | Mod: CPTII,S$GLB,, | Performed by: FAMILY MEDICINE

## 2022-01-18 PROCEDURE — 1126F PR PAIN SEVERITY QUANTIFIED, NO PAIN PRESENT: ICD-10-PCS | Mod: CPTII,S$GLB,, | Performed by: FAMILY MEDICINE

## 2022-01-18 PROCEDURE — 1101F PT FALLS ASSESS-DOCD LE1/YR: CPT | Mod: CPTII,S$GLB,, | Performed by: FAMILY MEDICINE

## 2022-01-18 RX ORDER — LEVOTHYROXINE SODIUM 25 UG/1
25 TABLET ORAL
Qty: 30 TABLET | Refills: 11 | Status: SHIPPED | OUTPATIENT
Start: 2022-01-18 | End: 2022-03-17

## 2022-01-18 RX ORDER — LISINOPRIL 2.5 MG/1
2.5 TABLET ORAL DAILY
Qty: 30 TABLET | Refills: 11 | Status: SHIPPED | OUTPATIENT
Start: 2022-01-18 | End: 2022-05-30

## 2022-01-18 RX ORDER — OMEPRAZOLE 20 MG/1
20 CAPSULE, DELAYED RELEASE ORAL EVERY MORNING
Qty: 30 CAPSULE | Refills: 11 | Status: SHIPPED | OUTPATIENT
Start: 2022-01-18 | End: 2022-05-13

## 2022-01-18 RX ORDER — VENLAFAXINE HYDROCHLORIDE 150 MG/1
150 CAPSULE, EXTENDED RELEASE ORAL DAILY
Qty: 30 CAPSULE | Refills: 11 | Status: SHIPPED | OUTPATIENT
Start: 2022-01-18 | End: 2022-05-30

## 2022-01-18 RX ORDER — MIRTAZAPINE 7.5 MG/1
7.5 TABLET, FILM COATED ORAL NIGHTLY
Qty: 30 TABLET | Refills: 11 | Status: SHIPPED | OUTPATIENT
Start: 2022-01-18 | End: 2022-06-10

## 2022-01-18 NOTE — PROGRESS NOTES
Health Maintenance Due   Topic Date Due    TETANUS VACCINE  Consult w pcp     Shingles Vaccine (1 of 2) Hx chickenpox, notified can get vaccine at pharmacy.       DEXA SCAN  Consult w pcp     Lipid Panel

## 2022-01-20 RX ORDER — LISINOPRIL 2.5 MG/1
TABLET ORAL
Qty: 28 TABLET | Refills: 2 | OUTPATIENT
Start: 2022-01-20

## 2022-01-20 RX ORDER — MIRTAZAPINE 7.5 MG/1
TABLET, FILM COATED ORAL
Qty: 28 TABLET | Refills: 2 | OUTPATIENT
Start: 2022-01-20

## 2022-01-20 RX ORDER — VENLAFAXINE HYDROCHLORIDE 150 MG/1
CAPSULE, EXTENDED RELEASE ORAL
Qty: 28 CAPSULE | OUTPATIENT
Start: 2022-01-20

## 2022-01-21 NOTE — PROGRESS NOTES
Subjective:       Patient ID: Carol Yadav is a 86 y.o. female.    Chief Complaint: Follow-up      HPI  86-year-old female presents for fatigue follow-up.  Patient continues to feel weak.  States she has I increase her physical activity except for went to the restroom or occasionally going out of her room.  States that due to COVID everything is shut down.      Review of Systems   Constitutional: Negative.    HENT: Negative.    Respiratory: Negative.    Cardiovascular: Negative.    Gastrointestinal: Negative.    Endocrine: Negative.    Genitourinary: Negative.    Musculoskeletal: Negative.    Neurological: Negative.    Psychiatric/Behavioral: Negative.           Past Medical History:   Diagnosis Date    Allergic rhinitis     Arthritis     Elevated blood pressure reading without diagnosis of hypertension     Hormone replacement therapy (postmenopausal)     Hyperlipidemia     Hypertension     Stroke of right basal ganglia 12/2014     Past Surgical History:   Procedure Laterality Date    APPENDECTOMY      BREAST BIOPSY      CATARACT EXTRACTION Bilateral at age 62 or 72    monovision     EYE SURGERY      hai cataract surgery    OOPHORECTOMY      PINNING OF HIP Left 6/16/2021    Procedure: PINNING, HIP;  Surgeon: Dae Bernal MD;  Location: [facility];  Service: Orthopedics;  Laterality: Left;    TONSILLECTOMY       Family History   Problem Relation Age of Onset    Breast cancer Mother     Brain cancer Mother         Metastatic from breast    Stroke Father     No Known Problems Sister     No Known Problems Brother     No Known Problems Maternal Aunt     No Known Problems Maternal Uncle     No Known Problems Paternal Aunt     No Known Problems Paternal Uncle     No Known Problems Maternal Grandmother     No Known Problems Maternal Grandfather     No Known Problems Paternal Grandmother     No Known Problems Paternal Grandfather     No Known Problems Daughter     No Known Problems Daughter      Schizophrenia Daughter     Amblyopia Neg Hx     Blindness Neg Hx     Cataracts Neg Hx     Diabetes Neg Hx     Glaucoma Neg Hx     Hypertension Neg Hx     Macular degeneration Neg Hx     Retinal detachment Neg Hx     Strabismus Neg Hx     Thyroid disease Neg Hx      Social History     Socioeconomic History    Marital status:    Tobacco Use    Smoking status: Former Smoker     Packs/day: 2.00     Years: 36.00     Pack years: 72.00     Types: Cigarettes     Start date:      Quit date:      Years since quittin.0    Smokeless tobacco: Never Used   Substance and Sexual Activity    Alcohol use: Yes     Alcohol/week: 21.0 standard drinks     Types: 7 Glasses of wine, 14 Standard drinks or equivalent per week    Drug use: No    Sexual activity: Not Currently     Partners: Male     Comment: 2017 divorce        Current Outpatient Medications:     acetaminophen (TYLENOL) 325 MG tablet, Take 2 tablets (650 mg total) by mouth every 4 (four) hours as needed., Disp: , Rfl: 0    alendronate (FOSAMAX) 70 MG tablet, TAKE 1 TABLET BY MOUTH EVERY 7 DAYS, Disp: 4 tablet, Rfl: 3    amoxicillin-clavulanate 875-125mg (AUGMENTIN) 875-125 mg per tablet, Take 1 tablet by mouth 2 (two) times daily., Disp: , Rfl:     aspirin 81 MG Chew, Take 1 tablet (81 mg total) by mouth 2 (two) times daily., Disp: , Rfl: 0    atorvastatin (LIPITOR) 40 MG tablet, TAKE 1 TABLET BY MOUTH ONCE DAILY, Disp: 28 tablet, Rfl: 2    calcium carbonate (CALCIUM 600) 600 mg calcium (1,500 mg) Tab, Take 1 tablet (600 mg total) by mouth once daily., Disp: 90 tablet, Rfl: 1    calcium carbonate (TUMS) 200 mg calcium (500 mg) chewable tablet, Take 1 tablet (500 mg total) by mouth 2 (two) times daily as needed for Heartburn., Disp: , Rfl:     famotidine (PEPCID) 20 MG tablet, Take 1 tablet (20 mg total) by mouth daily as needed (heartburn)., Disp: 30 tablet, Rfl: 0    fluticasone propionate (FLONASE) 50 mcg/actuation nasal  spray, 1 SPRAY IN EACH NOSTRIL TWICE A DAY AS NEEDED FOR RHINITIS, Disp: 16 g, Rfl: 11    guaiFENesin 100 mg/5 ml (ROBITUSSIN) 100 mg/5 mL syrup, Take 200 mg by mouth 3 (three) times daily as needed for Cough., Disp: , Rfl:     HYDROcodone-acetaminophen (NORCO) 5-325 mg per tablet, Take 1 tablet by mouth every 6 (six) hours as needed for Pain., Disp: 30 tablet, Rfl: 0    melatonin (MELATIN) 3 mg tablet, Take 2 tablets (6 mg total) by mouth nightly as needed for Insomnia., Disp: 30 tablet, Rfl: 0    mirabegron (MYRBETRIQ) 50 mg Tb24, Take 1 tablet (50 mg total) by mouth once daily., Disp: 30 tablet, Rfl: 11    multivit-minerals/folic/ginkgo (ONE DAILY WOMEN 50 PLUS ORAL), Take 1 tablet by mouth once daily., Disp: , Rfl:     olopatadine (PATANOL) 0.1 % ophthalmic solution, INSTILL 1 DROP IN EACH EYE 2 TIMES DAILY, Disp: 5 mL, Rfl: 11    polyethylene glycol (MIRALAX) 17 gram PwPk, Take 17 g by mouth daily as needed. Take every other day, Disp: 30 packet, Rfl: 11    saliva substitute combo no.9 (BIOTENE DRY MOUTH ORAL RINSE) Mwsh, by Mucous Membrane route. Use as directed as needed for dry mouth, Disp: , Rfl:     traZODone (DESYREL) 50 MG tablet, Take 50 mg by mouth nightly., Disp: , Rfl:     vitamin D (VITAMIN D3) 1000 units Tab, Take 1 tablet (1,000 Units total) by mouth once daily., Disp: 90 tablet, Rfl: 3    water Liqd 146 mL with MILK OF MAGNESIA 400 mg/5 mL Susp 400 mg, diphenhydrAMINE 12.5 mg/5 mL Elix 60 mg, nystatin 100,000 unit/mL Susp 500,000 Units, Take 5 mLs by mouth 3 (three) times daily as needed (mouth sores)., Disp: 1 Bottle, Rfl: 0    celecoxib (CELEBREX) 200 MG capsule, TAKE 1 CAPSULE BY MOUTH TWICE A DAY, Disp: 56 capsule, Rfl: 0    levothyroxine (SYNTHROID) 25 MCG tablet, Take 1 tablet (25 mcg total) by mouth before breakfast., Disp: 30 tablet, Rfl: 11    lisinopriL (PRINIVIL,ZESTRIL) 2.5 MG tablet, Take 1 tablet (2.5 mg total) by mouth once daily., Disp: 30 tablet, Rfl: 11     "mirtazapine (REMERON) 7.5 MG Tab, Take 1 tablet (7.5 mg total) by mouth every evening., Disp: 30 tablet, Rfl: 11    multivitamin-minerals-lutein (MULTIVITAMIN 50 PLUS) Tab, Take 1 tablet by mouth once daily., Disp: 90 tablet, Rfl: 3    omeprazole (PRILOSEC) 20 MG capsule, Take 1 capsule (20 mg total) by mouth every morning., Disp: 30 capsule, Rfl: 11    venlafaxine (EFFEXOR-XR) 150 MG Cp24, Take 1 capsule (150 mg total) by mouth once daily., Disp: 30 capsule, Rfl: 11   Objective:      Vitals:    01/18/22 1431   BP: 126/72   BP Location: Right arm   Patient Position: Sitting   BP Method: Large (Manual)   Pulse: 89   Resp: 18   Temp: 97.9 °F (36.6 °C)   TempSrc: Oral   SpO2: 98%   Weight: 80.1 kg (176 lb 9.4 oz)   Height: 5' 2" (1.575 m)       Physical Exam  Constitutional:       General: She is not in acute distress.     Appearance: She is not diaphoretic.   HENT:      Head: Normocephalic and atraumatic.   Eyes:      Conjunctiva/sclera: Conjunctivae normal.   Pulmonary:      Effort: Pulmonary effort is normal.   Musculoskeletal:      Cervical back: Neck supple.   Skin:     Findings: No rash.   Neurological:      Mental Status: She is alert and oriented to person, place, and time.   Psychiatric:         Mood and Affect: Mood and affect normal.         Behavior: Behavior normal.         Thought Content: Thought content normal.         Judgment: Judgment normal.            Assessment:       1. Physical deconditioning    2. Subclinical hypothyroidism    3. Hypertension, well controlled    4. Insomnia, unspecified type    5. Anxiety and depression    6. Gastroesophageal reflux disease, unspecified whether esophagitis present        Plan:       Physical deconditioning    Subclinical hypothyroidism  -     levothyroxine (SYNTHROID) 25 MCG tablet; Take 1 tablet (25 mcg total) by mouth before breakfast.  Dispense: 30 tablet; Refill: 11    Hypertension, well controlled  -     lisinopriL (PRINIVIL,ZESTRIL) 2.5 MG tablet; Take " 1 tablet (2.5 mg total) by mouth once daily.  Dispense: 30 tablet; Refill: 11    Insomnia, unspecified type  -     mirtazapine (REMERON) 7.5 MG Tab; Take 1 tablet (7.5 mg total) by mouth every evening.  Dispense: 30 tablet; Refill: 11    Anxiety and depression  -     venlafaxine (EFFEXOR-XR) 150 MG Cp24; Take 1 capsule (150 mg total) by mouth once daily.  Dispense: 30 capsule; Refill: 11    Gastroesophageal reflux disease, unspecified whether esophagitis present  -     omeprazole (PRILOSEC) 20 MG capsule; Take 1 capsule (20 mg total) by mouth every morning.  Dispense: 30 capsule; Refill: 11    cont meds. Advised pt to f/u w/ PT and to get more exercise.            Patient note was created using [employer].  Any errors in syntax or even information may not have been identified and edited on initial review prior to signing this note.

## 2022-01-21 NOTE — TELEPHONE ENCOUNTER
Quick DC. Request already responded to by other means (e.g. phone or fax)   Refill Authorization Note   Carol Yadav  is requesting a refill authorization.  Brief Assessment and Rationale for Refill:  Quick Discontinue  Medication Therapy Plan:       Medication Reconciliation Completed:  No      Comments:   Pended Medication(s)       Requested Prescriptions     Refused Prescriptions Disp Refills    lisinopriL (PRINIVIL,ZESTRIL) 2.5 MG tablet [Pharmacy Med Name: LISINOPRIL 2.5MG TAB] 28 tablet 2     Sig: TAKE 1 TABLET BY MOUTH ONCE DAILY     Refused By: VALERIE PERALES     Reason for Refusal: Request already responded to by other means (e.g. phone or fax)    mirtazapine (REMERON) 7.5 MG Tab [Pharmacy Med Name: MIRTAZAPINE 7.5MG TAB] 28 tablet 2     Sig: TAKE 1 TABLET BY MOUTH EVERY EVENING     Refused By: VALERIE PERAELS     Reason for Refusal: Request already responded to by other means (e.g. phone or fax)    venlafaxine (EFFEXOR-XR) 150 MG Cp24 [Pharmacy Med Name: VENLAFAXINE ER 150MG CAP] 28 capsule      Sig: TAKE 1 CAPSULE BY MOUTH ONCE DAILY     Refused By: VALERIE PERALES     Reason for Refusal: Request already responded to by other means (e.g. phone or fax)        Duplicate Pended Encounter(s)/ Last Prescribed Details: (includes pharmacy & prescriber details)   Ordering Encounter Report    Associated Reports   View Encounter                Note composed:9:17 PM 01/20/2022

## 2022-02-09 ENCOUNTER — PES CALL (OUTPATIENT)
Dept: ADMINISTRATIVE | Facility: CLINIC | Age: 87
End: 2022-02-09
Payer: MEDICARE

## 2022-02-10 ENCOUNTER — TELEPHONE (OUTPATIENT)
Dept: FAMILY MEDICINE | Facility: CLINIC | Age: 87
End: 2022-02-10
Payer: MEDICARE

## 2022-02-10 NOTE — TELEPHONE ENCOUNTER
----- Message from Teresa Smith sent at 2/9/2022  3:06 PM CST -----  Regarding: REquesting a call back  CCTIMESENSITIVE         Name of Caller:  Carol   Reason for Visit/Symptoms:    Pt has a pinch on her right side when she eats. Pt says her stool is yellow   Best Contact Number [phone]  Preferred Date/Time of Appointment:  Interested in Virtual Visit:   Additional Information:

## 2022-03-09 ENCOUNTER — TELEPHONE (OUTPATIENT)
Dept: FAMILY MEDICINE | Facility: CLINIC | Age: 87
End: 2022-03-09
Payer: MEDICARE

## 2022-03-09 NOTE — TELEPHONE ENCOUNTER
----- Message from Tianna Paterson sent at 3/9/2022 12:25 PM CST -----  Regarding: self  .Type: Patient Call Back    Who called: self     What is the request in detail: confusion about appts/ return call. Please call     Can the clinic reply by [facility]?    Would the patient rather a call back or a response via [facility]? Call     Best call back number: .[phone]

## 2022-03-14 DIAGNOSIS — E03.8 SUBCLINICAL HYPOTHYROIDISM: ICD-10-CM

## 2022-03-15 DIAGNOSIS — E03.8 SUBCLINICAL HYPOTHYROIDISM: ICD-10-CM

## 2022-03-15 NOTE — TELEPHONE ENCOUNTER
No new care gaps identified.  Powered by [employer]. Reference number: [id number].   3/15/2022 2:28:35 PM CDT

## 2022-03-17 RX ORDER — LEVOTHYROXINE SODIUM 25 UG/1
TABLET ORAL
Qty: 90 TABLET | Refills: 0 | Status: SHIPPED | OUTPATIENT
Start: 2022-03-17 | End: 2022-03-18 | Stop reason: SDUPTHER

## 2022-03-17 RX ORDER — LEVOTHYROXINE SODIUM 25 UG/1
25 TABLET ORAL
Qty: 30 TABLET | Refills: 11 | OUTPATIENT
Start: 2022-03-17 | End: 2023-03-12

## 2022-03-17 RX ORDER — CELECOXIB 200 MG/1
200 CAPSULE ORAL 2 TIMES DAILY
Qty: 180 CAPSULE | Refills: 0 | Status: SHIPPED | OUTPATIENT
Start: 2022-03-17 | End: 2022-06-10

## 2022-03-17 RX ORDER — CELECOXIB 200 MG/1
CAPSULE ORAL
Qty: 56 CAPSULE | Refills: 0 | OUTPATIENT
Start: 2022-03-17

## 2022-03-17 NOTE — TELEPHONE ENCOUNTER
----- Message from Gordy Ellison MA sent at 3/17/2022 10:52 AM CDT -----  Type: Patient Call Back    Who called:Ari - Sugey at [place]    What is the request in detail:celecoxib (CELEBREX) 200 MG capsule  levothyroxine (SYNTHROID) 25 MCG tablet need prescriptions sent to the pharmacy..     Can the clinic reply by [facility]?no    Would the patient rather a call back or a response via [facility]? yes    Best call back number:[phone]

## 2022-03-18 ENCOUNTER — LAB VISIT (OUTPATIENT)
Dept: LAB | Facility: HOSPITAL | Age: 87
End: 2022-03-18
Attending: FAMILY MEDICINE
Payer: MEDICARE

## 2022-03-18 ENCOUNTER — OFFICE VISIT (OUTPATIENT)
Dept: FAMILY MEDICINE | Facility: CLINIC | Age: 87
End: 2022-03-18
Payer: MEDICARE

## 2022-03-18 VITALS
WEIGHT: 176.81 LBS | HEART RATE: 87 BPM | HEIGHT: 62 IN | OXYGEN SATURATION: 94 % | SYSTOLIC BLOOD PRESSURE: 128 MMHG | DIASTOLIC BLOOD PRESSURE: 80 MMHG | RESPIRATION RATE: 16 BRPM | BODY MASS INDEX: 32.54 KG/M2 | TEMPERATURE: 98 F

## 2022-03-18 DIAGNOSIS — B36.9 FUNGAL RASH OF TORSO: ICD-10-CM

## 2022-03-18 DIAGNOSIS — I50.32 CHRONIC DIASTOLIC HEART FAILURE: ICD-10-CM

## 2022-03-18 DIAGNOSIS — I10 ESSENTIAL HYPERTENSION: ICD-10-CM

## 2022-03-18 DIAGNOSIS — Z00.00 ANNUAL PHYSICAL EXAM: Primary | ICD-10-CM

## 2022-03-18 DIAGNOSIS — R73.09 ELEVATED GLUCOSE: ICD-10-CM

## 2022-03-18 DIAGNOSIS — E78.2 MIXED HYPERLIPIDEMIA: ICD-10-CM

## 2022-03-18 DIAGNOSIS — E03.8 SUBCLINICAL HYPOTHYROIDISM: ICD-10-CM

## 2022-03-18 DIAGNOSIS — R53.81 PHYSICAL DECONDITIONING: ICD-10-CM

## 2022-03-18 LAB
ALBUMIN SERPL BCP-MCNC: 3.2 G/DL (ref 3.5–5.2)
ALP SERPL-CCNC: 91 U/L (ref 55–135)
ALT SERPL W/O P-5'-P-CCNC: 19 U/L (ref 10–44)
ANION GAP SERPL CALC-SCNC: 11 MMOL/L (ref 8–16)
AST SERPL-CCNC: 22 U/L (ref 10–40)
BASOPHILS # BLD AUTO: 0.09 K/UL (ref 0–0.2)
BASOPHILS NFR BLD: 0.9 % (ref 0–1.9)
BILIRUB SERPL-MCNC: 0.6 MG/DL (ref 0.1–1)
BUN SERPL-MCNC: 15 MG/DL (ref 8–23)
CALCIUM SERPL-MCNC: 9.2 MG/DL (ref 8.7–10.5)
CHLORIDE SERPL-SCNC: 105 MMOL/L (ref 95–110)
CHOLEST SERPL-MCNC: 149 MG/DL (ref 120–199)
CHOLEST/HDLC SERPL: 3.5 {RATIO} (ref 2–5)
CO2 SERPL-SCNC: 23 MMOL/L (ref 23–29)
CREAT SERPL-MCNC: 0.8 MG/DL (ref 0.5–1.4)
DIFFERENTIAL METHOD: ABNORMAL
EOSINOPHIL # BLD AUTO: 0.3 K/UL (ref 0–0.5)
EOSINOPHIL NFR BLD: 3.4 % (ref 0–8)
ERYTHROCYTE [DISTWIDTH] IN BLOOD BY AUTOMATED COUNT: 14.4 % (ref 11.5–14.5)
EST. GFR  (AFRICAN AMERICAN): >60 ML/MIN/1.73 M^2
EST. GFR  (NON AFRICAN AMERICAN): >60 ML/MIN/1.73 M^2
ESTIMATED AVG GLUCOSE: 214 MG/DL (ref 68–131)
GLUCOSE SERPL-MCNC: 263 MG/DL (ref 70–110)
HBA1C MFR BLD: 9.1 % (ref 4–5.6)
HCT VFR BLD AUTO: 39.8 % (ref 37–48.5)
HDLC SERPL-MCNC: 42 MG/DL (ref 40–75)
HDLC SERPL: 28.2 % (ref 20–50)
HGB BLD-MCNC: 12.3 G/DL (ref 12–16)
IMM GRANULOCYTES # BLD AUTO: 0.05 K/UL (ref 0–0.04)
IMM GRANULOCYTES NFR BLD AUTO: 0.5 % (ref 0–0.5)
LDLC SERPL CALC-MCNC: 71.2 MG/DL (ref 63–159)
LYMPHOCYTES # BLD AUTO: 2 K/UL (ref 1–4.8)
LYMPHOCYTES NFR BLD: 20 % (ref 18–48)
MCH RBC QN AUTO: 29.1 PG (ref 27–31)
MCHC RBC AUTO-ENTMCNC: 30.9 G/DL (ref 32–36)
MCV RBC AUTO: 94 FL (ref 82–98)
MONOCYTES # BLD AUTO: 0.7 K/UL (ref 0.3–1)
MONOCYTES NFR BLD: 6.9 % (ref 4–15)
NEUTROPHILS # BLD AUTO: 6.8 K/UL (ref 1.8–7.7)
NEUTROPHILS NFR BLD: 68.3 % (ref 38–73)
NONHDLC SERPL-MCNC: 107 MG/DL
NRBC BLD-RTO: 0 /100 WBC
PLATELET # BLD AUTO: 381 K/UL (ref 150–450)
PMV BLD AUTO: 10.7 FL (ref 9.2–12.9)
POTASSIUM SERPL-SCNC: 4.2 MMOL/L (ref 3.5–5.1)
PROT SERPL-MCNC: 6.7 G/DL (ref 6–8.4)
RBC # BLD AUTO: 4.22 M/UL (ref 4–5.4)
SODIUM SERPL-SCNC: 139 MMOL/L (ref 136–145)
TRIGL SERPL-MCNC: 179 MG/DL (ref 30–150)
WBC # BLD AUTO: 9.92 K/UL (ref 3.9–12.7)

## 2022-03-18 PROCEDURE — 83036 HEMOGLOBIN GLYCOSYLATED A1C: CPT | Performed by: FAMILY MEDICINE

## 2022-03-18 PROCEDURE — 3079F PR MOST RECENT DIASTOLIC BLOOD PRESSURE 80-89 MM HG: ICD-10-PCS | Mod: CPTII,S$GLB,, | Performed by: FAMILY MEDICINE

## 2022-03-18 PROCEDURE — 3288F FALL RISK ASSESSMENT DOCD: CPT | Mod: CPTII,S$GLB,, | Performed by: FAMILY MEDICINE

## 2022-03-18 PROCEDURE — 1159F PR MEDICATION LIST DOCUMENTED IN MEDICAL RECORD: ICD-10-PCS | Mod: CPTII,S$GLB,, | Performed by: FAMILY MEDICINE

## 2022-03-18 PROCEDURE — 36415 COLL VENOUS BLD VENIPUNCTURE: CPT | Mod: PO | Performed by: FAMILY MEDICINE

## 2022-03-18 PROCEDURE — 99214 OFFICE O/P EST MOD 30 MIN: CPT | Mod: S$GLB,,, | Performed by: FAMILY MEDICINE

## 2022-03-18 PROCEDURE — 3079F DIAST BP 80-89 MM HG: CPT | Mod: CPTII,S$GLB,, | Performed by: FAMILY MEDICINE

## 2022-03-18 PROCEDURE — 1126F PR PAIN SEVERITY QUANTIFIED, NO PAIN PRESENT: ICD-10-PCS | Mod: CPTII,S$GLB,, | Performed by: FAMILY MEDICINE

## 2022-03-18 PROCEDURE — 99214 PR OFFICE/OUTPT VISIT, EST, LEVL IV, 30-39 MIN: ICD-10-PCS | Mod: S$GLB,,, | Performed by: FAMILY MEDICINE

## 2022-03-18 PROCEDURE — 3074F PR MOST RECENT SYSTOLIC BLOOD PRESSURE < 130 MM HG: ICD-10-PCS | Mod: CPTII,S$GLB,, | Performed by: FAMILY MEDICINE

## 2022-03-18 PROCEDURE — 1101F PT FALLS ASSESS-DOCD LE1/YR: CPT | Mod: CPTII,S$GLB,, | Performed by: FAMILY MEDICINE

## 2022-03-18 PROCEDURE — 85025 COMPLETE CBC W/AUTO DIFF WBC: CPT | Performed by: FAMILY MEDICINE

## 2022-03-18 PROCEDURE — 80061 LIPID PANEL: CPT | Performed by: FAMILY MEDICINE

## 2022-03-18 PROCEDURE — 1126F AMNT PAIN NOTED NONE PRSNT: CPT | Mod: CPTII,S$GLB,, | Performed by: FAMILY MEDICINE

## 2022-03-18 PROCEDURE — 99999 PR PBB SHADOW E&M-EST. PATIENT-LVL V: CPT | Mod: PBBFAC,,, | Performed by: FAMILY MEDICINE

## 2022-03-18 PROCEDURE — 1101F PR PT FALLS ASSESS DOC 0-1 FALLS W/OUT INJ PAST YR: ICD-10-PCS | Mod: CPTII,S$GLB,, | Performed by: FAMILY MEDICINE

## 2022-03-18 PROCEDURE — 84443 ASSAY THYROID STIM HORMONE: CPT | Performed by: FAMILY MEDICINE

## 2022-03-18 PROCEDURE — 99999 PR PBB SHADOW E&M-EST. PATIENT-LVL V: ICD-10-PCS | Mod: PBBFAC,,, | Performed by: FAMILY MEDICINE

## 2022-03-18 PROCEDURE — 1159F MED LIST DOCD IN RCRD: CPT | Mod: CPTII,S$GLB,, | Performed by: FAMILY MEDICINE

## 2022-03-18 PROCEDURE — 80053 COMPREHEN METABOLIC PANEL: CPT | Performed by: FAMILY MEDICINE

## 2022-03-18 PROCEDURE — 3288F PR FALLS RISK ASSESSMENT DOCUMENTED: ICD-10-PCS | Mod: CPTII,S$GLB,, | Performed by: FAMILY MEDICINE

## 2022-03-18 PROCEDURE — 3074F SYST BP LT 130 MM HG: CPT | Mod: CPTII,S$GLB,, | Performed by: FAMILY MEDICINE

## 2022-03-18 RX ORDER — LEVOTHYROXINE SODIUM 25 UG/1
TABLET ORAL
Qty: 30 TABLET | Refills: 11 | Status: SHIPPED | OUTPATIENT
Start: 2022-03-18 | End: 2022-05-30

## 2022-03-18 RX ORDER — KETOCONAZOLE 20 MG/G
CREAM TOPICAL DAILY
Qty: 60 G | Refills: 1 | Status: SHIPPED | OUTPATIENT
Start: 2022-03-18 | End: 2022-06-23

## 2022-03-18 NOTE — PROGRESS NOTES
Subjective:       Patient ID: Carol Yadav is a 86 y.o. female.    Chief Complaint: Physical deconditioning (Follow up )      HPI  86-year-old female presents for worsening weakness.  States she has difficulty walking and now has pain in her back.  States this started over last few weeks.  Feels her memory has worsened as well.  His denies any fever chills.  Has a follow-up with Urology.  Denies any urinary complaints at this time.      Review of Systems   Constitutional: Negative.    HENT: Negative.    Respiratory: Negative.    Cardiovascular: Negative.    Gastrointestinal: Negative.    Endocrine: Negative.    Genitourinary: Negative.    Musculoskeletal: Negative.    Neurological: Negative.    Psychiatric/Behavioral: Negative.           Past Medical History:   Diagnosis Date    Allergic rhinitis     Arthritis     Elevated blood pressure reading without diagnosis of hypertension     Hormone replacement therapy (postmenopausal)     Hyperlipidemia     Hypertension     Stroke of right basal ganglia 12/2014     Past Surgical History:   Procedure Laterality Date    APPENDECTOMY      BREAST BIOPSY      CATARACT EXTRACTION Bilateral at age 62 or 72    monovision     EYE SURGERY      hai cataract surgery    OOPHORECTOMY      PINNING OF HIP Left 6/16/2021    Procedure: PINNING, HIP;  Surgeon: Dae Bernal MD;  Location: [facility];  Service: Orthopedics;  Laterality: Left;    TONSILLECTOMY       Family History   Problem Relation Age of Onset    Breast cancer Mother     Brain cancer Mother         Metastatic from breast    Stroke Father     No Known Problems Sister     No Known Problems Brother     No Known Problems Maternal Aunt     No Known Problems Maternal Uncle     No Known Problems Paternal Aunt     No Known Problems Paternal Uncle     No Known Problems Maternal Grandmother     No Known Problems Maternal Grandfather     No Known Problems Paternal Grandmother     No Known Problems Paternal  Grandfather     No Known Problems Daughter     No Known Problems Daughter     Schizophrenia Daughter     Amblyopia Neg Hx     Blindness Neg Hx     Cataracts Neg Hx     Diabetes Neg Hx     Glaucoma Neg Hx     Hypertension Neg Hx     Macular degeneration Neg Hx     Retinal detachment Neg Hx     Strabismus Neg Hx     Thyroid disease Neg Hx      Social History     Socioeconomic History    Marital status:    Tobacco Use    Smoking status: Former Smoker     Packs/day: 2.00     Years: 36.00     Pack years: 72.00     Types: Cigarettes     Start date:      Quit date:      Years since quittin.2    Smokeless tobacco: Never Used   Substance and Sexual Activity    Alcohol use: Yes     Alcohol/week: 21.0 standard drinks     Types: 7 Glasses of wine, 14 Standard drinks or equivalent per week    Drug use: No    Sexual activity: Not Currently     Partners: Male     Comment: 2017 divorce        Current Outpatient Medications:     acetaminophen (TYLENOL) 325 MG tablet, Take 2 tablets (650 mg total) by mouth every 4 (four) hours as needed., Disp: , Rfl: 0    alendronate (FOSAMAX) 70 MG tablet, TAKE 1 TABLET BY MOUTH EVERY 7 DAYS, Disp: 4 tablet, Rfl: 3    aspirin 81 MG Chew, Take 1 tablet (81 mg total) by mouth 2 (two) times daily., Disp: , Rfl: 0    atorvastatin (LIPITOR) 40 MG tablet, TAKE 1 TABLET BY MOUTH ONCE DAILY, Disp: 28 tablet, Rfl: 2    calcium carbonate (CALCIUM 600) 600 mg calcium (1,500 mg) Tab, Take 1 tablet (600 mg total) by mouth once daily., Disp: 90 tablet, Rfl: 1    calcium carbonate (TUMS) 200 mg calcium (500 mg) chewable tablet, Take 1 tablet (500 mg total) by mouth 2 (two) times daily as needed for Heartburn., Disp: , Rfl:     celecoxib (CELEBREX) 200 MG capsule, Take 1 capsule (200 mg total) by mouth 2 (two) times daily., Disp: 180 capsule, Rfl: 0    famotidine (PEPCID) 20 MG tablet, Take 1 tablet (20 mg total) by mouth daily as needed (heartburn)., Disp: 30  tablet, Rfl: 0    fluticasone propionate (FLONASE) 50 mcg/actuation nasal spray, 1 SPRAY IN EACH NOSTRIL TWICE A DAY AS NEEDED FOR RHINITIS, Disp: 16 g, Rfl: 11    guaiFENesin 100 mg/5 ml (ROBITUSSIN) 100 mg/5 mL syrup, Take 200 mg by mouth 3 (three) times daily as needed for Cough., Disp: , Rfl:     HYDROcodone-acetaminophen (NORCO) 5-325 mg per tablet, Take 1 tablet by mouth every 6 (six) hours as needed for Pain., Disp: 30 tablet, Rfl: 0    lisinopriL (PRINIVIL,ZESTRIL) 2.5 MG tablet, Take 1 tablet (2.5 mg total) by mouth once daily., Disp: 30 tablet, Rfl: 11    melatonin (MELATIN) 3 mg tablet, Take 2 tablets (6 mg total) by mouth nightly as needed for Insomnia., Disp: 30 tablet, Rfl: 0    mirtazapine (REMERON) 7.5 MG Tab, Take 1 tablet (7.5 mg total) by mouth every evening., Disp: 30 tablet, Rfl: 11    multivit-minerals/folic/ginkgo (ONE DAILY WOMEN 50 PLUS ORAL), Take 1 tablet by mouth once daily., Disp: , Rfl:     MYRBETRIQ 50 mg Tb24, TAKE 1 TABLET BY MOUTH ONCE DAILY, Disp: 30 tablet, Rfl: 11    olopatadine (PATANOL) 0.1 % ophthalmic solution, INSTILL 1 DROP IN EACH EYE 2 TIMES DAILY, Disp: 5 mL, Rfl: 11    omeprazole (PRILOSEC) 20 MG capsule, Take 1 capsule (20 mg total) by mouth every morning., Disp: 30 capsule, Rfl: 11    polyethylene glycol (MIRALAX) 17 gram PwPk, Take 17 g by mouth daily as needed. Take every other day, Disp: 30 packet, Rfl: 11    saliva substitute combo no.9 (BIOTENE DRY MOUTH ORAL RINSE) Mwsh, by Mucous Membrane route. Use as directed as needed for dry mouth, Disp: , Rfl:     venlafaxine (EFFEXOR-XR) 150 MG Cp24, Take 1 capsule (150 mg total) by mouth once daily., Disp: 30 capsule, Rfl: 11    vitamin D (VITAMIN D3) 1000 units Tab, Take 1 tablet (1,000 Units total) by mouth once daily., Disp: 90 tablet, Rfl: 3    water Liqd 146 mL with MILK OF MAGNESIA 400 mg/5 mL Susp 400 mg, diphenhydrAMINE 12.5 mg/5 mL Elix 60 mg, nystatin 100,000 unit/mL Susp 500,000 Units, Take 5  "mLs by mouth 3 (three) times daily as needed (mouth sores)., Disp: 1 Bottle, Rfl: 0    amoxicillin-clavulanate 875-125mg (AUGMENTIN) 875-125 mg per tablet, Take 1 tablet by mouth 2 (two) times daily., Disp: , Rfl:     ketoconazole (NIZORAL) 2 % cream, Apply topically once daily., Disp: 60 g, Rfl: 1    levothyroxine (SYNTHROID) 25 MCG tablet, Take 1 tablet before first meal of the day., Disp: 30 tablet, Rfl: 11    multivitamin-minerals-lutein (MULTIVITAMIN 50 PLUS) Tab, Take 1 tablet by mouth once daily., Disp: 90 tablet, Rfl: 3   Objective:      Vitals:    03/18/22 1255   BP: 128/80   BP Location: Right arm   Patient Position: Sitting   BP Method: Small (Manual)   Pulse: 87   Resp: 16   Temp: 98.1 °F (36.7 °C)   TempSrc: Oral   SpO2: (!) 94%   Weight: 80.2 kg (176 lb 12.9 oz)   Height: 5' 2" (1.575 m)       Physical Exam  Constitutional:       General: She is not in acute distress.     Appearance: She is not diaphoretic.   HENT:      Head: Normocephalic and atraumatic.   Eyes:      Conjunctiva/sclera: Conjunctivae normal.   Pulmonary:      Effort: Pulmonary effort is normal.   Musculoskeletal:      Cervical back: Neck supple.   Skin:     Findings: No rash.   Neurological:      Mental Status: She is alert and oriented to person, place, and time.   Psychiatric:         Behavior: Behavior normal.         Thought Content: Thought content normal.         Judgment: Judgment normal.            Assessment:       1. Annual physical exam    2. Physical deconditioning    3. Fungal rash of torso    4. Mixed hyperlipidemia    5. Chronic diastolic heart failure    6. Essential hypertension    7. Subclinical hypothyroidism    8. Elevated glucose        Plan:       Annual physical exam    Physical deconditioning  -     Ambulatory referral/consult to Home Health; Future; Expected date: 03/19/2022    Fungal rash of torso  -     ketoconazole (NIZORAL) 2 % cream; Apply topically once daily.  Dispense: 60 g; Refill: 1    Mixed " hyperlipidemia  -     CBC Auto Differential; Future; Expected date: 03/18/2022  -     Comprehensive Metabolic Panel; Future; Expected date: 03/18/2022  -     Hemoglobin A1C; Future; Expected date: 03/18/2022  -     TSH; Future; Expected date: 03/18/2022  -     Lipid Panel; Future; Expected date: 03/18/2022    Chronic diastolic heart failure  -     CBC Auto Differential; Future; Expected date: 03/18/2022  -     Comprehensive Metabolic Panel; Future; Expected date: 03/18/2022  -     Hemoglobin A1C; Future; Expected date: 03/18/2022  -     TSH; Future; Expected date: 03/18/2022  -     Lipid Panel; Future; Expected date: 03/18/2022    Essential hypertension  -     CBC Auto Differential; Future; Expected date: 03/18/2022  -     Comprehensive Metabolic Panel; Future; Expected date: 03/18/2022  -     Hemoglobin A1C; Future; Expected date: 03/18/2022  -     TSH; Future; Expected date: 03/18/2022  -     Lipid Panel; Future; Expected date: 03/18/2022    Subclinical hypothyroidism  -     TSH; Future; Expected date: 03/18/2022  -     levothyroxine (SYNTHROID) 25 MCG tablet; Take 1 tablet before first meal of the day.  Dispense: 30 tablet; Refill: 11    Elevated glucose  -     Hemoglobin A1C; Future; Expected date: 03/18/2022      F/u labs. Referred pt to home health for debility.            Patient note was created using [employer].  Any errors in syntax or even information may not have been identified and edited on initial review prior to signing this note.

## 2022-03-18 NOTE — PROGRESS NOTES
Health Maintenance Due   Topic     TETANUS VACCINE      Shingles Vaccine (1 of 2) hx chickenpox and shingles ; inform pt can get vaccine at pharmacy.    DEXA Scan  Consult with pcp     Lipid Panel  Consult with pcp     COVID-19 Vaccine (4 - Booster for Pfizer series)

## 2022-03-19 LAB — TSH SERPL DL<=0.005 MIU/L-ACNC: 2.64 UIU/ML (ref 0.4–4)

## 2022-03-22 ENCOUNTER — PATIENT MESSAGE (OUTPATIENT)
Dept: FAMILY MEDICINE | Facility: CLINIC | Age: 87
End: 2022-03-22
Payer: MEDICARE

## 2022-03-22 ENCOUNTER — OFFICE VISIT (OUTPATIENT)
Dept: UROLOGY | Facility: CLINIC | Age: 87
End: 2022-03-22
Payer: MEDICARE

## 2022-03-22 VITALS — WEIGHT: 176.81 LBS | HEIGHT: 62 IN | BODY MASS INDEX: 32.54 KG/M2

## 2022-03-22 DIAGNOSIS — E11.9 NEW ONSET TYPE 2 DIABETES MELLITUS: Primary | ICD-10-CM

## 2022-03-22 DIAGNOSIS — N39.41 URGE INCONTINENCE OF URINE: Primary | ICD-10-CM

## 2022-03-22 DIAGNOSIS — R82.90 MALODOROUS URINE: ICD-10-CM

## 2022-03-22 DIAGNOSIS — R35.1 NOCTURIA: ICD-10-CM

## 2022-03-22 PROCEDURE — 99214 OFFICE O/P EST MOD 30 MIN: CPT | Mod: S$GLB,,, | Performed by: UROLOGY

## 2022-03-22 PROCEDURE — 1126F AMNT PAIN NOTED NONE PRSNT: CPT | Mod: CPTII,S$GLB,, | Performed by: UROLOGY

## 2022-03-22 PROCEDURE — 99214 PR OFFICE/OUTPT VISIT, EST, LEVL IV, 30-39 MIN: ICD-10-PCS | Mod: S$GLB,,, | Performed by: UROLOGY

## 2022-03-22 PROCEDURE — 1159F PR MEDICATION LIST DOCUMENTED IN MEDICAL RECORD: ICD-10-PCS | Mod: CPTII,S$GLB,, | Performed by: UROLOGY

## 2022-03-22 PROCEDURE — 1159F MED LIST DOCD IN RCRD: CPT | Mod: CPTII,S$GLB,, | Performed by: UROLOGY

## 2022-03-22 PROCEDURE — 1160F RVW MEDS BY RX/DR IN RCRD: CPT | Mod: CPTII,S$GLB,, | Performed by: UROLOGY

## 2022-03-22 PROCEDURE — 1126F PR PAIN SEVERITY QUANTIFIED, NO PAIN PRESENT: ICD-10-PCS | Mod: CPTII,S$GLB,, | Performed by: UROLOGY

## 2022-03-22 PROCEDURE — 1100F PTFALLS ASSESS-DOCD GE2>/YR: CPT | Mod: CPTII,S$GLB,, | Performed by: UROLOGY

## 2022-03-22 PROCEDURE — 3288F PR FALLS RISK ASSESSMENT DOCUMENTED: ICD-10-PCS | Mod: CPTII,S$GLB,, | Performed by: UROLOGY

## 2022-03-22 PROCEDURE — 99999 PR PBB SHADOW E&M-EST. PATIENT-LVL IV: CPT | Mod: PBBFAC,,, | Performed by: UROLOGY

## 2022-03-22 PROCEDURE — 3288F FALL RISK ASSESSMENT DOCD: CPT | Mod: CPTII,S$GLB,, | Performed by: UROLOGY

## 2022-03-22 PROCEDURE — 99999 PR PBB SHADOW E&M-EST. PATIENT-LVL IV: ICD-10-PCS | Mod: PBBFAC,,, | Performed by: UROLOGY

## 2022-03-22 PROCEDURE — 1100F PR PT FALLS ASSESS DOC 2+ FALLS/FALL W/INJURY/YR: ICD-10-PCS | Mod: CPTII,S$GLB,, | Performed by: UROLOGY

## 2022-03-22 PROCEDURE — 1160F PR REVIEW ALL MEDS BY PRESCRIBER/CLIN PHARMACIST DOCUMENTED: ICD-10-PCS | Mod: CPTII,S$GLB,, | Performed by: UROLOGY

## 2022-03-22 RX ORDER — METFORMIN HYDROCHLORIDE 500 MG/1
500 TABLET, EXTENDED RELEASE ORAL 2 TIMES DAILY WITH MEALS
Qty: 180 TABLET | Refills: 1 | Status: SHIPPED | OUTPATIENT
Start: 2022-03-22 | End: 2022-06-10

## 2022-03-22 NOTE — PROGRESS NOTES
"  Subjective:       Carol Yadav is a 86 y.o. female who is an established patient who was referred by Dr Yadav for evaluation of UI.      Reports UI, mainly UUI. Currently on Ditropan 10mg for years. States she can only void with BM. She feels that her urine is coming out of her vagina, not her urethra. Denies constant UI. Maybe concerned about vesicovaginal fistula. She reports h/o perianal abscess (s/p I&D by CRS). Also concerned she might have bladder infection due to malodorous urine (though states she has BM at same time).     PMH: CVA. Reports cognitive decline.     Given Myrbetriq previously - remains on this. No bladder complaints today. Denies dysuria.  Reports RUQ pain that can be exacerbating by eating - feels this was related to gas - now improving. Main complaint is progressive weakness. Recent A1c elevated, admits to lots of "junk food."    Cysto 5/21 - normal      The following portions of the patient's history were reviewed and updated as appropriate: allergies, current medications, past family history, past medical history, past social history, past surgical history and problem list.    Review of Systems  12 point review of systems completed. Pertinent positive and negatives listed in HPI        Objective:    Vitals: Ht 5' 2" (1.575 m)   Wt 80.2 kg (176 lb 12.9 oz)   BMI 32.34 kg/m²      Physical Exam   General: well developed, well nourished in no acute distress  Head: normocephalic, atraumatic  Neck: supple, trachea midline, no obvious enlargement of thyroid  HEENT: EOMI, mucus membranes moist, sclera anicteric, no hearing impairment  Lungs: symmetric expansion, non-labored breathing  Skin: no rashes or lesions   Neuro: alert and oriented x 3, facial asymmetry  Psych: normal judgment and insight, normal mood/affect and non-anxious  Genitourinary:   patient declined exam      Lab Review   Urine analysis today in clinic shows - no urine     Lab Results   Component Value Date    WBC 9.92 " 03/18/2022    HGB 12.3 03/18/2022    HCT 39.8 03/18/2022    MCV 94 03/18/2022     03/18/2022     Lab Results   Component Value Date    CREATININE 0.8 03/18/2022    BUN 15 03/18/2022       Imaging  NA         Assessment/Plan:      1. Urge incontinence of urine   - UUI: Behavioral changes, PFPT, anticholinergics, mirabegron. Botox/InterStim for refractory UUI.    - Cystoscopy (she is concerned re: fistula) - wnl   - Stopped Ditropan due to cognitive SE.    - Myrbetriq 50mg      2. Nocturia    - Myrbetriq      3. Malodorous urine    - UCx - previously negative     Encouraged f/u PCP.       Follow up in 1 year

## 2022-03-23 NOTE — TELEPHONE ENCOUNTER
Has new onset dm most likely due to her diet. Given metformin. Needs an earlier f/u. Please help schedule.

## 2022-04-14 ENCOUNTER — OFFICE VISIT (OUTPATIENT)
Dept: HOME HEALTH SERVICES | Facility: CLINIC | Age: 87
End: 2022-04-14
Payer: MEDICARE

## 2022-04-14 VITALS
HEART RATE: 70 BPM | DIASTOLIC BLOOD PRESSURE: 74 MMHG | BODY MASS INDEX: 31.28 KG/M2 | HEIGHT: 62 IN | SYSTOLIC BLOOD PRESSURE: 140 MMHG | WEIGHT: 170 LBS | TEMPERATURE: 98 F

## 2022-04-14 DIAGNOSIS — F33.41 RECURRENT MAJOR DEPRESSIVE DISORDER, IN PARTIAL REMISSION: ICD-10-CM

## 2022-04-14 DIAGNOSIS — I27.20 PULMONARY HYPERTENSION, UNSPECIFIED: ICD-10-CM

## 2022-04-14 DIAGNOSIS — I69.354 HEMIPARESIS AFFECTING LEFT SIDE AS LATE EFFECT OF STROKE: ICD-10-CM

## 2022-04-14 DIAGNOSIS — R26.9 ABNORMALITY OF GAIT AND MOBILITY: ICD-10-CM

## 2022-04-14 DIAGNOSIS — I10 ESSENTIAL HYPERTENSION: ICD-10-CM

## 2022-04-14 DIAGNOSIS — I50.32 CHRONIC DIASTOLIC HEART FAILURE: ICD-10-CM

## 2022-04-14 DIAGNOSIS — G81.94 LEFT HEMIPARESIS: ICD-10-CM

## 2022-04-14 DIAGNOSIS — F41.8 ANXIETY WITH DEPRESSION: ICD-10-CM

## 2022-04-14 DIAGNOSIS — E66.9 OBESITY (BMI 30.0-34.9): ICD-10-CM

## 2022-04-14 DIAGNOSIS — M81.6 LOCALIZED OSTEOPOROSIS WITHOUT CURRENT PATHOLOGICAL FRACTURE: ICD-10-CM

## 2022-04-14 DIAGNOSIS — E78.2 MIXED HYPERLIPIDEMIA: ICD-10-CM

## 2022-04-14 DIAGNOSIS — Z86.73 H/O: CVA (CEREBROVASCULAR ACCIDENT): ICD-10-CM

## 2022-04-14 DIAGNOSIS — Z00.00 ENCOUNTER FOR PREVENTIVE HEALTH EXAMINATION: Primary | ICD-10-CM

## 2022-04-14 DIAGNOSIS — Z74.09 OTHER REDUCED MOBILITY: ICD-10-CM

## 2022-04-14 DIAGNOSIS — Z99.89 DEPENDENCE ON OTHER ENABLING MACHINES AND DEVICES: ICD-10-CM

## 2022-04-14 DIAGNOSIS — I70.0 ATHEROSCLEROSIS OF AORTA: ICD-10-CM

## 2022-04-14 DIAGNOSIS — E11.9 DIABETES MELLITUS, NEW ONSET: ICD-10-CM

## 2022-04-14 PROCEDURE — G0439 PR MEDICARE ANNUAL WELLNESS SUBSEQUENT VISIT: ICD-10-PCS | Mod: S$GLB,,, | Performed by: NURSE PRACTITIONER

## 2022-04-14 PROCEDURE — 1100F PR PT FALLS ASSESS DOC 2+ FALLS/FALL W/INJURY/YR: ICD-10-PCS | Mod: CPTII,S$GLB,, | Performed by: NURSE PRACTITIONER

## 2022-04-14 PROCEDURE — 1126F PR PAIN SEVERITY QUANTIFIED, NO PAIN PRESENT: ICD-10-PCS | Mod: CPTII,S$GLB,, | Performed by: NURSE PRACTITIONER

## 2022-04-14 PROCEDURE — 1160F RVW MEDS BY RX/DR IN RCRD: CPT | Mod: CPTII,S$GLB,, | Performed by: NURSE PRACTITIONER

## 2022-04-14 PROCEDURE — 3288F PR FALLS RISK ASSESSMENT DOCUMENTED: ICD-10-PCS | Mod: CPTII,S$GLB,, | Performed by: NURSE PRACTITIONER

## 2022-04-14 PROCEDURE — 3077F SYST BP >= 140 MM HG: CPT | Mod: CPTII,S$GLB,, | Performed by: NURSE PRACTITIONER

## 2022-04-14 PROCEDURE — 99499 RISK ADDL DX/OHS AUDIT: ICD-10-PCS | Mod: S$GLB,,, | Performed by: NURSE PRACTITIONER

## 2022-04-14 PROCEDURE — 1126F AMNT PAIN NOTED NONE PRSNT: CPT | Mod: CPTII,S$GLB,, | Performed by: NURSE PRACTITIONER

## 2022-04-14 PROCEDURE — 1159F MED LIST DOCD IN RCRD: CPT | Mod: CPTII,S$GLB,, | Performed by: NURSE PRACTITIONER

## 2022-04-14 PROCEDURE — 99499 UNLISTED E&M SERVICE: CPT | Mod: S$GLB,,, | Performed by: NURSE PRACTITIONER

## 2022-04-14 PROCEDURE — 3078F PR MOST RECENT DIASTOLIC BLOOD PRESSURE < 80 MM HG: ICD-10-PCS | Mod: CPTII,S$GLB,, | Performed by: NURSE PRACTITIONER

## 2022-04-14 PROCEDURE — G0439 PPPS, SUBSEQ VISIT: HCPCS | Mod: S$GLB,,, | Performed by: NURSE PRACTITIONER

## 2022-04-14 PROCEDURE — 3288F FALL RISK ASSESSMENT DOCD: CPT | Mod: CPTII,S$GLB,, | Performed by: NURSE PRACTITIONER

## 2022-04-14 PROCEDURE — 3078F DIAST BP <80 MM HG: CPT | Mod: CPTII,S$GLB,, | Performed by: NURSE PRACTITIONER

## 2022-04-14 PROCEDURE — 1160F PR REVIEW ALL MEDS BY PRESCRIBER/CLIN PHARMACIST DOCUMENTED: ICD-10-PCS | Mod: CPTII,S$GLB,, | Performed by: NURSE PRACTITIONER

## 2022-04-14 PROCEDURE — 3077F PR MOST RECENT SYSTOLIC BLOOD PRESSURE >= 140 MM HG: ICD-10-PCS | Mod: CPTII,S$GLB,, | Performed by: NURSE PRACTITIONER

## 2022-04-14 PROCEDURE — 1100F PTFALLS ASSESS-DOCD GE2>/YR: CPT | Mod: CPTII,S$GLB,, | Performed by: NURSE PRACTITIONER

## 2022-04-14 PROCEDURE — 1159F PR MEDICATION LIST DOCUMENTED IN MEDICAL RECORD: ICD-10-PCS | Mod: CPTII,S$GLB,, | Performed by: NURSE PRACTITIONER

## 2022-04-14 NOTE — PROGRESS NOTES
"  Carol Yadav presented for a  Medicare AWV and comprehensive Health Risk Assessment today. The following components were reviewed and updated:    · Medical history  · Family History  · Social history  · Allergies and Current Medications  · Health Risk Assessment  · Health Maintenance  · Care Team         ** See Completed Assessments for Annual Wellness Visit within the encounter summary.**         The following assessments were completed:  · Living Situation  · CAGE  · Depression Screening  · Timed Get Up and Go  · Whisper Test  · Cognitive Function Screening  ·   ·   ·   · Nutrition Screening  · ADL Screening  · PAQ Screening        Vitals:    04/14/22 1001   BP: (!) 140/74   Pulse: 70   Temp: 97.6 °F (36.4 °C)   Weight: 77.1 kg (170 lb)   Height: 5' 2" (1.575 m)     Body mass index is 31.09 kg/m².  Physical Exam  Constitutional:       Appearance: She is obese.   HENT:      Head: Normocephalic and atraumatic.      Nose: Nose normal.      Mouth/Throat:      Mouth: Mucous membranes are moist.   Eyes:      Extraocular Movements: Extraocular movements intact.   Cardiovascular:      Rate and Rhythm: Normal rate and regular rhythm.      Pulses: Normal pulses.      Heart sounds: Normal heart sounds.   Pulmonary:      Effort: Pulmonary effort is normal. No respiratory distress.      Breath sounds: Normal breath sounds.   Abdominal:      General: Bowel sounds are normal. There is no distension.      Palpations: Abdomen is soft.   Musculoskeletal:         General: No swelling.      Cervical back: Normal range of motion.      Comments: Left hemiparesis   Skin:     General: Skin is warm.   Neurological:      General: No focal deficit present.      Mental Status: She is alert and oriented to person, place, and time.      Gait: Gait abnormal (walker and wheelchair present).   Psychiatric:         Mood and Affect: Mood normal.         Behavior: Behavior normal.               Diagnoses and health risks identified today and " associated recommendations/orders:    1. Encounter for preventive health examination  Assessments completed. Preventive measures and health maintenance reviewed with patient.    2. Hemiparesis affecting left side as late effect of stroke  Stable, followed by PCP.    3. Left hemiparesis  Stable, followed by PCP.    4. Recurrent major depressive disorder, in partial remission  Stable, followed by PCP.    5. Chronic diastolic heart failure  Stable, followed by PCP.    6. Pulmonary hypertension, unspecified  Stable, followed by PCP.    7. Atherosclerosis of aorta  Stable, followed by PCP.    8. Obesity (BMI 30.0-34.9)  Stable, followed by PCP. Healthy diet and tolerable exercise encouraged.    9. Anxiety with depression  Stable, followed by PCP.    10. Diabetes mellitus, new onset  Stable, followed by PCP. Patient started on Metformin. Last A1C 9.1    11. Mixed hyperlipidemia  Stable, followed by PCP.    12. Essential hypertension  Stable, followed by PCP.    13. Localized osteoporosis without current pathological fracture  Stable, followed by PCP.    14. H/O: CVA (cerebrovascular accident)  Stable, followed by PCP.    15. Other reduced mobility  Stable, followed by PCP.    16. Abnormality of gait and mobility  Stable, followed by PCP.    17. Dependence on other enabling machines and devices  Stable, followed by PCP.    Provided Carol with a 5-10 year written screening schedule and personal prevention plan. Recommendations were developed using the USPSTF age appropriate recommendations. Education, counseling, and referrals were provided as needed. After Visit Summary printed and given to patient which includes a list of additional screenings\tests needed.    Follow up in about 1 year (around 4/14/2023) for your next annual wellness visit.    Maia Dumont NP  I offered to discuss advanced care planning, including how to pick a person who would make decisions for you if you were unable to make them for yourself,  called a health care power of , and what kind of decisions you might make such as use of life sustaining treatments such as ventilators and tube feeding when faced with a life limiting illness recorded on a living will that they will need to know. (How you want to be cared for as you near the end of your natural life)     X Patient is interested in learning more about how to make advanced directives.  I provided them paperwork and offered to discuss this with them.

## 2022-04-14 NOTE — PATIENT INSTRUCTIONS
Counseling and Referral of Other Preventative  (Italic type indicates deductible and co-insurance are waived)    Patient Name: Carol Yadav  Today's Date: 4/14/2022    Health Maintenance       Date Due Completion Date    TETANUS VACCINE Never done ---    Shingles Vaccine (1 of 2) Never done ---    DEXA Scan 11/08/2020 11/8/2017    Override on 11/1/2017: Not Clinically Appropriate    COVID-19 Vaccine (4 - Booster for Pfizer series) 01/05/2022 10/5/2021    Lipid Panel 03/18/2023 3/18/2022        No orders of the defined types were placed in this encounter.    The following information is provided to all patients.  This information is to help you find resources for any of the problems found today that may be affecting your health:                Living healthy guide: www.[facility].[place].gov      Understanding Diabetes: www.diabetes.org      Eating healthy: www.cdc.gov/healthyweight      [facility] home safety checklist: www.cdc.gov/steadi/patient.html      Agency on Aging: www.goea.[place].[facility]      Alcoholics anonymous (AA): www.aa.org      Physical Activity: www.kiki.nih.gov/wf2rtpf      Tobacco use: www.quitwithusla.org

## 2022-04-16 PROBLEM — E11.9 DIABETES MELLITUS, NEW ONSET: Status: ACTIVE | Noted: 2022-04-16

## 2022-04-16 PROBLEM — E66.9 OBESITY (BMI 30.0-34.9): Status: ACTIVE | Noted: 2022-04-16

## 2022-04-16 PROBLEM — E66.9 CLASS 1 OBESITY WITH SERIOUS COMORBIDITY AND BODY MASS INDEX (BMI) OF 32.0 TO 32.9 IN ADULT: Status: RESOLVED | Noted: 2018-01-19 | Resolved: 2022-04-16

## 2022-04-16 PROBLEM — I70.0 ATHEROSCLEROSIS OF AORTA: Status: ACTIVE | Noted: 2022-04-16

## 2022-04-16 PROBLEM — E66.811 CLASS 1 OBESITY WITH SERIOUS COMORBIDITY AND BODY MASS INDEX (BMI) OF 32.0 TO 32.9 IN ADULT: Status: RESOLVED | Noted: 2018-01-19 | Resolved: 2022-04-16

## 2022-04-16 PROBLEM — E66.811 OBESITY (BMI 30.0-34.9): Status: ACTIVE | Noted: 2022-04-16

## 2022-04-20 ENCOUNTER — OFFICE VISIT (OUTPATIENT)
Dept: FAMILY MEDICINE | Facility: CLINIC | Age: 87
End: 2022-04-20
Payer: MEDICARE

## 2022-04-20 VITALS
OXYGEN SATURATION: 95 % | RESPIRATION RATE: 18 BRPM | WEIGHT: 170.19 LBS | TEMPERATURE: 98 F | SYSTOLIC BLOOD PRESSURE: 122 MMHG | HEART RATE: 91 BPM | HEIGHT: 62 IN | BODY MASS INDEX: 31.32 KG/M2 | DIASTOLIC BLOOD PRESSURE: 62 MMHG

## 2022-04-20 DIAGNOSIS — Z78.0 POST-MENOPAUSAL: ICD-10-CM

## 2022-04-20 DIAGNOSIS — E11.65 UNCONTROLLED TYPE 2 DIABETES MELLITUS WITH HYPERGLYCEMIA: Primary | ICD-10-CM

## 2022-04-20 PROCEDURE — 3078F DIAST BP <80 MM HG: CPT | Mod: CPTII,S$GLB,, | Performed by: FAMILY MEDICINE

## 2022-04-20 PROCEDURE — 99214 OFFICE O/P EST MOD 30 MIN: CPT | Mod: S$GLB,,, | Performed by: FAMILY MEDICINE

## 2022-04-20 PROCEDURE — 3074F SYST BP LT 130 MM HG: CPT | Mod: CPTII,S$GLB,, | Performed by: FAMILY MEDICINE

## 2022-04-20 PROCEDURE — 99214 PR OFFICE/OUTPT VISIT, EST, LEVL IV, 30-39 MIN: ICD-10-PCS | Mod: S$GLB,,, | Performed by: FAMILY MEDICINE

## 2022-04-20 PROCEDURE — 1126F AMNT PAIN NOTED NONE PRSNT: CPT | Mod: CPTII,S$GLB,, | Performed by: FAMILY MEDICINE

## 2022-04-20 PROCEDURE — 3078F PR MOST RECENT DIASTOLIC BLOOD PRESSURE < 80 MM HG: ICD-10-PCS | Mod: CPTII,S$GLB,, | Performed by: FAMILY MEDICINE

## 2022-04-20 PROCEDURE — 99999 PR PBB SHADOW E&M-EST. PATIENT-LVL V: ICD-10-PCS | Mod: PBBFAC,,, | Performed by: FAMILY MEDICINE

## 2022-04-20 PROCEDURE — 1159F PR MEDICATION LIST DOCUMENTED IN MEDICAL RECORD: ICD-10-PCS | Mod: CPTII,S$GLB,, | Performed by: FAMILY MEDICINE

## 2022-04-20 PROCEDURE — 99499 UNLISTED E&M SERVICE: CPT | Mod: S$GLB,,, | Performed by: FAMILY MEDICINE

## 2022-04-20 PROCEDURE — 1126F PR PAIN SEVERITY QUANTIFIED, NO PAIN PRESENT: ICD-10-PCS | Mod: CPTII,S$GLB,, | Performed by: FAMILY MEDICINE

## 2022-04-20 PROCEDURE — 99999 PR PBB SHADOW E&M-EST. PATIENT-LVL V: CPT | Mod: PBBFAC,,, | Performed by: FAMILY MEDICINE

## 2022-04-20 PROCEDURE — 1159F MED LIST DOCD IN RCRD: CPT | Mod: CPTII,S$GLB,, | Performed by: FAMILY MEDICINE

## 2022-04-20 PROCEDURE — 3074F PR MOST RECENT SYSTOLIC BLOOD PRESSURE < 130 MM HG: ICD-10-PCS | Mod: CPTII,S$GLB,, | Performed by: FAMILY MEDICINE

## 2022-04-20 RX ORDER — FLASH GLUCOSE SENSOR
1 KIT MISCELLANEOUS
Qty: 2 KIT | Refills: 3 | Status: SHIPPED | OUTPATIENT
Start: 2022-04-20 | End: 2022-04-20 | Stop reason: SDUPTHER

## 2022-04-20 RX ORDER — FLASH GLUCOSE SCANNING READER
1 EACH MISCELLANEOUS
Qty: 2 EACH | Refills: 2 | Status: SHIPPED | OUTPATIENT
Start: 2022-04-20

## 2022-04-20 RX ORDER — FLASH GLUCOSE SENSOR
1 KIT MISCELLANEOUS
Qty: 2 KIT | Refills: 3 | Status: SHIPPED | OUTPATIENT
Start: 2022-04-20

## 2022-04-20 RX ORDER — FLASH GLUCOSE SCANNING READER
1 EACH MISCELLANEOUS
Qty: 2 EACH | Refills: 2 | Status: SHIPPED | OUTPATIENT
Start: 2022-04-20 | End: 2022-04-20 | Stop reason: SDUPTHER

## 2022-04-20 NOTE — PROGRESS NOTES
Health Maintenance Due   Topic     TETANUS VACCINE      Shingles Vaccine (1 of 2)  hx chicken pox. Notified pt can get vaccine at pharmacy      DEXA Scan  Pending order    COVID-19 Vaccine (4 - Booster for Pfizer series)

## 2022-04-21 ENCOUNTER — TELEPHONE (OUTPATIENT)
Dept: FAMILY MEDICINE | Facility: CLINIC | Age: 87
End: 2022-04-21

## 2022-04-21 NOTE — TELEPHONE ENCOUNTER
----- Message from Sunitha Wallace sent at 4/20/2022  3:17 PM CDT -----  Type: Patient Call Back    Who called:self     What is the request in detail: Would like to speak with a nurse about her blood sugar readings.     Can the clinic reply by [facility]? No     Would the patient rather a call back or a response via [facility]? Call back     Best call back number: [phone]

## 2022-04-22 ENCOUNTER — TELEPHONE (OUTPATIENT)
Dept: FAMILY MEDICINE | Facility: CLINIC | Age: 87
End: 2022-04-22
Payer: MEDICARE

## 2022-04-26 ENCOUNTER — TELEPHONE (OUTPATIENT)
Dept: FAMILY MEDICINE | Facility: CLINIC | Age: 87
End: 2022-04-26
Payer: MEDICARE

## 2022-04-26 NOTE — TELEPHONE ENCOUNTER
----- Message from Leslie Weinberg sent at 4/26/2022  3:03 PM CDT -----  Regarding: self [phone]  .Type: Patient Call Back    Who called: self     What is the request in detail: Pt stated that she's still waiting to received her glucose monitor due to [facility] needing approval     Can the clinic reply by [facility]? Call back    Would the patient rather a call back or a response via [facility]?  Call back     Best call back number: .[phone]

## 2022-04-29 PROCEDURE — G0180 MD CERTIFICATION HHA PATIENT: HCPCS | Mod: ,,, | Performed by: FAMILY MEDICINE

## 2022-04-29 PROCEDURE — G0180 PR HOME HEALTH MD CERTIFICATION: ICD-10-PCS | Mod: ,,, | Performed by: FAMILY MEDICINE

## 2022-05-09 ENCOUNTER — CLINICAL SUPPORT (OUTPATIENT)
Dept: DIABETES | Facility: CLINIC | Age: 87
End: 2022-05-09
Payer: MEDICARE

## 2022-05-09 VITALS — BODY MASS INDEX: 30.51 KG/M2 | WEIGHT: 166.81 LBS

## 2022-05-09 DIAGNOSIS — E11.9 DIABETES MELLITUS, NEW ONSET: Primary | ICD-10-CM

## 2022-05-09 PROCEDURE — 99999 PR PBB SHADOW E&M-EST. PATIENT-LVL I: CPT | Mod: PBBFAC,,, | Performed by: DIETITIAN, REGISTERED

## 2022-05-09 PROCEDURE — G0108 PR DIAB MANAGE TRN  PER INDIV: ICD-10-PCS | Mod: S$GLB,,, | Performed by: DIETITIAN, REGISTERED

## 2022-05-09 PROCEDURE — G0108 DIAB MANAGE TRN  PER INDIV: HCPCS | Mod: S$GLB,,, | Performed by: DIETITIAN, REGISTERED

## 2022-05-09 PROCEDURE — 99999 PR PBB SHADOW E&M-EST. PATIENT-LVL I: ICD-10-PCS | Mod: PBBFAC,,, | Performed by: DIETITIAN, REGISTERED

## 2022-05-09 NOTE — PROGRESS NOTES
Diabetes Care Specialist Progress Note  Author: Roxanne Cochran RD  Date: 5/9/2022    Program Intake  Reason for Diabetes Program Visit:: Initial Diabetes Assessment  Current diabetes risk level:: low  In the last 12 months, have you:: none  Permission to speak with others about care:: yes    Lab Results   Component Value Date    HGBA1C 9.1 (H) 03/18/2022     Clinical    Patient Health Rating  Compared to other people your age, how would you rate your health?: Fair  Clinical Assessment  Current Diabetes Treatment: Oral Medication, Diet  Have you ever experienced hypoglycemia (low blood sugar)?: no  Have you ever experienced hyperglycemia (high blood sugar)?: no    Medication Information  Medication adherence impacting ability to self-manage diabetes?: No    Labs  Lab Compliance Barriers: No    Nutritional Status  Diet: Regular  Meal Plan 24 Hour Recall: Breakfast, Lunch, Dinner, Snack  Meal Plan 24 Hour Recall - Breakfast: noon: Iced coffee w small cup ice cream, ccrambled egg, 1/2 biscuit, prado  Meal Plan 24 Hour Recall - Lunch: skipped since had Bkfst at noon  Meal Plan 24 Hour Recall - Dinner: 5pm baked meat, starch (rice, potato or pasta, veg. canned fruit, diet soda or OJ  Meal Plan 24 Hour Recall - Snack: fruit, unsweet tea, occ chocolate donovan candy  Change in appetite?: No  Current nutritional status an area of need that is impacting patient's ability to self-manage diabetes?: No    Additional Social History    Support  Does anyone support you with your diabetes care?: yes  Who supports you?: nursing home staff  Who takes you to your medical appointments?: paid caregiver  Does the current support meet the patient's needs?: Yes  Is Support an area impacting ability to self-manage diabetes?: No    Access to Mass Media & Technology  Does the patient have access to any of the following devices or technologies?: Smart phone  Media or technology needs impacting ability to self-manage diabetes?:  No    Cognitive/Behavioral Health  Alert and Oriented: Yes  Difficulty Thinking: No  Requires Prompting: No  Requires assistance for routine expression?: No  Cognitive or behavioral barriers impacting ability to self-manage diabetes?: Yes    Culture/Gnosticist  Culture or Shinto beliefs that may impact ability to access healthcare: No    Communication  Language preference: English  Hearing Problems: No  Vision Problems: No  Communication needs impacting ability to self-manage diabetes?: No    Health Literacy  Preferred Learning Method: Face to Face  How often do you need to have someone help you read instructions, pamphlets, or written material from your doctor or pharmacy?: Rarely  Health literacy needs impacting ability to self-manage diabetes?: No      Diabetes Self-Management Skills Assessment    Diabetes Disease Process/Treatment Options  Patient/caregiver able to state what happens when someone has diabetes.: somewhat  Patient/caregiver knows what type of diabetes they have.: yes  Diabetes Disease Process/Treatment Options: Skills Assessment Completed: Yes  Assessment indicates:: Adequate understanding  Area of need?: No    Nutrition/Healthy Eating  Challenges to healthy eating:: other (see comments) (dislikes the food served at her assisted living facility)  Method of carbohydrate measurement:: eyeballing/guessing, no method  Patient can identify foods that impact blood sugar.: no (see comments)  Nutrition/Healthy Eating Skills Assessment Completed:: Yes  Assessment indicates:: Knowledge deficit, Instruction Needed  Area of need?: Yes    Physical Activity/Exercise  Patient's daily activity level:: sedentary  Patient formally exercises outside of work.: no  Reasons for not exercising:: physically unable to exercise currently  Physical Activity/Exercise Skills Assessment Completed: : Yes  Assessment indicates:: Knowledge deficit, Adequate understanding  Area of need?: No    Medications  Patient is able to  describe current diabetes management routine.: yes  Diabetes management routine:: oral medications  Patient is able to identify current diabetes medications, dosages, and appropriate timing of medications.: yes  Patient understands the purpose of the medications taken for diabetes.: yes  Patient reports problems or concerns with current medication regimen.: no  Medication Skills Assessment Completed:: Yes  Area of need?: No    Home Blood Glucose Monitoring  Patient states that blood sugar is checked at home daily.: no  Reasons for not monitoring:: other (see comments) (waiting for libre2 to be covered by insurance)  Home Blood Glucose Monitoring Skills Assessment Completed: : Yes  Assessment indicates:: Instruction Needed, Knowledge deficit  Area of need?: Yes    Acute Complications  Acute Complications Skills Assessment Completed: : Yes  Assessment indicates:: Adequate understanding, Knowledge deficit  Area of need?: No    Chronic Complications  Chronic Complications Skills Assessment Completed: : Yes  Assessment indicates:: Adequate understanding, Knowledge deficit  Area of need?: No    Psychosocial/Coping  Psychosocial/Coping Skills Assessment Completed: : No  Deffered due to:: Time    Diabetes Self Support Plan  Assessment Summary and Plan    Based on today's diabetes care assessment, the following areas of need were identified:      Diabetes Self-Management Skills 5/9/2022   Diabetes Disease Process/Treatment Options No   Nutrition/Healthy Eating Yes-very carb unaware and had particular food habits; see care plan   Physical Activity/Exercise No   Medication No   Home Blood Glucose Monitoring Yes-waiting for libre2 but not checking Bg; see care plan   Acute Complications No   Chronic Complications No        Today's interventions were provided through individual discussion, instruction, and written materials were provided.    Patient verbalized understanding of instruction and written materials.  Pt was able to  return back demonstration of instructions today. Patient understood key points, needs reinforcement and further instruction.     Diabetes Self-Management Care Plan:    Today's Diabetes Self-Management Care Plan was developed with Carol's input. Carol has agreed to work toward the following goal(s) to improve his/her overall diabetes control.      Care Plan: Diabetes Management   Updates made since 4/9/2022 12:00 AM      Problem: Healthy Eating       Goal: Eat 3 meals daily with 30-45g/2-3 servings of Carbohydrate per meal.    Start Date: 5/9/2022   Priority: High   Barriers: Lack of Motivation to Change   Note:    Pt provided many reasons why she could not follow a healty eating plan-- from the horrible food and staff at her living facility to her likes and dislikes of certain foods and the necessity of having certain items such as chocolate and a high sugar coffee beverage needed every morning    -Discussed carb vs non-carb foods, appropriate amount of carbs to have at meals/snacks and acceptable serving sizes of individual carb items.  - Instructed on CONSISTENT CARB INTAKE with appropriate label reading and serving sizes of specific carb containing foods., portion control (hand) and using the plate method of meal planning.   -Encouraged carb sources primarily from whole grains, fresh/frozen fruits, and low-fat milk and yogurt.   -Discussed meal plans and snack ideas amenable to pt.    -Instructed pt to aim for 3 evenly spaced meals with 30-45 gm carb/meal and 0-15 gm at snacks.    Due to pt's  verbal opinion of some of the diet restrictions, careful adherence to some parts of diet not anticipated     Task: Provided visual examples using dry measuring cups, food models, and other familiar objects such as computer mouse, deck or cards, tennis ball etc. to help with visualization of portions. Completed 5/9/2022      Task: Recommended replacing beverages containing high sugar content with noncaloric/sugar free options  and/or water. Completed 5/9/2022      Task: Review the importance of balancing carbohydrates with each meal using portion control techniques to count servings of carbohydrate and label reading to identify serving size and amount of total carbs per serving. Completed 5/9/2022      Problem: Blood Glucose Self-Monitoring       Goal: Patient agrees to check and record blood sugars 1-2 times per day.    Barriers: Lack of Supplies   Note:    Pt reports she is not checking Bg because she is waiting for the Libre2 to be approved by insurance. Discussed acute symptoms of high BG and urged pt to check Bg until she gets new supplies  Pt states she will either return for training on CGM or will have the nurse at her home to place the sensor for her     Task: Reviewed the importance of self-monitoring blood glucose and keeping logs. Completed 5/9/2022      Task: Discussed ways to minimize pain when monitoring blood glucose.           Follow Up Plan     Follow up in about 3 months (around 8/9/2022).    Today's care plan and follow up schedule was discussed with patient.  Carol verbalized understanding of the care plan, goals, and agrees to follow up plan.        The patient was encouraged to communicate with his/her health care provider/physician and care team regarding his/her condition(s) and treatment.  I provided the patient with my contact information today and encouraged to contact me via phone or [facility] as needed.     Length of Visit   Total Time: 60 Minutes

## 2022-05-10 DIAGNOSIS — E11.65 UNCONTROLLED TYPE 2 DIABETES MELLITUS WITH HYPERGLYCEMIA: Primary | ICD-10-CM

## 2022-05-10 RX ORDER — LANCETS
EACH MISCELLANEOUS
Qty: 100 EACH | Refills: 3 | Status: SHIPPED | OUTPATIENT
Start: 2022-05-10 | End: 2024-04-02 | Stop reason: SDUPTHER

## 2022-05-10 RX ORDER — INSULIN PUMP SYRINGE, 3 ML
EACH MISCELLANEOUS
Qty: 1 EACH | Refills: 0 | Status: SHIPPED | OUTPATIENT
Start: 2022-05-10 | End: 2023-05-10

## 2022-05-10 NOTE — TELEPHONE ENCOUNTER
----- Message from Valeria Miranda sent at 5/9/2022  3:47 PM CDT -----  Regarding: testing kit  Name of Who is Calling: COLIN HOOVER [[id number]]      What is the request in detail: Patient states she does not have the diabetic testing kit she was suppose to received she would like to discuss this matter       Can the clinic reply by [facility]: no      What Number to Call Back if not in [facility]: [phone]

## 2022-05-10 NOTE — TELEPHONE ENCOUNTER
No new care gaps identified.  [facility] Embedded Care Gaps. Reference number: [id number]. 5/10/2022   11:48:05 AM REGINO

## 2022-05-10 NOTE — TELEPHONE ENCOUNTER
Spoke to Cassandra with [facility] and was told freestyle milvia was denied because pt not on insulin; pt informed; she is ok with you sending orders for regular meter and supplies to [facility]; orders pended

## 2022-05-12 DIAGNOSIS — I63.9 CEREBROVASCULAR ACCIDENT (CVA), UNSPECIFIED MECHANISM: ICD-10-CM

## 2022-05-12 DIAGNOSIS — K21.9 GASTROESOPHAGEAL REFLUX DISEASE, UNSPECIFIED WHETHER ESOPHAGITIS PRESENT: ICD-10-CM

## 2022-05-13 RX ORDER — ATORVASTATIN CALCIUM 40 MG/1
TABLET, FILM COATED ORAL
Qty: 28 TABLET | Refills: 2 | Status: SHIPPED | OUTPATIENT
Start: 2022-05-13 | End: 2022-05-31

## 2022-05-13 RX ORDER — OMEPRAZOLE 20 MG/1
CAPSULE, DELAYED RELEASE ORAL
Qty: 28 CAPSULE | Refills: 11 | Status: SHIPPED | OUTPATIENT
Start: 2022-05-13 | End: 2022-06-10

## 2022-05-24 ENCOUNTER — TELEPHONE (OUTPATIENT)
Dept: FAMILY MEDICINE | Facility: CLINIC | Age: 87
End: 2022-05-24
Payer: MEDICARE

## 2022-05-24 NOTE — TELEPHONE ENCOUNTER
----- Message from Jorge Garcia sent at 5/24/2022  3:26 PM CDT -----  Regarding: request to change brand of glucometer and supplies  Type:  Pharmacy Calling to Clarify an RX    Name of Caller: Violette     Pharmacy Name: [facility] pharmacy     Prescription Name: freestyle milvia sensor     What do they need to clarify?  Violette from [facility] pharmacy called to notify Dr. Yadav that the patient's freestyle milvia sensor is not covered under insurance. If possible, they would like to change it and the supplies to the true metrix meter and supplies. Please return call at earliest convenience.    Can you be contacted via [facility]?no     Best Call Back Number: [phone]

## 2022-05-24 NOTE — TELEPHONE ENCOUNTER
Spoke to dre and informed her orders were faxed to N for diabetic supplies; she states assisted living facility called them regarding supplies because they have orders to check her BS but no meter or supplies; dre states she will inform them to check with [facility] but in the meantime she can send pt meter free of charge and low cost for lancets and strips

## 2022-05-30 DIAGNOSIS — K21.9 GASTROESOPHAGEAL REFLUX DISEASE, UNSPECIFIED WHETHER ESOPHAGITIS PRESENT: ICD-10-CM

## 2022-05-30 DIAGNOSIS — M81.0 AGE-RELATED OSTEOPOROSIS WITHOUT CURRENT PATHOLOGICAL FRACTURE: ICD-10-CM

## 2022-05-30 DIAGNOSIS — E03.8 SUBCLINICAL HYPOTHYROIDISM: ICD-10-CM

## 2022-05-30 DIAGNOSIS — E11.9 NEW ONSET TYPE 2 DIABETES MELLITUS: ICD-10-CM

## 2022-05-30 DIAGNOSIS — F32.A ANXIETY AND DEPRESSION: ICD-10-CM

## 2022-05-30 DIAGNOSIS — H04.203 BILATERAL EPIPHORA: ICD-10-CM

## 2022-05-30 DIAGNOSIS — I10 HYPERTENSION, WELL CONTROLLED: ICD-10-CM

## 2022-05-30 DIAGNOSIS — F41.9 ANXIETY AND DEPRESSION: ICD-10-CM

## 2022-05-30 DIAGNOSIS — G47.00 INSOMNIA, UNSPECIFIED TYPE: ICD-10-CM

## 2022-05-30 RX ORDER — LEVOTHYROXINE SODIUM 25 UG/1
TABLET ORAL
Qty: 90 TABLET | Refills: 3 | Status: SHIPPED | OUTPATIENT
Start: 2022-05-30 | End: 2022-10-06 | Stop reason: SDUPTHER

## 2022-05-30 RX ORDER — VENLAFAXINE HYDROCHLORIDE 150 MG/1
150 CAPSULE, EXTENDED RELEASE ORAL DAILY
Qty: 90 CAPSULE | Refills: 3 | Status: SHIPPED | OUTPATIENT
Start: 2022-05-30 | End: 2023-05-04

## 2022-05-30 RX ORDER — LISINOPRIL 2.5 MG/1
2.5 TABLET ORAL DAILY
Qty: 90 TABLET | Refills: 3 | Status: SHIPPED | OUTPATIENT
Start: 2022-05-30 | End: 2022-10-06

## 2022-05-30 NOTE — TELEPHONE ENCOUNTER
Refill Routing Note   Medication(s) are not appropriate for processing by [facility] for the following reason(s):      - Outside of protocol  - Indication is outside of scope for [facility]  - Medication is a new start (<3 months)  - Allergy/Contraindication (osteoporosis present on the problem list, contraindicated with PPIs)    [facility] action(s):  Defer  Route  Approve       Medication Therapy Plan: DEFER metformin (new start), omeprazole (osteoporosis present on problem list, contraindicated); ROUTE: mirtazapine (insomnia indication outside of ORC scope) celecoxib, olopatadine, cerovite, vitamin D (medications are non-delegated); APPROVE levothyroxine, lisinopril, venlafaxine  Medication reconciliation completed: No     Appointments  past 12m or future 3m with PCP    Date Provider   Last Visit   4/20/2022 Jatin Yadav MD   Next Visit   6/29/2022 Jatin Yadav MD   ED visits in past 90 days: 0        Note composed:1:55 PM 05/30/2022

## 2022-06-03 ENCOUNTER — EXTERNAL HOME HEALTH (OUTPATIENT)
Dept: HOME HEALTH SERVICES | Facility: HOSPITAL | Age: 87
End: 2022-06-03
Payer: MEDICARE

## 2022-06-10 RX ORDER — MULTIVIT-MIN/FA/LYCOPEN/LUTEIN .4-300-25
TABLET ORAL
Qty: 30 TABLET | Refills: 0 | Status: SHIPPED | OUTPATIENT
Start: 2022-06-10 | End: 2022-07-24

## 2022-06-10 RX ORDER — OMEPRAZOLE 20 MG/1
CAPSULE, DELAYED RELEASE ORAL
Qty: 30 CAPSULE | Refills: 0 | Status: SHIPPED | OUTPATIENT
Start: 2022-06-10 | End: 2022-06-30

## 2022-06-10 RX ORDER — METFORMIN HYDROCHLORIDE 500 MG/1
TABLET, EXTENDED RELEASE ORAL
Qty: 60 TABLET | Refills: 0 | Status: SHIPPED | OUTPATIENT
Start: 2022-06-10 | End: 2022-06-28

## 2022-06-10 RX ORDER — CELECOXIB 200 MG/1
CAPSULE ORAL
Qty: 60 CAPSULE | Refills: 0 | Status: SHIPPED | OUTPATIENT
Start: 2022-06-10 | End: 2022-06-30

## 2022-06-10 RX ORDER — MIRTAZAPINE 7.5 MG/1
TABLET, FILM COATED ORAL
Qty: 30 TABLET | Refills: 0 | Status: SHIPPED | OUTPATIENT
Start: 2022-06-10 | End: 2022-06-30

## 2022-06-10 RX ORDER — CHOLECALCIFEROL (VITAMIN D3) 25 MCG
TABLET ORAL
Qty: 30 TABLET | Refills: 0 | Status: SHIPPED | OUTPATIENT
Start: 2022-06-10 | End: 2022-06-30

## 2022-06-10 RX ORDER — OLOPATADINE HYDROCHLORIDE 1 MG/ML
SOLUTION/ DROPS OPHTHALMIC
Qty: 5 ML | Refills: 0 | Status: SHIPPED | OUTPATIENT
Start: 2022-06-10 | End: 2022-06-30

## 2022-06-14 ENCOUNTER — TELEPHONE (OUTPATIENT)
Dept: FAMILY MEDICINE | Facility: CLINIC | Age: 87
End: 2022-06-14
Payer: MEDICARE

## 2022-06-14 NOTE — TELEPHONE ENCOUNTER
----- Message from Finn Sheehan sent at 6/14/2022  4:16 PM CDT -----  Type: RX Refill Request    Who Called: Elli/[facility]    Have you contacted your pharmacy: yes    Refill or New Rx: refill    RX Name and Strength:  melatonin (MELATIN) 3 mg tablet    ketoconazole (NIZORAL) 2 % cream    Preferred Pharmacy with phone number:  [facility] [address] [address] [facility]   Phone:  [phone]  Fax:  [phone]          Local or Mail Order:local    Ordering Provider:Dr. Yadav    Would the patient rather a call back or a response via [facility]?  Call back    Best Call Back Number: [phone]

## 2022-06-17 ENCOUNTER — TELEPHONE (OUTPATIENT)
Dept: FAMILY MEDICINE | Facility: CLINIC | Age: 87
End: 2022-06-17
Payer: MEDICARE

## 2022-06-17 DIAGNOSIS — R27.0 ATAXIA: ICD-10-CM

## 2022-06-17 NOTE — TELEPHONE ENCOUNTER
Last Office Visit Info:   The patient's last visit with Jatin Yadav MD was on 4/20/2022.    The patient's last visit in current department was on 4/20/2022.        Last CBC Results:   Lab Results   Component Value Date    WBC 9.92 03/18/2022    HGB 12.3 03/18/2022    HCT 39.8 03/18/2022     03/18/2022       Last CMP Results  Lab Results   Component Value Date     03/18/2022    K 4.2 03/18/2022     03/18/2022    CO2 23 03/18/2022    BUN 15 03/18/2022    CREATININE 0.8 03/18/2022    CALCIUM 9.2 03/18/2022    ALBUMIN 3.2 (L) 03/18/2022    AST 22 03/18/2022    ALT 19 03/18/2022       Last Lipids  Lab Results   Component Value Date    CHOL 149 03/18/2022    TRIG 179 (H) 03/18/2022    HDL 42 03/18/2022    LDLCALC 71.2 03/18/2022       Last A1C  Lab Results   Component Value Date    HGBA1C 9.1 (H) 03/18/2022       Last TSH  Lab Results   Component Value Date    TSH 2.639 03/18/2022             Current Med Refills  Medication List with Changes/Refills   Current Medications    ACETAMINOPHEN (TYLENOL) 325 MG TABLET    Take 2 tablets (650 mg total) by mouth every 4 (four) hours as needed.       Start Date: 3/9/2021  End Date: --    ALENDRONATE (FOSAMAX) 70 MG TABLET    TAKE 1 TABLET BY MOUTH EVERY 7 DAYS.       Start Date: 5/31/2022 End Date: --    AMOXICILLIN-CLAVULANATE 875-125MG (AUGMENTIN) 875-125 MG PER TABLET    Take 1 tablet by mouth 2 (two) times daily.       Start Date: 5/9/2021  End Date: --    ASPIRIN 81 MG CHEW    CHEW AND SWALLOW 1 TABLET TWICE DAILY.       Start Date: 5/31/2022 End Date: --    ATORVASTATIN (LIPITOR) 40 MG TABLET    TAKE 1 TABLET BY MOUTH ONCE DAILY.       Start Date: 5/31/2022 End Date: --    BLOOD SUGAR DIAGNOSTIC STRP    To check BG 1 times daily, to use with insurance preferred meter       Start Date: 5/10/2022 End Date: --    BLOOD-GLUCOSE METER KIT    To check BG 1 times daily, to use with insurance preferred meter       Start Date: 5/10/2022 End Date: 5/10/2023     CALCIUM CARBONATE (CALCIUM 600) 600 MG CALCIUM (1,500 MG) TAB    Take 1 tablet (600 mg total) by mouth once daily.       Start Date: 10/22/2019End Date: --    CALCIUM CARBONATE (TUMS) 200 MG CALCIUM (500 MG) CHEWABLE TABLET    Take 1 tablet (500 mg total) by mouth 2 (two) times daily as needed for Heartburn.       Start Date: 7/6/2021  End Date: 7/6/2022    CELECOXIB (CELEBREX) 200 MG CAPSULE    TAKE 1 CAPSULE BY MOUTH TWICE DAILY.       Start Date: 6/10/2022 End Date: --    CEROVITE SENIOR 0.4 MG-300 MCG- 250 MCG TAB    TAKE 1 TABLET BY MOUTH ONCE DAILY.       Start Date: 6/10/2022 End Date: --    FLASH GLUCOSE SCANNING READER (Re.MuSTYLE YESY 14 DAY READER) MISC    1 application by Misc.(Non-Drug; Combo Route) route 3 (three) times daily with meals.       Start Date: 4/20/2022 End Date: --    FLASH GLUCOSE SENSOR (FREESTYLE YESY 14 DAY SENSOR) KIT    1 application by Misc.(Non-Drug; Combo Route) route 3 (three) times daily with meals.       Start Date: 4/20/2022 End Date: --    FLUTICASONE PROPIONATE (FLONASE) 50 MCG/ACTUATION NASAL SPRAY    1 SPRAY IN EACH NOSTRIL TWICE A DAY AS NEEDED FOR RHINITIS       Start Date: 12/27/2021End Date: --    GUAIFENESIN 100 MG/5 ML (ROBITUSSIN) 100 MG/5 ML SYRUP    Take 200 mg by mouth 3 (three) times daily as needed for Cough.       Start Date: --        End Date: --    HYDROCODONE-ACETAMINOPHEN (NORCO) 5-325 MG PER TABLET    Take 1 tablet by mouth every 6 (six) hours as needed for Pain.       Start Date: 7/9/2021  End Date: --    KETOCONAZOLE (NIZORAL) 2 % CREAM    Apply topically once daily.       Start Date: 3/18/2022 End Date: --    LANCETS MISC    To check BG 1 times daily, to use with insurance preferred meter       Start Date: 5/10/2022 End Date: --    LEVOTHYROXINE (SYNTHROID) 25 MCG TABLET    Take 1 tablet by mouth before first meal of the day.       Start Date: 5/30/2022 End Date: --    LISINOPRIL (PRINIVIL,ZESTRIL) 2.5 MG TABLET    Take 1 tablet (2.5 mg total) by  mouth once daily.       Start Date: 5/30/2022 End Date: --    MELATONIN (MELATIN) 3 MG TABLET    Take 2 tablets (6 mg total) by mouth nightly as needed for Insomnia.       Start Date: 7/6/2021  End Date: --    METFORMIN (GLUCOPHAGE-XR) 500 MG ER 24HR TABLET    TAKE 1 TABLET BY MOUTH TWICE A DAY WITH MEALS.       Start Date: 6/10/2022 End Date: --    MIRTAZAPINE (REMERON) 7.5 MG TAB    TAKE 1 TABLET BY MOUTH IN THE EVENING.       Start Date: 6/10/2022 End Date: --    MULTIVIT-MINERALS/FOLIC/GINKGO (ONE DAILY WOMEN 50 PLUS ORAL)    Take 1 tablet by mouth once daily.       Start Date: --        End Date: --    MULTIVITAMIN-MINERALS-LUTEIN (MULTIVITAMIN 50 PLUS) TAB    Take 1 tablet by mouth once daily.       Start Date: 9/10/2019 End Date: 6/15/2021    MYRBETRIQ 50 MG TB24    TAKE 1 TABLET BY MOUTH ONCE DAILY.       Start Date: 5/30/2022 End Date: --    OLOPATADINE (PATANOL) 0.1 % OPHTHALMIC SOLUTION    PLACE 1 DROP IN EACH EYE TWICE DAILY.       Start Date: 6/10/2022 End Date: --    OMEPRAZOLE (PRILOSEC) 20 MG CAPSULE    TAKE 1 CAPSULE BY MOUTH EVERY MORNING.       Start Date: 6/10/2022 End Date: --    POLYETHYLENE GLYCOL (MIRALAX) 17 GRAM PWPK    Take 17 g by mouth daily as needed. Take every other day       Start Date: 5/26/2021 End Date: --    SALIVA SUBSTITUTE COMBO NO.9 (BIOTENE DRY MOUTH ORAL RINSE) MWSH    by Mucous Membrane route. Use as directed as needed for dry mouth       Start Date: --        End Date: --    VENLAFAXINE (EFFEXOR-XR) 150 MG CP24    Take 1 capsule (150 mg total) by mouth once daily.       Start Date: 5/30/2022 End Date: --    VITAMIN D (VITAMIN D3) 1000 UNITS TAB    TAKE 1 TABLET BY MOUTH ONCE DAILY.       Start Date: 6/10/2022 End Date: --    WATER LIQD 146 ML WITH MILK OF MAGNESIA 400 MG/5 ML SUSP 400 MG, DIPHENHYDRAMINE 12.5 MG/5 ML ELIX 60 MG, NYSTATIN 100,000 UNIT/ML SUSP 500,000 UNITS    Take 5 mLs by mouth 3 (three) times daily as needed (mouth sores).       Start Date: 7/6/2021  End  Date: --       Order(s) placed per written order guidelines:     Please advise.

## 2022-06-17 NOTE — TELEPHONE ENCOUNTER
Refills pended in another encounter; called pharmacy and informed them Dr Yadav is out of the office and will return next week

## 2022-06-17 NOTE — TELEPHONE ENCOUNTER
----- Message from Jorge Garcia sent at 6/17/2022 10:05 AM CDT -----  Regarding: refill  Type:  Pharmacy Calling to Clarify an RX    Name of Caller: Violette     Pharmacy Name: [facility]     Prescription Name: melatonin (MELATIN) 3 mg tablet/ calcium carbonate (CALCIUM 600) 600 mg calcium (1,500 mg) Tab/ ketoconazole (NIZORAL) 2 % cream    What do they need to clarify? Violette from [facility] is calling on behalf of the patient to get refills for the medications above. She stated that the patient is in an assisted living facility and their refills come through them. She can be reached at the number below.     They are located at: [address]    Can you be contacted via [facility]?no     Best Call Back Number: [phone]

## 2022-06-21 NOTE — PROGRESS NOTES
Subjective:       Patient ID: Carol Yadav is a 86 y.o. female.    Chief Complaint: Follow-up      HPI  85 yo female presents for dm f/u. a1c worsened. States she has improved her diet. States she is taking her meds. Continues to feel fatigue.    Review of Systems   Constitutional: Negative.    HENT: Negative.    Respiratory: Negative.    Cardiovascular: Negative.    Gastrointestinal: Negative.    Endocrine: Negative.    Genitourinary: Negative.    Musculoskeletal: Negative.    Neurological: Negative.    Psychiatric/Behavioral: Negative.           Past Medical History:   Diagnosis Date    Allergic rhinitis     Arthritis     Diabetes mellitus, new onset 4/16/2022    Elevated blood pressure reading without diagnosis of hypertension     Hormone replacement therapy (postmenopausal)     Hyperlipidemia     Hypertension     Stroke of right basal ganglia 12/2014     Past Surgical History:   Procedure Laterality Date    APPENDECTOMY      BREAST BIOPSY      CATARACT EXTRACTION Bilateral at age 62 or 72    monovision     EYE SURGERY      hai cataract surgery    OOPHORECTOMY      PINNING OF HIP Left 6/16/2021    Procedure: PINNING, HIP;  Surgeon: Dae Bernal MD;  Location: [facility];  Service: Orthopedics;  Laterality: Left;    TONSILLECTOMY       Family History   Problem Relation Age of Onset    Breast cancer Mother     Brain cancer Mother         Metastatic from breast    Stroke Father     No Known Problems Sister     No Known Problems Brother     No Known Problems Daughter     No Known Problems Daughter     Schizophrenia Daughter     No Known Problems Maternal Aunt     No Known Problems Maternal Uncle     No Known Problems Paternal Aunt     No Known Problems Paternal Uncle     No Known Problems Maternal Grandmother     No Known Problems Maternal Grandfather     No Known Problems Paternal Grandmother     No Known Problems Paternal Grandfather     Amblyopia Neg Hx     Blindness Neg Hx      Cataracts Neg Hx     Diabetes Neg Hx     Glaucoma Neg Hx     Hypertension Neg Hx     Macular degeneration Neg Hx     Retinal detachment Neg Hx     Strabismus Neg Hx     Thyroid disease Neg Hx      Social History     Socioeconomic History    Marital status:    Tobacco Use    Smoking status: Former Smoker     Packs/day: 2.00     Years: 36.00     Pack years: 72.00     Types: Cigarettes     Start date:      Quit date:      Years since quittin.4    Smokeless tobacco: Never Used   Substance and Sexual Activity    Alcohol use: Not Currently     Alcohol/week: 21.0 standard drinks     Types: 7 Glasses of wine, 14 Standard drinks or equivalent per week    Drug use: No    Sexual activity: Not Currently     Partners: Male     Comment: 2017 divorce      Social Determinants of Health     Financial Resource Strain: Low Risk     Difficulty of Paying Living Expenses: Not hard at all   Food Insecurity: No Food Insecurity    Worried About Running Out of Food in the Last Year: Never true    Ran Out of Food in the Last Year: Never true   Transportation Needs: No Transportation Needs    Lack of Transportation (Medical): No    Lack of Transportation (Non-Medical): No   Physical Activity: Insufficiently Active    Days of Exercise per Week: 5 days    Minutes of Exercise per Session: 10 min   Stress: No Stress Concern Present    Feeling of Stress : Only a little   Social Connections: Socially Isolated    Frequency of Communication with Friends and Family: More than three times a week    Frequency of Social Gatherings with Friends and Family: Once a week    Attends Sikhism Services: Never    Active Member of Clubs or Organizations: No    Marital Status:    Housing Stability: Low Risk     Unable to Pay for Housing in the Last Year: No    Number of Places Lived in the Last Year: 1    Unstable Housing in the Last Year: No       Current Outpatient Medications:     acetaminophen  (TYLENOL) 325 MG tablet, Take 2 tablets (650 mg total) by mouth every 4 (four) hours as needed., Disp: , Rfl: 0    calcium carbonate (TUMS) 200 mg calcium (500 mg) chewable tablet, Take 1 tablet (500 mg total) by mouth 2 (two) times daily as needed for Heartburn., Disp: , Rfl:     ketoconazole (NIZORAL) 2 % cream, Apply topically once daily., Disp: 60 g, Rfl: 1    multivit-minerals/folic/ginkgo (ONE DAILY WOMEN 50 PLUS ORAL), Take 1 tablet by mouth once daily., Disp: , Rfl:     polyethylene glycol (MIRALAX) 17 gram PwPk, Take 17 g by mouth daily as needed. Take every other day, Disp: 30 packet, Rfl: 11    saliva substitute combo no.9 (BIOTENE DRY MOUTH ORAL RINSE) Mwsh, by Mucous Membrane route. Use as directed as needed for dry mouth, Disp: , Rfl:     alendronate (FOSAMAX) 70 MG tablet, TAKE 1 TABLET BY MOUTH EVERY 7 DAYS., Disp: 4 tablet, Rfl: 2    amoxicillin-clavulanate 875-125mg (AUGMENTIN) 875-125 mg per tablet, Take 1 tablet by mouth 2 (two) times daily., Disp: , Rfl:     aspirin 81 MG Chew, CHEW AND SWALLOW 1 TABLET TWICE DAILY., Disp: 60 tablet, Rfl: 2    atorvastatin (LIPITOR) 40 MG tablet, TAKE 1 TABLET BY MOUTH ONCE DAILY., Disp: 30 tablet, Rfl: 2    blood sugar diagnostic Strp, To check BG 1 times daily, to use with insurance preferred meter, Disp: 100 each, Rfl: 3    blood-glucose meter kit, To check BG 1 times daily, to use with insurance preferred meter, Disp: 1 each, Rfl: 0    calcium carbonate (CALCIUM 600) 600 mg calcium (1,500 mg) Tab, Take 1 tablet (600 mg total) by mouth once daily. (Patient not taking: No sig reported), Disp: 90 tablet, Rfl: 1    celecoxib (CELEBREX) 200 MG capsule, TAKE 1 CAPSULE BY MOUTH TWICE DAILY., Disp: 60 capsule, Rfl: 0    CEROVITE SENIOR 0.4 mg-300 mcg- 250 mcg Tab, TAKE 1 TABLET BY MOUTH ONCE DAILY., Disp: 30 tablet, Rfl: 0    flash glucose scanning reader (FREESTYLE YESY 14 DAY READER) Misc, 1 application by Misc.(Non-Drug; Combo Route) route 3  (three) times daily with meals., Disp: 2 each, Rfl: 2    flash glucose sensor (FREESTYLE YESY 14 DAY SENSOR) Kit, 1 application by Misc.(Non-Drug; Combo Route) route 3 (three) times daily with meals., Disp: 2 kit, Rfl: 3    fluticasone propionate (FLONASE) 50 mcg/actuation nasal spray, 1 SPRAY IN EACH NOSTRIL TWICE A DAY AS NEEDED FOR RHINITIS (Patient not taking: Reported on 4/20/2022), Disp: 16 g, Rfl: 11    guaiFENesin 100 mg/5 ml (ROBITUSSIN) 100 mg/5 mL syrup, Take 200 mg by mouth 3 (three) times daily as needed for Cough., Disp: , Rfl:     HYDROcodone-acetaminophen (NORCO) 5-325 mg per tablet, Take 1 tablet by mouth every 6 (six) hours as needed for Pain. (Patient not taking: No sig reported), Disp: 30 tablet, Rfl: 0    lancets Misc, To check BG 1 times daily, to use with insurance preferred meter, Disp: 100 each, Rfl: 3    levothyroxine (SYNTHROID) 25 MCG tablet, Take 1 tablet by mouth before first meal of the day., Disp: 90 tablet, Rfl: 3    lisinopriL (PRINIVIL,ZESTRIL) 2.5 MG tablet, Take 1 tablet (2.5 mg total) by mouth once daily., Disp: 90 tablet, Rfl: 3    melatonin (MELATIN) 3 mg tablet, Take 2 tablets (6 mg total) by mouth nightly as needed for Insomnia. (Patient not taking: No sig reported), Disp: 30 tablet, Rfl: 0    metFORMIN (GLUCOPHAGE-XR) 500 MG ER 24hr tablet, TAKE 1 TABLET BY MOUTH TWICE A DAY WITH MEALS., Disp: 60 tablet, Rfl: 0    mirtazapine (REMERON) 7.5 MG Tab, TAKE 1 TABLET BY MOUTH IN THE EVENING., Disp: 30 tablet, Rfl: 0    multivitamin-minerals-lutein (MULTIVITAMIN 50 PLUS) Tab, Take 1 tablet by mouth once daily., Disp: 90 tablet, Rfl: 3    MYRBETRIQ 50 mg Tb24, TAKE 1 TABLET BY MOUTH ONCE DAILY., Disp: 30 tablet, Rfl: 0    olopatadine (PATANOL) 0.1 % ophthalmic solution, PLACE 1 DROP IN EACH EYE TWICE DAILY., Disp: 5 mL, Rfl: 0    omeprazole (PRILOSEC) 20 MG capsule, TAKE 1 CAPSULE BY MOUTH EVERY MORNING., Disp: 30 capsule, Rfl: 0    venlafaxine (EFFEXOR-XR) 150 MG  "Cp24, Take 1 capsule (150 mg total) by mouth once daily., Disp: 90 capsule, Rfl: 3    vitamin D (VITAMIN D3) 1000 units Tab, TAKE 1 TABLET BY MOUTH ONCE DAILY., Disp: 30 tablet, Rfl: 0    water Liqd 146 mL with MILK OF MAGNESIA 400 mg/5 mL Susp 400 mg, diphenhydrAMINE 12.5 mg/5 mL Elix 60 mg, nystatin 100,000 unit/mL Susp 500,000 Units, Take 5 mLs by mouth 3 (three) times daily as needed (mouth sores). (Patient not taking: No sig reported), Disp: 1 Bottle, Rfl: 0   Objective:      Vitals:    04/20/22 1401   BP: 122/62   BP Location: Left arm   Patient Position: Sitting   BP Method: Small (Manual)   Pulse: 91   Resp: 18   Temp: 97.8 °F (36.6 °C)   TempSrc: Oral   SpO2: 95%   Weight: 77.2 kg (170 lb 3.1 oz)   Height: 5' 2" (1.575 m)       Physical Exam  Constitutional:       General: She is not in acute distress.     Appearance: She is not diaphoretic.   HENT:      Head: Normocephalic and atraumatic.   Eyes:      Conjunctiva/sclera: Conjunctivae normal.   Pulmonary:      Effort: Pulmonary effort is normal.   Musculoskeletal:         General: Normal range of motion.      Cervical back: Neck supple.   Skin:     Findings: No rash.   Neurological:      Mental Status: She is alert and oriented to person, place, and time.   Psychiatric:         Behavior: Behavior normal.         Thought Content: Thought content normal.         Judgment: Judgment normal.            Assessment:       1. Uncontrolled type 2 diabetes mellitus with hyperglycemia    2. Post-menopausal        Plan:       Uncontrolled type 2 diabetes mellitus with hyperglycemia  -     Discontinue: flash glucose scanning reader (FREESTYLE YESY 14 DAY READER) Misc; 1 application by Misc.(Non-Drug; Combo Route) route 3 (three) times daily with meals.  Dispense: 2 each; Refill: 2  -     Discontinue: flash glucose sensor (FREESTYLE YESY 14 DAY SENSOR) Kit; 1 application by Misc.(Non-Drug; Combo Route) route 3 (three) times daily with meals.  Dispense: 2 kit; Refill: " 3  -     Ambulatory referral/consult to Diabetes Education; Future; Expected date: 04/27/2022  -     flash glucose scanning reader (FREESTYLE YESY 14 DAY READER) Misc; 1 application by Misc.(Non-Drug; Combo Route) route 3 (three) times daily with meals.  Dispense: 2 each; Refill: 2  -     flash glucose sensor (FREESTYLE YESY 14 DAY SENSOR) Kit; 1 application by Misc.(Non-Drug; Combo Route) route 3 (three) times daily with meals.  Dispense: 2 kit; Refill: 3    Post-menopausal  -     DXA Bone Density Spine And Hip; Future; Expected date: 04/20/2022      Cont meds f/u in 1month.          Patient note was created using [employer].  Any errors in syntax or even information may not have been identified and edited on initial review prior to signing this note.

## 2022-06-23 RX ORDER — CALCIUM CARBONATE 600 MG
1 TABLET ORAL DAILY
Qty: 90 TABLET | Refills: 1 | OUTPATIENT
Start: 2022-06-23

## 2022-06-23 RX ORDER — TALC
6 POWDER (GRAM) TOPICAL NIGHTLY PRN
Qty: 30 TABLET | Refills: 0 | COMMUNITY
Start: 2022-06-23

## 2022-06-27 ENCOUNTER — TELEPHONE (OUTPATIENT)
Dept: FAMILY MEDICINE | Facility: CLINIC | Age: 87
End: 2022-06-27
Payer: MEDICARE

## 2022-06-27 NOTE — TELEPHONE ENCOUNTER
----- Message from Tereso Fournier sent at 6/27/2022 11:47 AM CDT -----  Can the clinic reply in [facility]: no         Please refill the medication(s) listed below. Please call the patient when the prescription(s) is ready for  at this phone number    [phone]         Medication #1 melatonin (MELATIN) 3 mg tablet       Medication #2 calcium carbonate (TUMS) 200 mg calcium (500 mg) chewable tablet         Preferred Pharmacy: [facility] [id number]

## 2022-06-27 NOTE — TELEPHONE ENCOUNTER
Advised pt that these are OTC medications. Patient states she never called and requested these medications.

## 2022-06-29 ENCOUNTER — OFFICE VISIT (OUTPATIENT)
Dept: FAMILY MEDICINE | Facility: CLINIC | Age: 87
End: 2022-06-29
Payer: MEDICARE

## 2022-06-29 ENCOUNTER — PATIENT MESSAGE (OUTPATIENT)
Dept: FAMILY MEDICINE | Facility: CLINIC | Age: 87
End: 2022-06-29

## 2022-06-29 ENCOUNTER — LAB VISIT (OUTPATIENT)
Dept: LAB | Facility: HOSPITAL | Age: 87
End: 2022-06-29
Attending: FAMILY MEDICINE
Payer: MEDICARE

## 2022-06-29 VITALS
HEART RATE: 99 BPM | RESPIRATION RATE: 16 BRPM | DIASTOLIC BLOOD PRESSURE: 82 MMHG | SYSTOLIC BLOOD PRESSURE: 138 MMHG | OXYGEN SATURATION: 95 % | TEMPERATURE: 98 F | BODY MASS INDEX: 29.21 KG/M2 | WEIGHT: 158.75 LBS | HEIGHT: 62 IN

## 2022-06-29 DIAGNOSIS — I10 ESSENTIAL HYPERTENSION: ICD-10-CM

## 2022-06-29 DIAGNOSIS — R53.81 PHYSICAL DECONDITIONING: ICD-10-CM

## 2022-06-29 DIAGNOSIS — E11.9 NEW ONSET TYPE 2 DIABETES MELLITUS: Primary | ICD-10-CM

## 2022-06-29 DIAGNOSIS — E11.9 NEW ONSET TYPE 2 DIABETES MELLITUS: ICD-10-CM

## 2022-06-29 DIAGNOSIS — Z86.73 H/O: CVA (CEREBROVASCULAR ACCIDENT): ICD-10-CM

## 2022-06-29 DIAGNOSIS — I10 HYPERTENSION, WELL CONTROLLED: ICD-10-CM

## 2022-06-29 LAB
ALBUMIN SERPL BCP-MCNC: 3.9 G/DL (ref 3.5–5.2)
ALP SERPL-CCNC: 88 U/L (ref 55–135)
ALT SERPL W/O P-5'-P-CCNC: 19 U/L (ref 10–44)
ANION GAP SERPL CALC-SCNC: 11 MMOL/L (ref 8–16)
AST SERPL-CCNC: 27 U/L (ref 10–40)
BILIRUB SERPL-MCNC: 0.8 MG/DL (ref 0.1–1)
BUN SERPL-MCNC: 17 MG/DL (ref 8–23)
CALCIUM SERPL-MCNC: 9.9 MG/DL (ref 8.7–10.5)
CHLORIDE SERPL-SCNC: 107 MMOL/L (ref 95–110)
CO2 SERPL-SCNC: 25 MMOL/L (ref 23–29)
CREAT SERPL-MCNC: 1 MG/DL (ref 0.5–1.4)
EST. GFR  (AFRICAN AMERICAN): 59.8 ML/MIN/1.73 M^2
EST. GFR  (NON AFRICAN AMERICAN): 51.9 ML/MIN/1.73 M^2
ESTIMATED AVG GLUCOSE: 137 MG/DL (ref 68–131)
GLUCOSE SERPL-MCNC: 125 MG/DL (ref 70–110)
HBA1C MFR BLD: 6.4 % (ref 4–5.6)
POTASSIUM SERPL-SCNC: 4 MMOL/L (ref 3.5–5.1)
PROT SERPL-MCNC: 7.7 G/DL (ref 6–8.4)
SODIUM SERPL-SCNC: 143 MMOL/L (ref 136–145)

## 2022-06-29 PROCEDURE — 99999 PR PBB SHADOW E&M-EST. PATIENT-LVL III: ICD-10-PCS | Mod: PBBFAC,,, | Performed by: FAMILY MEDICINE

## 2022-06-29 PROCEDURE — 3075F PR MOST RECENT SYSTOLIC BLOOD PRESS GE 130-139MM HG: ICD-10-PCS | Mod: CPTII,S$GLB,, | Performed by: FAMILY MEDICINE

## 2022-06-29 PROCEDURE — 1126F AMNT PAIN NOTED NONE PRSNT: CPT | Mod: CPTII,S$GLB,, | Performed by: FAMILY MEDICINE

## 2022-06-29 PROCEDURE — 99214 PR OFFICE/OUTPT VISIT, EST, LEVL IV, 30-39 MIN: ICD-10-PCS | Mod: S$GLB,,, | Performed by: FAMILY MEDICINE

## 2022-06-29 PROCEDURE — 36415 COLL VENOUS BLD VENIPUNCTURE: CPT | Mod: PO | Performed by: FAMILY MEDICINE

## 2022-06-29 PROCEDURE — 3079F PR MOST RECENT DIASTOLIC BLOOD PRESSURE 80-89 MM HG: ICD-10-PCS | Mod: CPTII,S$GLB,, | Performed by: FAMILY MEDICINE

## 2022-06-29 PROCEDURE — 3075F SYST BP GE 130 - 139MM HG: CPT | Mod: CPTII,S$GLB,, | Performed by: FAMILY MEDICINE

## 2022-06-29 PROCEDURE — 3079F DIAST BP 80-89 MM HG: CPT | Mod: CPTII,S$GLB,, | Performed by: FAMILY MEDICINE

## 2022-06-29 PROCEDURE — 99214 OFFICE O/P EST MOD 30 MIN: CPT | Mod: S$GLB,,, | Performed by: FAMILY MEDICINE

## 2022-06-29 PROCEDURE — 99999 PR PBB SHADOW E&M-EST. PATIENT-LVL III: CPT | Mod: PBBFAC,,, | Performed by: FAMILY MEDICINE

## 2022-06-29 PROCEDURE — 80053 COMPREHEN METABOLIC PANEL: CPT | Performed by: FAMILY MEDICINE

## 2022-06-29 PROCEDURE — 83036 HEMOGLOBIN GLYCOSYLATED A1C: CPT | Performed by: FAMILY MEDICINE

## 2022-06-29 PROCEDURE — 1126F PR PAIN SEVERITY QUANTIFIED, NO PAIN PRESENT: ICD-10-PCS | Mod: CPTII,S$GLB,, | Performed by: FAMILY MEDICINE

## 2022-06-29 RX ORDER — METFORMIN HYDROCHLORIDE 500 MG/1
500 TABLET, EXTENDED RELEASE ORAL 2 TIMES DAILY WITH MEALS
Qty: 180 TABLET | Refills: 1 | Status: SHIPPED | OUTPATIENT
Start: 2022-06-29 | End: 2022-12-13 | Stop reason: SDUPTHER

## 2022-06-29 NOTE — PROGRESS NOTES
Subjective:       Patient ID: Carol Yadav is a 86 y.o. female.    Chief Complaint: Diabetes (Follow up )      HPI  87 yo female presents for dm f/u. Brought bg journal and it has improved. States she is taking her meds. Is attempt to walk more. Denies any other issues.    Review of Systems   Constitutional: Negative.    HENT: Negative.    Respiratory: Negative.    Cardiovascular: Negative.    Gastrointestinal: Negative.    Endocrine: Negative.    Genitourinary: Negative.    Musculoskeletal: Negative.    Neurological: Negative.    Psychiatric/Behavioral: Negative.           Past Medical History:   Diagnosis Date    Allergic rhinitis     Arthritis     Diabetes mellitus, new onset 4/16/2022    Elevated blood pressure reading without diagnosis of hypertension     Hormone replacement therapy (postmenopausal)     Hyperlipidemia     Hypertension     Stroke of right basal ganglia 12/2014     Past Surgical History:   Procedure Laterality Date    APPENDECTOMY      BREAST BIOPSY      CATARACT EXTRACTION Bilateral at age 62 or 72    monovision     EYE SURGERY      hai cataract surgery    OOPHORECTOMY      PINNING OF HIP Left 6/16/2021    Procedure: PINNING, HIP;  Surgeon: Dae Bernal MD;  [facility]: [facility];  Service: Orthopedics;  Laterality: Left;    TONSILLECTOMY       Family History   Problem Relation Age of Onset    Breast cancer Mother     Brain cancer Mother         Metastatic from breast    Stroke Father     No Known Problems Sister     No Known Problems Brother     No Known Problems Daughter     No Known Problems Daughter     Schizophrenia Daughter     No Known Problems Maternal Aunt     No Known Problems Maternal Uncle     No Known Problems Paternal Aunt     No Known Problems Paternal Uncle     No Known Problems Maternal Grandmother     No Known Problems Maternal Grandfather     No Known Problems Paternal Grandmother     No Known Problems Paternal Grandfather     Amblyopia Neg Hx      Blindness Neg Hx     Cataracts Neg Hx     Diabetes Neg Hx     Glaucoma Neg Hx     Hypertension Neg Hx     Macular degeneration Neg Hx     Retinal detachment Neg Hx     Strabismus Neg Hx     Thyroid disease Neg Hx      Social History     Socioeconomic History    Marital status:    Tobacco Use    Smoking status: Former Smoker     Packs/day: 2.00     Years: 36.00     Pack years: 72.00     Types: Cigarettes     Start date:      Quit date:      Years since quittin.5    Smokeless tobacco: Never Used   Substance and Sexual Activity    Alcohol use: Not Currently     Alcohol/week: 21.0 standard drinks     Types: 7 Glasses of wine, 14 Standard drinks or equivalent per week    Drug use: No    Sexual activity: Not Currently     Partners: Male     Comment: 2017 divorce      Social Determinants of Health     Financial Resource Strain: Low Risk     Difficulty of Paying Living Expenses: Not hard at all   Food Insecurity: No Food Insecurity    Worried About Running Out of Food in the Last Year: Never true    Ran Out of Food in the Last Year: Never true   Transportation Needs: No Transportation Needs    Lack of Transportation (Medical): No    Lack of Transportation (Non-Medical): No   Physical Activity: Insufficiently Active    Days of Exercise per Week: 5 days    Minutes of Exercise per Session: 10 min   Stress: No Stress Concern Present    Feeling of Stress : Only a little   Social Connections: Socially Isolated    Frequency of Communication with Friends and Family: More than three times a week    Frequency of Social Gatherings with Friends and Family: Once a week    Attends Pent[employer]al Services: Never    Active Member of Clubs or Organizations: No    Marital Status:    Housing Stability: Low Risk     Unable to Pay for Housing in the Last Year: No    Number of Places Lived in the Last Year: 1    Unstable Housing in the Last Year: No       Current Outpatient  Medications:     acetaminophen (TYLENOL) 325 MG tablet, Take 2 tablets (650 mg total) by mouth every 4 (four) hours as needed., Disp: , Rfl: 0    alendronate (FOSAMAX) 70 MG tablet, TAKE 1 TABLET BY MOUTH EVERY 7 DAYS., Disp: 4 tablet, Rfl: 2    amoxicillin-clavulanate 875-125mg (AUGMENTIN) 875-125 mg per tablet, Take 1 tablet by mouth 2 (two) times daily., Disp: , Rfl:     aspirin 81 MG Chew, CHEW AND SWALLOW 1 TABLET TWICE DAILY., Disp: 60 tablet, Rfl: 2    atorvastatin (LIPITOR) 40 MG tablet, TAKE 1 TABLET BY MOUTH ONCE DAILY., Disp: 30 tablet, Rfl: 2    blood sugar diagnostic Strp, To check BG 1 times daily, to use with insurance preferred meter, Disp: 100 each, Rfl: 3    blood-glucose meter kit, To check BG 1 times daily, to use with insurance preferred meter, Disp: 1 each, Rfl: 0    calcium carbonate (CALCIUM 600) 600 mg calcium (1,500 mg) Tab, Take 1 tablet (600 mg total) by mouth once daily. (Patient not taking: No sig reported), Disp: 90 tablet, Rfl: 1    calcium carbonate (TUMS) 200 mg calcium (500 mg) chewable tablet, Take 1 tablet (500 mg total) by mouth 2 (two) times daily as needed for Heartburn., Disp: , Rfl:     celecoxib (CELEBREX) 200 MG capsule, TAKE 1 CAPSULE BY MOUTH TWICE DAILY., Disp: 60 capsule, Rfl: 0    CEROVITE SENIOR 0.4 mg-300 mcg- 250 mcg Tab, TAKE 1 TABLET BY MOUTH ONCE DAILY., Disp: 30 tablet, Rfl: 0    flash glucose scanning reader (FREESTYLE YESY 14 DAY READER) Misc, 1 application by Misc.(Non-Drug; Combo Route) route 3 (three) times daily with meals., Disp: 2 each, Rfl: 2    flash glucose sensor (FREESTYLE YESY 14 DAY SENSOR) Kit, 1 application by Misc.(Non-Drug; Combo Route) route 3 (three) times daily with meals., Disp: 2 kit, Rfl: 3    fluticasone propionate (FLONASE) 50 mcg/actuation nasal spray, 1 SPRAY IN EACH NOSTRIL TWICE A DAY AS NEEDED FOR RHINITIS (Patient not taking: Reported on 4/20/2022), Disp: 16 g, Rfl: 11    guaiFENesin 100 mg/5 ml (ROBITUSSIN) 100  mg/5 mL syrup, Take 200 mg by mouth 3 (three) times daily as needed for Cough., Disp: , Rfl:     HYDROcodone-acetaminophen (NORCO) 5-325 mg per tablet, Take 1 tablet by mouth every 6 (six) hours as needed for Pain. (Patient not taking: No sig reported), Disp: 30 tablet, Rfl: 0    ketoconazole (NIZORAL) 2 % cream, APPLY TO AFFECTED AREA ONCE DAILY., Disp: 60 g, Rfl: 0    lancets Misc, To check BG 1 times daily, to use with insurance preferred meter, Disp: 100 each, Rfl: 3    levothyroxine (SYNTHROID) 25 MCG tablet, Take 1 tablet by mouth before first meal of the day., Disp: 90 tablet, Rfl: 3    lisinopriL (PRINIVIL,ZESTRIL) 2.5 MG tablet, Take 1 tablet (2.5 mg total) by mouth once daily., Disp: 90 tablet, Rfl: 3    melatonin (MELATIN) 3 mg tablet, Take 2 tablets (6 mg total) by mouth nightly as needed for Insomnia. (Patient not taking: No sig reported), Disp: 30 tablet, Rfl: 0    metFORMIN (GLUCOPHAGE-XR) 500 MG ER 24hr tablet, Take 1 tablet (500 mg total) by mouth 2 (two) times daily with meals., Disp: 180 tablet, Rfl: 1    mirtazapine (REMERON) 7.5 MG Tab, TAKE 1 TABLET BY MOUTH IN THE EVENING., Disp: 30 tablet, Rfl: 0    multivit-minerals/folic/ginkgo (ONE DAILY WOMEN 50 PLUS ORAL), Take 1 tablet by mouth once daily., Disp: , Rfl:     multivitamin-minerals-lutein (MULTIVITAMIN 50 PLUS) Tab, Take 1 tablet by mouth once daily., Disp: 90 tablet, Rfl: 3    MYRBETRIQ 50 mg Tb24, TAKE 1 TABLET BY MOUTH ONCE DAILY., Disp: 30 tablet, Rfl: 11    olopatadine (PATANOL) 0.1 % ophthalmic solution, PLACE 1 DROP IN EACH EYE TWICE DAILY., Disp: 5 mL, Rfl: 0    omeprazole (PRILOSEC) 20 MG capsule, TAKE 1 CAPSULE BY MOUTH EVERY MORNING., Disp: 30 capsule, Rfl: 0    polyethylene glycol (MIRALAX) 17 gram PwPk, Take 17 g by mouth daily as needed. Take every other day, Disp: 30 packet, Rfl: 11    saliva substitute combo no.9 (BIOTENE DRY MOUTH ORAL RINSE) Mwsh, by Mucous Membrane route. Use as directed as needed for dry  "mouth, Disp: , Rfl:     venlafaxine (EFFEXOR-XR) 150 MG Cp24, Take 1 capsule (150 mg total) by mouth once daily., Disp: 90 capsule, Rfl: 3    vitamin D (VITAMIN D3) 1000 units Tab, TAKE 1 TABLET BY MOUTH ONCE DAILY., Disp: 30 tablet, Rfl: 0    water Liqd 146 mL with MILK OF MAGNESIA 400 mg/5 mL Susp 400 mg, diphenhydrAMINE 12.5 mg/5 mL Elix 60 mg, nystatin 100,000 unit/mL Susp 500,000 Units, Take 5 mLs by mouth 3 (three) times daily as needed (mouth sores). (Patient not taking: No sig reported), Disp: 1 Bottle, Rfl: 0   Objective:      Vitals:    06/29/22 1249   BP: 138/82   BP Location: Right arm   Patient Position: Sitting   BP Method: Small (Manual)   Pulse: 99   Resp: 16   Temp: 97.5 °F (36.4 °C)   TempSrc: Oral   SpO2: 95%   Weight: 72 kg (158 lb 11.7 oz)   Height: 5' 2" (1.575 m)       Physical Exam  Constitutional:       General: She is not in acute distress.     Appearance: She is not diaphoretic.   HENT:      Head: Normocephalic and atraumatic.   Eyes:      Conjunctiva/sclera: Conjunctivae normal.   Pulmonary:      Effort: Pulmonary effort is normal.   Musculoskeletal:         General: Normal range of motion.      Cervical back: Neck supple.   Skin:     Findings: No rash.   Neurological:      Mental Status: She is alert and oriented to person, place, and time.   Psychiatric:         Behavior: Behavior normal.         Thought Content: Thought content normal.         Judgment: Judgment normal.            Assessment:       1. New onset type 2 diabetes mellitus    2. Physical deconditioning    3. Hypertension, well controlled    4. Essential hypertension    5. H/O: CVA (cerebrovascular accident)        Plan:       New onset type 2 diabetes mellitus  -     metFORMIN (GLUCOPHAGE-XR) 500 MG ER 24hr tablet; Take 1 tablet (500 mg total) by mouth 2 (two) times daily with meals.  Dispense: 180 tablet; Refill: 1  -     Hemoglobin A1C; Future; Expected date: 06/29/2022  -     Comprehensive Metabolic Panel; Future; " Expected date: 06/29/2022    Physical deconditioning    Hypertension, well controlled    Essential hypertension    H/O: CVA (cerebrovascular accident)    Follow-up in 3 months.            Patient note was created using [employer].  Any errors in syntax or even information may not have been identified and edited on initial review prior to signing this note.

## 2022-07-07 DIAGNOSIS — K21.9 GASTROESOPHAGEAL REFLUX DISEASE, UNSPECIFIED WHETHER ESOPHAGITIS PRESENT: Primary | ICD-10-CM

## 2022-07-07 NOTE — TELEPHONE ENCOUNTER
----- Message from Carmencita Borjas sent at 7/7/2022  2:05 PM CDT -----  [phone]  Type: RX Refill Request    Who Called: [facility]    Have you contacted your pharmacy: Yes. Pharmacy is calling. 2nd call to get medications refilled so they can deliver the patient's medications.     Refill or New Rx: Refill     RX Name and Strength: calcium carbonate (TUMS) 200 mg calcium (500 mg) chewable tablet//  [facility] states it should be 600 mg.     Is this a 30 day or 90 day RX: 90 day    Preferred Pharmacy with phone number: .  [facility] - [place] - [address]  [address]  [place] [address]  Phone: [phone] Fax: [phone]    Local or Mail Order: Local    Would the patient rather a call back or a response via [facility]? Call back    Best Call Back Number: [phone]    Additional Information: Please send in ASAP!

## 2022-07-11 RX ORDER — CALCIUM CARBONATE 200(500)MG
500 TABLET,CHEWABLE ORAL 2 TIMES DAILY PRN
Qty: 180 TABLET | Refills: 0 | Status: SHIPPED | OUTPATIENT
Start: 2022-07-11 | End: 2023-08-23 | Stop reason: SDUPTHER

## 2022-07-29 DIAGNOSIS — R27.0 ATAXIA: ICD-10-CM

## 2022-07-29 NOTE — TELEPHONE ENCOUNTER
----- Message from Laura Maria sent at 7/29/2022  2:29 PM CDT -----  Type: RX Refill Request    Who Called: [facility] [phone]    Have you contacted your pharmacy:    Refill or New Rx:calcium carbonate (CALCIUM 600) 600 mg calcium (1,500 mg) Tab    RX Name and Strength:    How is the patient currently taking it? (ex. 1XDay):    Is this a 30 day or 90 day RX:    Preferred Pharmacy with phone number:    Local or Mail Order:    Ordering Provider:    Would the patient rather a call back or a response via [facility]?     Best Call Back Number:    Additional Information:

## 2022-08-02 RX ORDER — CALCIUM CARBONATE 600 MG
1 TABLET ORAL DAILY
Qty: 90 TABLET | Refills: 3 | Status: SHIPPED | OUTPATIENT
Start: 2022-08-02 | End: 2023-07-05

## 2022-08-09 ENCOUNTER — TELEPHONE (OUTPATIENT)
Dept: FAMILY MEDICINE | Facility: CLINIC | Age: 87
End: 2022-08-09
Payer: MEDICARE

## 2022-08-09 DIAGNOSIS — R13.10 DYSPHAGIA, UNSPECIFIED TYPE: Primary | ICD-10-CM

## 2022-08-09 NOTE — TELEPHONE ENCOUNTER
----- Message from Leslie Weinberg sent at 8/9/2022 12:37 PM CDT -----  Regarding: self  .Type: Patient Call Back    Who called: self      What is the request in detail:Pt is requesting to speak with nurse in regards to going to a ENT     Can the clinic reply by [facility]? Call back     Would the patient rather a call back or a response via [facility]?  Call back     Best call back number: .[phone]

## 2022-08-09 NOTE — TELEPHONE ENCOUNTER
Patient she has a cough and difficulty swallowing her pills. Would to know what she should do or see.

## 2022-08-09 NOTE — TELEPHONE ENCOUNTER
----- Message from Tianna Weinberg sent at 8/9/2022  8:32 AM CDT -----  Regarding: daughter  .Type: Patient Call Back    Who called: Fatou     What is the request in detail: daughter has questions regarding the current medication patient is taking - celebrex and side effects     Can the clinic reply by [facility]?    Would the patient rather a call back or a response via [facility]? Call     Best call back number   .[phone]

## 2022-08-10 ENCOUNTER — TELEPHONE (OUTPATIENT)
Dept: FAMILY MEDICINE | Facility: CLINIC | Age: 87
End: 2022-08-10
Payer: MEDICARE

## 2022-08-10 NOTE — TELEPHONE ENCOUNTER
Patient informed that referral has been placed and that referral department will call to schedule an appointment.  Patient verbalized understanding.

## 2022-08-10 NOTE — TELEPHONE ENCOUNTER
Cardiomems PA pressure sensor monitor remote transmissions reviewed 7/18/20-7/27/20  PAD range 23-25 mmHg.  Goal 20-24 mmHg    Most readings within goal. Recommend CPM.      LVM for Fatou jensen's daughter to return the call.

## 2022-08-10 NOTE — LETTER
August 26, 2022    Carol Yadav  [address]  [address]  [place] [address]             [facility]  [address] [facility] [address]  Phone: [phone]  Fax: [phone] To Whom It May Concern:    Patient Carol Yadav may discontinue the use of Celebrex.     If you have any questions or concerns, please don't hesitate to contact the office at [phone]      Sincerely,    Jatin Yadav MD

## 2022-08-10 NOTE — TELEPHONE ENCOUNTER
"Fatou/patient's daughter informed of Dr. Yadav's note.  Daughter verbalized understanding, stated she will inform patient of the response.     Dr. Yadav's note regarding the patient taking Celebrex--  "It is a prn med so she should not take it everyday. If she would be switched over to advil or another med then that could give the same risk which is pretty low in general. If she feels safer not taking it then she can take tylenol prn."    "

## 2022-08-11 ENCOUNTER — TELEPHONE (OUTPATIENT)
Dept: FAMILY MEDICINE | Facility: CLINIC | Age: 87
End: 2022-08-11
Payer: MEDICARE

## 2022-08-11 ENCOUNTER — TELEPHONE (OUTPATIENT)
Dept: ADMINISTRATIVE | Facility: HOSPITAL | Age: 87
End: 2022-08-11
Payer: MEDICARE

## 2022-08-11 DIAGNOSIS — J01.90 ACUTE BACTERIAL SINUSITIS: Primary | ICD-10-CM

## 2022-08-11 DIAGNOSIS — B96.89 ACUTE BACTERIAL SINUSITIS: Primary | ICD-10-CM

## 2022-08-11 RX ORDER — AZITHROMYCIN 250 MG/1
TABLET, FILM COATED ORAL
Qty: 6 TABLET | Refills: 0 | Status: SHIPPED | OUTPATIENT
Start: 2022-08-11 | End: 2022-08-16

## 2022-08-11 RX ORDER — PROMETHAZINE HYDROCHLORIDE AND DEXTROMETHORPHAN HYDROBROMIDE 6.25; 15 MG/5ML; MG/5ML
5 SYRUP ORAL NIGHTLY PRN
Qty: 118 ML | Refills: 0 | Status: SHIPPED | OUTPATIENT
Start: 2022-08-11 | End: 2022-08-21

## 2022-08-11 NOTE — TELEPHONE ENCOUNTER
----- Message from Celine Barrett sent at 8/10/2022  3:07 PM CDT -----  Type:  Patient Returning Call    Who Called: self    Who Left Message for Patient: Ann    Does the patient know what this is regarding?:yes. States that nursing home is going to continue giving medication at previous dosage. Needs someone to contact the nursing home.    Would the patient rather a call back or a response via [facility]? call    Best Call Back Number:[phone] (home)

## 2022-08-11 NOTE — TELEPHONE ENCOUNTER
----- Message from Eddjuvevjcharlette Jose sent at 8/11/2022 12:56 PM CDT -----  Regarding: request for medication for eye and cough  Type: Patient Call Back    Who called:Carol     What is the request in detail: the patient is calling to request medication for her eye and  a cough. She stated that for the last 2 days, her eyes have been irritated on the outside and inside. It is also running. Patient was asked if they were red, she stated that she has not looked. Also she stated that she has a cough for the last week that has now turned into a cold. She would like cough syrup called in. Please return call at earliest convenience.     Can the clinic reply by [facility]?no    Would the patient rather a call back or a response via [facility]? Call back    Best call back number:[phone]

## 2022-08-11 NOTE — TELEPHONE ENCOUNTER
Spoke with the nurse Vonda at [facility] and was told any change or discontinuation of this medication must be a written order. (Fax# [phone])

## 2022-08-11 NOTE — TELEPHONE ENCOUNTER
----- Message from Celine Barrett sent at 8/11/2022  9:26 AM CDT -----  Type: Patient Call Back    Who called:self    What is the request in detail:pt has a persistant cough and wants to know if cough medicine can be prescibed. Please call  .  Can the clinic reply by [facility]? no    Would the patient rather a call back or a response via [facility]? call    Best call back number:[phone] (home)

## 2022-08-26 NOTE — TELEPHONE ENCOUNTER
----- Message from Madiha Stevens sent at 8/26/2022  9:02 AM CDT -----  Regarding: Self/   [phone]  Type: Patient Call Back    Who called:  Patient    What is the request in detail:  Patient is asking for a call back from staff regarding stopping a medication.  Then medication she's asking to stop is celecoxib (CELEBREX) 200 MG capsule, but she's wanting to speak to her PCP.  Thank you    Would the patient rather a call back or a response via [facility]?  Call back    Best call back number:  [phone]          Thank you

## 2022-08-26 NOTE — TELEPHONE ENCOUNTER
LVM advising patient a letter has been generated to discontinue the use of Celebrex. Also advised her daughter Lindsay.

## 2022-09-08 ENCOUNTER — TELEPHONE (OUTPATIENT)
Dept: FAMILY MEDICINE | Facility: CLINIC | Age: 87
End: 2022-09-08
Payer: MEDICARE

## 2022-09-08 NOTE — TELEPHONE ENCOUNTER
----- Message from Sindy Pringle sent at 9/8/2022 11:05 AM CDT -----  Type:  Pharmacy Calling to Clarify an RX    Name of Caller: \    Pharmacy Name:   [facility] - [place] - [address]  [address]  [place] [address]  Phone: [phone] Fax: [phone]        Prescription Name: ketoconazole (NIZORAL) 2 % cream    What do they need to clarify? Can pt use as needed?    Can you be contacted via [facility]?    Best Call Back Number:     Additional Information:

## 2022-09-15 DIAGNOSIS — H04.203 BILATERAL EPIPHORA: ICD-10-CM

## 2022-09-15 DIAGNOSIS — M81.0 AGE-RELATED OSTEOPOROSIS WITHOUT CURRENT PATHOLOGICAL FRACTURE: ICD-10-CM

## 2022-09-15 DIAGNOSIS — G47.00 INSOMNIA, UNSPECIFIED TYPE: ICD-10-CM

## 2022-09-15 RX ORDER — CELECOXIB 200 MG/1
CAPSULE ORAL
Qty: 60 CAPSULE | Refills: 0 | Status: SHIPPED | OUTPATIENT
Start: 2022-09-15 | End: 2022-10-06

## 2022-09-15 RX ORDER — MULTIVIT-MIN/FA/LYCOPEN/LUTEIN .4-300-25
TABLET ORAL
Qty: 30 TABLET | Refills: 0 | Status: SHIPPED | OUTPATIENT
Start: 2022-09-15 | End: 2022-10-21

## 2022-09-15 RX ORDER — CHOLECALCIFEROL (VITAMIN D3) 25 MCG
TABLET ORAL
Qty: 30 TABLET | Refills: 0 | Status: SHIPPED | OUTPATIENT
Start: 2022-09-15 | End: 2022-10-21

## 2022-09-15 RX ORDER — OLOPATADINE HYDROCHLORIDE 1 MG/ML
SOLUTION/ DROPS OPHTHALMIC
Qty: 5 ML | Refills: 0 | Status: SHIPPED | OUTPATIENT
Start: 2022-09-15 | End: 2022-10-21

## 2022-09-15 RX ORDER — MIRTAZAPINE 7.5 MG/1
TABLET, FILM COATED ORAL
Qty: 90 TABLET | Refills: 1 | Status: SHIPPED | OUTPATIENT
Start: 2022-09-15 | End: 2023-03-09

## 2022-09-15 RX ORDER — TALC
POWDER (GRAM) TOPICAL
Qty: 60 TABLET | Refills: 0 | Status: SHIPPED | OUTPATIENT
Start: 2022-09-15 | End: 2022-10-21

## 2022-09-15 NOTE — TELEPHONE ENCOUNTER
No new care gaps identified.  [facility] Embedded Care Gaps. Reference number: [id number]. 9/15/2022   4:04:01 PM CDT

## 2022-09-15 NOTE — TELEPHONE ENCOUNTER
Refill Routing Note   Medication(s) are not appropriate for processing by [facility] for the following reason(s):      - Outside of protocol    [facility] action(s):  Approve  Route          Medication reconciliation completed: No     Appointments  past 12m or future 3m with PCP    Date Provider   Last Visit   6/29/2022 Jatin Yadav MD   Next Visit   9/29/2022 Jatin Yadav MD   ED visits in past 90 days: 0        Note composed:4:12 PM 09/15/2022

## 2022-09-16 ENCOUNTER — TELEPHONE (OUTPATIENT)
Dept: FAMILY MEDICINE | Facility: CLINIC | Age: 87
End: 2022-09-16
Payer: MEDICARE

## 2022-09-16 NOTE — TELEPHONE ENCOUNTER
Pharmacist states RX for asa 81mg was sent in for once daily but pt has been taking twice daily (previous RX); please advise correct directions

## 2022-09-16 NOTE — TELEPHONE ENCOUNTER
----- Message from Jameel Henderson sent at 9/16/2022 12:39 PM CDT -----  Name of Who is Calling: [facility] on behalf of COLIN HOOVER [[id number]]            What is the request in detail: Patient is requesting call back for directions on Asprin 81mg medication is it once a day or twice              Can the clinic reply by [facility]: no              What Number to Call Back if not in [facility]: [phone]

## 2022-10-04 NOTE — PROGRESS NOTES
Health Maintenance Due   Topic     Diabetes Urine Screening  Consult pcp      TETANUS VACCINE      Shingles Vaccine (1 of 2)  hx chicken pox. Notified pt can get vaccine at pharmacy      Eye Exam      DEXA Scan  Pending order      COVID-19 Vaccine (4 - Booster for Pfizer series)     Influenza Vaccine (1) Pt agree to get today

## 2022-10-06 ENCOUNTER — LAB VISIT (OUTPATIENT)
Dept: LAB | Facility: HOSPITAL | Age: 87
End: 2022-10-06
Attending: FAMILY MEDICINE
Payer: MEDICARE

## 2022-10-06 ENCOUNTER — OFFICE VISIT (OUTPATIENT)
Dept: FAMILY MEDICINE | Facility: CLINIC | Age: 87
End: 2022-10-06
Payer: MEDICARE

## 2022-10-06 VITALS
TEMPERATURE: 98 F | BODY MASS INDEX: 26.65 KG/M2 | SYSTOLIC BLOOD PRESSURE: 102 MMHG | WEIGHT: 144.81 LBS | HEIGHT: 62 IN | OXYGEN SATURATION: 95 % | HEART RATE: 85 BPM | RESPIRATION RATE: 18 BRPM | DIASTOLIC BLOOD PRESSURE: 60 MMHG

## 2022-10-06 DIAGNOSIS — E03.8 SUBCLINICAL HYPOTHYROIDISM: ICD-10-CM

## 2022-10-06 DIAGNOSIS — R53.81 PHYSICAL DECONDITIONING: ICD-10-CM

## 2022-10-06 DIAGNOSIS — E11.9 TYPE 2 DIABETES MELLITUS WITHOUT COMPLICATION, WITHOUT LONG-TERM CURRENT USE OF INSULIN: Primary | ICD-10-CM

## 2022-10-06 DIAGNOSIS — Z23 INFLUENZA VACCINE NEEDED: ICD-10-CM

## 2022-10-06 DIAGNOSIS — Z86.73 H/O: CVA (CEREBROVASCULAR ACCIDENT): ICD-10-CM

## 2022-10-06 DIAGNOSIS — I50.32 CHRONIC DIASTOLIC HEART FAILURE: ICD-10-CM

## 2022-10-06 DIAGNOSIS — Z78.0 POST-MENOPAUSAL: ICD-10-CM

## 2022-10-06 DIAGNOSIS — E11.9 TYPE 2 DIABETES MELLITUS WITHOUT COMPLICATION, WITHOUT LONG-TERM CURRENT USE OF INSULIN: ICD-10-CM

## 2022-10-06 DIAGNOSIS — I70.0 ATHEROSCLEROSIS OF AORTA: ICD-10-CM

## 2022-10-06 DIAGNOSIS — I10 HYPERTENSION, WELL CONTROLLED: ICD-10-CM

## 2022-10-06 DIAGNOSIS — E78.2 MIXED HYPERLIPIDEMIA: ICD-10-CM

## 2022-10-06 LAB
ALBUMIN SERPL BCP-MCNC: 3.6 G/DL (ref 3.5–5.2)
ALP SERPL-CCNC: 86 U/L (ref 55–135)
ALT SERPL W/O P-5'-P-CCNC: 13 U/L (ref 10–44)
ANION GAP SERPL CALC-SCNC: 12 MMOL/L (ref 8–16)
AST SERPL-CCNC: 21 U/L (ref 10–40)
BILIRUB SERPL-MCNC: 0.4 MG/DL (ref 0.1–1)
BUN SERPL-MCNC: 16 MG/DL (ref 8–23)
CALCIUM SERPL-MCNC: 9.5 MG/DL (ref 8.7–10.5)
CHLORIDE SERPL-SCNC: 108 MMOL/L (ref 95–110)
CO2 SERPL-SCNC: 21 MMOL/L (ref 23–29)
CREAT SERPL-MCNC: 0.8 MG/DL (ref 0.5–1.4)
EST. GFR  (NO RACE VARIABLE): >60 ML/MIN/1.73 M^2
ESTIMATED AVG GLUCOSE: 117 MG/DL (ref 68–131)
GLUCOSE SERPL-MCNC: 111 MG/DL (ref 70–110)
HBA1C MFR BLD: 5.7 % (ref 4–5.6)
POTASSIUM SERPL-SCNC: 4.2 MMOL/L (ref 3.5–5.1)
PROT SERPL-MCNC: 7.1 G/DL (ref 6–8.4)
SODIUM SERPL-SCNC: 141 MMOL/L (ref 136–145)
T4 FREE SERPL-MCNC: 0.84 NG/DL (ref 0.71–1.51)
TSH SERPL DL<=0.005 MIU/L-ACNC: 2.41 UIU/ML (ref 0.4–4)

## 2022-10-06 PROCEDURE — 1126F PR PAIN SEVERITY QUANTIFIED, NO PAIN PRESENT: ICD-10-PCS | Mod: CPTII,S$GLB,, | Performed by: FAMILY MEDICINE

## 2022-10-06 PROCEDURE — 3074F SYST BP LT 130 MM HG: CPT | Mod: CPTII,S$GLB,, | Performed by: FAMILY MEDICINE

## 2022-10-06 PROCEDURE — 84443 ASSAY THYROID STIM HORMONE: CPT | Performed by: FAMILY MEDICINE

## 2022-10-06 PROCEDURE — 3078F PR MOST RECENT DIASTOLIC BLOOD PRESSURE < 80 MM HG: ICD-10-PCS | Mod: CPTII,S$GLB,, | Performed by: FAMILY MEDICINE

## 2022-10-06 PROCEDURE — 3288F PR FALLS RISK ASSESSMENT DOCUMENTED: ICD-10-PCS | Mod: CPTII,S$GLB,, | Performed by: FAMILY MEDICINE

## 2022-10-06 PROCEDURE — 3288F FALL RISK ASSESSMENT DOCD: CPT | Mod: CPTII,S$GLB,, | Performed by: FAMILY MEDICINE

## 2022-10-06 PROCEDURE — 90694 VACC AIIV4 NO PRSRV 0.5ML IM: CPT | Mod: S$GLB,,, | Performed by: FAMILY MEDICINE

## 2022-10-06 PROCEDURE — 99214 PR OFFICE/OUTPT VISIT, EST, LEVL IV, 30-39 MIN: ICD-10-PCS | Mod: 25,S$GLB,, | Performed by: FAMILY MEDICINE

## 2022-10-06 PROCEDURE — 1101F PR PT FALLS ASSESS DOC 0-1 FALLS W/OUT INJ PAST YR: ICD-10-PCS | Mod: CPTII,S$GLB,, | Performed by: FAMILY MEDICINE

## 2022-10-06 PROCEDURE — 36415 COLL VENOUS BLD VENIPUNCTURE: CPT | Mod: PO | Performed by: FAMILY MEDICINE

## 2022-10-06 PROCEDURE — 83036 HEMOGLOBIN GLYCOSYLATED A1C: CPT | Performed by: FAMILY MEDICINE

## 2022-10-06 PROCEDURE — 1159F MED LIST DOCD IN RCRD: CPT | Mod: CPTII,S$GLB,, | Performed by: FAMILY MEDICINE

## 2022-10-06 PROCEDURE — 99999 PR PBB SHADOW E&M-EST. PATIENT-LVL V: CPT | Mod: PBBFAC,,, | Performed by: FAMILY MEDICINE

## 2022-10-06 PROCEDURE — G0008 ADMIN INFLUENZA VIRUS VAC: HCPCS | Mod: S$GLB,,, | Performed by: FAMILY MEDICINE

## 2022-10-06 PROCEDURE — 99999 PR PBB SHADOW E&M-EST. PATIENT-LVL V: ICD-10-PCS | Mod: PBBFAC,,, | Performed by: FAMILY MEDICINE

## 2022-10-06 PROCEDURE — 1159F PR MEDICATION LIST DOCUMENTED IN MEDICAL RECORD: ICD-10-PCS | Mod: CPTII,S$GLB,, | Performed by: FAMILY MEDICINE

## 2022-10-06 PROCEDURE — 1126F AMNT PAIN NOTED NONE PRSNT: CPT | Mod: CPTII,S$GLB,, | Performed by: FAMILY MEDICINE

## 2022-10-06 PROCEDURE — 3074F PR MOST RECENT SYSTOLIC BLOOD PRESSURE < 130 MM HG: ICD-10-PCS | Mod: CPTII,S$GLB,, | Performed by: FAMILY MEDICINE

## 2022-10-06 PROCEDURE — 99214 OFFICE O/P EST MOD 30 MIN: CPT | Mod: 25,S$GLB,, | Performed by: FAMILY MEDICINE

## 2022-10-06 PROCEDURE — 3078F DIAST BP <80 MM HG: CPT | Mod: CPTII,S$GLB,, | Performed by: FAMILY MEDICINE

## 2022-10-06 PROCEDURE — 1101F PT FALLS ASSESS-DOCD LE1/YR: CPT | Mod: CPTII,S$GLB,, | Performed by: FAMILY MEDICINE

## 2022-10-06 PROCEDURE — 80053 COMPREHEN METABOLIC PANEL: CPT | Performed by: FAMILY MEDICINE

## 2022-10-06 PROCEDURE — 90694 FLU VACCINE - QUADRIVALENT - ADJUVANTED: ICD-10-PCS | Mod: S$GLB,,, | Performed by: FAMILY MEDICINE

## 2022-10-06 PROCEDURE — 84439 ASSAY OF FREE THYROXINE: CPT | Performed by: FAMILY MEDICINE

## 2022-10-06 PROCEDURE — G0008 FLU VACCINE - QUADRIVALENT - ADJUVANTED: ICD-10-PCS | Mod: S$GLB,,, | Performed by: FAMILY MEDICINE

## 2022-10-06 RX ORDER — AMOXICILLIN 500 MG/1
1000 CAPSULE ORAL 2 TIMES DAILY
COMMUNITY
Start: 2022-09-07 | End: 2023-03-28

## 2022-10-06 RX ORDER — BLOOD-GLUCOSE METER
EACH MISCELLANEOUS
COMMUNITY
Start: 2022-05-24

## 2022-10-06 RX ORDER — IBUPROFEN 800 MG/1
800 TABLET ORAL EVERY 6 HOURS PRN
COMMUNITY
Start: 2022-09-07

## 2022-10-06 RX ORDER — LEVOTHYROXINE SODIUM 25 UG/1
TABLET ORAL
Qty: 90 TABLET | Refills: 3 | Status: SHIPPED | OUTPATIENT
Start: 2022-10-06 | End: 2023-09-28

## 2022-10-06 RX ORDER — GLUCOSAM/CHON-MSM1/C/MANG/BOSW 500-416.6
TABLET ORAL
COMMUNITY
Start: 2022-05-24 | End: 2023-04-03

## 2022-10-08 ENCOUNTER — PATIENT MESSAGE (OUTPATIENT)
Dept: FAMILY MEDICINE | Facility: CLINIC | Age: 87
End: 2022-10-08
Payer: MEDICARE

## 2022-10-19 NOTE — PROGRESS NOTES
Subjective:       Patient ID: Carol Yadav is a 87 y.o. female.    Chief Complaint: Follow-up      Follow-up    88 yo female presents for 3 month f/u. Doing well overall. States she continues to have fatigue but endorses decrease exercise. States she likes to play on her ipad. Denies any other issues at this time.    Review of Systems   Constitutional: Negative.    HENT: Negative.     Respiratory: Negative.     Cardiovascular: Negative.    Gastrointestinal: Negative.    Endocrine: Negative.    Genitourinary: Negative.    Musculoskeletal: Negative.    Neurological: Negative.    Psychiatric/Behavioral: Negative.          Past Medical History:   Diagnosis Date    Allergic rhinitis     Arthritis     Diabetes mellitus, new onset 4/16/2022    Elevated blood pressure reading without diagnosis of hypertension     Hormone replacement therapy (postmenopausal)     Hyperlipidemia     Hypertension     Stroke of right basal ganglia 12/2014     Past Surgical History:   Procedure Laterality Date    APPENDECTOMY      BREAST BIOPSY      CATARACT EXTRACTION Bilateral at age 62 or 72    monovision     EYE SURGERY      hai cataract surgery    OOPHORECTOMY      PINNING OF HIP Left 6/16/2021    Procedure: PINNING, HIP;  Surgeon: Dae Bernal MD;  Location: [facility];  Service: Orthopedics;  Laterality: Left;    TONSILLECTOMY       Family History   Problem Relation Age of Onset    Breast cancer Mother     Brain cancer Mother         Metastatic from breast    Stroke Father     No Known Problems Sister     No Known Problems Brother     No Known Problems Daughter     No Known Problems Daughter     Schizophrenia Daughter     No Known Problems Maternal Aunt     No Known Problems Maternal Uncle     No Known Problems Paternal Aunt     No Known Problems Paternal Uncle     No Known Problems Maternal Grandmother     No Known Problems Maternal Grandfather     No Known Problems Paternal Grandmother     No Known Problems Paternal Grandfather      Amblyopia Neg Hx     Blindness Neg Hx     Cataracts Neg Hx     Diabetes Neg Hx     Glaucoma Neg Hx     Hypertension Neg Hx     Macular degeneration Neg Hx     Retinal detachment Neg Hx     Strabismus Neg Hx     Thyroid disease Neg Hx      Social History     Socioeconomic History    Marital status:    Tobacco Use    Smoking status: Former     Packs/day: 2.00     Years: 36.00     Pack years: 72.00     Types: Cigarettes     Start date:      Quit date:      Years since quittin.8    Smokeless tobacco: Never   Substance and Sexual Activity    Alcohol use: Not Currently     Alcohol/week: 21.0 standard drinks     Types: 7 Glasses of wine, 14 Standard drinks or equivalent per week    Drug use: No    Sexual activity: Not Currently     Partners: Male     Comment: 2017 divorce      Social Determinants of Health     Financial Resource Strain: Low Risk     Difficulty of Paying Living Expenses: Not hard at all   Food Insecurity: No Food Insecurity    Worried About Running Out of Food in the Last Year: Never true    Ran Out of Food in the Last Year: Never true   Transportation Needs: No Transportation Needs    Lack of Transportation (Medical): No    Lack of Transportation (Non-Medical): No   Physical Activity: Insufficiently Active    Days of Exercise per Week: 5 days    Minutes of Exercise per Session: 10 min   Stress: No Stress Concern Present    Feeling of Stress : Only a little   Social Connections: Socially Isolated    Frequency of Communication with Friends and Family: More than three times a week    Frequency of Social Gatherings with Friends and Family: Once a week    Attends Rastafari Services: Never    Active Member of Clubs or Organizations: No    Marital Status:    Housing Stability: Low Risk     Unable to Pay for Housing in the Last Year: No    Number of Places Lived in the Last Year: 1    Unstable Housing in the Last Year: No       Current Outpatient Medications:     acetaminophen  (TYLENOL) 325 MG tablet, Take 2 tablets (650 mg total) by mouth every 4 (four) hours as needed., Disp: , Rfl: 0    alendronate (FOSAMAX) 70 MG tablet, TAKE 1 TABLET BY MOUTH EVERY 7 DAYS., Disp: 4 tablet, Rfl: 11    amoxicillin (AMOXIL) 500 MG capsule, Take 1,000 mg by mouth 2 (two) times daily., Disp: , Rfl:     amoxicillin-clavulanate 875-125mg (AUGMENTIN) 875-125 mg per tablet, Take 1 tablet by mouth 2 (two) times daily., Disp: , Rfl:     aspirin 81 MG Chew, Take 1 tablet (81 mg total) by mouth once daily., Disp: 30 tablet, Rfl: 11    atorvastatin (LIPITOR) 40 MG tablet, TAKE 1 TABLET BY MOUTH ONCE DAILY., Disp: 30 tablet, Rfl: 11    blood sugar diagnostic Strp, To check BG 1 times daily, to use with insurance preferred meter, Disp: 100 each, Rfl: 3    blood-glucose meter kit, To check BG 1 times daily, to use with insurance preferred meter, Disp: 1 each, Rfl: 0    calcium carbonate (CALCIUM 600) 600 mg calcium (1,500 mg) Tab, Take 1 tablet (600 mg total) by mouth once daily., Disp: 90 tablet, Rfl: 3    calcium carbonate (TUMS) 200 mg calcium (500 mg) chewable tablet, Take 1 tablet (500 mg total) by mouth 2 (two) times daily as needed for Heartburn., Disp: 180 tablet, Rfl: 0    CERTAVITE SENIOR 0.4 mg-300 mcg- 250 mcg Tab, TAKE 1 TABLET BY MOUTH ONCE DAILY., Disp: 30 tablet, Rfl: 0    flash glucose scanning reader (FREESTYLE YESY 14 DAY READER) Misc, 1 application by Misc.(Non-Drug; Combo Route) route 3 (three) times daily with meals., Disp: 2 each, Rfl: 2    flash glucose sensor (FREESTYLE YESY 14 DAY SENSOR) Kit, 1 application by Misc.(Non-Drug; Combo Route) route 3 (three) times daily with meals., Disp: 2 kit, Rfl: 3    guaiFENesin 100 mg/5 ml (ROBITUSSIN) 100 mg/5 mL syrup, Take 200 mg by mouth 3 (three) times daily as needed for Cough., Disp: , Rfl:     ibuprofen (ADVIL,MOTRIN) 800 MG tablet, Take 800 mg by mouth every 6 (six) hours as needed., Disp: , Rfl:     ketoconazole (NIZORAL) 2 % cream, APPLY TO  AFFECTED AREA ONCE DAILY., Disp: 60 g, Rfl: 0    lancets Misc, To check BG 1 times daily, to use with insurance preferred meter, Disp: 100 each, Rfl: 3    melatonin (MELATIN) 3 mg tablet, TAKE 2 TABLETS BY MOUTH AT BEDTIME., Disp: 60 tablet, Rfl: 0    metFORMIN (GLUCOPHAGE-XR) 500 MG ER 24hr tablet, Take 1 tablet (500 mg total) by mouth 2 (two) times daily with meals., Disp: 180 tablet, Rfl: 1    mirtazapine (REMERON) 7.5 MG Tab, TAKE 1 TABLET BY MOUTH IN THE EVENING., Disp: 90 tablet, Rfl: 1    multivit-minerals/folic/ginkgo (ONE DAILY WOMEN 50 PLUS ORAL), Take 1 tablet by mouth once daily., Disp: , Rfl:     multivitamin-minerals-lutein Tab, Take 1 tablet by mouth once daily., Disp: 30 tablet, Rfl: 0    MYRBETRIQ 50 mg Tb24, TAKE 1 TABLET BY MOUTH ONCE DAILY., Disp: 30 tablet, Rfl: 11    olopatadine (PATANOL) 0.1 % ophthalmic solution, PLACE 1 DROP IN EACH EYE TWICE DAILY., Disp: 5 mL, Rfl: 0    omeprazole (PRILOSEC) 20 MG capsule, TAKE 1 CAPSULE BY MOUTH EVERY MORNING., Disp: 90 capsule, Rfl: 3    polyethylene glycol (MIRALAX) 17 gram PwPk, Take 17 g by mouth daily as needed. Take every other day, Disp: 30 packet, Rfl: 11    saliva substitute combo no.9 (BIOTENE DRY MOUTH ORAL RINSE) Mwsh, by Mucous Membrane route. Use as directed as needed for dry mouth, Disp: , Rfl:     TRUE METRIX GLUCOSE METER Misc, , Disp: , Rfl:     TRUEPLUS LANCETS 30 gauge Misc, , Disp: , Rfl:     venlafaxine (EFFEXOR-XR) 150 MG Cp24, Take 1 capsule (150 mg total) by mouth once daily., Disp: 90 capsule, Rfl: 3    vitamin D (VITAMIN D3) 1000 units Tab, TAKE 1 TABLET BY MOUTH ONCE DAILY., Disp: 30 tablet, Rfl: 0    fluticasone propionate (FLONASE) 50 mcg/actuation nasal spray, 1 SPRAY IN EACH NOSTRIL TWICE A DAY AS NEEDED FOR RHINITIS, Disp: 16 g, Rfl: 11    HYDROcodone-acetaminophen (NORCO) 5-325 mg per tablet, Take 1 tablet by mouth every 6 (six) hours as needed for Pain., Disp: 30 tablet, Rfl: 0    levothyroxine (SYNTHROID) 25 MCG tablet,  "Take 1 tablet by mouth before first meal of the day., Disp: 90 tablet, Rfl: 3    water Liqd 146 mL with MILK OF MAGNESIA 400 mg/5 mL Susp 400 mg, diphenhydrAMINE 12.5 mg/5 mL Elix 60 mg, nystatin 100,000 unit/mL Susp 500,000 Units, Take 5 mLs by mouth 3 (three) times daily as needed (mouth sores)., Disp: 1 Bottle, Rfl: 0   Objective:      Vitals:    10/06/22 1349   BP: 102/60   BP Location: Left arm   Patient Position: Sitting   BP Method: Small (Manual)   Pulse: 85   Resp: 18   Temp: 98.2 °F (36.8 °C)   TempSrc: Oral   SpO2: 95%   Weight: 65.7 kg (144 lb 13.5 oz)   Height: 5' 2" (1.575 m)       Physical Exam  Constitutional:       General: She is not in acute distress.     Appearance: She is not diaphoretic.   HENT:      Head: Normocephalic and atraumatic.   Eyes:      Conjunctiva/sclera: Conjunctivae normal.   Pulmonary:      Effort: Pulmonary effort is normal.   Musculoskeletal:         General: Normal range of motion.      Cervical back: Neck supple.   Skin:     Findings: No rash.   Neurological:      Mental Status: She is alert and oriented to person, place, and time.   Psychiatric:         Behavior: Behavior normal.         Thought Content: Thought content normal.         Judgment: Judgment normal.          Assessment:       1. Type 2 diabetes mellitus without complication, without long-term current use of insulin    2. Post-menopausal    3. Physical deconditioning    4. Chronic diastolic heart failure    5. Atherosclerosis of aorta    6. Hypertension, well controlled    7. Mixed hyperlipidemia    8. H/O: CVA (cerebrovascular accident)    9. Subclinical hypothyroidism    10. Influenza vaccine needed          Plan:       Type 2 diabetes mellitus without complication, without long-term current use of insulin  -     Hemoglobin A1C; Future; Expected date: 10/06/2022  -     COMPREHENSIVE METABOLIC PANEL; Future; Expected date: 10/06/2022    Post-menopausal    Physical deconditioning    Chronic diastolic heart " failure    Atherosclerosis of aorta    Hypertension, well controlled    Mixed hyperlipidemia    H/O: CVA (cerebrovascular accident)    Subclinical hypothyroidism  -     TSH; Future; Expected date: 10/06/2022  -     T4, FREE; Future; Expected date: 10/06/2022  -     levothyroxine (SYNTHROID) 25 MCG tablet; Take 1 tablet by mouth before first meal of the day.  Dispense: 90 tablet; Refill: 3    Influenza vaccine needed  -     Influenza (FLUAD) - Quadrivalent (Adjuvanted) *Preferred* (65+) (PF)    Cont meds. Advised pt to exercise but declined home health.          Future Appointments   Date Time Provider Department Center   1/6/2023  1:20 PM Jatin Yadav MD [facility] [place]       Patient note was created using [employer].  Any errors in syntax or even information may not have been identified and edited on initial review prior to signing this note.

## 2022-12-07 ENCOUNTER — TELEPHONE (OUTPATIENT)
Dept: FAMILY MEDICINE | Facility: CLINIC | Age: 87
End: 2022-12-07

## 2022-12-07 ENCOUNTER — NURSE TRIAGE (OUTPATIENT)
Dept: ADMINISTRATIVE | Facility: CLINIC | Age: 87
End: 2022-12-07
Payer: MEDICARE

## 2022-12-07 ENCOUNTER — HOSPITAL ENCOUNTER (EMERGENCY)
Facility: HOSPITAL | Age: 87
Discharge: HOME | End: 2022-12-07
Attending: EMERGENCY MEDICINE
Payer: MEDICARE

## 2022-12-07 VITALS
HEART RATE: 90 BPM | RESPIRATION RATE: 18 BRPM | HEIGHT: 62 IN | SYSTOLIC BLOOD PRESSURE: 116 MMHG | WEIGHT: 140 LBS | OXYGEN SATURATION: 96 % | TEMPERATURE: 98 F | DIASTOLIC BLOOD PRESSURE: 59 MMHG | BODY MASS INDEX: 25.76 KG/M2

## 2022-12-07 DIAGNOSIS — M25.519 SHOULDER PAIN: ICD-10-CM

## 2022-12-07 DIAGNOSIS — S46.912A LEFT SHOULDER STRAIN, INITIAL ENCOUNTER: Primary | ICD-10-CM

## 2022-12-07 PROCEDURE — 99283 EMERGENCY DEPT VISIT LOW MDM: CPT

## 2022-12-07 RX ORDER — HYDROCODONE BITARTRATE AND ACETAMINOPHEN 5; 325 MG/1; MG/1
1 TABLET ORAL EVERY 4 HOURS PRN
Qty: 12 TABLET | Refills: 0 | Status: SHIPPED | OUTPATIENT
Start: 2022-12-07 | End: 2022-12-17

## 2022-12-07 RX ORDER — ACETAMINOPHEN 500 MG
1000 TABLET ORAL
Status: DISCONTINUED | OUTPATIENT
Start: 2022-12-07 | End: 2022-12-07 | Stop reason: HOSPADM

## 2022-12-07 NOTE — TELEPHONE ENCOUNTER
Called patient to get more information.  Left message for patient to call office to give information regarding pain in left.

## 2022-12-07 NOTE — TELEPHONE ENCOUNTER
"Pt states that since yesterday afternoon she has been experiencing left shoulder pain "that travels up my neck and into my head." Hx of stroke but no MI. Pt states, "the nurse at my nursing home is worried that this could be from a heart attack." Pt denies chest pain.     Care Advice recommends that pt go to ED now. Pt agrees to dispo and states, "I will call one of my daughters to take me there now." Pt is advised to call back with any new/worsening sxs, questions, or concerns; pt verbalizes understanding.    Reason for Disposition   [1] Age > 40 AND [2] no obvious cause AND [3] pain even when not moving the arm  (Exception: pain is clearly made worse by moving arm or bending neck)    Additional Information   Negative: Passed out (i.e., lost consciousness, collapsed and was not responding)   Negative: Shock suspected (e.g., cold/pale/clammy skin, too weak to stand, low BP, rapid pulse)   Negative: [1] Similar pain previously AND [2] it was from "heart attack"   Negative: [1] Similar pain previously AND [2] it was from "angina" AND [3] not relieved by nitroglycerin   Negative: Sounds like a life-threatening emergency to the triager   Negative: Followed a shoulder injury   Negative: Difficulty breathing or unusual sweating (e.g., sweating without exertion)   Negative: [1] Pain lasting > 5 minutes AND [2] pain also present in chest  (Exception: pain is clearly made worse by movement)    Protocols used: Shoulder Pain-A-AH    "

## 2022-12-07 NOTE — TELEPHONE ENCOUNTER
Pt's chart opened in error.  Reason for Disposition   Caller has already spoken with another triager and has no further questions    Protocols used: No Contact or Duplicate Contact Call-A-OH

## 2022-12-07 NOTE — ED TRIAGE NOTES
Pt presents to ED c/o L shoulder pain that was radiating to the neck but is no longer radiating. Pt states the shoulder pain began when she was in the process of getting up and moving. Pt states the pain is worse when she moves her shoulder. Shoulder is tender to touch. Pt has a hx of stroke and her L arm is contracted. Pt denies chest pain, SOB, n/v/d, abdominal pain or any other symptoms. Pt is AAOX4, VSS, NAD noted.

## 2022-12-07 NOTE — ED PROVIDER NOTES
"Encounter Date: 12/7/2022    SCRIBE #1 NOTE: I, Francesca Deras, am scribing for, and in the presence of,  David Bray MD. I have scribed the following portions of the note - Other sections scribed: HPI, ROS.     History     Chief Complaint   Patient presents with    Shoulder Pain     Pt reports L shoulder pain that radiates up to neck and head x 1 day. Denies cp, numbness/tingling. Hx cva with L arm contracted. Pt resident of the suites in [facility].      A 87 y.o. female with a pertinent PMHx of arthritis, DM, elevated BP w/o HTN, HLD, HTN, and a stroke of the right basal ganglia, presents to the ED for evaluation of intermittent, severe left shoulder pain that began 1 day ago. Her left shoulder pain radiates up to her left lateral neck and head. Patient reports that she was trying to stand from a seated position when her pain suddenly began. She has associated symptoms of diarrhea that began 4 days ago (resolved), mild wheezing, and left leg "jumping while sleeping". She states that "it sounds like babies are crying in my chest". Her musculoskeletal pain is exacerbated with movement and slightly alleviated when rubbing her shoulder. She took Tylenol with relief. She denies any falls. No other exacerbating or alleviating factors. Patient denies cough, shortness of breath, chest pain, chest tightness, fever, chills, abdominal pain, nausea, vomiting, dysuria, sore throat, eye pain, ear pain, rash, or any other associated symptoms.     The history is provided by the patient. No  was used.   Review of patient's allergies indicates:  No Known Allergies  Past Medical History:   Diagnosis Date    Allergic rhinitis     Arthritis     Diabetes mellitus, new onset 4/16/2022    Elevated blood pressure reading without diagnosis of hypertension     Hormone replacement therapy (postmenopausal)     Hyperlipidemia     Hypertension     Stroke of right basal ganglia 12/2014     Past Surgical History: "   Procedure Laterality Date    APPENDECTOMY      BREAST BIOPSY      CATARACT EXTRACTION Bilateral at age 62 or 72    monovision     EYE SURGERY      hai cataract surgery    OOPHORECTOMY      PINNING OF HIP Left 2021    Procedure: PINNING, HIP;  Surgeon: Dae Bernal MD;  Location: [facility];  Service: Orthopedics;  Laterality: Left;    TONSILLECTOMY       Family History   Problem Relation Age of Onset    Breast cancer Mother     Brain cancer Mother         Metastatic from breast    Stroke Father     No Known Problems Sister     No Known Problems Brother     No Known Problems Daughter     No Known Problems Daughter     Schizophrenia Daughter     No Known Problems Maternal Aunt     No Known Problems Maternal Uncle     No Known Problems Paternal Aunt     No Known Problems Paternal Uncle     No Known Problems Maternal Grandmother     No Known Problems Maternal Grandfather     No Known Problems Paternal Grandmother     No Known Problems Paternal Grandfather     Amblyopia Neg Hx     Blindness Neg Hx     Cataracts Neg Hx     Diabetes Neg Hx     Glaucoma Neg Hx     Hypertension Neg Hx     Macular degeneration Neg Hx     Retinal detachment Neg Hx     Strabismus Neg Hx     Thyroid disease Neg Hx      Social History     Tobacco Use    Smoking status: Former     Packs/day: 2.00     Years: 36.00     Pack years: 72.00     Types: Cigarettes     Start date:      Quit date:      Years since quittin.9    Smokeless tobacco: Never   Substance Use Topics    Alcohol use: Not Currently     Alcohol/week: 21.0 standard drinks     Types: 7 Glasses of wine, 14 Standard drinks or equivalent per week    Drug use: No     Review of Systems   Constitutional:  Negative for chills, diaphoresis and fever.   HENT:  Negative for ear pain and sore throat.    Eyes:  Negative for pain.   Respiratory:  Positive for wheezing (mild). Negative for cough, chest tightness and shortness of breath.    Cardiovascular:  Negative for chest pain.  "  Gastrointestinal:  Positive for diarrhea (resolved). Negative for abdominal pain, nausea and vomiting.   Genitourinary:  Negative for dysuria.   Musculoskeletal:  Negative for back pain.        (+) Left shoulder pain that radiates up to her lateral neck and head  (+) Left leg "jumping while sleeping"   Skin:  Negative for rash.   Neurological:  Negative for facial asymmetry.   Psychiatric/Behavioral:  Negative for confusion.      Physical Exam     Initial Vitals [12/07/22 1321]   BP Pulse Resp Temp SpO2   (!) 116/59 90 18 97.6 °F (36.4 °C) 96 %      MAP       --         Physical Exam  The patient was examined specifically for the following:   General:No significant distress, Good color, Warm and dry. Head and neck:Scalp atraumatic, Neck supple. Neurological:Appropriate conversation, Gross motor deficits. Eyes:Conjugate gaze, Clear corneas. ENT: No epistaxis. Cardiac: Regular rate and rhythm, Grossly normal heart tones. Pulmonary: Wheezing, Rales. Gastrointestinal: Abdominal tenderness, Abdominal distention. Musculoskeletal: Extremity deformity, Apparent pain with range of motion of the joints. Skin: Rash.   The findings on examination were normal except for the following:  The patient has almost complete paralysis of the left parietal extremity.  The patient has moderate pain with range of motion of the left shoulder when the olecranon as elevated.  The lungs are clear.  The heart tones are normal.  The abdomen is soft.  There is no tenderness of the neck.  ED Course   Procedures  Labs Reviewed - No data to display       Imaging Results    None       Medical decision making: Given the above this patient has sharp positional pain of the left shoulder, worse with movement.  There is no history of trauma.  I doubt fracture.  Left upper extremity is paralyzed from a stroke.  She gets some left neck pain along the superior border the trapezius but it is not there now.  She sometimes gets some left-sided headache with " her left-sided neck pain.  That is not there now.  She occasionally gets jumping over left leg at night when she sleeps.  She is without other injuries or problems.  No chest pressure tightness.  I doubt cardiac causes of this pain.  I believe this is musculoskeletal and in the absence of trauma I do not believe x-rays are indicated.       Medications - No data to display  Medical Decision Making:   History:   Old Medical Records: I decided to obtain old medical records.        Scribe Attestation:   Scribe #1: I performed the above scribed service and the documentation accurately describes the services I performed. I attest to the accuracy of the note.                   Clinical Impression:   Final diagnoses:  [M25.519] Shoulder pain  [S46.912A] Left shoulder strain, initial encounter (Primary)        ED Disposition Condition    Discharge Stable          ED Prescriptions       Medication Sig Dispense Start Date End Date Auth. Provider    HYDROcodone-acetaminophen (NORCO) 5-325 mg per tablet Take 1 tablet by mouth every 4 (four) hours as needed for Pain. 12 tablet 12/7/2022 12/17/2022 David Bray MD          Follow-up Information       Follow up With Specialties Details Why Contact Info    Jatin Yadav MD Family Medicine In 1 week  [address] [place] [address]  [phone]          I personally performed the services described in this documentation.  All medical record  entries made by the scribe are at my direction and in my presence.  Signed, Dr. Katarina Bray MD  12/07/22 8962

## 2022-12-07 NOTE — TELEPHONE ENCOUNTER
----- Message from Rose Marie Richards sent at 12/7/2022 11:58 AM CST -----  Regarding: self [phone]  Type: Patient Call Back    Who called: self     What is the request in detail: is having pain in elbow, she stated that the nurse at the [facility] living thinks it can be related to a heart attack.    Can the clinic reply by [facility]? no    Would the patient rather a call back or a response via [facility]? Call back     Best call back number: [phone]    Sent call to on call nurse

## 2022-12-07 NOTE — DISCHARGE INSTRUCTIONS
Tylenol, 1000 mg by mouth every 8 hours as needed for pain or Tylenol with hydrocodone as directed.  Return immediately if you get worse or if new problems develop.  Please use a heating pad.  Rest.  You may follow up with the primary care doctor in 1 week.

## 2022-12-09 ENCOUNTER — TELEPHONE (OUTPATIENT)
Dept: FAMILY MEDICINE | Facility: CLINIC | Age: 87
End: 2022-12-09

## 2022-12-09 ENCOUNTER — TELEPHONE (OUTPATIENT)
Dept: UROLOGY | Facility: HOSPITAL | Age: 87
End: 2022-12-09
Payer: MEDICARE

## 2022-12-09 NOTE — TELEPHONE ENCOUNTER
I LM for pt to call clinic back and let her know gemtesa is not covered under her insurance so there is no cheaper alternative for the [email or web address].

## 2022-12-09 NOTE — TELEPHONE ENCOUNTER
----- Message from Treasure Scott MA sent at 12/9/2022  9:16 AM CST -----  Gemtesa is not covered.  ----- Message -----  From: COLE Moody MD  Sent: 12/9/2022   8:57 AM CST  To: Treasure Scott MA    Check if Gemtesa is cheaper.  Other alternatives have the same side effects as Ditropan that she took previously.    ----- Message -----  From: Treasure Scott MA  Sent: 12/9/2022   8:51 AM CST  To: COLE Moody MD    The pt is calling stating her myrbetriq is to expensive and she is wanting to know if there is a cheaper alternative. Please advise.

## 2022-12-09 NOTE — TELEPHONE ENCOUNTER
----- Message from Rose Marie Richards sent at 12/9/2022  8:40 AM CST -----  Regarding: Daughter [phone]  Type: Patient Call Back    Who called: daughter  Fatou George     What is the request in detail: MYRBETRIQ 50 mg Tb24 is to expensive and would like to know if they have anything cheaper that the provider suggests.     Can the clinic reply by [facility]? no    Would the patient rather a call back or a response via [facility]?  Call back     Best call back number: [phone]    [place], [place] - [address]  [address]  [place] [address]  Phone: [phone] Fax: [phone]

## 2023-01-06 ENCOUNTER — OFFICE VISIT (OUTPATIENT)
Dept: FAMILY MEDICINE | Facility: CLINIC | Age: 88
End: 2023-01-06
Payer: MEDICARE

## 2023-01-06 VITALS
TEMPERATURE: 98 F | OXYGEN SATURATION: 98 % | DIASTOLIC BLOOD PRESSURE: 60 MMHG | SYSTOLIC BLOOD PRESSURE: 100 MMHG | RESPIRATION RATE: 18 BRPM | HEART RATE: 104 BPM | WEIGHT: 138.25 LBS | BODY MASS INDEX: 25.44 KG/M2 | HEIGHT: 62 IN

## 2023-01-06 DIAGNOSIS — Z86.73 H/O: CVA (CEREBROVASCULAR ACCIDENT): ICD-10-CM

## 2023-01-06 DIAGNOSIS — I70.0 ATHEROSCLEROSIS OF AORTA: ICD-10-CM

## 2023-01-06 DIAGNOSIS — I69.354 HEMIPARESIS AFFECTING LEFT SIDE AS LATE EFFECT OF STROKE: ICD-10-CM

## 2023-01-06 DIAGNOSIS — R30.0 DYSURIA: ICD-10-CM

## 2023-01-06 DIAGNOSIS — I10 ESSENTIAL HYPERTENSION: ICD-10-CM

## 2023-01-06 DIAGNOSIS — R54 AGE-RELATED PHYSICAL DEBILITY: Primary | ICD-10-CM

## 2023-01-06 DIAGNOSIS — F33.41 RECURRENT MAJOR DEPRESSIVE DISORDER, IN PARTIAL REMISSION: ICD-10-CM

## 2023-01-06 DIAGNOSIS — E11.59 TYPE 2 DIABETES MELLITUS WITH OTHER CIRCULATORY COMPLICATIONS: ICD-10-CM

## 2023-01-06 DIAGNOSIS — R53.83 FATIGUE, UNSPECIFIED TYPE: ICD-10-CM

## 2023-01-06 DIAGNOSIS — M81.0 POST-MENOPAUSAL OSTEOPOROSIS: ICD-10-CM

## 2023-01-06 DIAGNOSIS — E11.9 TYPE 2 DIABETES MELLITUS WITHOUT COMPLICATION, WITHOUT LONG-TERM CURRENT USE OF INSULIN: ICD-10-CM

## 2023-01-06 DIAGNOSIS — I50.32 CHRONIC DIASTOLIC HEART FAILURE: ICD-10-CM

## 2023-01-06 PROCEDURE — 99999 PR PBB SHADOW E&M-EST. PATIENT-LVL V: CPT | Mod: PBBFAC,,, | Performed by: FAMILY MEDICINE

## 2023-01-06 PROCEDURE — 3074F PR MOST RECENT SYSTOLIC BLOOD PRESSURE < 130 MM HG: ICD-10-PCS | Mod: CPTII,S$GLB,, | Performed by: FAMILY MEDICINE

## 2023-01-06 PROCEDURE — 1126F AMNT PAIN NOTED NONE PRSNT: CPT | Mod: CPTII,S$GLB,, | Performed by: FAMILY MEDICINE

## 2023-01-06 PROCEDURE — 1159F PR MEDICATION LIST DOCUMENTED IN MEDICAL RECORD: ICD-10-PCS | Mod: CPTII,S$GLB,, | Performed by: FAMILY MEDICINE

## 2023-01-06 PROCEDURE — 1101F PR PT FALLS ASSESS DOC 0-1 FALLS W/OUT INJ PAST YR: ICD-10-PCS | Mod: CPTII,S$GLB,, | Performed by: FAMILY MEDICINE

## 2023-01-06 PROCEDURE — 3288F PR FALLS RISK ASSESSMENT DOCUMENTED: ICD-10-PCS | Mod: CPTII,S$GLB,, | Performed by: FAMILY MEDICINE

## 2023-01-06 PROCEDURE — 3078F PR MOST RECENT DIASTOLIC BLOOD PRESSURE < 80 MM HG: ICD-10-PCS | Mod: CPTII,S$GLB,, | Performed by: FAMILY MEDICINE

## 2023-01-06 PROCEDURE — 99214 PR OFFICE/OUTPT VISIT, EST, LEVL IV, 30-39 MIN: ICD-10-PCS | Mod: S$GLB,,, | Performed by: FAMILY MEDICINE

## 2023-01-06 PROCEDURE — 1126F PR PAIN SEVERITY QUANTIFIED, NO PAIN PRESENT: ICD-10-PCS | Mod: CPTII,S$GLB,, | Performed by: FAMILY MEDICINE

## 2023-01-06 PROCEDURE — 3288F FALL RISK ASSESSMENT DOCD: CPT | Mod: CPTII,S$GLB,, | Performed by: FAMILY MEDICINE

## 2023-01-06 PROCEDURE — 99999 PR PBB SHADOW E&M-EST. PATIENT-LVL V: ICD-10-PCS | Mod: PBBFAC,,, | Performed by: FAMILY MEDICINE

## 2023-01-06 PROCEDURE — 1159F MED LIST DOCD IN RCRD: CPT | Mod: CPTII,S$GLB,, | Performed by: FAMILY MEDICINE

## 2023-01-06 PROCEDURE — 99499 UNLISTED E&M SERVICE: CPT | Mod: S$GLB,,, | Performed by: FAMILY MEDICINE

## 2023-01-06 PROCEDURE — 99214 OFFICE O/P EST MOD 30 MIN: CPT | Mod: S$GLB,,, | Performed by: FAMILY MEDICINE

## 2023-01-06 PROCEDURE — 1101F PT FALLS ASSESS-DOCD LE1/YR: CPT | Mod: CPTII,S$GLB,, | Performed by: FAMILY MEDICINE

## 2023-01-06 PROCEDURE — 3074F SYST BP LT 130 MM HG: CPT | Mod: CPTII,S$GLB,, | Performed by: FAMILY MEDICINE

## 2023-01-06 PROCEDURE — 3078F DIAST BP <80 MM HG: CPT | Mod: CPTII,S$GLB,, | Performed by: FAMILY MEDICINE

## 2023-01-06 PROCEDURE — 99499 RISK ADDL DX/OHS AUDIT: ICD-10-PCS | Mod: S$GLB,,, | Performed by: FAMILY MEDICINE

## 2023-01-06 RX ORDER — CYANOCOBALAMIN 1000 UG/ML
1000 INJECTION, SOLUTION INTRAMUSCULAR; SUBCUTANEOUS
Status: SHIPPED | OUTPATIENT
Start: 2023-01-06

## 2023-01-06 RX ORDER — METFORMIN HYDROCHLORIDE 500 MG/1
500 TABLET, EXTENDED RELEASE ORAL 2 TIMES DAILY WITH MEALS
Qty: 180 TABLET | Refills: 3 | Status: SHIPPED | OUTPATIENT
Start: 2023-01-06 | End: 2023-12-27

## 2023-01-06 NOTE — PROGRESS NOTES
Health Maintenance Due   Topic     Diabetes Urine Screening  Consult pcp    TETANUS VACCINE  Notified pt can get vaccine at pharmacy.    Shingles Vaccine (1 of 2) Hx of chickenpox. Notified pt can get vaccine at pharmacy.    Eye Exam  Consult pcp    DEXA Scan  Pt will schedule in portal     COVID-19 Vaccine (4 - Booster for Pfizer series) Not offered at this Facility

## 2023-01-08 PROBLEM — E11.59 TYPE 2 DIABETES MELLITUS WITH OTHER CIRCULATORY COMPLICATIONS: Status: ACTIVE | Noted: 2023-01-08

## 2023-01-09 NOTE — PROGRESS NOTES
Subjective:       Patient ID: Carol Yadav is a 87 y.o. female.    Chief Complaint: Follow-up      Follow-up    87-year-old female presents for three-month follow-up.  Patient continues to feel she is getting weaker.  States she is ready for home help.  Endorses not getting much exercise.  Has been on a diabetic diet and has lost weight.      Review of Systems   Constitutional: Negative.    HENT: Negative.     Respiratory: Negative.     Cardiovascular: Negative.    Gastrointestinal: Negative.    Endocrine: Negative.    Genitourinary: Negative.    Musculoskeletal: Negative.    Neurological: Negative.    Psychiatric/Behavioral: Negative.          Past Medical History:   Diagnosis Date    Allergic rhinitis     Arthritis     Diabetes mellitus, new onset 4/16/2022    Elevated blood pressure reading without diagnosis of hypertension     Hormone replacement therapy (postmenopausal)     Hyperlipidemia     Hypertension     Stroke of right basal ganglia 12/2014     Past Surgical History:   Procedure Laterality Date    APPENDECTOMY      BREAST BIOPSY      CATARACT EXTRACTION Bilateral at age 62 or 72    monovision     EYE SURGERY      hai cataract surgery    OOPHORECTOMY      PINNING OF HIP Left 6/16/2021    Procedure: PINNING, HIP;  Surgeon: Dae Bernal MD;  Location: [facility];  Service: Orthopedics;  Laterality: Left;    TONSILLECTOMY       Family History   Problem Relation Age of Onset    Breast cancer Mother     Brain cancer Mother         Metastatic from breast    Stroke Father     No Known Problems Sister     No Known Problems Brother     No Known Problems Daughter     No Known Problems Daughter     Schizophrenia Daughter     No Known Problems Maternal Aunt     No Known Problems Maternal Uncle     No Known Problems Paternal Aunt     No Known Problems Paternal Uncle     No Known Problems Maternal Grandmother     No Known Problems Maternal Grandfather     No Known Problems Paternal Grandmother     No Known Problems  Paternal Grandfather     Amblyopia Neg Hx     Blindness Neg Hx     Cataracts Neg Hx     Diabetes Neg Hx     Glaucoma Neg Hx     Hypertension Neg Hx     Macular degeneration Neg Hx     Retinal detachment Neg Hx     Strabismus Neg Hx     Thyroid disease Neg Hx      Social History     Socioeconomic History    Marital status:    Tobacco Use    Smoking status: Former     Packs/day: 2.00     Years: 36.00     Pack years: 72.00     Types: Cigarettes     Start date:      Quit date:      Years since quittin.0    Smokeless tobacco: Never   Substance and Sexual Activity    Alcohol use: Not Currently     Alcohol/week: 21.0 standard drinks     Types: 7 Glasses of wine, 14 Standard drinks or equivalent per week    Drug use: No    Sexual activity: Not Currently     Partners: Male     Comment: 2017 divorce      Social Determinants of Health     Financial Resource Strain: Low Risk     Difficulty of Paying Living Expenses: Not hard at all   Food Insecurity: No Food Insecurity    Worried About Running Out of Food in the Last Year: Never true    Ran Out of Food in the Last Year: Never true   Transportation Needs: No Transportation Needs    Lack of Transportation (Medical): No    Lack of Transportation (Non-Medical): No   Physical Activity: Insufficiently Active    Days of Exercise per Week: 5 days    Minutes of Exercise per Session: 10 min   Stress: No Stress Concern Present    Feeling of Stress : Only a little   Social Connections: Socially Isolated    Frequency of Communication with Friends and Family: More than three times a week    Frequency of Social Gatherings with Friends and Family: Once a week    Attends Spiritism Services: Never    Active Member of Clubs or Organizations: No    Marital Status:    Housing Stability: Low Risk     Unable to Pay for Housing in the Last Year: No    Number of Places Lived in the Last Year: 1    Unstable Housing in the Last Year: No       Current Outpatient  Medications:     acetaminophen (TYLENOL) 325 MG tablet, Take 2 tablets (650 mg total) by mouth every 4 (four) hours as needed., Disp: , Rfl: 0    alendronate (FOSAMAX) 70 MG tablet, TAKE 1 TABLET BY MOUTH EVERY 7 DAYS., Disp: 4 tablet, Rfl: 11    amoxicillin (AMOXIL) 500 MG capsule, Take 1,000 mg by mouth 2 (two) times daily., Disp: , Rfl:     amoxicillin-clavulanate 875-125mg (AUGMENTIN) 875-125 mg per tablet, Take 1 tablet by mouth 2 (two) times daily., Disp: , Rfl:     aspirin 81 MG Chew, Take 1 tablet (81 mg total) by mouth once daily., Disp: 30 tablet, Rfl: 11    atorvastatin (LIPITOR) 40 MG tablet, TAKE 1 TABLET BY MOUTH ONCE DAILY., Disp: 30 tablet, Rfl: 11    blood sugar diagnostic Strp, To check BG 1 times daily, to use with insurance preferred meter, Disp: 100 each, Rfl: 3    blood-glucose meter kit, To check BG 1 times daily, to use with insurance preferred meter, Disp: 1 each, Rfl: 0    calcium carbonate (CALCIUM 600) 600 mg calcium (1,500 mg) Tab, Take 1 tablet (600 mg total) by mouth once daily., Disp: 90 tablet, Rfl: 3    calcium carbonate (TUMS) 200 mg calcium (500 mg) chewable tablet, Take 1 tablet (500 mg total) by mouth 2 (two) times daily as needed for Heartburn., Disp: 180 tablet, Rfl: 0    CERTAVITE SENIOR 0.4 mg-300 mcg- 250 mcg Tab, TAKE 1 TABLET BY MOUTH ONCE DAILY., Disp: 30 tablet, Rfl: 0    flash glucose scanning reader (FREESTYLE YESY 14 DAY READER) Misc, 1 application by Misc.(Non-Drug; Combo Route) route 3 (three) times daily with meals., Disp: 2 each, Rfl: 2    flash glucose sensor (FREESTYLE YESY 14 DAY SENSOR) Kit, 1 application by Misc.(Non-Drug; Combo Route) route 3 (three) times daily with meals., Disp: 2 kit, Rfl: 3    fluticasone propionate (FLONASE) 50 mcg/actuation nasal spray, 1 SPRAY IN EACH NOSTRIL TWICE A DAY AS NEEDED FOR RHINITIS, Disp: 16 g, Rfl: 11    guaiFENesin 100 mg/5 ml (ROBITUSSIN) 100 mg/5 mL syrup, Take 200 mg by mouth 3 (three) times daily as needed for  Cough., Disp: , Rfl:     ibuprofen (ADVIL,MOTRIN) 800 MG tablet, Take 800 mg by mouth every 6 (six) hours as needed., Disp: , Rfl:     ketoconazole (NIZORAL) 2 % cream, APPLY TO AFFECTED AREA ONCE DAILY., Disp: 60 g, Rfl: 0    lancets Misc, To check BG 1 times daily, to use with insurance preferred meter, Disp: 100 each, Rfl: 3    levothyroxine (SYNTHROID) 25 MCG tablet, Take 1 tablet by mouth before first meal of the day., Disp: 90 tablet, Rfl: 3    lisinopriL (PRINIVIL,ZESTRIL) 2.5 MG tablet, Take 2.5 mg by mouth., Disp: , Rfl:     melatonin (MELATIN) 3 mg tablet, TAKE 2 TABLETS BY MOUTH AT BEDTIME., Disp: 60 tablet, Rfl: 0    mirtazapine (REMERON) 7.5 MG Tab, TAKE 1 TABLET BY MOUTH IN THE EVENING., Disp: 90 tablet, Rfl: 1    multivit-minerals/folic/ginkgo (ONE DAILY WOMEN 50 PLUS ORAL), Take 1 tablet by mouth once daily., Disp: , Rfl:     multivitamin-minerals-lutein Tab, Take 1 tablet by mouth once daily., Disp: 30 tablet, Rfl: 0    MYRBETRIQ 50 mg Tb24, TAKE 1 TABLET BY MOUTH ONCE DAILY., Disp: 30 tablet, Rfl: 11    olopatadine (PATANOL) 0.1 % ophthalmic solution, PLACE 1 DROP IN EACH EYE TWICE DAILY., Disp: 5 mL, Rfl: 0    omeprazole (PRILOSEC) 20 MG capsule, TAKE 1 CAPSULE BY MOUTH EVERY MORNING., Disp: 90 capsule, Rfl: 3    polyethylene glycol (MIRALAX) 17 gram PwPk, Take 17 g by mouth daily as needed. Take every other day, Disp: 30 packet, Rfl: 11    saliva substitute combo no.9 (BIOTENE DRY MOUTH ORAL RINSE) Mwsh, by Mucous Membrane route. Use as directed as needed for dry mouth, Disp: , Rfl:     TRUE METRIX GLUCOSE METER Misc, , Disp: , Rfl:     TRUEPLUS LANCETS 30 gauge Misc, , Disp: , Rfl:     venlafaxine (EFFEXOR-XR) 150 MG Cp24, Take 1 capsule (150 mg total) by mouth once daily., Disp: 90 capsule, Rfl: 3    vitamin D (VITAMIN D3) 1000 units Tab, TAKE 1 TABLET BY MOUTH ONCE DAILY., Disp: 30 tablet, Rfl: 0    water Liqd 146 mL with MILK OF MAGNESIA 400 mg/5 mL Susp 400 mg, diphenhydrAMINE 12.5 mg/5 mL  "Elix 60 mg, nystatin 100,000 unit/mL Susp 500,000 Units, Take 5 mLs by mouth 3 (three) times daily as needed (mouth sores)., Disp: 1 Bottle, Rfl: 0    metFORMIN (GLUCOPHAGE-XR) 500 MG ER 24hr tablet, Take 1 tablet (500 mg total) by mouth 2 (two) times daily with meals., Disp: 180 tablet, Rfl: 3    Current Facility-Administered Medications:     cyanocobalamin injection 1,000 mcg, 1,000 mcg, Subcutaneous, 1 time in Clinic/HOD, Jatin Yadav MD   Objective:      Vitals:    01/06/23 1344   BP: 100/60   BP Location: Right arm   Patient Position: Sitting   BP Method: Small (Manual)   Pulse: 104   Resp: 18   Temp: 97.5 °F (36.4 °C)   TempSrc: Oral   SpO2: 98%   Weight: 62.7 kg (138 lb 3.7 oz)   Height: 5' 2" (1.575 m)       Physical Exam  Constitutional:       General: She is not in acute distress.     Appearance: She is not diaphoretic.   HENT:      Head: Normocephalic and atraumatic.   Eyes:      Conjunctiva/sclera: Conjunctivae normal.   Pulmonary:      Effort: Pulmonary effort is normal.   Musculoskeletal:      Cervical back: Neck supple.   Skin:     Findings: No rash.   Neurological:      Mental Status: She is alert and oriented to person, place, and time.   Psychiatric:         Behavior: Behavior normal.         Thought Content: Thought content normal.         Judgment: Judgment normal.          Assessment:       1. Age-related physical debility    2. Post-menopausal osteoporosis    3. Fatigue, unspecified type    4. H/O: CVA (cerebrovascular accident)    5. 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 Essential hypertension    7. Atherosclerosis of aorta    8. Type 2 diabetes mellitus without complication, without long-term current use of insulin    9. Dysuria    10. Type 2 diabetes mellitus with other circulatory complications    11. Recurrent major depressive disorder, in partial remission    12. Chronic diastolic heart failure          Plan:       Age-related physical debility  -     " Ambulatory referral/consult to Home Health; Future; Expected date: 01/07/2023  -     cyanocobalamin injection 1,000 mcg    Post-menopausal osteoporosis  -     DXA Bone Density Spine And Hip; Future; Expected date: 01/06/2023    Fatigue, unspecified type  -     cyanocobalamin injection 1,000 mcg    H/O: CVA (cerebrovascular accident)    Hemiparesis affecting left side as late effect of stroke    Essential hypertension    Atherosclerosis of aorta  -     CBC Auto Differential; Future; Expected date: 01/06/2023  -     Comprehensive Metabolic Panel; Future; Expected date: 01/06/2023  -     Hemoglobin A1C; Future; Expected date: 01/06/2023  -     TSH; Future; Expected date: 01/06/2023  -     Lipid Panel; Future; Expected date: 01/06/2023    Type 2 diabetes mellitus without complication, without long-term current use of insulin  -     CBC Auto Differential; Future; Expected date: 01/06/2023  -     Comprehensive Metabolic Panel; Future; Expected date: 01/06/2023  -     Hemoglobin A1C; Future; Expected date: 01/06/2023  -     TSH; Future; Expected date: 01/06/2023  -     Lipid Panel; Future; Expected date: 01/06/2023  -     metFORMIN (GLUCOPHAGE-XR) 500 MG ER 24hr tablet; Take 1 tablet (500 mg total) by mouth 2 (two) times daily with meals.  Dispense: 180 tablet; Refill: 3    Dysuria  -     Urine culture; Future; Expected date: 01/06/2023    Type 2 diabetes mellitus with other circulatory complications    Recurrent major depressive disorder, in partial remission    Chronic diastolic heart failure    Referred to home health.  Given B12 shot.  Patient would like urine culture since she has dysuria that has stopped a few days ago.  Follow-up in 3 months for annual.              Future Appointments   Date Time Provider Department Center   4/4/2023 12:00 PM LAB, [facility] [place]   4/6/2023  2:20 PM Jatin Yadav MD [facility]       Patient note was created using MModal.  Any errors in syntax or even  information may not have been identified and edited on initial review prior to signing this note.

## 2023-01-10 ENCOUNTER — LAB VISIT (OUTPATIENT)
Dept: LAB | Facility: HOSPITAL | Age: 88
End: 2023-01-10
Attending: FAMILY MEDICINE
Payer: MEDICARE

## 2023-01-10 DIAGNOSIS — R30.0 DYSURIA: ICD-10-CM

## 2023-01-10 PROCEDURE — 87086 URINE CULTURE/COLONY COUNT: CPT | Performed by: FAMILY MEDICINE

## 2023-01-12 LAB
BACTERIA UR CULT: NORMAL
BACTERIA UR CULT: NORMAL

## 2023-02-06 ENCOUNTER — TELEPHONE (OUTPATIENT)
Dept: FAMILY MEDICINE | Facility: CLINIC | Age: 88
End: 2023-02-06
Payer: MEDICARE

## 2023-02-06 DIAGNOSIS — R53.81 PHYSICAL DECONDITIONING: ICD-10-CM

## 2023-02-06 DIAGNOSIS — Z86.73 H/O: CVA (CEREBROVASCULAR ACCIDENT): ICD-10-CM

## 2023-02-06 DIAGNOSIS — G81.94 LEFT HEMIPARESIS: Primary | ICD-10-CM

## 2023-02-06 NOTE — TELEPHONE ENCOUNTER
----- Message from Celine Barrett sent at 2/6/2023  9:27 AM CST -----  Type: Patient Call Back    Who called:[facility]    What is the request in detail:needs order and clinical notes faxed over for a hospital bed.     Can the clinic reply by [facility]?    Would the patient rather a call back or a response via [facility]? Call    Best call back number:  Fax:

## 2023-02-09 ENCOUNTER — PES CALL (OUTPATIENT)
Dept: ADMINISTRATIVE | Facility: CLINIC | Age: 88
End: 2023-02-09
Payer: MEDICARE

## 2023-02-16 ENCOUNTER — HOSPITAL ENCOUNTER (OUTPATIENT)
Dept: RADIOLOGY | Facility: CLINIC | Age: 88
Discharge: HOME | End: 2023-02-16
Attending: FAMILY MEDICINE
Payer: MEDICARE

## 2023-02-16 DIAGNOSIS — M81.0 POST-MENOPAUSAL OSTEOPOROSIS: ICD-10-CM

## 2023-02-16 PROCEDURE — 77080 DXA BONE DENSITY AXIAL: CPT | Mod: TC,PO

## 2023-02-16 PROCEDURE — 77080 DEXA BONE DENSITY SPINE HIP: ICD-10-PCS | Mod: 26,,, | Performed by: INTERNAL MEDICINE

## 2023-02-16 PROCEDURE — 77080 DXA BONE DENSITY AXIAL: CPT | Mod: 26,,, | Performed by: INTERNAL MEDICINE

## 2023-02-25 ENCOUNTER — PATIENT MESSAGE (OUTPATIENT)
Dept: FAMILY MEDICINE | Facility: CLINIC | Age: 88
End: 2023-02-25
Payer: MEDICARE

## 2023-02-27 ENCOUNTER — TELEPHONE (OUTPATIENT)
Dept: FAMILY MEDICINE | Facility: CLINIC | Age: 88
End: 2023-02-27
Payer: MEDICARE

## 2023-02-27 NOTE — TELEPHONE ENCOUNTER
----- Message from Julita Nair sent at 2/27/2023  8:16 AM CST -----  Regarding: Patient call back  .Type: Patient Call Back    Who called: Fatou George-Daughter     What is the request in detail: would like the doctor to put in an order for a 36 inch adjustable bed without a mattress      Can the clinic reply by [facility]?    Would the patient rather a call back or a response via [facility]?call      Best call back number: [phone]      Additional Information:

## 2023-03-01 ENCOUNTER — TELEPHONE (OUTPATIENT)
Dept: FAMILY MEDICINE | Facility: CLINIC | Age: 88
End: 2023-03-01
Payer: MEDICARE

## 2023-03-01 DIAGNOSIS — M81.0 OSTEOPOROSIS, UNSPECIFIED OSTEOPOROSIS TYPE, UNSPECIFIED PATHOLOGICAL FRACTURE PRESENCE: Primary | ICD-10-CM

## 2023-03-02 NOTE — TELEPHONE ENCOUNTER
Daughter states she spoke to the [employer] company and updated the information, the last bed was returned so they will be delivering bed tomorrow

## 2023-03-02 NOTE — TELEPHONE ENCOUNTER
Worsened from osteopenia to osteoporosis. Will change to prolia for injection. Pt should stop fosamax.

## 2023-03-03 ENCOUNTER — SPECIALTY PHARMACY (OUTPATIENT)
Dept: PHARMACY | Facility: CLINIC | Age: 88
End: 2023-03-03
Payer: MEDICARE

## 2023-03-03 DIAGNOSIS — M81.0 OSTEOPOROSIS, UNSPECIFIED OSTEOPOROSIS TYPE, UNSPECIFIED PATHOLOGICAL FRACTURE PRESENCE: Primary | ICD-10-CM

## 2023-03-03 NOTE — TELEPHONE ENCOUNTER
Heidy, this is Katalina Ruiz with [facility].  We are working on your prescription that your doctor has sent us. We will be working with your insurance to get this approved for you. We will be calling you along the way with updates on your medication.  If you have any questions, you can reach us at [phone].    Welcome call outcome: Left voicemail

## 2023-03-03 NOTE — TELEPHONE ENCOUNTER
Prolia  PA not required.    Benefit Investigation    Prolia (drug name)  [employer]   No LIS  Estimated copay: $300   Pt is in initial  [facility] in Network    Forward to FA

## 2023-03-08 ENCOUNTER — TELEPHONE (OUTPATIENT)
Dept: FAMILY MEDICINE | Facility: CLINIC | Age: 88
End: 2023-03-08
Payer: MEDICARE

## 2023-03-08 DIAGNOSIS — Z12.31 ENCOUNTER FOR SCREENING MAMMOGRAM FOR BREAST CANCER: Primary | ICD-10-CM

## 2023-03-08 DIAGNOSIS — R21 RASH: Primary | ICD-10-CM

## 2023-03-08 DIAGNOSIS — H53.8 BLURRY VISION: ICD-10-CM

## 2023-03-08 NOTE — TELEPHONE ENCOUNTER
Pt states she has been having a rash all over for some time, used to see a dermatologist but hasn't in awhile; requesting referral; also wants to see eye doctor

## 2023-03-08 NOTE — TELEPHONE ENCOUNTER
----- Message from Celine Barrett sent at 3/8/2023 12:51 PM CST -----  Type: Patient Call Back    Who called:self    What is the request in detail:pt states she just spoke w one of the nurses but forgot to give her some extra information. Pt also needs orders placed for mammo. Please call    Can the clinic reply by [facility]?    Would the patient rather a call back or a response via [facility]? call    Best call back number:[phone] (home)

## 2023-03-08 NOTE — TELEPHONE ENCOUNTER
----- Message from Katrina Mota sent at 3/8/2023 12:38 PM CST -----  Regarding: pt : [phone]  Type: Patient Call Back    Who called:pt     What is the request in detail:pt would like a call as soon as possible concerning her skin     Can the clinic reply by [facility]?no     Would the patient rather a call back or a response via [facility]?call back     Best call back number: [phone]      Additional Information: pt would like a referral for derm/ eye doctor

## 2023-03-09 RX ORDER — TRIAMCINOLONE ACETONIDE 1 MG/G
CREAM TOPICAL 2 TIMES DAILY
Qty: 80 G | Refills: 1 | Status: SHIPPED | OUTPATIENT
Start: 2023-03-09 | End: 2023-10-25

## 2023-03-10 ENCOUNTER — TELEPHONE (OUTPATIENT)
Dept: FAMILY MEDICINE | Facility: CLINIC | Age: 88
End: 2023-03-10
Payer: MEDICARE

## 2023-03-10 NOTE — TELEPHONE ENCOUNTER
Spoke with patient states that she will call back with Dermatology info Dr Figueroa , referral will defer .

## 2023-03-13 NOTE — TELEPHONE ENCOUNTER
Outgoing call to patient's daughter. She stated that her mother is currently at an assisted living facility. [facility] [phone].

## 2023-03-14 ENCOUNTER — SPECIALTY PHARMACY (OUTPATIENT)
Dept: PHARMACY | Facility: CLINIC | Age: 88
End: 2023-03-14
Payer: MEDICARE

## 2023-03-14 DIAGNOSIS — M81.0 OSTEOPOROSIS, UNSPECIFIED OSTEOPOROSIS TYPE, UNSPECIFIED PATHOLOGICAL FRACTURE PRESENCE: Primary | ICD-10-CM

## 2023-03-14 NOTE — TELEPHONE ENCOUNTER
Provider office stated patient can receive the Prolia at the appointment on 4/6. Pending initial consult accordingly.    Outgoing call to notify patient, she verbalized understanding.

## 2023-03-14 NOTE — TELEPHONE ENCOUNTER
Staff message sent to provider to determine if patient will receive Prolia injection at appointment on 4/6/2023.

## 2023-03-14 NOTE — TELEPHONE ENCOUNTER
Incoming call from patient. She gave approval to fill medication and stated her next appointment is on April 6.     Forwarding to initial.

## 2023-03-14 NOTE — TELEPHONE ENCOUNTER
Outgoing call to [facility]. Spoke to nurse who stated that they do not give Prolia injections there. She will leave a note for the patient to call us back to discuss filling her Prolia.

## 2023-03-16 ENCOUNTER — PATIENT MESSAGE (OUTPATIENT)
Dept: FAMILY MEDICINE | Facility: CLINIC | Age: 88
End: 2023-03-16
Payer: MEDICARE

## 2023-03-28 NOTE — TELEPHONE ENCOUNTER
Completed initial consult, need to review medication list. Spoke with staff at nursing home, recommended to call back tomorrow to review medication list with a nurse.

## 2023-03-29 RX ORDER — PROMETHAZINE HYDROCHLORIDE AND DEXTROMETHORPHAN HYDROBROMIDE 6.25; 15 MG/5ML; MG/5ML
SYRUP ORAL
COMMUNITY

## 2023-03-29 RX ORDER — LISINOPRIL 2.5 MG/1
2.5 TABLET ORAL DAILY
COMMUNITY
End: 2023-05-04 | Stop reason: SDUPTHER

## 2023-03-29 RX ORDER — FAMOTIDINE 20 MG/1
20 TABLET, FILM COATED ORAL 2 TIMES DAILY PRN
COMMUNITY

## 2023-03-29 RX ORDER — HYDROCODONE BITARTRATE AND ACETAMINOPHEN 5; 325 MG/1; MG/1
1 TABLET ORAL EVERY 6 HOURS PRN
COMMUNITY

## 2023-03-29 NOTE — TELEPHONE ENCOUNTER
Specialty Pharmacy - Initial Clinical Assessment    Specialty Medication Orders Linked to Encounter      Flowsheet Row Most Recent Value   Medication #1 denosumab (PROLIA) 60 mg/mL Syrg (Order#[id number], Rx#[id number])          Patient Diagnosis   M81.0 - Osteoporosis, unspecified osteoporosis type, unspecified pathological fracture presence    Subjective    Carol Yadav is a 87 y.o. female, who is followed by the specialty pharmacy service for management and education.    Recent Encounters       Date Type Provider Description    03/14/2023 Specialty Pharmacy Jennifer Baker PharmD Initial Clinical Assessment    03/03/2023 Specialty Pharmacy Katalina Ruiz PharmD Referral Authorization          Clinical call attempts since last clinical assessment   3/14/2023  4:35 PM - Specialty Pharmacy - Clinical Assessment by Jennifer Baker PharmD     Current Outpatient Medications   Medication Sig Note    acetaminophen (TYLENOL) 325 MG tablet Take 2 tablets (650 mg total) by mouth every 4 (four) hours as needed.     aspirin 81 MG Chew Take 1 tablet (81 mg total) by mouth once daily.     atorvastatin (LIPITOR) 40 MG tablet TAKE 1 TABLET BY MOUTH ONCE DAILY.     calcium carbonate (CALCIUM 600) 600 mg calcium (1,500 mg) Tab Take 1 tablet (600 mg total) by mouth once daily.     calcium carbonate (TUMS) 200 mg calcium (500 mg) chewable tablet Take 1 tablet (500 mg total) by mouth 2 (two) times daily as needed for Heartburn.     famotidine (PEPCID) 20 MG tablet Take 20 mg by mouth 2 (two) times daily as needed.     fluticasone propionate (FLONASE) 50 mcg/actuation nasal spray 1 SPRAY IN EACH NOSTRIL TWICE A DAY AS NEEDED FOR RHINITIS     guaiFENesin 100 mg/5 ml (ROBITUSSIN) 100 mg/5 mL syrup Take 200 mg by mouth 3 (three) times daily as needed for Cough.     HYDROcodone-acetaminophen (NORCO) 5-325 mg per tablet Take 1 tablet by mouth every 6 (six) hours as needed.     ibuprofen (ADVIL,MOTRIN) 800 MG tablet Take 800 mg by mouth every  6 (six) hours as needed.     ketoconazole (NIZORAL) 2 % cream APPLY TO AFFECTED AREA ONCE DAILY.     levothyroxine (SYNTHROID) 25 MCG tablet Take 1 tablet by mouth before first meal of the day.     lisinopriL (PRINIVIL,ZESTRIL) 2.5 MG tablet Take 2.5 mg by mouth once daily.     melatonin (MELATIN) 3 mg tablet TAKE 2 TABLETS BY MOUTH AT BEDTIME.     metFORMIN (GLUCOPHAGE-XR) 500 MG ER 24hr tablet Take 1 tablet (500 mg total) by mouth 2 (two) times daily with meals.     mirtazapine (REMERON) 7.5 MG Tab TAKE 1 TABLET BY MOUTH IN THE EVENING.     multivit-min-FA-lycopen-lutein (CERTAVITE SENIOR) 0.4 mg-300 mcg- 250 mcg Tab TAKE 1 TABLET BY MOUTH ONCE DAILY.     olopatadine (PATANOL) 0.1 % ophthalmic solution PLACE 1 DROP IN EACH EYE TWICE DAILY.     omeprazole (PRILOSEC) 20 MG capsule TAKE 1 CAPSULE BY MOUTH EVERY MORNING.     polyethylene glycol (MIRALAX) 17 gram PwPk Take 17 g by mouth daily as needed. Take every other day     promethazine-dextromethorphan (PROMETHAZINE-DM) 6.25-15 mg/5 mL Syrp Take by mouth as needed.     saliva substitute combo no.9 (BIOTENE DRY MOUTH ORAL RINSE) Mwsh by Mucous Membrane route. Use as directed as needed for dry mouth     venlafaxine (EFFEXOR-XR) 150 MG Cp24 Take 1 capsule (150 mg total) by mouth once daily.     vitamin D (VITAMIN D3) 1000 units Tab TAKE 1 TABLET BY MOUTH ONCE DAILY.     blood sugar diagnostic Strp To check BG 1 times daily, to use with insurance preferred meter     blood-glucose meter kit To check BG 1 times daily, to use with insurance preferred meter     denosumab (PROLIA) 60 mg/mL Syrg Inject 1 mL (60 mg total) into the skin every 6 (six) months.     flash glucose scanning reader (FREESTYLE YESY 14 DAY READER) Misc 1 application by Misc.(Non-Drug; Combo Route) route 3 (three) times daily with meals.     flash glucose sensor (FREESTYLE YESY 14 DAY SENSOR) Kit 1 application by Misc.(Non-Drug; Combo Route) route 3 (three) times daily with meals.     lancets Misc To  check BG 1 times daily, to use with insurance preferred meter     multivit-minerals/folic/ginkgo (ONE DAILY WOMEN 50 PLUS ORAL) Take 1 tablet by mouth once daily.     multivitamin-minerals-lutein Tab Take 1 tablet by mouth once daily.     MYRBETRIQ 50 mg Tb24 TAKE 1 TABLET BY MOUTH ONCE DAILY. 3/29/2023: Patient is no longer taking    triamcinolone acetonide 0.1% (KENALOG) 0.1 % cream Apply topically 2 (two) times daily.     TRUE METRIX GLUCOSE METER Misc      TRUEPLUS LANCETS 30 gauge Misc      water Liqd 146 mL with MILK OF MAGNESIA 400 mg/5 mL Susp 400 mg, diphenhydrAMINE 12.5 mg/5 mL Elix 60 mg, nystatin 100,000 unit/mL Susp 500,000 Units Take 5 mLs by mouth 3 (three) times daily as needed (mouth sores).    Last reviewed on 3/29/2023  9:10 AM by Jennifer Baker, Maya    Review of patient's allergies indicates:  No Known AllergiesLast reviewed on  3/28/2023 4:35 PM by Jennifer Baker    Drug Interactions    Drug interactions evaluated: yes  Clinically relevant drug interactions identified: no  Provided the patient with educational material regarding drug interactions: not applicable         Adverse Effects    *All other systems reviewed and are negative       Assessment Questions - Documented Responses      Flowsheet Row Most Recent Value   Assessment    Medication Reconciliation completed for patient Yes   During the past 4 weeks, has patient missed any activities due to condition or medication? No   During the past 4 weeks, did patient have any of the following urgent care visits? None   Goals of Therapy Status Discussed (new start)   Status of the patients ability to self-administer: Caregiver to administer  [Patient will receive injections at the provider office]   All education points have been covered with patient? Yes, supplemental printed education provided   Welcome packet contents reviewed and discussed with patient? No  [[facility] will deliver the medication directly to the provider office.]   Assesment  "completed? Yes   Plan Therapy being initiated   Do you need to open a clinical intervention (i-vent)? No   Do you want to schedule first shipment? Yes   Medication #1 Assessment Info    Patient status New medication, New to [facility]   Is this medication appropriate for the patient? Yes   Is this medication effective? Not yet started          Refill Questions - Documented Responses      Flowsheet Row Most Recent Value   Patient Availability and HIPAA Verification    Does patient want to proceed with activity? Yes   HIPAA/medical authority confirmed? Yes   Relationship to patient of person spoken to? Self   Refill Screening Questions    When does the patient need to receive the medication? 04/06/23   Refill Delivery Questions    How will the patient receive the medication?    When does the patient need to receive the medication? 04/06/23   Shipping Address Prescription   Address in [facility] confirmed and updated if neccessary? Yes   Expected Copay ($) 300   Is the patient able to afford the medication copay? Yes   Payment Method new CC added to file   Days supply of Refill 180   Supplies needed? No supplies needed   Refill activity completed? Yes   Refill activity plan Refill scheduled   Shipment/Pickup Date: 04/04/23            Objective    She has a past medical history of Allergic rhinitis, Arthritis, Diabetes mellitus, new onset (4/16/2022), Elevated blood pressure reading without diagnosis of hypertension, Hormone replacement therapy (postmenopausal), Hyperlipidemia, Hypertension, and Stroke of right basal ganglia (12/2014).    Tried/failed medications: alendronate    BP Readings from Last 4 Encounters:   01/06/23 100/60   12/07/22 (!) 116/59   10/06/22 102/60   06/29/22 138/82     Ht Readings from Last 4 Encounters:   01/06/23 5' 2" (1.575 m)   12/07/22 5' 2" (1.575 m)   10/06/22 5' 2" (1.575 m)   06/29/22 5' 2" (1.575 m)     Wt Readings from Last 4 Encounters:   01/06/23 62.7 kg (138 lb 3.7 oz) "   12/07/22 63.5 kg (140 lb)   10/06/22 65.7 kg (144 lb 13.5 oz)   06/29/22 72 kg (158 lb 11.7 oz)       The goals of prescribed drug therapy management include:  Supporting patient to meet the prescriber's medical treatment objectives  Improving or maintaining quality of life  Maintaining optimal therapy adherence  Minimizing and managing side effects      Goals of Therapy Status: Discussed (new start)    Assessment/Plan  Patient plans to start therapy on 04/06/23      Indication, dosage, appropriateness, effectiveness, safety and convenience of her specialty medication(s) were reviewed today.     Patient Education   Patient received education on the following:   Expectations and possible outcomes of therapy  Proper use, timely administration, and missed dose management  Duration of therapy  Side effects, including prevention, minimization, and management  Contraindications and safety precautions  New or changed medications, including prescribe and over the counter medications and supplements  Reviews recommended vaccinations, as appropriate  Storage, safe handling, and disposal      Tasks added this encounter   No tasks added.   Tasks due within next 3 months   3/28/2023 - Clinical - Initial Assessment     Gordon MarcusD  [facility]  [address] [place] [id number]  Phone: [phone]  Fax: [phone]

## 2023-03-29 NOTE — TELEPHONE ENCOUNTER
Oh confirmed to the below address for delivery on 4/4 via staff message with Pebbles Aguirre LPN.    Attn: Pebbles Aguirre LPN ([phone])  [facility] [place]  [address] [place] [address]

## 2023-03-31 ENCOUNTER — PES CALL (OUTPATIENT)
Dept: ADMINISTRATIVE | Facility: CLINIC | Age: 88
End: 2023-03-31
Payer: MEDICARE

## 2023-04-04 ENCOUNTER — LAB VISIT (OUTPATIENT)
Dept: LAB | Facility: HOSPITAL | Age: 88
End: 2023-04-04
Attending: FAMILY MEDICINE
Payer: MEDICARE

## 2023-04-04 ENCOUNTER — CLINICAL SUPPORT (OUTPATIENT)
Dept: FAMILY MEDICINE | Facility: CLINIC | Age: 88
End: 2023-04-04
Payer: MEDICARE

## 2023-04-04 DIAGNOSIS — M81.0 OSTEOPOROSIS, UNSPECIFIED OSTEOPOROSIS TYPE, UNSPECIFIED PATHOLOGICAL FRACTURE PRESENCE: Primary | ICD-10-CM

## 2023-04-04 DIAGNOSIS — I70.0 ATHEROSCLEROSIS OF AORTA: ICD-10-CM

## 2023-04-04 DIAGNOSIS — E11.9 TYPE 2 DIABETES MELLITUS WITHOUT COMPLICATION, WITHOUT LONG-TERM CURRENT USE OF INSULIN: ICD-10-CM

## 2023-04-04 LAB
ALBUMIN SERPL BCP-MCNC: 3.9 G/DL (ref 3.5–5.2)
ALP SERPL-CCNC: 77 U/L (ref 55–135)
ALT SERPL W/O P-5'-P-CCNC: 10 U/L (ref 10–44)
ANION GAP SERPL CALC-SCNC: 14 MMOL/L (ref 8–16)
AST SERPL-CCNC: 19 U/L (ref 10–40)
BASOPHILS # BLD AUTO: 0.05 K/UL (ref 0–0.2)
BASOPHILS NFR BLD: 0.5 % (ref 0–1.9)
BILIRUB SERPL-MCNC: 0.4 MG/DL (ref 0.1–1)
BUN SERPL-MCNC: 18 MG/DL (ref 8–23)
CALCIUM SERPL-MCNC: 10 MG/DL (ref 8.7–10.5)
CHLORIDE SERPL-SCNC: 107 MMOL/L (ref 95–110)
CHOLEST SERPL-MCNC: 154 MG/DL (ref 120–199)
CHOLEST/HDLC SERPL: 3.2 {RATIO} (ref 2–5)
CO2 SERPL-SCNC: 21 MMOL/L (ref 23–29)
CREAT SERPL-MCNC: 0.8 MG/DL (ref 0.5–1.4)
DIFFERENTIAL METHOD: ABNORMAL
EOSINOPHIL # BLD AUTO: 0.1 K/UL (ref 0–0.5)
EOSINOPHIL NFR BLD: 1.3 % (ref 0–8)
ERYTHROCYTE [DISTWIDTH] IN BLOOD BY AUTOMATED COUNT: 14.2 % (ref 11.5–14.5)
EST. GFR  (NO RACE VARIABLE): >60 ML/MIN/1.73 M^2
ESTIMATED AVG GLUCOSE: 120 MG/DL (ref 68–131)
GLUCOSE SERPL-MCNC: 108 MG/DL (ref 70–110)
HBA1C MFR BLD: 5.8 % (ref 4–5.6)
HCT VFR BLD AUTO: 42.5 % (ref 37–48.5)
HDLC SERPL-MCNC: 48 MG/DL (ref 40–75)
HDLC SERPL: 31.2 % (ref 20–50)
HGB BLD-MCNC: 13.4 G/DL (ref 12–16)
IMM GRANULOCYTES # BLD AUTO: 0.03 K/UL (ref 0–0.04)
IMM GRANULOCYTES NFR BLD AUTO: 0.3 % (ref 0–0.5)
LDLC SERPL CALC-MCNC: 81 MG/DL (ref 63–159)
LYMPHOCYTES # BLD AUTO: 2.7 K/UL (ref 1–4.8)
LYMPHOCYTES NFR BLD: 28.3 % (ref 18–48)
MCH RBC QN AUTO: 29.9 PG (ref 27–31)
MCHC RBC AUTO-ENTMCNC: 31.5 G/DL (ref 32–36)
MCV RBC AUTO: 95 FL (ref 82–98)
MONOCYTES # BLD AUTO: 0.6 K/UL (ref 0.3–1)
MONOCYTES NFR BLD: 6.6 % (ref 4–15)
NEUTROPHILS # BLD AUTO: 6.1 K/UL (ref 1.8–7.7)
NEUTROPHILS NFR BLD: 63 % (ref 38–73)
NONHDLC SERPL-MCNC: 106 MG/DL
NRBC BLD-RTO: 0 /100 WBC
PLATELET # BLD AUTO: 447 K/UL (ref 150–450)
PMV BLD AUTO: 9.8 FL (ref 9.2–12.9)
POTASSIUM SERPL-SCNC: 4.1 MMOL/L (ref 3.5–5.1)
PROT SERPL-MCNC: 7.3 G/DL (ref 6–8.4)
RBC # BLD AUTO: 4.48 M/UL (ref 4–5.4)
SODIUM SERPL-SCNC: 142 MMOL/L (ref 136–145)
TRIGL SERPL-MCNC: 125 MG/DL (ref 30–150)
TSH SERPL DL<=0.005 MIU/L-ACNC: 3.67 UIU/ML (ref 0.4–4)
WBC # BLD AUTO: 9.65 K/UL (ref 3.9–12.7)

## 2023-04-04 PROCEDURE — 84443 ASSAY THYROID STIM HORMONE: CPT | Performed by: FAMILY MEDICINE

## 2023-04-04 PROCEDURE — 99999 PR PBB SHADOW E&M-EST. PATIENT-LVL I: ICD-10-PCS | Mod: PBBFAC,,,

## 2023-04-04 PROCEDURE — 80061 LIPID PANEL: CPT | Performed by: FAMILY MEDICINE

## 2023-04-04 PROCEDURE — 96372 THER/PROPH/DIAG INJ SC/IM: CPT | Mod: S$GLB,,, | Performed by: FAMILY MEDICINE

## 2023-04-04 PROCEDURE — 96372 PR INJECTION,THERAP/PROPH/DIAG2ST, IM OR SUBCUT: ICD-10-PCS | Mod: S$GLB,,, | Performed by: FAMILY MEDICINE

## 2023-04-04 PROCEDURE — 80053 COMPREHEN METABOLIC PANEL: CPT | Performed by: FAMILY MEDICINE

## 2023-04-04 PROCEDURE — 36415 COLL VENOUS BLD VENIPUNCTURE: CPT | Mod: PO | Performed by: FAMILY MEDICINE

## 2023-04-04 PROCEDURE — 85025 COMPLETE CBC W/AUTO DIFF WBC: CPT | Performed by: FAMILY MEDICINE

## 2023-04-04 PROCEDURE — 99999 PR PBB SHADOW E&M-EST. PATIENT-LVL I: CPT | Mod: PBBFAC,,,

## 2023-04-04 PROCEDURE — 83036 HEMOGLOBIN GLYCOSYLATED A1C: CPT | Performed by: FAMILY MEDICINE

## 2023-04-04 NOTE — PROGRESS NOTES
Pt tolerated injection of prolia without difficulty; medication was supplied by [facility]

## 2023-04-19 ENCOUNTER — TELEPHONE (OUTPATIENT)
Dept: FAMILY MEDICINE | Facility: CLINIC | Age: 88
End: 2023-04-19
Payer: MEDICARE

## 2023-04-19 NOTE — TELEPHONE ENCOUNTER
LM informing pt Dr Yadav will be out of the office tomorrow afternoon and we need to reschedule her appt; please call office

## 2023-04-20 ENCOUNTER — TELEPHONE (OUTPATIENT)
Dept: FAMILY MEDICINE | Facility: CLINIC | Age: 88
End: 2023-04-20
Payer: MEDICARE

## 2023-04-20 NOTE — TELEPHONE ENCOUNTER
----- Message from Leann Pearson sent at 2023  3:03 PM CDT -----  Regarding: Request for Annual Mammogram  Type:  Mammogram    Caller is requesting to schedule their annual mammogram appointment.  Order is not listed in EPIC.  Please enter order and contact patient to schedule.    Name of Caller:  Self     Where would they like the mammogram performed? [facility]    Would the patient rather a call back or a response via [facility]? Call     Best Call Back Number: [phone]      Additional Information: Her last referral is

## 2023-04-20 NOTE — TELEPHONE ENCOUNTER
Mammogram order not ; called pt but she was having trouble hearing; scheduled and mailed appt reminder to pt

## 2023-05-03 DIAGNOSIS — F41.9 ANXIETY AND DEPRESSION: ICD-10-CM

## 2023-05-03 DIAGNOSIS — F32.A ANXIETY AND DEPRESSION: ICD-10-CM

## 2023-05-03 NOTE — TELEPHONE ENCOUNTER
No care due was identified.  [facility] Embedded Care Due Messages. Reference number: [id number].   5/03/2023 9:18:39 AM CDT

## 2023-05-03 NOTE — TELEPHONE ENCOUNTER
Refill Routing Note   Medication(s) are not appropriate for processing by [facility] for the following reason(s):      Drug-disease interaction: venlafaxine and Essential hypertension; Hypertension, well controlled  No active prescription written by PCP    [facility] action(s):  Defer          Pharmacist review requested: Yes     Appointments  past 12m or future 3m with PCP    Date Provider   Last Visit   1/6/2023 Jatin Yadav MD   Next Visit   6/14/2023 Jatin Yadav MD   ED visits in past 90 days: 0        Note composed:3:53 PM 05/03/2023

## 2023-05-04 ENCOUNTER — HOSPITAL ENCOUNTER (OUTPATIENT)
Dept: RADIOLOGY | Facility: HOSPITAL | Age: 88
Discharge: HOME | End: 2023-05-04
Attending: FAMILY MEDICINE
Payer: MEDICARE

## 2023-05-04 VITALS — BODY MASS INDEX: 25.44 KG/M2 | HEIGHT: 62 IN | WEIGHT: 138.25 LBS

## 2023-05-04 DIAGNOSIS — Z12.31 ENCOUNTER FOR SCREENING MAMMOGRAM FOR BREAST CANCER: ICD-10-CM

## 2023-05-04 PROCEDURE — 77067 SCR MAMMO BI INCL CAD: CPT | Mod: TC

## 2023-05-04 PROCEDURE — 77063 MAMMO DIGITAL SCREENING BILAT WITH TOMO: ICD-10-PCS | Mod: 26,,, | Performed by: RADIOLOGY

## 2023-05-04 PROCEDURE — 77067 SCR MAMMO BI INCL CAD: CPT | Mod: 26,,, | Performed by: RADIOLOGY

## 2023-05-04 PROCEDURE — 77063 BREAST TOMOSYNTHESIS BI: CPT | Mod: 26,,, | Performed by: RADIOLOGY

## 2023-05-04 PROCEDURE — 77067 MAMMO DIGITAL SCREENING BILAT WITH TOMO: ICD-10-PCS | Mod: 26,,, | Performed by: RADIOLOGY

## 2023-05-04 RX ORDER — VENLAFAXINE HYDROCHLORIDE 150 MG/1
CAPSULE, EXTENDED RELEASE ORAL
Qty: 90 CAPSULE | Refills: 2 | Status: SHIPPED | OUTPATIENT
Start: 2023-05-04 | End: 2024-01-24

## 2023-05-04 RX ORDER — LISINOPRIL 2.5 MG/1
TABLET ORAL
Qty: 90 TABLET | Refills: 2 | Status: SHIPPED | OUTPATIENT
Start: 2023-05-04 | End: 2024-01-24

## 2023-05-04 NOTE — TELEPHONE ENCOUNTER
Refill Routing Note   Medication(s) are not appropriate for processing by [facility] for the following reason(s):      No active prescription written by PCP    [facility] action(s):  Defer  Approve          Alert overridden per protocol: Yes   Pharmacist review requested: Yes     Appointments  past 12m or future 3m with PCP    Date Provider   Last Visit   1/6/2023 Jatin Yadav MD   Next Visit   6/14/2023 Jatin Yadav MD   ED visits in past 90 days: 0        Note composed:12:29 PM 05/04/2023

## 2023-05-05 ENCOUNTER — PES CALL (OUTPATIENT)
Dept: ADMINISTRATIVE | Facility: CLINIC | Age: 88
End: 2023-05-05
Payer: MEDICARE

## 2023-05-24 ENCOUNTER — PES CALL (OUTPATIENT)
Dept: ADMINISTRATIVE | Facility: CLINIC | Age: 88
End: 2023-05-24
Payer: MEDICARE

## 2023-06-02 DIAGNOSIS — G47.00 INSOMNIA, UNSPECIFIED TYPE: ICD-10-CM

## 2023-06-02 RX ORDER — MIRTAZAPINE 7.5 MG/1
TABLET, FILM COATED ORAL
Qty: 90 TABLET | Refills: 2 | Status: SHIPPED | OUTPATIENT
Start: 2023-06-02 | End: 2024-02-28

## 2023-06-02 NOTE — TELEPHONE ENCOUNTER
No care due was identified.  [facility] Embedded Care Due Messages. Reference number: [id number].   6/02/2023 2:44:06 PM CDT

## 2023-06-03 NOTE — TELEPHONE ENCOUNTER
Refill Decision Note   Carol Yadav  is requesting a refill authorization.  Brief Assessment and Rationale for Refill:  Approve     Medication Therapy Plan:         Comments:     Note composed:11:51 PM 06/02/2023

## 2023-06-13 ENCOUNTER — TELEPHONE (OUTPATIENT)
Dept: FAMILY MEDICINE | Facility: CLINIC | Age: 88
End: 2023-06-13
Payer: MEDICARE

## 2023-06-13 DIAGNOSIS — E11.9 TYPE 2 DIABETES MELLITUS WITHOUT COMPLICATION, WITHOUT LONG-TERM CURRENT USE OF INSULIN: Primary | ICD-10-CM

## 2023-06-13 NOTE — TELEPHONE ENCOUNTER
----- Message from Leann Pearson sent at 6/13/2023  2:47 PM CDT -----  Regarding: Request for Lab Appointment  Type: Lab    Caller is requesting to schedule their Lab appointment prior to annual appointment.    Order is not listed in EPIC.  Please enter order and contact patient to schedule.    Name of Caller: Self     Preferred Date and Time of Labs- Afternoon from June 19th on    Date of [facility] Appointment: August 23rd    Where would they like the lab performed? [facility]    Would the patient rather a call back or a response via [facility]? Call     Best Call Back Number: [phone]

## 2023-06-27 ENCOUNTER — PES CALL (OUTPATIENT)
Dept: ADMINISTRATIVE | Facility: CLINIC | Age: 88
End: 2023-06-27
Payer: MEDICARE

## 2023-07-03 DIAGNOSIS — R27.0 ATAXIA: ICD-10-CM

## 2023-07-05 RX ORDER — CALCIUM CARBONATE 600 MG
TABLET ORAL
Qty: 30 TABLET | Refills: 0 | Status: SHIPPED | OUTPATIENT
Start: 2023-07-05 | End: 2023-08-01

## 2023-07-31 DIAGNOSIS — R27.0 ATAXIA: ICD-10-CM

## 2023-07-31 DIAGNOSIS — K21.9 GASTROESOPHAGEAL REFLUX DISEASE, UNSPECIFIED WHETHER ESOPHAGITIS PRESENT: ICD-10-CM

## 2023-07-31 RX ORDER — OMEPRAZOLE 20 MG/1
CAPSULE, DELAYED RELEASE ORAL
Qty: 90 CAPSULE | Refills: 1 | Status: SHIPPED | OUTPATIENT
Start: 2023-07-31 | End: 2024-01-24

## 2023-07-31 NOTE — TELEPHONE ENCOUNTER
No care due was identified.  [facility] Embedded Care Due Messages. Reference number: [id number].   7/31/2023 5:45:17 PM CDT

## 2023-07-31 NOTE — TELEPHONE ENCOUNTER
Refill Routing Note   Medication(s) are not appropriate for processing by [facility] for the following reason(s):      - Outside of protocol    [facility] action(s):  Route  Approve       Medication Therapy Plan: route calcium carb; approve omeprazole  Medication reconciliation completed: No     Appointments  past 12m or future 3m with PCP    Date Provider   Last Visit   1/6/2023 Jatin Yadav MD   Next Visit   8/23/2023 Jatin Yadav MD   ED visits in past 90 days: 0        Note composed:6:17 PM 07/31/2023

## 2023-08-01 RX ORDER — CALCIUM CARBONATE 600 MG
TABLET ORAL
Qty: 30 TABLET | Refills: 0 | Status: SHIPPED | OUTPATIENT
Start: 2023-08-01 | End: 2023-09-05 | Stop reason: SDUPTHER

## 2023-08-18 ENCOUNTER — TELEPHONE (OUTPATIENT)
Dept: FAMILY MEDICINE | Facility: CLINIC | Age: 88
End: 2023-08-18
Payer: MEDICARE

## 2023-08-18 NOTE — TELEPHONE ENCOUNTER
----- Message from Ubaldo Astorga sent at 8/18/2023 12:57 PM CDT -----  Type: Patient Call Back    Who called:Self    What is the request in detail:In regards to bone density     Can the clinic reply by [facility]?No    Would the patient rather a call back or a response via [facility]? Call    Best call back number:.[phone] (home)     Additional Information:

## 2023-08-23 ENCOUNTER — OFFICE VISIT (OUTPATIENT)
Dept: FAMILY MEDICINE | Facility: CLINIC | Age: 88
End: 2023-08-23
Payer: MEDICARE

## 2023-08-23 VITALS
OXYGEN SATURATION: 94 % | WEIGHT: 129.88 LBS | BODY MASS INDEX: 23.9 KG/M2 | SYSTOLIC BLOOD PRESSURE: 118 MMHG | HEIGHT: 62 IN | TEMPERATURE: 98 F | RESPIRATION RATE: 18 BRPM | DIASTOLIC BLOOD PRESSURE: 60 MMHG | HEART RATE: 90 BPM

## 2023-08-23 DIAGNOSIS — E11.59 TYPE 2 DIABETES MELLITUS WITH OTHER CIRCULATORY COMPLICATIONS: ICD-10-CM

## 2023-08-23 DIAGNOSIS — I63.9 CEREBROVASCULAR ACCIDENT (CVA), UNSPECIFIED MECHANISM: ICD-10-CM

## 2023-08-23 DIAGNOSIS — K59.01 SLOW TRANSIT CONSTIPATION: Primary | ICD-10-CM

## 2023-08-23 DIAGNOSIS — R54 AGE-RELATED PHYSICAL DEBILITY: ICD-10-CM

## 2023-08-23 LAB
ALBUMIN/CREAT UR: NORMAL UG/MG (ref 0–30)
CREAT UR-MCNC: 51 MG/DL (ref 15–325)
MICROALBUMIN UR DL<=1MG/L-MCNC: <5 UG/ML

## 2023-08-23 PROCEDURE — 99999 PR PBB SHADOW E&M-EST. PATIENT-LVL V: CPT | Mod: PBBFAC,,, | Performed by: FAMILY MEDICINE

## 2023-08-23 PROCEDURE — 99999 PR PBB SHADOW E&M-EST. PATIENT-LVL V: ICD-10-PCS | Mod: PBBFAC,,, | Performed by: FAMILY MEDICINE

## 2023-08-23 PROCEDURE — 3078F PR MOST RECENT DIASTOLIC BLOOD PRESSURE < 80 MM HG: ICD-10-PCS | Mod: CPTII,S$GLB,, | Performed by: FAMILY MEDICINE

## 2023-08-23 PROCEDURE — 1159F MED LIST DOCD IN RCRD: CPT | Mod: CPTII,S$GLB,, | Performed by: FAMILY MEDICINE

## 2023-08-23 PROCEDURE — 1159F PR MEDICATION LIST DOCUMENTED IN MEDICAL RECORD: ICD-10-PCS | Mod: CPTII,S$GLB,, | Performed by: FAMILY MEDICINE

## 2023-08-23 PROCEDURE — 1126F AMNT PAIN NOTED NONE PRSNT: CPT | Mod: CPTII,S$GLB,, | Performed by: FAMILY MEDICINE

## 2023-08-23 PROCEDURE — 3288F PR FALLS RISK ASSESSMENT DOCUMENTED: ICD-10-PCS | Mod: CPTII,S$GLB,, | Performed by: FAMILY MEDICINE

## 2023-08-23 PROCEDURE — 3074F SYST BP LT 130 MM HG: CPT | Mod: CPTII,S$GLB,, | Performed by: FAMILY MEDICINE

## 2023-08-23 PROCEDURE — 99214 OFFICE O/P EST MOD 30 MIN: CPT | Mod: S$GLB,,, | Performed by: FAMILY MEDICINE

## 2023-08-23 PROCEDURE — 1101F PT FALLS ASSESS-DOCD LE1/YR: CPT | Mod: CPTII,S$GLB,, | Performed by: FAMILY MEDICINE

## 2023-08-23 PROCEDURE — 3078F DIAST BP <80 MM HG: CPT | Mod: CPTII,S$GLB,, | Performed by: FAMILY MEDICINE

## 2023-08-23 PROCEDURE — 3288F FALL RISK ASSESSMENT DOCD: CPT | Mod: CPTII,S$GLB,, | Performed by: FAMILY MEDICINE

## 2023-08-23 PROCEDURE — 1126F PR PAIN SEVERITY QUANTIFIED, NO PAIN PRESENT: ICD-10-PCS | Mod: CPTII,S$GLB,, | Performed by: FAMILY MEDICINE

## 2023-08-23 PROCEDURE — 3074F PR MOST RECENT SYSTOLIC BLOOD PRESSURE < 130 MM HG: ICD-10-PCS | Mod: CPTII,S$GLB,, | Performed by: FAMILY MEDICINE

## 2023-08-23 PROCEDURE — 1101F PR PT FALLS ASSESS DOC 0-1 FALLS W/OUT INJ PAST YR: ICD-10-PCS | Mod: CPTII,S$GLB,, | Performed by: FAMILY MEDICINE

## 2023-08-23 PROCEDURE — 82570 ASSAY OF URINE CREATININE: CPT | Performed by: FAMILY MEDICINE

## 2023-08-23 PROCEDURE — 99214 PR OFFICE/OUTPT VISIT, EST, LEVL IV, 30-39 MIN: ICD-10-PCS | Mod: S$GLB,,, | Performed by: FAMILY MEDICINE

## 2023-08-23 RX ORDER — ATORVASTATIN CALCIUM 40 MG/1
40 TABLET, FILM COATED ORAL DAILY
Qty: 30 TABLET | Refills: 11 | Status: SHIPPED | OUTPATIENT
Start: 2023-08-23

## 2023-08-23 NOTE — PROGRESS NOTES
Subjective:       Patient ID: Carol Yadav is a 87 y.o. female.    Chief Complaint: Follow-up      Follow-up      87-year-old female presents for constipation.  States she still gets occasionally constipated and then has diarrhea 3 4 days later.  States most of her days her on her bed.  Patient feels she is getting weaker.  Does state she does not exercise.    Review of Systems   Constitutional: Negative.    HENT: Negative.     Respiratory: Negative.     Cardiovascular: Negative.    Gastrointestinal: Negative.    Endocrine: Negative.    Genitourinary: Negative.    Musculoskeletal: Negative.    Neurological: Negative.    Psychiatric/Behavioral: Negative.            Past Medical History:   Diagnosis Date    Allergic rhinitis     Arthritis     Diabetes mellitus, new onset 4/16/2022    Elevated blood pressure reading without diagnosis of hypertension     Hormone replacement therapy (postmenopausal)     Hyperlipidemia     Hypertension     Stroke of right basal ganglia 12/2014     Past Surgical History:   Procedure Laterality Date    APPENDECTOMY      BREAST BIOPSY      CATARACT EXTRACTION Bilateral at age 62 or 72    monovision     EYE SURGERY      hai cataract surgery    OOPHORECTOMY      PINNING OF HIP Left 6/16/2021    Procedure: PINNING, HIP;  Surgeon: Dae Bernal MD;  Location: [facility];  Service: Orthopedics;  Laterality: Left;    TONSILLECTOMY       Family History   Problem Relation Age of Onset    Breast cancer Mother     Brain cancer Mother         Metastatic from breast    Stroke Father     No Known Problems Sister     No Known Problems Brother     No Known Problems Daughter     No Known Problems Daughter     Schizophrenia Daughter     No Known Problems Maternal Aunt     No Known Problems Maternal Uncle     No Known Problems Paternal Aunt     No Known Problems Paternal Uncle     No Known Problems Maternal Grandmother     No Known Problems Maternal Grandfather     No Known Problems Paternal Grandmother     No  Known Problems Paternal Grandfather     Amblyopia Neg Hx     Blindness Neg Hx     Cataracts Neg Hx     Diabetes Neg Hx     Glaucoma Neg Hx     Hypertension Neg Hx     Macular degeneration Neg Hx     Retinal detachment Neg Hx     Strabismus Neg Hx     Thyroid disease Neg Hx      Social History     Socioeconomic History    Marital status:    Tobacco Use    Smoking status: Former     Current packs/day: 0.00     Average packs/day: 2.0 packs/day for 36.0 years (72.0 ttl pk-yrs)     Types: Cigarettes     Start date:      Quit date:      Years since quittin.6    Smokeless tobacco: Never   Substance and Sexual Activity    Alcohol use: Not Currently     Alcohol/week: 21.0 standard drinks of alcohol     Types: 7 Glasses of wine, 14 Standard drinks or equivalent per week    Drug use: No    Sexual activity: Not Currently     Partners: Male     Comment: 2017 divorce      Social Determinants of Health     Financial Resource Strain: Low Risk  (2022)    Overall Financial Resource Strain (CARDIA)     Difficulty of Paying Living Expenses: Not hard at all   Food Insecurity: No Food Insecurity (2022)    Hunger Vital Sign     Worried About Running Out of Food in the Last Year: Never true     Ran Out of Food in the Last Year: Never true   Transportation Needs: No Transportation Needs (2022)    PRAPARE - Transportation     Lack of Transportation (Medical): No     Lack of Transportation (Non-Medical): No   Physical Activity: Insufficiently Active (2022)    Exercise Vital Sign     Days of Exercise per Week: 5 days     Minutes of Exercise per Session: 10 min   Stress: No Stress Concern Present (2022)    [place] citizen [employer] - Occupational Stress Questionnaire     Feeling of Stress : Only a little   Social Connections: Socially Isolated (2022)    Social Connection and Isolation Panel [NHANES]     Frequency of Communication with Friends and Family: More than three  times a week     Frequency of Social Gatherings with Friends and Family: Once a week     Attends Zoroastrianism Services: Never     Active Member of Clubs or Organizations: No     Marital Status:    Housing Stability: Low Risk  (4/14/2022)    Housing Stability Vital Sign     Unable to Pay for Housing in the Last Year: No     Number of Places Lived in the Last Year: 1     Unstable Housing in the Last Year: No       Current Outpatient Medications:     acetaminophen (TYLENOL) 325 MG tablet, Take 2 tablets (650 mg total) by mouth every 4 (four) hours as needed., Disp: , Rfl: 0    aspirin 81 MG Chew, Take 1 tablet (81 mg total) by mouth once daily., Disp: 30 tablet, Rfl: 11    calcium carbonate (OS-HAYLEE) 600 mg calcium (1,500 mg) Tab, TAKE 1 TABLET BY MOUTH ONCE DAILY., Disp: 30 tablet, Rfl: 0    denosumab (PROLIA) 60 mg/mL Syrg, Inject 1 mL (60 mg total) into the skin every 6 (six) months., Disp: 2 mL, Rfl: 0    famotidine (PEPCID) 20 MG tablet, Take 20 mg by mouth 2 (two) times daily as needed., Disp: , Rfl:     flash glucose scanning reader (FREESTYLE YESY 14 DAY READER) Misc, 1 application by Misc.(Non-Drug; Combo Route) route 3 (three) times daily with meals., Disp: 2 each, Rfl: 2    flash glucose sensor (FREESTYLE YESY 14 DAY SENSOR) Kit, 1 application by Misc.(Non-Drug; Combo Route) route 3 (three) times daily with meals., Disp: 2 kit, Rfl: 3    fluticasone propionate (FLONASE) 50 mcg/actuation nasal spray, 1 SPRAY IN EACH NOSTRIL TWICE A DAY AS NEEDED FOR RHINITIS, Disp: 16 g, Rfl: 11    guaiFENesin 100 mg/5 ml (ROBITUSSIN) 100 mg/5 mL syrup, Take 200 mg by mouth 3 (three) times daily as needed for Cough., Disp: , Rfl:     HYDROcodone-acetaminophen (NORCO) 5-325 mg per tablet, Take 1 tablet by mouth every 6 (six) hours as needed., Disp: , Rfl:     ibuprofen (ADVIL,MOTRIN) 800 MG tablet, Take 800 mg by mouth every 6 (six) hours as needed., Disp: , Rfl:     ketoconazole (NIZORAL) 2 % cream, APPLY TO AFFECTED  AREA ONCE DAILY., Disp: 60 g, Rfl: 0    lancets Misc, To check BG 1 times daily, to use with insurance preferred meter, Disp: 100 each, Rfl: 3    levothyroxine (SYNTHROID) 25 MCG tablet, Take 1 tablet by mouth before first meal of the day., Disp: 90 tablet, Rfl: 3    lisinopriL (PRINIVIL,ZESTRIL) 2.5 MG tablet, TAKE 1 TABLET BY MOUTH ONCE DAILY., Disp: 90 tablet, Rfl: 2    melatonin (MELATIN) 3 mg tablet, TAKE 2 TABLETS BY MOUTH AT BEDTIME., Disp: 180 tablet, Rfl: 3    metFORMIN (GLUCOPHAGE-XR) 500 MG ER 24hr tablet, Take 1 tablet (500 mg total) by mouth 2 (two) times daily with meals., Disp: 180 tablet, Rfl: 3    mirtazapine (REMERON) 7.5 MG Tab, TAKE 1 TABLET BY MOUTH IN THE EVENING., Disp: 90 tablet, Rfl: 2    multivit-min-FA-lycopen-lutein (CERTAVITE SENIOR) 0.4 mg-300 mcg- 250 mcg Tab, TAKE 1 TABLET BY MOUTH ONCE DAILY., Disp: 90 tablet, Rfl: 3    multivit-minerals/folic/ginkgo (ONE DAILY WOMEN 50 PLUS ORAL), Take 1 tablet by mouth once daily., Disp: , Rfl:     olopatadine (PATANOL) 0.1 % ophthalmic solution, PLACE 1 DROP IN EACH EYE TWICE DAILY., Disp: 5 mL, Rfl: 3    omeprazole (PRILOSEC) 20 MG capsule, TAKE 1 CAPSULE BY MOUTH EVERY MORNING., Disp: 90 capsule, Rfl: 1    polyethylene glycol (MIRALAX) 17 gram PwPk, Take 17 g by mouth daily as needed. Take every other day, Disp: 30 packet, Rfl: 11    promethazine-dextromethorphan (PROMETHAZINE-DM) 6.25-15 mg/5 mL Syrp, Take by mouth as needed., Disp: , Rfl:     saliva substitute combo no.9 (BIOTENE DRY MOUTH ORAL RINSE) Mwsh, by Mucous Membrane route. Use as directed as needed for dry mouth, Disp: , Rfl:     triamcinolone acetonide 0.1% (KENALOG) 0.1 % cream, Apply topically 2 (two) times daily., Disp: 80 g, Rfl: 1    TRUE METRIX GLUCOSE METER Misc, , Disp: , Rfl:     TRUE METRIX GLUCOSE TEST STRIP Strp, CHECK BLOOD SUGAR 3 TIMES DAILY WITH MEALS., Disp: 300 strip, Rfl: 3    TRUEPLUS LANCETS 30 gauge Misc, CHECK BLOOD SUGAR 3 TIMES DAILY WITH MEALS., Disp: 300  "each, Rfl: 3    venlafaxine (EFFEXOR-XR) 150 MG Cp24, TAKE 1 CAPSULE BY MOUTH ONCE DAILY., Disp: 90 capsule, Rfl: 2    vitamin D (VITAMIN D3) 1000 units Tab, TAKE 1 TABLET BY MOUTH ONCE DAILY., Disp: 90 tablet, Rfl: 3    water Liqd 146 mL with MILK OF MAGNESIA 400 mg/5 mL Susp 400 mg, diphenhydrAMINE 12.5 mg/5 mL Elix 60 mg, nystatin 100,000 unit/mL Susp 500,000 Units, Take 5 mLs by mouth 3 (three) times daily as needed (mouth sores)., Disp: 1 Bottle, Rfl: 0    atorvastatin (LIPITOR) 40 MG tablet, Take 1 tablet (40 mg total) by mouth once daily., Disp: 30 tablet, Rfl: 11    Current Facility-Administered Medications:     cyanocobalamin injection 1,000 mcg, 1,000 mcg, Subcutaneous, 1 time in Clinic/HOD, Jatin Yadav MD    denosumab (PROLIA) injection 60 mg, 60 mg, Subcutaneous, Q6 Months, Jatin Yadav MD, 60 mg at 04/04/23 1439   Objective:      Vitals:    08/23/23 1423   BP: 118/60   BP Location: Right arm   Patient Position: Sitting   BP Method: Small (Manual)   Pulse: 90   Resp: 18   Temp: 97.9 °F (36.6 °C)   TempSrc: Oral   SpO2: (!) 94%   Weight: 58.9 kg (129 lb 13.6 oz)   Height: 5' 2" (1.575 m)       Physical Exam  Constitutional:       General: She is not in acute distress.     Appearance: She is not diaphoretic.   HENT:      Head: Normocephalic and atraumatic.   Eyes:      Conjunctiva/sclera: Conjunctivae normal.   Pulmonary:      Effort: Pulmonary effort is normal.   Musculoskeletal:      Cervical back: Neck supple.   Skin:     Findings: No rash.   Neurological:      Mental Status: She is alert and oriented to person, place, and time.   Psychiatric:         Behavior: Behavior normal.         Thought Content: Thought content normal.         Judgment: Judgment normal.            Assessment:       1. Slow transit constipation    2. Type 2 diabetes mellitus with other circulatory complications    3. Cerebrovascular accident (CVA), unspecified mechanism    4. Age-related physical debility      "   Plan:       Slow transit constipation    Type 2 diabetes mellitus with other circulatory complications  -     Microalbumin/Creatinine Ratio, Urine; Future; Expected date: 08/23/2023    Cerebrovascular accident (CVA), unspecified mechanism  -     atorvastatin (LIPITOR) 40 MG tablet; Take 1 tablet (40 mg total) by mouth once daily.  Dispense: 30 tablet; Refill: 11    Age-related physical debility      Can take fiber supplements along with MiraLax that she is taking.  Advised hydration in more physical activity.  Follow-up in 2 months        Future Appointments   Date Time Provider Department Center   10/10/2023 12:00 PM [facility], [facility] [place]   10/13/2023  9:20 AM Jatin Yadav MD [facility]       Patient note was created using [employer].  Any errors in syntax or even information may not have been identified and edited on initial review prior to signing this note.

## 2023-08-23 NOTE — PROGRESS NOTES
Health Maintenance Due   Topic     Diabetes Urine Screening  Consult pcp    TETANUS VACCINE  Consult pcp    Shingles Vaccine (1 of 2)  hx chicken pox. Notified pt can get vaccine at pharmacy    COVID-19 Vaccine (4 - Pfizer risk series) Not offered at this office

## 2023-09-01 RX ORDER — NAPROXEN SODIUM 220 MG/1
TABLET, FILM COATED ORAL
Qty: 60 TABLET | Refills: 0 | Status: SHIPPED | OUTPATIENT
Start: 2023-09-01 | End: 2023-09-29

## 2023-09-05 DIAGNOSIS — R27.0 ATAXIA: ICD-10-CM

## 2023-09-05 NOTE — TELEPHONE ENCOUNTER
----- Message from Leann Pearson sent at 9/5/2023  1:32 PM CDT -----  Regarding: Refill Request  Type: RX Refill Request      Who Called: Self       Refill or New Rx: refill       RX Name and Strength: Needs new orders for OTC for calcium carbonate (OS-HAYLEE) 600 mg calcium (1,500 mg) Tab        Preferred Pharmacy with phone number:  .  [facility] [address]  [address]  [place] [address]  Phone: [phone] Fax: [phone]          Would the patient rather a call back or a response via My [facility]? Call       Best Call Back Number: .[phone]

## 2023-09-07 DIAGNOSIS — R27.0 ATAXIA: ICD-10-CM

## 2023-09-07 RX ORDER — CALCIUM CARBONATE 600 MG
1 TABLET ORAL DAILY
Qty: 90 TABLET | Refills: 3 | Status: SHIPPED | OUTPATIENT
Start: 2023-09-07 | End: 2023-10-13

## 2023-09-07 RX ORDER — CALCIUM CARBONATE 600 MG
1 TABLET ORAL DAILY
Qty: 30 TABLET | Refills: 0 | Status: SHIPPED | OUTPATIENT
Start: 2023-09-07 | End: 2023-10-13

## 2023-09-07 NOTE — TELEPHONE ENCOUNTER
She states she has requested this medication 3 times for the patient, the patient is in a living facility and is in need of her meds. If there is any issues refilling meds please reach out to the pharmacy.

## 2023-09-07 NOTE — TELEPHONE ENCOUNTER
----- Message from Shyanne Parra sent at 9/7/2023  9:11 AM CDT -----  Regarding: [id number]  Type: RX Refill Request    Who Called: Vanessa - [facility]    Have you contacted your pharmacy:yes    Refill or New Rx:refill     RX Name and Strength:calcium carbonate (OS-HAYLEE) 600 mg calcium (1,500 mg) Tab      Is this a 30 day or 90 day RX: 30 days     Preferred Pharmacy with phone number:  [facility] [address]  [address]  [place] [address]  Phone: [phone] Fax: [phone]      Local or Mail Order:local     Ordering Provider:Jony    Would the patient rather a call back or a response via My "[employer]? Call back     Best Call Back Number:[phone]      Additional Information: she states she has requested this medication 3 times for the pt, the pt is in a living facility and is in need of her meds. If there is any issues refilling meds please reach out to the pharmacy.

## 2023-09-09 DIAGNOSIS — H04.203 BILATERAL EPIPHORA: ICD-10-CM

## 2023-09-13 RX ORDER — OLOPATADINE HYDROCHLORIDE 1 MG/ML
SOLUTION/ DROPS OPHTHALMIC
Qty: 5 ML | Refills: 0 | Status: SHIPPED | OUTPATIENT
Start: 2023-09-13 | End: 2023-09-29

## 2023-09-28 DIAGNOSIS — H04.203 BILATERAL EPIPHORA: ICD-10-CM

## 2023-09-28 DIAGNOSIS — E03.8 SUBCLINICAL HYPOTHYROIDISM: ICD-10-CM

## 2023-09-28 RX ORDER — LEVOTHYROXINE SODIUM 25 UG/1
TABLET ORAL
Qty: 90 TABLET | Refills: 1 | Status: SHIPPED | OUTPATIENT
Start: 2023-09-28 | End: 2024-04-02

## 2023-09-28 NOTE — TELEPHONE ENCOUNTER
No care due was identified.  [facility] Embedded Care Due Messages. Reference number: [id number].   9/28/2023 5:57:16 PM CDT

## 2023-09-28 NOTE — TELEPHONE ENCOUNTER
Refill Routing Note   Medication(s) are not appropriate for processing by [facility] for the following reason(s):      Medication outside of protocol    [facility] action(s):  Route  Approve Care Due:  None identified          Appointments  past 12m or future 3m with PCP    Date Provider   Last Visit   8/23/2023 Jatin Yadav MD   Next Visit   10/13/2023 Jatin Yadav MD   ED visits in past 90 days: 0        Note composed:6:11 PM 09/28/2023

## 2023-09-29 RX ORDER — NAPROXEN SODIUM 220 MG/1
TABLET, FILM COATED ORAL
Qty: 180 TABLET | Refills: 0 | Status: SHIPPED | OUTPATIENT
Start: 2023-09-29 | End: 2024-04-02

## 2023-09-29 RX ORDER — OLOPATADINE HYDROCHLORIDE 1 MG/ML
SOLUTION/ DROPS OPHTHALMIC
Qty: 5 ML | Refills: 0 | Status: SHIPPED | OUTPATIENT
Start: 2023-09-29 | End: 2023-12-28

## 2023-10-12 ENCOUNTER — TELEPHONE (OUTPATIENT)
Dept: FAMILY MEDICINE | Facility: CLINIC | Age: 88
End: 2023-10-12
Payer: MEDICARE

## 2023-10-12 DIAGNOSIS — R27.0 ATAXIA: ICD-10-CM

## 2023-10-12 NOTE — TELEPHONE ENCOUNTER
Per Maxine/[facility], patient's script for Os-Shaheen was denied.  Is medication discontinued?  What is reason for refill denial?  Please advise.

## 2023-10-12 NOTE — TELEPHONE ENCOUNTER
----- Message from Shyanne Fidel sent at 10/12/2023  4:01 PM CDT -----  Regarding: [id number]  Type: Patient Call Back    Who called:Maxine Ifrah [facility]    What is the request in detail:states she is calling to get clarification on the pt medication calcium carbonate (OS-HAYLEE) 600 mg calcium (1,500 mg) Tab, she states she will like a call back from the office in regards to the medication being denied    Can the clinic reply by [facility]?no     Would the patient rather a call back or a response via [facility]? Call back     Best call back number:[phone]    Additional Information:  [facility] [place] - [address]  [address]  [place] [address]  Phone: [phone] Fax: [phone]

## 2023-10-13 ENCOUNTER — OFFICE VISIT (OUTPATIENT)
Dept: FAMILY MEDICINE | Facility: CLINIC | Age: 88
End: 2023-10-13
Payer: MEDICARE

## 2023-10-13 ENCOUNTER — LAB VISIT (OUTPATIENT)
Dept: LAB | Facility: HOSPITAL | Age: 88
End: 2023-10-13
Attending: FAMILY MEDICINE
Payer: MEDICARE

## 2023-10-13 VITALS
RESPIRATION RATE: 18 BRPM | HEART RATE: 83 BPM | BODY MASS INDEX: 24.06 KG/M2 | WEIGHT: 130.75 LBS | DIASTOLIC BLOOD PRESSURE: 62 MMHG | HEIGHT: 62 IN | OXYGEN SATURATION: 97 % | TEMPERATURE: 98 F | SYSTOLIC BLOOD PRESSURE: 130 MMHG

## 2023-10-13 DIAGNOSIS — E11.9 TYPE 2 DIABETES MELLITUS WITHOUT COMPLICATION, WITHOUT LONG-TERM CURRENT USE OF INSULIN: ICD-10-CM

## 2023-10-13 DIAGNOSIS — Z23 NEEDS FLU SHOT: ICD-10-CM

## 2023-10-13 DIAGNOSIS — K92.1 MELENA: Primary | ICD-10-CM

## 2023-10-13 DIAGNOSIS — K92.1 MELENA: ICD-10-CM

## 2023-10-13 LAB
BASOPHILS # BLD AUTO: 0.07 K/UL (ref 0–0.2)
BASOPHILS NFR BLD: 0.8 % (ref 0–1.9)
DIFFERENTIAL METHOD: ABNORMAL
EOSINOPHIL # BLD AUTO: 0.2 K/UL (ref 0–0.5)
EOSINOPHIL NFR BLD: 1.8 % (ref 0–8)
ERYTHROCYTE [DISTWIDTH] IN BLOOD BY AUTOMATED COUNT: 13.8 % (ref 11.5–14.5)
ESTIMATED AVG GLUCOSE: 108 MG/DL (ref 68–131)
HBA1C MFR BLD: 5.4 % (ref 4–5.6)
HCT VFR BLD AUTO: 40.9 % (ref 37–48.5)
HGB BLD-MCNC: 12.8 G/DL (ref 12–16)
IMM GRANULOCYTES # BLD AUTO: 0.02 K/UL (ref 0–0.04)
IMM GRANULOCYTES NFR BLD AUTO: 0.2 % (ref 0–0.5)
LYMPHOCYTES # BLD AUTO: 2.8 K/UL (ref 1–4.8)
LYMPHOCYTES NFR BLD: 31.1 % (ref 18–48)
MCH RBC QN AUTO: 30.4 PG (ref 27–31)
MCHC RBC AUTO-ENTMCNC: 31.3 G/DL (ref 32–36)
MCV RBC AUTO: 97 FL (ref 82–98)
MONOCYTES # BLD AUTO: 0.7 K/UL (ref 0.3–1)
MONOCYTES NFR BLD: 7.5 % (ref 4–15)
NEUTROPHILS # BLD AUTO: 5.2 K/UL (ref 1.8–7.7)
NEUTROPHILS NFR BLD: 58.6 % (ref 38–73)
NRBC BLD-RTO: 0 /100 WBC
PLATELET # BLD AUTO: 407 K/UL (ref 150–450)
PMV BLD AUTO: 10.2 FL (ref 9.2–12.9)
RBC # BLD AUTO: 4.21 M/UL (ref 4–5.4)
WBC # BLD AUTO: 8.9 K/UL (ref 3.9–12.7)

## 2023-10-13 PROCEDURE — 36415 COLL VENOUS BLD VENIPUNCTURE: CPT | Mod: PO | Performed by: FAMILY MEDICINE

## 2023-10-13 PROCEDURE — G0008 FLU VACCINE - QUADRIVALENT - ADJUVANTED: ICD-10-PCS | Mod: S$GLB,,, | Performed by: FAMILY MEDICINE

## 2023-10-13 PROCEDURE — 1159F PR MEDICATION LIST DOCUMENTED IN MEDICAL RECORD: ICD-10-PCS | Mod: CPTII,S$GLB,, | Performed by: FAMILY MEDICINE

## 2023-10-13 PROCEDURE — 1126F PR PAIN SEVERITY QUANTIFIED, NO PAIN PRESENT: ICD-10-PCS | Mod: CPTII,S$GLB,, | Performed by: FAMILY MEDICINE

## 2023-10-13 PROCEDURE — 99999 PR PBB SHADOW E&M-EST. PATIENT-LVL V: ICD-10-PCS | Mod: PBBFAC,,, | Performed by: FAMILY MEDICINE

## 2023-10-13 PROCEDURE — 1159F MED LIST DOCD IN RCRD: CPT | Mod: CPTII,S$GLB,, | Performed by: FAMILY MEDICINE

## 2023-10-13 PROCEDURE — 1101F PT FALLS ASSESS-DOCD LE1/YR: CPT | Mod: CPTII,S$GLB,, | Performed by: FAMILY MEDICINE

## 2023-10-13 PROCEDURE — 80053 COMPREHEN METABOLIC PANEL: CPT | Performed by: FAMILY MEDICINE

## 2023-10-13 PROCEDURE — 85025 COMPLETE CBC W/AUTO DIFF WBC: CPT | Performed by: FAMILY MEDICINE

## 2023-10-13 PROCEDURE — 83036 HEMOGLOBIN GLYCOSYLATED A1C: CPT | Performed by: FAMILY MEDICINE

## 2023-10-13 PROCEDURE — 99214 OFFICE O/P EST MOD 30 MIN: CPT | Mod: S$GLB,,, | Performed by: FAMILY MEDICINE

## 2023-10-13 PROCEDURE — 3288F FALL RISK ASSESSMENT DOCD: CPT | Mod: CPTII,S$GLB,, | Performed by: FAMILY MEDICINE

## 2023-10-13 PROCEDURE — 90694 FLU VACCINE - QUADRIVALENT - ADJUVANTED: ICD-10-PCS | Mod: S$GLB,,, | Performed by: FAMILY MEDICINE

## 2023-10-13 PROCEDURE — G0008 ADMIN INFLUENZA VIRUS VAC: HCPCS | Mod: S$GLB,,, | Performed by: FAMILY MEDICINE

## 2023-10-13 PROCEDURE — 3288F PR FALLS RISK ASSESSMENT DOCUMENTED: ICD-10-PCS | Mod: CPTII,S$GLB,, | Performed by: FAMILY MEDICINE

## 2023-10-13 PROCEDURE — 1126F AMNT PAIN NOTED NONE PRSNT: CPT | Mod: CPTII,S$GLB,, | Performed by: FAMILY MEDICINE

## 2023-10-13 PROCEDURE — 1101F PR PT FALLS ASSESS DOC 0-1 FALLS W/OUT INJ PAST YR: ICD-10-PCS | Mod: CPTII,S$GLB,, | Performed by: FAMILY MEDICINE

## 2023-10-13 PROCEDURE — 99214 PR OFFICE/OUTPT VISIT, EST, LEVL IV, 30-39 MIN: ICD-10-PCS | Mod: S$GLB,,, | Performed by: FAMILY MEDICINE

## 2023-10-13 PROCEDURE — 90694 VACC AIIV4 NO PRSRV 0.5ML IM: CPT | Mod: S$GLB,,, | Performed by: FAMILY MEDICINE

## 2023-10-13 PROCEDURE — 99999 PR PBB SHADOW E&M-EST. PATIENT-LVL V: CPT | Mod: PBBFAC,,, | Performed by: FAMILY MEDICINE

## 2023-10-13 RX ORDER — CALCIUM CARBONATE 600 MG
1 TABLET ORAL DAILY
Qty: 90 TABLET | Refills: 3 | Status: SHIPPED | OUTPATIENT
Start: 2023-10-13

## 2023-10-13 NOTE — PROGRESS NOTES
Subjective:       Patient ID: Carol Yadav is a 88 y.o. female.    Chief Complaint: Follow-up      Follow-up      88-year-old female presents for follow-up.  Patient states she has intermittent episodes of very dark stools and red stools.  Has history of constipation.  Denies any pain with bowel movements.    Review of Systems   Constitutional: Negative.    HENT: Negative.     Respiratory: Negative.     Cardiovascular: Negative.    Gastrointestinal: Negative.    Endocrine: Negative.    Genitourinary: Negative.    Musculoskeletal: Negative.    Neurological: Negative.    Psychiatric/Behavioral: Negative.            Past Medical History:   Diagnosis Date    Allergic rhinitis     Arthritis     Diabetes mellitus, new onset 4/16/2022    Elevated blood pressure reading without diagnosis of hypertension     Hormone replacement therapy (postmenopausal)     Hyperlipidemia     Hypertension     Stroke of right basal ganglia 12/2014     Past Surgical History:   Procedure Laterality Date    APPENDECTOMY      BREAST BIOPSY      CATARACT EXTRACTION Bilateral at age 62 or 72    monovision     EYE SURGERY      hai cataract surgery    OOPHORECTOMY      PINNING OF HIP Left 6/16/2021    Procedure: PINNING, HIP;  Surgeon: Dae Bernal MD;  Location: [facility];  Service: Orthopedics;  Laterality: Left;    TONSILLECTOMY       Family History   Problem Relation Age of Onset    Breast cancer Mother     Brain cancer Mother         Metastatic from breast    Stroke Father     No Known Problems Sister     No Known Problems Brother     No Known Problems Daughter     No Known Problems Daughter     Schizophrenia Daughter     No Known Problems Maternal Aunt     No Known Problems Maternal Uncle     No Known Problems Paternal Aunt     No Known Problems Paternal Uncle     No Known Problems Maternal Grandmother     No Known Problems Maternal Grandfather     No Known Problems Paternal Grandmother     No Known Problems Paternal Grandfather     Amblyopia  Neg Hx     Blindness Neg Hx     Cataracts Neg Hx     Diabetes Neg Hx     Glaucoma Neg Hx     Hypertension Neg Hx     Macular degeneration Neg Hx     Retinal detachment Neg Hx     Strabismus Neg Hx     Thyroid disease Neg Hx      Social History     Socioeconomic History    Marital status:    Tobacco Use    Smoking status: Former     Current packs/day: 0.00     Average packs/day: 2.0 packs/day for 36.0 years (72.0 ttl pk-yrs)     Types: Cigarettes     Start date:      Quit date:      Years since quittin.8    Smokeless tobacco: Never   Substance and Sexual Activity    Alcohol use: Not Currently     Alcohol/week: 21.0 standard drinks of alcohol     Types: 7 Glasses of wine, 14 Standard drinks or equivalent per week    Drug use: No    Sexual activity: Not Currently     Partners: Male     Comment: 2017 divorce      Social Determinants of Health     Financial Resource Strain: Low Risk  (2022)    Overall Financial Resource Strain (CARDIA)     Difficulty of Paying Living Expenses: Not hard at all   Food Insecurity: No Food Insecurity (2022)    Hunger Vital Sign     Worried About Running Out of Food in the Last Year: Never true     Ran Out of Food in the Last Year: Never true   Transportation Needs: No Transportation Needs (2022)    PRAPARE - Transportation     Lack of Transportation (Medical): No     Lack of Transportation (Non-Medical): No   Physical Activity: Insufficiently Active (2022)    Exercise Vital Sign     Days of Exercise per Week: 5 days     Minutes of Exercise per Session: 10 min   Stress: No Stress Concern Present (2022)    [employer] - Occupational Stress Questionnaire     Feeling of Stress : Only a little   Social Connections: Socially Isolated (2022)    Social Connection and Isolation Panel [NHANES]     Frequency of Communication with Friends and Family: More than three times a week     Frequency of Social Gatherings with  Friends and Family: Once a week     Attends Jain Services: Never     Active Member of Clubs or Organizations: No     Marital Status:    Housing Stability: Low Risk  (4/14/2022)    Housing Stability Vital Sign     Unable to Pay for Housing in the Last Year: No     Number of Places Lived in the Last Year: 1     Unstable Housing in the Last Year: No       Current Outpatient Medications:     acetaminophen (TYLENOL) 325 MG tablet, Take 2 tablets (650 mg total) by mouth every 4 (four) hours as needed., Disp: , Rfl: 0    aspirin 81 MG Chew, CHEW AND SWALLOW 1 TABLET TWICE DAILY., Disp: 180 tablet, Rfl: 0    atorvastatin (LIPITOR) 40 MG tablet, Take 1 tablet (40 mg total) by mouth once daily., Disp: 30 tablet, Rfl: 11    calcium carbonate (OS-HAYLEE) 600 mg calcium (1,500 mg) Tab, Take 1 tablet (600 mg total) by mouth once daily., Disp: 90 tablet, Rfl: 3    denosumab (PROLIA) 60 mg/mL Syrg, Inject 1 mL (60 mg total) into the skin every 6 (six) months., Disp: 2 mL, Rfl: 0    famotidine (PEPCID) 20 MG tablet, Take 20 mg by mouth 2 (two) times daily as needed., Disp: , Rfl:     flash glucose scanning reader (FREESTYLE YESY 14 DAY READER) Misc, 1 application by Misc.(Non-Drug; Combo Route) route 3 (three) times daily with meals., Disp: 2 each, Rfl: 2    flash glucose sensor (FREESTYLE YESY 14 DAY SENSOR) Kit, 1 application by Misc.(Non-Drug; Combo Route) route 3 (three) times daily with meals., Disp: 2 kit, Rfl: 3    fluticasone propionate (FLONASE) 50 mcg/actuation nasal spray, 1 SPRAY IN EACH NOSTRIL TWICE A DAY AS NEEDED FOR RHINITIS, Disp: 16 g, Rfl: 11    guaiFENesin 100 mg/5 ml (ROBITUSSIN) 100 mg/5 mL syrup, Take 200 mg by mouth 3 (three) times daily as needed for Cough., Disp: , Rfl:     HYDROcodone-acetaminophen (NORCO) 5-325 mg per tablet, Take 1 tablet by mouth every 6 (six) hours as needed., Disp: , Rfl:     ibuprofen (ADVIL,MOTRIN) 800 MG tablet, Take 800 mg by mouth every 6 (six) hours as needed.,  Disp: , Rfl:     ketoconazole (NIZORAL) 2 % cream, APPLY TO AFFECTED AREA ONCE DAILY., Disp: 60 g, Rfl: 0    lancets Misc, To check BG 1 times daily, to use with insurance preferred meter, Disp: 100 each, Rfl: 3    levothyroxine (SYNTHROID) 25 MCG tablet, TAKE 1 TABLET BY MOUTH IN THE MORNING BEFORE BREAKFAST., Disp: 90 tablet, Rfl: 1    lisinopriL (PRINIVIL,ZESTRIL) 2.5 MG tablet, TAKE 1 TABLET BY MOUTH ONCE DAILY., Disp: 90 tablet, Rfl: 2    melatonin (MELATIN) 3 mg tablet, TAKE 2 TABLETS BY MOUTH AT BEDTIME., Disp: 180 tablet, Rfl: 3    metFORMIN (GLUCOPHAGE-XR) 500 MG ER 24hr tablet, Take 1 tablet (500 mg total) by mouth 2 (two) times daily with meals., Disp: 180 tablet, Rfl: 3    mirtazapine (REMERON) 7.5 MG Tab, TAKE 1 TABLET BY MOUTH IN THE EVENING., Disp: 90 tablet, Rfl: 2    multivit-min-FA-lycopen-lutein (CERTAVITE SENIOR) 0.4 mg-300 mcg- 250 mcg Tab, TAKE 1 TABLET BY MOUTH ONCE DAILY., Disp: 90 tablet, Rfl: 3    multivit-minerals/folic/ginkgo (ONE DAILY WOMEN 50 PLUS ORAL), Take 1 tablet by mouth once daily., Disp: , Rfl:     olopatadine (PATANOL) 0.1 % ophthalmic solution, PLACE 1 DROP IN EACH EYE TWICE DAILY., Disp: 5 mL, Rfl: 0    omeprazole (PRILOSEC) 20 MG capsule, TAKE 1 CAPSULE BY MOUTH EVERY MORNING., Disp: 90 capsule, Rfl: 1    polyethylene glycol (MIRALAX) 17 gram PwPk, Take 17 g by mouth daily as needed. Take every other day, Disp: 30 packet, Rfl: 11    promethazine-dextromethorphan (PROMETHAZINE-DM) 6.25-15 mg/5 mL Syrp, Take by mouth as needed., Disp: , Rfl:     saliva substitute combo no.9 (BIOTENE DRY MOUTH ORAL RINSE) Mwsh, by Mucous Membrane route. Use as directed as needed for dry mouth, Disp: , Rfl:     triamcinolone acetonide 0.1% (KENALOG) 0.1 % cream, Apply topically 2 (two) times daily., Disp: 80 g, Rfl: 1    TRUE METRIX GLUCOSE METER Misc, , Disp: , Rfl:     TRUE METRIX GLUCOSE TEST STRIP Strp, CHECK BLOOD SUGAR 3 TIMES DAILY WITH MEALS., Disp: 300 strip, Rfl: 3    TRUEPLUS LANCETS  "30 gauge Misc, CHECK BLOOD SUGAR 3 TIMES DAILY WITH MEALS., Disp: 300 each, Rfl: 3    venlafaxine (EFFEXOR-XR) 150 MG Cp24, TAKE 1 CAPSULE BY MOUTH ONCE DAILY., Disp: 90 capsule, Rfl: 2    vitamin D (VITAMIN D3) 1000 units Tab, TAKE 1 TABLET BY MOUTH ONCE DAILY., Disp: 90 tablet, Rfl: 3    water Liqd 146 mL with MILK OF MAGNESIA 400 mg/5 mL Susp 400 mg, diphenhydrAMINE 12.5 mg/5 mL Elix 60 mg, nystatin 100,000 unit/mL Susp 500,000 Units, Take 5 mLs by mouth 3 (three) times daily as needed (mouth sores)., Disp: 1 Bottle, Rfl: 0    Current Facility-Administered Medications:     cyanocobalamin injection 1,000 mcg, 1,000 mcg, Subcutaneous, 1 time in Clinic/HOD, Jatin Yadav MD    denosumab (PROLIA) injection 60 mg, 60 mg, Subcutaneous, Q6 Months, Jatin Yadav MD, 60 mg at 04/04/23 1439   Objective:      Vitals:    10/13/23 1001   BP: 130/62   BP Location: Right arm   Patient Position: Sitting   BP Method: Small (Manual)   Pulse: 83   Resp: 18   Temp: 97.7 °F (36.5 °C)   TempSrc: Oral   SpO2: 97%   Weight: 59.3 kg (130 lb 11.7 oz)   Height: 5' 2" (1.575 m)       Physical Exam  Constitutional:       General: She is not in acute distress.     Appearance: She is not diaphoretic.   HENT:      Head: Normocephalic and atraumatic.   Eyes:      Conjunctiva/sclera: Conjunctivae normal.   Pulmonary:      Effort: Pulmonary effort is normal.   Musculoskeletal:      Cervical back: Neck supple.   Skin:     Findings: No rash.   Neurological:      Mental Status: She is alert and oriented to person, place, and time.   Psychiatric:         Behavior: Behavior normal.         Thought Content: Thought content normal.         Judgment: Judgment normal.            Assessment:       1. Melena    2. Needs flu shot    3. Type 2 diabetes mellitus without complication, without long-term current use of insulin        Plan:       Melena  -     CBC Auto Differential; Future; Expected date: 10/13/2023  -     Ambulatory referral/consult to " Gastroenterology; Future; Expected date: 10/20/2023    Needs flu shot  -     Influenza - Quadrivalent (Adjuvanted)    Type 2 diabetes mellitus without complication, without long-term current use of insulin  -     Cancel: Comprehensive Metabolic Panel  -     Cancel: Hemoglobin A1C  -     Hemoglobin A1C; Future; Expected date: 10/13/2023  -     COMPREHENSIVE METABOLIC PANEL; Future; Expected date: 10/13/2023    Other orders  -     Cancel: Influenza - Quadrivalent (Adjuvanted)    F/u labs. Referred pt to GI for possible melena.          No future appointments.    Patient note was created using Kleek.  Any errors in syntax or even information may not have been identified and edited on initial review prior to signing this note.

## 2023-10-13 NOTE — PROGRESS NOTES
Health Maintenance Due   Topic     TETANUS VACCINE  Hx of chickenpox. Notified pt can get vaccine at pharmacy.    Shingles Vaccine (1 of 2) Hx of chickenpox. Notified pt can get vaccine at pharmacy.    Influenza Vaccine (1) Pt agree to get today    COVID-19 Vaccine (4 - 2023-24 season) Not offered at this Facility    Hemoglobin A1c  Consult pcp

## 2023-10-14 LAB
ALBUMIN SERPL BCP-MCNC: 3.6 G/DL (ref 3.5–5.2)
ALP SERPL-CCNC: 66 U/L (ref 55–135)
ALT SERPL W/O P-5'-P-CCNC: 14 U/L (ref 10–44)
ANION GAP SERPL CALC-SCNC: 14 MMOL/L (ref 8–16)
AST SERPL-CCNC: 21 U/L (ref 10–40)
BILIRUB SERPL-MCNC: 0.6 MG/DL (ref 0.1–1)
BUN SERPL-MCNC: 17 MG/DL (ref 8–23)
CALCIUM SERPL-MCNC: 9.6 MG/DL (ref 8.7–10.5)
CHLORIDE SERPL-SCNC: 104 MMOL/L (ref 95–110)
CO2 SERPL-SCNC: 21 MMOL/L (ref 23–29)
CREAT SERPL-MCNC: 0.8 MG/DL (ref 0.5–1.4)
EST. GFR  (NO RACE VARIABLE): >60 ML/MIN/1.73 M^2
GLUCOSE SERPL-MCNC: 88 MG/DL (ref 70–110)
POTASSIUM SERPL-SCNC: 4.2 MMOL/L (ref 3.5–5.1)
PROT SERPL-MCNC: 6.6 G/DL (ref 6–8.4)
SODIUM SERPL-SCNC: 139 MMOL/L (ref 136–145)

## 2023-11-29 ENCOUNTER — TELEPHONE (OUTPATIENT)
Dept: FAMILY MEDICINE | Facility: CLINIC | Age: 88
End: 2023-11-29
Payer: MEDICARE

## 2023-11-29 NOTE — TELEPHONE ENCOUNTER
----- Message from Zuly Chavira sent at 11/29/2023 12:12 PM CST -----  Regarding: self [phone]  Type: Lab    Caller is requesting to schedule their Lab appointment prior to annual appointment.    Order is not listed in EPIC.  Please enter order and contact patient to schedule.    Name of Caller: self     Where would they like the lab performed? Close by home    Would the patient rather a call back or a response via [facility]? Call back    Best Call Back Number:[phone]    Additional Information: pt stated she have UTI and would like to drop off urine, pt stated she also have hip pain, pt would like call back      Thanks

## 2023-12-04 DIAGNOSIS — R21 RASH: ICD-10-CM

## 2023-12-04 RX ORDER — TRIAMCINOLONE ACETONIDE 1 MG/G
CREAM TOPICAL 2 TIMES DAILY
Qty: 240 G | Refills: 3 | Status: SHIPPED | OUTPATIENT
Start: 2023-12-04 | End: 2024-04-02

## 2023-12-04 NOTE — TELEPHONE ENCOUNTER
No care due was identified.  [facility] Embedded Care Due Messages. Reference number: [id number].   12/04/2023 9:16:49 AM CST

## 2023-12-04 NOTE — TELEPHONE ENCOUNTER
Refill Decision Note   Carol Yadav  is requesting a refill authorization.  Brief Assessment and Rationale for Refill:  Approve     Medication Therapy Plan:         Comments:     Note composed:4:07 PM 12/04/2023

## 2023-12-07 ENCOUNTER — OFFICE VISIT (OUTPATIENT)
Dept: GASTROENTEROLOGY | Facility: CLINIC | Age: 88
End: 2023-12-07
Payer: MEDICARE

## 2023-12-07 VITALS
DIASTOLIC BLOOD PRESSURE: 70 MMHG | HEART RATE: 94 BPM | WEIGHT: 133.38 LBS | BODY MASS INDEX: 24.54 KG/M2 | HEIGHT: 62 IN | SYSTOLIC BLOOD PRESSURE: 136 MMHG

## 2023-12-07 DIAGNOSIS — K92.1 BLACK STOOL: ICD-10-CM

## 2023-12-07 PROCEDURE — 1159F PR MEDICATION LIST DOCUMENTED IN MEDICAL RECORD: ICD-10-PCS | Mod: CPTII,S$GLB,, | Performed by: STUDENT IN AN ORGANIZED HEALTH CARE EDUCATION/TRAINING PROGRAM

## 2023-12-07 PROCEDURE — 1101F PT FALLS ASSESS-DOCD LE1/YR: CPT | Mod: CPTII,S$GLB,, | Performed by: STUDENT IN AN ORGANIZED HEALTH CARE EDUCATION/TRAINING PROGRAM

## 2023-12-07 PROCEDURE — 3288F PR FALLS RISK ASSESSMENT DOCUMENTED: ICD-10-PCS | Mod: CPTII,S$GLB,, | Performed by: STUDENT IN AN ORGANIZED HEALTH CARE EDUCATION/TRAINING PROGRAM

## 2023-12-07 PROCEDURE — 1159F MED LIST DOCD IN RCRD: CPT | Mod: CPTII,S$GLB,, | Performed by: STUDENT IN AN ORGANIZED HEALTH CARE EDUCATION/TRAINING PROGRAM

## 2023-12-07 PROCEDURE — 3288F FALL RISK ASSESSMENT DOCD: CPT | Mod: CPTII,S$GLB,, | Performed by: STUDENT IN AN ORGANIZED HEALTH CARE EDUCATION/TRAINING PROGRAM

## 2023-12-07 PROCEDURE — 99999 PR PBB SHADOW E&M-EST. PATIENT-LVL V: ICD-10-PCS | Mod: PBBFAC,,, | Performed by: STUDENT IN AN ORGANIZED HEALTH CARE EDUCATION/TRAINING PROGRAM

## 2023-12-07 PROCEDURE — 1126F PR PAIN SEVERITY QUANTIFIED, NO PAIN PRESENT: ICD-10-PCS | Mod: CPTII,S$GLB,, | Performed by: STUDENT IN AN ORGANIZED HEALTH CARE EDUCATION/TRAINING PROGRAM

## 2023-12-07 PROCEDURE — 99999 PR PBB SHADOW E&M-EST. PATIENT-LVL V: CPT | Mod: PBBFAC,,, | Performed by: STUDENT IN AN ORGANIZED HEALTH CARE EDUCATION/TRAINING PROGRAM

## 2023-12-07 PROCEDURE — 99203 OFFICE O/P NEW LOW 30 MIN: CPT | Mod: S$GLB,,, | Performed by: STUDENT IN AN ORGANIZED HEALTH CARE EDUCATION/TRAINING PROGRAM

## 2023-12-07 PROCEDURE — 1126F AMNT PAIN NOTED NONE PRSNT: CPT | Mod: CPTII,S$GLB,, | Performed by: STUDENT IN AN ORGANIZED HEALTH CARE EDUCATION/TRAINING PROGRAM

## 2023-12-07 PROCEDURE — 1101F PR PT FALLS ASSESS DOC 0-1 FALLS W/OUT INJ PAST YR: ICD-10-PCS | Mod: CPTII,S$GLB,, | Performed by: STUDENT IN AN ORGANIZED HEALTH CARE EDUCATION/TRAINING PROGRAM

## 2023-12-07 PROCEDURE — 99203 PR OFFICE/OUTPT VISIT, NEW, LEVL III, 30-44 MIN: ICD-10-PCS | Mod: S$GLB,,, | Performed by: STUDENT IN AN ORGANIZED HEALTH CARE EDUCATION/TRAINING PROGRAM

## 2023-12-07 NOTE — PROGRESS NOTES
[facility]    Reason for visit: The encounter diagnosis was Melena.  Referring Provider/PCP: Jatin Yadav MD    History of Present Illness:  Carol Yadav is a 88 y.o. female with a history of CVA with hemiparesis, HF, as well as multiple other medical problems who is presenting for initial evaluation of black stools.    The patient reports that she has been having intermittent black stools over the years, described as black and tarry, sometimes lighter, and sometimes normal color.  She saw bright red blood in the toilet bowl once in the past.  Denies any abdominal pain, nausea, vomiting.  She does not take iron, although she is on multiple vitamin supplements which may contain iron.  Blood work was done by PCP, CBC notable for normal Hgb that has been stable over the past 2 years, actually improved since 2021. CMP with normal BUN.      Physical Exam:  Constitutional:  not in acute distress and well developed  HENT: Head: Normal, normocephalic, atraumatic.  Eyes: conjunctiva clear and sclera nonicteric  Skin: normal color  Neurological: alert, oriented x3  Psychiatric: mood and affect are within normal limits, pt is a good historian; no memory problems were noted    Laboratory:  See HPI    Imaging:  No pertinent imaging.    Endoscopy:  Colonoscopy  2018 - normal, no repeat recommended    Assessment:  Carol Yadav is a 88 y.o. female who is presenting for initial evaluation of black stools.    Problems:  Black stools  Constipation    The patient reports a longstanding history of intermittent black stools, low suspicion for GI bleeding since her blood counts have been stable with normal BUN and creatinine, and this has been a long standing problem over a few years, however can not rule out intermittent low volume GI bleeding from PUD, AVMs or malignancy although less likely given the chronicity of her symptoms.  The patient is concerned that this could be causing her weakness, however  given her Hgb has been stable, low suspicion for GI bleeding as a cause of her weakness.  We discussed that I can not 100% rule out GI bleeding and further evaluation with an EGD and possible colonoscopy would be necessary to know for sure, but the patient would rather not proceed with that since this is not the cause of her symptoms.  Her constipation is controlled with Miralax every other day.      Plan:  Monitor CBC periodically with PCP. If blood counts drop, can consider EGD +/- colonoscopy    Jacqueline Benjamin MD  Gastroenterology and Hepatology    No orders of the defined types were placed in this encounter.

## 2023-12-13 RX ORDER — ACETAMINOPHEN 325 MG/1
650 TABLET ORAL EVERY 4 HOURS PRN
Qty: 60 TABLET | Refills: 0 | Status: SHIPPED | OUTPATIENT
Start: 2023-12-13 | End: 2023-12-28

## 2023-12-27 DIAGNOSIS — E11.9 TYPE 2 DIABETES MELLITUS WITHOUT COMPLICATION, WITHOUT LONG-TERM CURRENT USE OF INSULIN: ICD-10-CM

## 2023-12-27 DIAGNOSIS — H04.203 BILATERAL EPIPHORA: ICD-10-CM

## 2023-12-27 RX ORDER — METFORMIN HYDROCHLORIDE 500 MG/1
500 TABLET, EXTENDED RELEASE ORAL 2 TIMES DAILY WITH MEALS
Qty: 180 TABLET | Refills: 1 | Status: SHIPPED | OUTPATIENT
Start: 2023-12-27 | End: 2024-02-23 | Stop reason: SDUPTHER

## 2023-12-27 NOTE — TELEPHONE ENCOUNTER
No care due was identified.  [facility] Embedded Care Due Messages. Reference number: [id number].   12/27/2023 12:35:07 PM CST

## 2023-12-28 RX ORDER — ACETAMINOPHEN 325 MG/1
TABLET ORAL
Qty: 60 TABLET | Refills: 0 | Status: SHIPPED | OUTPATIENT
Start: 2023-12-28

## 2023-12-28 RX ORDER — OLOPATADINE HYDROCHLORIDE 1 MG/ML
SOLUTION/ DROPS OPHTHALMIC
Qty: 5 ML | Refills: 0 | Status: SHIPPED | OUTPATIENT
Start: 2023-12-28 | End: 2024-01-25 | Stop reason: SDUPTHER

## 2023-12-28 NOTE — TELEPHONE ENCOUNTER
Refill Routing Note   Medication(s) are not appropriate for processing by [facility] for the following reason(s):        Outside of protocol    [facility] action(s):  Route  Approve               Appointments  past 12m or future 3m with PCP    Date Provider   Last Visit   10/13/2023 Jatin Yadav MD   Next Visit   2/23/2024 Jatin Yadav MD   ED visits in past 90 days: 0        Note composed:10:13 PM 12/27/2023

## 2024-01-24 DIAGNOSIS — F32.A ANXIETY AND DEPRESSION: ICD-10-CM

## 2024-01-24 DIAGNOSIS — K21.9 GASTROESOPHAGEAL REFLUX DISEASE, UNSPECIFIED WHETHER ESOPHAGITIS PRESENT: ICD-10-CM

## 2024-01-24 DIAGNOSIS — M81.0 AGE-RELATED OSTEOPOROSIS WITHOUT CURRENT PATHOLOGICAL FRACTURE: ICD-10-CM

## 2024-01-24 DIAGNOSIS — H04.203 BILATERAL EPIPHORA: ICD-10-CM

## 2024-01-24 DIAGNOSIS — F41.9 ANXIETY AND DEPRESSION: ICD-10-CM

## 2024-01-24 RX ORDER — OMEPRAZOLE 20 MG/1
20 CAPSULE, DELAYED RELEASE ORAL EVERY MORNING
Qty: 90 CAPSULE | Refills: 2 | Status: SHIPPED | OUTPATIENT
Start: 2024-01-24 | End: 2024-02-23 | Stop reason: SDUPTHER

## 2024-01-24 RX ORDER — VENLAFAXINE HYDROCHLORIDE 150 MG/1
150 CAPSULE, EXTENDED RELEASE ORAL DAILY
Qty: 90 CAPSULE | Refills: 2 | Status: SHIPPED | OUTPATIENT
Start: 2024-01-24 | End: 2024-04-30

## 2024-01-24 RX ORDER — LISINOPRIL 2.5 MG/1
2.5 TABLET ORAL DAILY
Qty: 90 TABLET | Refills: 2 | Status: SHIPPED | OUTPATIENT
Start: 2024-01-24

## 2024-01-24 NOTE — TELEPHONE ENCOUNTER
Refill Routing Note   Medication(s) are not appropriate for processing by [facility] for the following reason(s):        Drug-disease interaction   Outside of protocol: Vit D3, Melatonin, Certavite, Olopatadine    [facility] action(s):  Defer  Route  Approve     Requires labs : Yes             Appointments  past 12m or future 3m with PCP    Date Provider   Last Visit   10/13/2023 Jatin Yadav MD   Next Visit   2/23/2024 Jatin Yadav MD   ED visits in past 90 days: 0        Note composed:4:19 PM 01/24/2024

## 2024-01-24 NOTE — TELEPHONE ENCOUNTER
Care Due:                  Date            Visit Type   Department     Provider  --------------------------------------------------------------------------------                                EP -                              PRIMARY      [facility]  Last Visit: 10-      CARE ([facility])   MEDICINE       [facility]  [place]                              EP -                              PRIMARY      [facility]  Next Visit: 02-      CARE ([facility])   MEDICINE       [facility]                                                            Last  Test          Frequency    Reason                     Performed    Due Date  --------------------------------------------------------------------------------    HBA1C.......  6 months...  metFORMIN................  10-   04-    Lipid Panel.  12 months..  atorvastatin.............  04- 03-    [facility] Embedded Care Due Messages. Reference number: [id number].   1/24/2024 1:36:42 PM CST

## 2024-01-25 RX ORDER — OLOPATADINE HYDROCHLORIDE 1 MG/ML
SOLUTION/ DROPS OPHTHALMIC
Qty: 5 ML | Refills: 0 | Status: SHIPPED | OUTPATIENT
Start: 2024-01-25 | End: 2024-02-16 | Stop reason: SDUPTHER

## 2024-01-25 RX ORDER — CHOLECALCIFEROL (VITAMIN D3) 25 MCG
TABLET ORAL
Qty: 30 TABLET | Refills: 0 | Status: SHIPPED | OUTPATIENT
Start: 2024-01-25

## 2024-01-25 RX ORDER — MULTIVIT-MIN/FA/LYCOPEN/LUTEIN .4-300-25
TABLET ORAL
Qty: 30 TABLET | Refills: 0 | Status: SHIPPED | OUTPATIENT
Start: 2024-01-25

## 2024-01-25 RX ORDER — TALC
POWDER (GRAM) TOPICAL
Qty: 60 TABLET | Refills: 0 | Status: SHIPPED | OUTPATIENT
Start: 2024-01-25

## 2024-01-25 NOTE — TELEPHONE ENCOUNTER
Refill Routing Note   Medication(s) are not appropriate for processing by [facility] for the following reason(s):        Outside of protocol    [facility] action(s):  Route  Approve     Requires labs : Yes (Lipid panel due 3/30/24; A1C due 4/11/24)          Pharmacist review requested: Yes   Extended chart review required: Yes     Appointments  past 12m or future 3m with PCP    Date Provider   Last Visit   10/13/2023 Jatin Yadav MD   Next Visit   2/23/2024 Jatin Yadav MD   ED visits in past 90 days: 0        Note composed:6:12 PM 01/24/2024

## 2024-02-16 DIAGNOSIS — H04.203 BILATERAL EPIPHORA: ICD-10-CM

## 2024-02-16 RX ORDER — OLOPATADINE HYDROCHLORIDE 1 MG/ML
SOLUTION/ DROPS OPHTHALMIC
Qty: 5 ML | Refills: 0 | Status: SHIPPED | OUTPATIENT
Start: 2024-02-16 | End: 2024-04-02

## 2024-02-23 ENCOUNTER — OFFICE VISIT (OUTPATIENT)
Dept: FAMILY MEDICINE | Facility: CLINIC | Age: 89
End: 2024-02-23
Payer: MEDICARE

## 2024-02-23 VITALS
BODY MASS INDEX: 23.82 KG/M2 | OXYGEN SATURATION: 95 % | DIASTOLIC BLOOD PRESSURE: 60 MMHG | HEART RATE: 87 BPM | RESPIRATION RATE: 16 BRPM | TEMPERATURE: 98 F | HEIGHT: 62 IN | SYSTOLIC BLOOD PRESSURE: 118 MMHG | WEIGHT: 129.44 LBS

## 2024-02-23 DIAGNOSIS — E11.59 TYPE 2 DIABETES MELLITUS WITH OTHER CIRCULATORY COMPLICATIONS: ICD-10-CM

## 2024-02-23 DIAGNOSIS — I50.32 CHRONIC DIASTOLIC HEART FAILURE: ICD-10-CM

## 2024-02-23 DIAGNOSIS — I69.354 HEMIPARESIS AFFECTING LEFT SIDE AS LATE EFFECT OF STROKE: ICD-10-CM

## 2024-02-23 DIAGNOSIS — Z86.73 H/O: CVA (CEREBROVASCULAR ACCIDENT): ICD-10-CM

## 2024-02-23 DIAGNOSIS — K21.9 GASTROESOPHAGEAL REFLUX DISEASE, UNSPECIFIED WHETHER ESOPHAGITIS PRESENT: ICD-10-CM

## 2024-02-23 DIAGNOSIS — F33.41 RECURRENT MAJOR DEPRESSIVE DISORDER, IN PARTIAL REMISSION: ICD-10-CM

## 2024-02-23 DIAGNOSIS — K92.1 BLACK STOOL: Primary | ICD-10-CM

## 2024-02-23 DIAGNOSIS — R35.0 URINARY FREQUENCY: ICD-10-CM

## 2024-02-23 DIAGNOSIS — I70.0 ATHEROSCLEROSIS OF AORTA: ICD-10-CM

## 2024-02-23 DIAGNOSIS — E11.9 TYPE 2 DIABETES MELLITUS WITHOUT COMPLICATION, WITHOUT LONG-TERM CURRENT USE OF INSULIN: ICD-10-CM

## 2024-02-23 PROCEDURE — 3288F FALL RISK ASSESSMENT DOCD: CPT | Mod: CPTII,S$GLB,, | Performed by: FAMILY MEDICINE

## 2024-02-23 PROCEDURE — 1101F PT FALLS ASSESS-DOCD LE1/YR: CPT | Mod: CPTII,S$GLB,, | Performed by: FAMILY MEDICINE

## 2024-02-23 PROCEDURE — 99999 PR PBB SHADOW E&M-EST. PATIENT-LVL V: CPT | Mod: PBBFAC,,, | Performed by: FAMILY MEDICINE

## 2024-02-23 PROCEDURE — 1159F MED LIST DOCD IN RCRD: CPT | Mod: CPTII,S$GLB,, | Performed by: FAMILY MEDICINE

## 2024-02-23 PROCEDURE — 99215 OFFICE O/P EST HI 40 MIN: CPT | Mod: S$GLB,,, | Performed by: FAMILY MEDICINE

## 2024-02-23 RX ORDER — METFORMIN HYDROCHLORIDE 500 MG/1
500 TABLET, EXTENDED RELEASE ORAL DAILY
Qty: 90 TABLET | Refills: 3 | Status: SHIPPED | OUTPATIENT
Start: 2024-02-23 | End: 2025-02-22

## 2024-02-23 RX ORDER — OMEPRAZOLE 40 MG/1
40 CAPSULE, DELAYED RELEASE ORAL EVERY MORNING
Qty: 90 CAPSULE | Refills: 1 | Status: SHIPPED | OUTPATIENT
Start: 2024-02-23

## 2024-02-23 NOTE — PROGRESS NOTES
Subjective:       Patient ID: Carol Yadav is a 88 y.o. female.    Chief Complaint: Follow-up      Follow-up  Associated symptoms include abdominal pain.     87 yo female presents for ab pain. States she has n/v with the pain on occasion. States she had diarrhea as well. Saw GI and was advised to monitor cbc.     Review of Systems   Constitutional: Negative.    HENT: Negative.     Respiratory: Negative.     Cardiovascular: Negative.    Gastrointestinal:  Positive for abdominal pain and diarrhea.   Endocrine: Negative.    Genitourinary: Negative.    Musculoskeletal: Negative.    Neurological: Negative.    Psychiatric/Behavioral: Negative.            Past Medical History:   Diagnosis Date    Allergic rhinitis     Arthritis     Diabetes mellitus, new onset 4/16/2022    Elevated blood pressure reading without diagnosis of hypertension     Hormone replacement therapy (postmenopausal)     Hyperlipidemia     Hypertension     Stroke of right basal ganglia 12/2014     Past Surgical History:   Procedure Laterality Date    APPENDECTOMY      BREAST BIOPSY      CATARACT EXTRACTION Bilateral at age 62 or 72    monovision     EYE SURGERY      hai cataract surgery    OOPHORECTOMY      PINNING OF HIP Left 6/16/2021    Procedure: PINNING, HIP;  Surgeon: Dae Bernal MD;  Location: [facility];  Service: Orthopedics;  Laterality: Left;    TONSILLECTOMY       Family History   Problem Relation Age of Onset    Breast cancer Mother     Brain cancer Mother         Metastatic from breast    Stroke Father     No Known Problems Sister     No Known Problems Brother     No Known Problems Maternal Aunt     No Known Problems Maternal Uncle     No Known Problems Paternal Aunt     No Known Problems Paternal Uncle     No Known Problems Maternal Grandmother     No Known Problems Maternal Grandfather     No Known Problems Paternal Grandmother     No Known Problems Paternal Grandfather     No Known Problems Daughter     No Known Problems Daughter      Schizophrenia Daughter     Amblyopia Neg Hx     Blindness Neg Hx     Cataracts Neg Hx     Diabetes Neg Hx     Glaucoma Neg Hx     Hypertension Neg Hx     Macular degeneration Neg Hx     Retinal detachment Neg Hx     Strabismus Neg Hx     Thyroid disease Neg Hx     Colon cancer Neg Hx     Esophageal cancer Neg Hx      Social History     Socioeconomic History    Marital status:    Tobacco Use    Smoking status: Former     Current packs/day: 0.00     Average packs/day: 2.0 packs/day for 36.0 years (72.0 ttl pk-yrs)     Types: Cigarettes     Start date:      Quit date:      Years since quittin.1    Smokeless tobacco: Never   Substance and Sexual Activity    Alcohol use: Not Currently     Alcohol/week: 21.0 standard drinks of alcohol     Types: 7 Glasses of wine, 14 Standard drinks or equivalent per week    Drug use: No    Sexual activity: Not Currently     Partners: Male     Comment: 2017 divorce      Social Determinants of Health     Financial Resource Strain: Low Risk  (2022)    Overall Financial Resource Strain (CARDIA)     Difficulty of Paying Living Expenses: Not hard at all   Food Insecurity: No Food Insecurity (2022)    Hunger Vital Sign     Worried About Running Out of Food in the Last Year: Never true     Ran Out of Food in the Last Year: Never true   Transportation Needs: No Transportation Needs (2022)    PRAPARE - Transportation     Lack of Transportation (Medical): No     Lack of Transportation (Non-Medical): No   Physical Activity: Insufficiently Active (2022)    Exercise Vital Sign     Days of Exercise per Week: 5 days     Minutes of Exercise per Session: 10 min   Stress: No Stress Concern Present (2022)    [employer] [place] [employer] - Occupational Stress Questionnaire     Feeling of Stress : Only a little   Social Connections: Socially Isolated (2022)    Social Connection and Isolation Panel [NHANES]     Frequency of Communication with  Friends and Family: More than three times a week     Frequency of Social Gatherings with Friends and Family: Once a week     Attends Christian Services: Never     Active Member of Clubs or Organizations: No     Marital Status:    Housing Stability: Low Risk  (4/14/2022)    Housing Stability Vital Sign     Unable to Pay for Housing in the Last Year: No     Number of Places Lived in the Last Year: 1     Unstable Housing in the Last Year: No       Current Outpatient Medications:     acetaminophen (TYLENOL) 325 MG tablet, TAKE 2 TABLETS BY MOUTH EVERY FOUR HOURS AS NEEDED FOR PAIN., Disp: 60 tablet, Rfl: 0    aspirin 81 MG Chew, CHEW AND SWALLOW 1 TABLET TWICE DAILY., Disp: 180 tablet, Rfl: 0    atorvastatin (LIPITOR) 40 MG tablet, Take 1 tablet (40 mg total) by mouth once daily., Disp: 30 tablet, Rfl: 11    calcium carbonate (OS-HAYLEE) 600 mg calcium (1,500 mg) Tab, Take 1 tablet (600 mg total) by mouth once daily., Disp: 90 tablet, Rfl: 3    denosumab (PROLIA) 60 mg/mL Syrg, Inject 1 mL (60 mg total) into the skin every 6 (six) months., Disp: 2 mL, Rfl: 0    famotidine (PEPCID) 20 MG tablet, Take 20 mg by mouth 2 (two) times daily as needed., Disp: , Rfl:     flash glucose scanning reader (FREESTYLE YESY 14 DAY READER) Misc, 1 application by Misc.(Non-Drug; Combo Route) route 3 (three) times daily with meals., Disp: 2 each, Rfl: 2    flash glucose sensor (FREESTYLE YESY 14 DAY SENSOR) Kit, 1 application by Misc.(Non-Drug; Combo Route) route 3 (three) times daily with meals., Disp: 2 kit, Rfl: 3    fluticasone propionate (FLONASE) 50 mcg/actuation nasal spray, 1 SPRAY IN EACH NOSTRIL TWICE A DAY AS NEEDED FOR RHINITIS, Disp: 16 g, Rfl: 11    HYDROcodone-acetaminophen (NORCO) 5-325 mg per tablet, Take 1 tablet by mouth every 6 (six) hours as needed., Disp: , Rfl:     ibuprofen (ADVIL,MOTRIN) 800 MG tablet, Take 800 mg by mouth every 6 (six) hours as needed., Disp: , Rfl:     ketoconazole (NIZORAL) 2 % cream,  APPLY TO AFFECTED AREA ONCE DAILY., Disp: 60 g, Rfl: 0    lancets Misc, To check BG 1 times daily, to use with insurance preferred meter, Disp: 100 each, Rfl: 3    levothyroxine (SYNTHROID) 25 MCG tablet, TAKE 1 TABLET BY MOUTH IN THE MORNING BEFORE BREAKFAST., Disp: 90 tablet, Rfl: 1    lisinopriL (PRINIVIL,ZESTRIL) 2.5 MG tablet, Take 1 tablet (2.5 mg total) by mouth once daily., Disp: 90 tablet, Rfl: 2    melatonin (MELATIN) 3 mg tablet, TAKE 2 TABLETS BY MOUTH AT BEDTIME., Disp: 60 tablet, Rfl: 0    mirtazapine (REMERON) 7.5 MG Tab, TAKE 1 TABLET BY MOUTH IN THE EVENING., Disp: 90 tablet, Rfl: 2    multivit-min-FA-lycopen-lutein (CERTAVITE SENIOR) 0.4 mg-300 mcg- 250 mcg Tab, TAKE 1 TABLET BY MOUTH ONCE DAILY., Disp: 30 tablet, Rfl: 0    multivit-minerals/folic/ginkgo (ONE DAILY WOMEN 50 PLUS ORAL), Take 1 tablet by mouth once daily., Disp: , Rfl:     olopatadine (PATANOL) 0.1 % ophthalmic solution, PLACE 1 DROP IN EACH EYE TWICE DAILY., Disp: 5 mL, Rfl: 0    polyethylene glycol (MIRALAX) 17 gram PwPk, Take 17 g by mouth daily as needed. Take every other day, Disp: 30 packet, Rfl: 11    saliva substitute combo no.9 (BIOTENE DRY MOUTH ORAL RINSE) Mwsh, by Mucous Membrane route. Use as directed as needed for dry mouth, Disp: , Rfl:     triamcinolone acetonide 0.1% (KENALOG) 0.1 % cream, APPLY TO AFFECTED AREA TWICE DAILY., Disp: 240 g, Rfl: 3    TRUE METRIX GLUCOSE METER Misc, , Disp: , Rfl:     TRUE METRIX GLUCOSE TEST STRIP Strp, CHECK BLOOD SUGAR 3 TIMES DAILY WITH MEALS., Disp: 300 strip, Rfl: 3    TRUEPLUS LANCETS 30 gauge Misc, CHECK BLOOD SUGAR 3 TIMES DAILY WITH MEALS., Disp: 300 each, Rfl: 3    venlafaxine (EFFEXOR-XR) 150 MG Cp24, Take 1 capsule (150 mg total) by mouth once daily., Disp: 90 capsule, Rfl: 2    vitamin D (VITAMIN D3) 1000 units Tab, TAKE 1 TABLET BY MOUTH ONCE DAILY., Disp: 30 tablet, Rfl: 0    water Liqd 146 mL with MILK OF MAGNESIA 400 mg/5 mL Susp 400 mg, diphenhydrAMINE 12.5 mg/5 mL  "Elix 60 mg, nystatin 100,000 unit/mL Susp 500,000 Units, Take 5 mLs by mouth 3 (three) times daily as needed (mouth sores)., Disp: 1 Bottle, Rfl: 0    guaiFENesin 100 mg/5 ml (ROBITUSSIN) 100 mg/5 mL syrup, Take 200 mg by mouth 3 (three) times daily as needed for Cough., Disp: , Rfl:     metFORMIN (GLUCOPHAGE-XR) 500 MG ER 24hr tablet, Take 1 tablet (500 mg total) by mouth once daily., Disp: 90 tablet, Rfl: 3    omeprazole (PRILOSEC) 40 MG capsule, Take 1 capsule (40 mg total) by mouth every morning., Disp: 90 capsule, Rfl: 1    promethazine-dextromethorphan (PROMETHAZINE-DM) 6.25-15 mg/5 mL Syrp, Take by mouth as needed., Disp: , Rfl:     Current Facility-Administered Medications:     cyanocobalamin injection 1,000 mcg, 1,000 mcg, Subcutaneous, 1 time in Clinic/HOD, Jatin Yadav MD    denosumab (PROLIA) injection 60 mg, 60 mg, Subcutaneous, Q6 Months, Jatin Yadav MD, 60 mg at 04/04/23 1439   Objective:      Vitals:    02/23/24 1344   BP: 118/60   BP Location: Right arm   Patient Position: Sitting   BP Method: Medium (Manual)   Pulse: 87   Resp: 16   Temp: 98 °F (36.7 °C)   TempSrc: Oral   SpO2: 95%   Weight: 58.7 kg (129 lb 6.6 oz)   Height: 5' 2" (1.575 m)       Physical Exam  Constitutional:       General: She is not in acute distress.     Appearance: She is not diaphoretic.   HENT:      Head: Normocephalic and atraumatic.   Eyes:      Conjunctiva/sclera: Conjunctivae normal.   Pulmonary:      Effort: Pulmonary effort is normal.   Musculoskeletal:      Cervical back: Neck supple.   Skin:     Findings: No rash.   Neurological:      Mental Status: She is alert and oriented to person, place, and time.   Psychiatric:         Behavior: Behavior normal.         Thought Content: Thought content normal.         Judgment: Judgment normal.            Assessment:       1. Black stool    2. H/O: CVA (cerebrovascular accident)    3. Type 2 diabetes mellitus with other circulatory complications    4. Chronic " diastolic heart failure    5. 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 Recurrent major depressive disorder, in partial remission    7. Atherosclerosis of aorta    8. Gastroesophageal reflux disease, unspecified whether esophagitis present    9. Type 2 diabetes mellitus without complication, without long-term current use of insulin    10. Urinary frequency        Plan:       Black stool  -     CBC Auto Differential; Future; Expected date: 02/23/2024    H/O: CVA (cerebrovascular accident)    Type 2 diabetes mellitus with other circulatory complications    Chronic diastolic heart failure    Hemiparesis affecting left side as late effect of stroke    Recurrent major depressive disorder, in partial remission    Atherosclerosis of aorta    Gastroesophageal reflux disease, unspecified whether esophagitis present  -     omeprazole (PRILOSEC) 40 MG capsule; Take 1 capsule (40 mg total) by mouth every morning.  Dispense: 90 capsule; Refill: 1    Type 2 diabetes mellitus without complication, without long-term current use of insulin  -     metFORMIN (GLUCOPHAGE-XR) 500 MG ER 24hr tablet; Take 1 tablet (500 mg total) by mouth once daily.  Dispense: 90 tablet; Refill: 3    Urinary frequency  -     Urine culture; Future; Expected date: 02/23/2024    F/u cbc.     40 minute visit with more than 50% of time spent on counseling.           No future appointments.    Patient note was created using [employer].  Any errors in syntax or even information may not have been identified and edited on initial review prior to signing this note.

## 2024-02-23 NOTE — PROGRESS NOTES
Health Maintenance Due   Topic     TETANUS VACCINE      Shingles Vaccine (1 of 2) hx chickenpox ; inform pt can get vaccine at pharmacy.    RSV Vaccine (Age 60+ and Pregnant patients) (1 - 1-dose 60+ series)     COVID-19 Vaccine (5 - 2023-24 season)     Eye Exam

## 2024-02-28 DIAGNOSIS — G47.00 INSOMNIA, UNSPECIFIED TYPE: ICD-10-CM

## 2024-02-28 RX ORDER — MIRTAZAPINE 7.5 MG/1
TABLET, FILM COATED ORAL
Qty: 90 TABLET | Refills: 2 | Status: SHIPPED | OUTPATIENT
Start: 2024-02-28 | End: 2024-06-05

## 2024-02-28 NOTE — TELEPHONE ENCOUNTER
Care Due:                  Date            Visit Type   Department     Provider  --------------------------------------------------------------------------------                                EP -                              PRIMARY      [facility]  Last Visit: 02-      CARE ([facility])   MEDICINE       Jatin Yadav  Next Visit: None Scheduled  None         None Found                                                            Last  Test          Frequency    Reason                     Performed    Due Date  --------------------------------------------------------------------------------    Vitamin D...  12 months..  vitamin..................  Not Found    Overdue    [employer] Embedded Care Due Messages. Reference number: [id number].   2/28/2024 5:52:57 PM CST

## 2024-02-29 NOTE — TELEPHONE ENCOUNTER
Provider Staff:  Action required for this patient     Please see care gap opportunities below in Care Due Message:   Vitamin D outdated    Thanks!  [facility]     Appointments      Date Provider   Last Visit   2/23/2024 Jatin Yadav MD   Next Visit   Visit date not found Jatin Yadav MD     Refill Decision Note   Carol Yadav  is requesting a refill authorization.  Brief Assessment and Rationale for Refill:  Approve     Medication Therapy Plan:         Comments:     Note composed:6:15 PM 02/28/2024

## 2024-03-07 PROBLEM — M81.0 OSTEOPOROSIS: Status: ACTIVE | Noted: 2024-03-07

## 2024-03-15 ENCOUNTER — LAB VISIT (OUTPATIENT)
Dept: LAB | Facility: HOSPITAL | Age: 89
End: 2024-03-15
Attending: FAMILY MEDICINE
Payer: MEDICARE

## 2024-03-15 DIAGNOSIS — K92.1 BLACK STOOL: ICD-10-CM

## 2024-03-15 LAB
BASOPHILS # BLD AUTO: 0.07 K/UL (ref 0–0.2)
BASOPHILS NFR BLD: 0.8 % (ref 0–1.9)
DIFFERENTIAL METHOD BLD: ABNORMAL
EOSINOPHIL # BLD AUTO: 0.2 K/UL (ref 0–0.5)
EOSINOPHIL NFR BLD: 2.8 % (ref 0–8)
ERYTHROCYTE [DISTWIDTH] IN BLOOD BY AUTOMATED COUNT: 13.8 % (ref 11.5–14.5)
HCT VFR BLD AUTO: 37.4 % (ref 37–48.5)
HGB BLD-MCNC: 11.9 G/DL (ref 12–16)
IMM GRANULOCYTES # BLD AUTO: 0.02 K/UL (ref 0–0.04)
IMM GRANULOCYTES NFR BLD AUTO: 0.2 % (ref 0–0.5)
LYMPHOCYTES # BLD AUTO: 3.1 K/UL (ref 1–4.8)
LYMPHOCYTES NFR BLD: 35.8 % (ref 18–48)
MCH RBC QN AUTO: 30.3 PG (ref 27–31)
MCHC RBC AUTO-ENTMCNC: 31.8 G/DL (ref 32–36)
MCV RBC AUTO: 95 FL (ref 82–98)
MONOCYTES # BLD AUTO: 0.6 K/UL (ref 0.3–1)
MONOCYTES NFR BLD: 6.5 % (ref 4–15)
NEUTROPHILS # BLD AUTO: 4.7 K/UL (ref 1.8–7.7)
NEUTROPHILS NFR BLD: 53.9 % (ref 38–73)
NRBC BLD-RTO: 0 /100 WBC
PLATELET # BLD AUTO: 370 K/UL (ref 150–450)
PMV BLD AUTO: 10.4 FL (ref 9.2–12.9)
RBC # BLD AUTO: 3.93 M/UL (ref 4–5.4)
WBC # BLD AUTO: 8.64 K/UL (ref 3.9–12.7)

## 2024-03-15 PROCEDURE — 85025 COMPLETE CBC W/AUTO DIFF WBC: CPT | Performed by: FAMILY MEDICINE

## 2024-03-15 PROCEDURE — 36415 COLL VENOUS BLD VENIPUNCTURE: CPT | Mod: PO | Performed by: FAMILY MEDICINE

## 2024-03-19 ENCOUNTER — PATIENT MESSAGE (OUTPATIENT)
Dept: FAMILY MEDICINE | Facility: CLINIC | Age: 89
End: 2024-03-19
Payer: MEDICARE

## 2024-03-19 DIAGNOSIS — N30.00 ACUTE CYSTITIS WITHOUT HEMATURIA: Primary | ICD-10-CM

## 2024-03-19 RX ORDER — SULFAMETHOXAZOLE AND TRIMETHOPRIM 800; 160 MG/1; MG/1
1 TABLET ORAL 2 TIMES DAILY
Qty: 10 TABLET | Refills: 0 | Status: SHIPPED | OUTPATIENT
Start: 2024-03-19 | End: 2024-03-24

## 2024-04-01 DIAGNOSIS — H04.203 BILATERAL EPIPHORA: ICD-10-CM

## 2024-04-01 DIAGNOSIS — E11.65 UNCONTROLLED TYPE 2 DIABETES MELLITUS WITH HYPERGLYCEMIA: ICD-10-CM

## 2024-04-01 DIAGNOSIS — R21 RASH: ICD-10-CM

## 2024-04-01 DIAGNOSIS — E03.8 SUBCLINICAL HYPOTHYROIDISM: ICD-10-CM

## 2024-04-02 RX ORDER — LEVOTHYROXINE SODIUM 25 UG/1
TABLET ORAL
Qty: 30 TABLET | Refills: 0 | Status: SHIPPED | OUTPATIENT
Start: 2024-04-02 | End: 2024-04-29 | Stop reason: SDUPTHER

## 2024-04-02 RX ORDER — NAPROXEN SODIUM 220 MG/1
TABLET, FILM COATED ORAL
Qty: 60 TABLET | Refills: 0 | Status: SHIPPED | OUTPATIENT
Start: 2024-04-02 | End: 2024-04-29 | Stop reason: SDUPTHER

## 2024-04-02 RX ORDER — OLOPATADINE HYDROCHLORIDE 1 MG/ML
SOLUTION/ DROPS OPHTHALMIC
Qty: 5 ML | Refills: 0 | Status: SHIPPED | OUTPATIENT
Start: 2024-04-02 | End: 2024-04-29 | Stop reason: SDUPTHER

## 2024-04-02 RX ORDER — BLOOD-GLUCOSE CONTROL, NORMAL
EACH MISCELLANEOUS
Qty: 300 EACH | Refills: 3 | Status: SHIPPED | OUTPATIENT
Start: 2024-04-02

## 2024-04-02 RX ORDER — TRIAMCINOLONE ACETONIDE 1 MG/G
CREAM TOPICAL 2 TIMES DAILY
Qty: 80 G | Refills: 0 | Status: SHIPPED | OUTPATIENT
Start: 2024-04-02 | End: 2024-04-30

## 2024-04-02 NOTE — TELEPHONE ENCOUNTER
Refill Routing Note   Medication(s) are not appropriate for processing by [facility] for the following reason(s):        Outside of protocol  Required labs outdated: TSH    [facility] action(s):  Defer  Route     Requires labs : Yes    Medication Therapy Plan:  Labs (a1c, lipid panel, TSH) due      Appointments  past 12m or future 3m with PCP    Date Provider   Last Visit   2/23/2024 Jatin Yadav MD   Next Visit   Visit date not found Jatin Yadav MD   ED visits in past 90 days: 0        Note composed:2:53 PM 04/02/2024

## 2024-04-08 DIAGNOSIS — M81.0 OSTEOPOROSIS, UNSPECIFIED OSTEOPOROSIS TYPE, UNSPECIFIED PATHOLOGICAL FRACTURE PRESENCE: ICD-10-CM

## 2024-04-08 NOTE — TELEPHONE ENCOUNTER
No care due was identified.  [facility] Embedded Care Due Messages. Reference number: [id number].   4/08/2024 10:59:13 AM CDT

## 2024-04-12 RX ORDER — DENOSUMAB 60 MG/ML
60 INJECTION SUBCUTANEOUS
Qty: 2 ML | Refills: 0 | Status: ACTIVE | OUTPATIENT
Start: 2024-04-12 | End: 2025-04-12

## 2024-04-17 ENCOUNTER — TELEPHONE (OUTPATIENT)
Dept: FAMILY MEDICINE | Facility: CLINIC | Age: 89
End: 2024-04-17
Payer: MEDICARE

## 2024-04-17 NOTE — TELEPHONE ENCOUNTER
----- Message from Zuly Chavira sent at 4/17/2024  2:39 PM CDT -----  Regarding: self  Who called: self        What is the request in detail: pt would like call back from nurse in regards to doing Bone density test, pt stated she get Bone Density test done every 6 months       Can the clinic reply by [facility]? No        Would the patient rather a call back or a response via [facility]? Call back       Best call back number:[phone]

## 2024-04-22 ENCOUNTER — CLINICAL SUPPORT (OUTPATIENT)
Dept: FAMILY MEDICINE | Facility: CLINIC | Age: 89
End: 2024-04-22
Payer: MEDICARE

## 2024-04-22 DIAGNOSIS — M81.0 OSTEOPOROSIS, UNSPECIFIED OSTEOPOROSIS TYPE, UNSPECIFIED PATHOLOGICAL FRACTURE PRESENCE: Primary | ICD-10-CM

## 2024-04-22 NOTE — PROGRESS NOTES
Pt tolerated injection of prolia 60mg to right posterior arm without difficulty; medication was supplied by [facility]  LOT [id number]  EXP [id number]  NDC  68159-093-72

## 2024-04-29 DIAGNOSIS — R21 RASH: ICD-10-CM

## 2024-04-29 DIAGNOSIS — F32.A ANXIETY AND DEPRESSION: ICD-10-CM

## 2024-04-29 DIAGNOSIS — E03.8 SUBCLINICAL HYPOTHYROIDISM: ICD-10-CM

## 2024-04-29 DIAGNOSIS — H04.203 BILATERAL EPIPHORA: ICD-10-CM

## 2024-04-29 DIAGNOSIS — B36.9 FUNGAL RASH OF TORSO: ICD-10-CM

## 2024-04-29 DIAGNOSIS — F41.9 ANXIETY AND DEPRESSION: ICD-10-CM

## 2024-04-29 NOTE — TELEPHONE ENCOUNTER
Care Due:                  Date            Visit Type   Department     Provider  --------------------------------------------------------------------------------                                EP -                              PRIMARY      [facility]  Last Visit: 02-      CARE ([facility])   MEDICINE       Jatin Yadav  Next Visit: None Scheduled  None         None Found                                                            Last  Test          Frequency    Reason                     Performed    Due Date  --------------------------------------------------------------------------------    Vitamin D...  12 months..  vitamin..................  Not Found    Overdue    [employer] Embedded Care Due Messages. Reference number: [id number].   4/29/2024 1:27:31 PM CDT

## 2024-04-30 RX ORDER — TRIAMCINOLONE ACETONIDE 1 MG/G
CREAM TOPICAL 2 TIMES DAILY
Qty: 80 G | Refills: 1 | Status: SHIPPED | OUTPATIENT
Start: 2024-04-30

## 2024-04-30 RX ORDER — VENLAFAXINE HYDROCHLORIDE 150 MG/1
150 CAPSULE, EXTENDED RELEASE ORAL
Qty: 90 CAPSULE | Refills: 1 | Status: SHIPPED | OUTPATIENT
Start: 2024-04-30

## 2024-04-30 NOTE — TELEPHONE ENCOUNTER
Refill Routing Note   Medication(s) are not appropriate for processing by [facility] for the following reason(s):        Required labs outdated: Synthroid - TSH  Outside of protocol: Nizoral, Aspirin, Patanol    [facility] action(s):  Defer  Route  Approve     Requires labs : Yes - Vitamin D outdated          Pharmacist review requested: Yes   Extended chart review required: Yes     Appointments  past 12m or future 3m with PCP    Date Provider   Last Visit   2/23/2024 Jatin Yadav MD   Next Visit   Visit date not found Jatin Yadav MD   ED visits in past 90 days: 0        Note composed:2:16 PM 04/30/2024

## 2024-04-30 NOTE — TELEPHONE ENCOUNTER
Refill Routing Note   Medication(s) are not appropriate for processing by [facility] for the following reason(s):        Required labs outdated: Synthroid  Drug-disease interaction: Effexor  Outside of protocol: Patanol, Aspirin, Nizoral    [facility] action(s):  Defer  Route  Approve     Requires labs : Yes      Medication Therapy Plan: Drug-Disease: venlafaxine and Essential hypertension; Hypertension, well controlled    Pharmacist review requested: Yes     Appointments  past 12m or future 3m with PCP    Date Provider   Last Visit   2/23/2024 Jtain Yadav MD   Next Visit   Visit date not found Jatin Yadav MD   ED visits in past 90 days: 0        Note composed:1:34 PM 04/30/2024

## 2024-05-02 DIAGNOSIS — B36.9 FUNGAL RASH OF TORSO: ICD-10-CM

## 2024-05-02 DIAGNOSIS — H04.203 BILATERAL EPIPHORA: ICD-10-CM

## 2024-05-02 DIAGNOSIS — E03.8 SUBCLINICAL HYPOTHYROIDISM: ICD-10-CM

## 2024-05-02 NOTE — TELEPHONE ENCOUNTER
No care due was identified.  [facility] Embedded Care Due Messages. Reference number: [id number].   5/02/2024 4:21:48 PM CDT

## 2024-05-03 RX ORDER — LEVOTHYROXINE SODIUM 25 UG/1
TABLET ORAL
Qty: 90 TABLET | Refills: 3 | Status: SHIPPED | OUTPATIENT
Start: 2024-05-03

## 2024-05-03 RX ORDER — KETOCONAZOLE 20 MG/G
1 CREAM TOPICAL DAILY
Qty: 60 G | Refills: 0 | OUTPATIENT
Start: 2024-05-03

## 2024-05-03 RX ORDER — NAPROXEN SODIUM 220 MG/1
TABLET, FILM COATED ORAL
Qty: 60 TABLET | Refills: 0 | OUTPATIENT
Start: 2024-05-03

## 2024-05-03 RX ORDER — NAPROXEN SODIUM 220 MG/1
81 TABLET, FILM COATED ORAL DAILY
Qty: 90 TABLET | Refills: 3 | Status: SHIPPED | OUTPATIENT
Start: 2024-05-03 | End: 2024-05-09 | Stop reason: SDUPTHER

## 2024-05-03 RX ORDER — OLOPATADINE HYDROCHLORIDE 1 MG/ML
SOLUTION/ DROPS OPHTHALMIC
Qty: 5 ML | Refills: 0 | OUTPATIENT
Start: 2024-05-03

## 2024-05-03 RX ORDER — LEVOTHYROXINE SODIUM 25 UG/1
TABLET ORAL
Qty: 30 TABLET | Refills: 0 | OUTPATIENT
Start: 2024-05-03

## 2024-05-03 RX ORDER — OLOPATADINE HYDROCHLORIDE 1 MG/ML
SOLUTION/ DROPS OPHTHALMIC
Qty: 5 ML | Refills: 0 | Status: SHIPPED | OUTPATIENT
Start: 2024-05-03 | End: 2024-05-31 | Stop reason: SDUPTHER

## 2024-05-03 RX ORDER — KETOCONAZOLE 20 MG/G
1 CREAM TOPICAL DAILY
Qty: 60 G | Refills: 0 | Status: SHIPPED | OUTPATIENT
Start: 2024-05-03 | End: 2024-05-31 | Stop reason: SDUPTHER

## 2024-05-09 DIAGNOSIS — Z86.73 H/O: CVA (CEREBROVASCULAR ACCIDENT): Primary | ICD-10-CM

## 2024-05-09 RX ORDER — NAPROXEN SODIUM 220 MG/1
81 TABLET, FILM COATED ORAL DAILY
Qty: 90 TABLET | Refills: 3 | Status: SHIPPED | OUTPATIENT
Start: 2024-05-09

## 2024-05-15 ENCOUNTER — TELEPHONE (OUTPATIENT)
Dept: FAMILY MEDICINE | Facility: CLINIC | Age: 89
End: 2024-05-15
Payer: MEDICARE

## 2024-05-15 DIAGNOSIS — I63.9 CEREBROVASCULAR ACCIDENT (CVA), UNSPECIFIED MECHANISM: Primary | ICD-10-CM

## 2024-05-15 NOTE — TELEPHONE ENCOUNTER
----- Message from Naya Clarke sent at 5/15/2024  3:13 PM CDT -----  Regarding: self [phone]  .Type:  Patient Returning Call    Who Called: self    Who Left Message for Patient: N/A    Does the patient know what this is regarding? yes    Would the patient rather a call back or a response via [facility]? Call back    Best Call Back Number: [phone]

## 2024-05-15 NOTE — TELEPHONE ENCOUNTER
----- Message from Naya Clarke sent at 5/15/2024 12:27 PM CDT -----  Regarding: self [phone]  .Type: Patient Call Back    Who called:self    What is the request in detail: patient requesting to speak with MD regarding an matthew and medication list.    Can the clinic reply by [facility]?no    Would the patient rather a call back or a response via [facility]?call back    Best call back number [phone]

## 2024-05-15 NOTE — TELEPHONE ENCOUNTER
Pt states her shaking has gotten worse and she would like to see a neurologist; doesn't know if it has to do with her stroke or if something else is going on, states she has hx of parkinson's in her family

## 2024-05-29 DIAGNOSIS — B36.9 FUNGAL RASH OF TORSO: ICD-10-CM

## 2024-05-29 DIAGNOSIS — H04.203 BILATERAL EPIPHORA: ICD-10-CM

## 2024-05-31 RX ORDER — OLOPATADINE HYDROCHLORIDE 1 MG/ML
SOLUTION/ DROPS OPHTHALMIC
Qty: 5 ML | Refills: 0 | Status: SHIPPED | OUTPATIENT
Start: 2024-05-31

## 2024-05-31 RX ORDER — KETOCONAZOLE 20 MG/G
1 CREAM TOPICAL
Qty: 60 G | Refills: 0 | Status: SHIPPED | OUTPATIENT
Start: 2024-05-31

## 2024-06-04 ENCOUNTER — TELEPHONE (OUTPATIENT)
Dept: FAMILY MEDICINE | Facility: CLINIC | Age: 89
End: 2024-06-04
Payer: MEDICARE

## 2024-06-05 ENCOUNTER — OFFICE VISIT (OUTPATIENT)
Dept: FAMILY MEDICINE | Facility: CLINIC | Age: 89
End: 2024-06-05
Payer: MEDICARE

## 2024-06-05 ENCOUNTER — TELEPHONE (OUTPATIENT)
Dept: FAMILY MEDICINE | Facility: CLINIC | Age: 89
End: 2024-06-05

## 2024-06-05 ENCOUNTER — CLINICAL SUPPORT (OUTPATIENT)
Dept: FAMILY MEDICINE | Facility: CLINIC | Age: 89
End: 2024-06-05
Attending: INTERNAL MEDICINE
Payer: MEDICARE

## 2024-06-05 VITALS
HEART RATE: 89 BPM | HEIGHT: 62 IN | SYSTOLIC BLOOD PRESSURE: 112 MMHG | TEMPERATURE: 98 F | OXYGEN SATURATION: 97 % | BODY MASS INDEX: 23.45 KG/M2 | DIASTOLIC BLOOD PRESSURE: 66 MMHG | WEIGHT: 127.44 LBS

## 2024-06-05 DIAGNOSIS — H40.023 OPEN ANGLE WITH BORDERLINE FINDINGS AND HIGH GLAUCOMA RISK IN BOTH EYES: Primary | ICD-10-CM

## 2024-06-05 DIAGNOSIS — E11.59 TYPE 2 DIABETES MELLITUS WITH OTHER CIRCULATORY COMPLICATIONS: Primary | ICD-10-CM

## 2024-06-05 DIAGNOSIS — I10 HYPERTENSION, WELL CONTROLLED: ICD-10-CM

## 2024-06-05 DIAGNOSIS — E11.59 TYPE 2 DIABETES MELLITUS WITH OTHER CIRCULATORY COMPLICATIONS: ICD-10-CM

## 2024-06-05 DIAGNOSIS — I69.354 HEMIPARESIS AFFECTING LEFT SIDE AS LATE EFFECT OF STROKE: ICD-10-CM

## 2024-06-05 PROBLEM — R10.2 VAGINAL PAIN: Status: RESOLVED | Noted: 2019-10-29 | Resolved: 2024-06-05

## 2024-06-05 PROBLEM — E66.9 OBESITY (BMI 30.0-34.9): Status: RESOLVED | Noted: 2022-04-16 | Resolved: 2024-06-05

## 2024-06-05 PROBLEM — D62 ACUTE BLOOD LOSS ANEMIA: Status: RESOLVED | Noted: 2021-06-21 | Resolved: 2024-06-05

## 2024-06-05 PROBLEM — E11.9 DIABETES MELLITUS, NEW ONSET: Status: RESOLVED | Noted: 2022-04-16 | Resolved: 2024-06-05

## 2024-06-05 PROBLEM — E66.811 OBESITY (BMI 30.0-34.9): Status: RESOLVED | Noted: 2022-04-16 | Resolved: 2024-06-05

## 2024-06-05 PROBLEM — N76.0 ACUTE VAGINITIS: Status: RESOLVED | Noted: 2018-03-20 | Resolved: 2024-06-05

## 2024-06-05 PROCEDURE — 1126F AMNT PAIN NOTED NONE PRSNT: CPT | Mod: CPTII,S$GLB,, | Performed by: INTERNAL MEDICINE

## 2024-06-05 PROCEDURE — 1101F PT FALLS ASSESS-DOCD LE1/YR: CPT | Mod: CPTII,S$GLB,, | Performed by: INTERNAL MEDICINE

## 2024-06-05 PROCEDURE — 3288F FALL RISK ASSESSMENT DOCD: CPT | Mod: CPTII,S$GLB,, | Performed by: INTERNAL MEDICINE

## 2024-06-05 PROCEDURE — 1160F RVW MEDS BY RX/DR IN RCRD: CPT | Mod: CPTII,S$GLB,, | Performed by: INTERNAL MEDICINE

## 2024-06-05 PROCEDURE — 92228 IMG RTA DETC/MNTR DS PHY/QHP: CPT | Mod: 26,S$GLB,, | Performed by: OPHTHALMOLOGY

## 2024-06-05 PROCEDURE — 99999 PR PBB SHADOW E&M-EST. PATIENT-LVL V: CPT | Mod: PBBFAC,,, | Performed by: INTERNAL MEDICINE

## 2024-06-05 PROCEDURE — 99214 OFFICE O/P EST MOD 30 MIN: CPT | Mod: S$GLB,,, | Performed by: INTERNAL MEDICINE

## 2024-06-05 PROCEDURE — 1159F MED LIST DOCD IN RCRD: CPT | Mod: CPTII,S$GLB,, | Performed by: INTERNAL MEDICINE

## 2024-06-05 PROCEDURE — 2025F 7 FLD RTA PHOTO W/O RTNOPTHY: CPT | Mod: CPTII,S$GLB,, | Performed by: OPHTHALMOLOGY

## 2024-06-05 PROCEDURE — 92228 IMG RTA DETC/MNTR DS PHY/QHP: CPT | Mod: TC,S$GLB,, | Performed by: INTERNAL MEDICINE

## 2024-06-05 NOTE — ASSESSMENT & PLAN NOTE
Chronic, controlled   Lab Results   Component Value Date    HGBA1C 5.4 10/13/2023     - continue metformin. May consider d/c if still <6.5%. can reduce frequency of BG checks

## 2024-06-05 NOTE — TELEPHONE ENCOUNTER
----- Message from Rose Marie Richards sent at 6/5/2024  3:49 PM CDT -----  Regarding: [facility]  Type: Patient Call Back    Who called: Carol from   [facility] - [place] - [address]  [address]  [place] [address]  Phone: [phone] Fax: [phone]    What is the request in detail: stated they received a cancellation for mirtazapine (REMERON) 7.5 MG Tab  and wanted to see if it was discontinued due to sometimes its an error.   Detail Level: Zone Patient Specific Counseling (Will Not Stick From Patient To Patient): Joint pain and swelling in hands feet

## 2024-06-05 NOTE — PROGRESS NOTES
Health Maintenance Due   Topic     TETANUS VACCINE   hx chicken pox. Notified pt can get vaccine at pharmacy    Shingles Vaccine (1 of 2)  hx chicken pox. Notified pt can get vaccine at pharmacy    RSV Vaccine (Age 60+ and Pregnant patients) (1 - 1-dose 60+ series) Not offered at this office    COVID-19 Vaccine (5 - 2023-24 season) Not offered at this office    Lipid Panel  Consult pcp    Hemoglobin A1c  Consult pcp    Eye Exam  Consult pcp

## 2024-06-05 NOTE — PROGRESS NOTES
Chief Complaint: [facility]      Carol Yadav  is a 88 y.o. year old patient who presents today for [facility]    Patient had a stroke in 2014. Has residual left sided weakness. Strong family history of stroke. Patient stays in an assisted living where she gets . Reports occasional shakes but no tremors. Watches a lot of TV but does puzzles on her iPad. Has reduced her physical activity.     Past Medical History:   Diagnosis Date    Acute blood loss anemia 06/21/2021    Allergic rhinitis     Arthritis     Diabetes mellitus, new onset 04/16/2022    Elevated blood pressure reading without diagnosis of hypertension     Hormone replacement therapy (postmenopausal)     Hyperlipidemia     Hypertension     Slurred speech 11/07/2014    Stroke of right basal ganglia 12/2014       Past Surgical History:   Procedure Laterality Date    APPENDECTOMY      BREAST BIOPSY      CATARACT EXTRACTION Bilateral at age 62 or 72    monovision     EYE SURGERY      hai cataract surgery    OOPHORECTOMY      PINNING OF HIP Left 6/16/2021    Procedure: PINNING, HIP;  Surgeon: Dae Bernal MD;  Location: [facility];  Service: Orthopedics;  Laterality: Left;    TONSILLECTOMY          Family History   Problem Relation Name Age of Onset    Breast cancer Mother      Brain cancer Mother          Metastatic from breast    Stroke Father      No Known Problems Sister      No Known Problems Brother      No Known Problems Maternal Aunt      No Known Problems Maternal Uncle      No Known Problems Paternal Aunt      No Known Problems Paternal Uncle      No Known Problems Maternal Grandmother      No Known Problems Maternal Grandfather      No Known Problems Paternal Grandmother      No Known Problems Paternal Grandfather      No Known Problems Daughter henrique     No Known Problems Daughter eusebio     Schizophrenia Daughter corinne     Amblyopia Neg Hx      Blindness Neg Hx      Cataracts Neg Hx      Diabetes Neg Hx      Glaucoma Neg Hx       Hypertension Neg Hx      Macular degeneration Neg Hx      Retinal detachment Neg Hx      Strabismus Neg Hx      Thyroid disease Neg Hx      Colon cancer Neg Hx      Esophageal cancer Neg Hx          Social History     Socioeconomic History    Marital status:    Tobacco Use    Smoking status: Former     Current packs/day: 0.00     Average packs/day: 2.0 packs/day for 36.0 years (72.0 ttl pk-yrs)     Types: Cigarettes     Start date:      Quit date:      Years since quittin.4    Smokeless tobacco: Never   Substance and Sexual Activity    Alcohol use: Not Currently     Alcohol/week: 21.0 standard drinks of alcohol     Types: 7 Glasses of wine, 14 Standard drinks or equivalent per week    Drug use: No    Sexual activity: Not Currently     Partners: Male     Comment: 2017 divorce      Social Determinants of Health     Financial Resource Strain: Low Risk  (2022)    Overall Financial Resource Strain (CARDIA)     Difficulty of Paying Living Expenses: Not hard at all   Food Insecurity: No Food Insecurity (2022)    Hunger Vital Sign     Worried About Running Out of Food in the Last Year: Never true     Ran Out of Food in the Last Year: Never true   Transportation Needs: No Transportation Needs (2022)    PRAPARE - Transportation     Lack of Transportation (Medical): No     Lack of Transportation (Non-Medical): No   Physical Activity: Insufficiently Active (2022)    Exercise Vital Sign     Days of Exercise per Week: 5 days     Minutes of Exercise per Session: 10 min   Stress: No Stress Concern Present (2022)    [employer] - Occupational Stress Questionnaire     Feeling of Stress : Only a little   Housing Stability: Low Risk  (2022)    Housing Stability Vital Sign     Unable to Pay for Housing in the Last Year: No     Number of Places Lived in the Last Year: 1     Unstable Housing in the Last Year: No         Current Outpatient Medications:      acetaminophen (TYLENOL) 325 MG tablet, TAKE 2 TABLETS BY MOUTH EVERY FOUR HOURS AS NEEDED FOR PAIN., Disp: 60 tablet, Rfl: 0    aspirin 81 MG Chew, Take 1 tablet (81 mg total) by mouth once daily., Disp: 90 tablet, Rfl: 3    atorvastatin (LIPITOR) 40 MG tablet, Take 1 tablet (40 mg total) by mouth once daily., Disp: 30 tablet, Rfl: 11    calcium carbonate (OS-HAYLEE) 600 mg calcium (1,500 mg) Tab, Take 1 tablet (600 mg total) by mouth once daily., Disp: 90 tablet, Rfl: 3    denosumab (PROLIA) 60 mg/mL Syrg, Inject 1 mL (60 mg total) into the skin every 6 (six) months., Disp: 2 mL, Rfl: 0    famotidine (PEPCID) 20 MG tablet, Take 20 mg by mouth 2 (two) times daily as needed., Disp: , Rfl:     flash glucose scanning reader (FREESTYLE YESY 14 DAY READER) Misc, 1 application by Misc.(Non-Drug; Combo Route) route 3 (three) times daily with meals., Disp: 2 each, Rfl: 2    flash glucose sensor (FREESTYLE YESY 14 DAY SENSOR) Kit, 1 application by Misc.(Non-Drug; Combo Route) route 3 (three) times daily with meals., Disp: 2 kit, Rfl: 3    fluticasone propionate (FLONASE) 50 mcg/actuation nasal spray, 1 SPRAY IN EACH NOSTRIL TWICE A DAY AS NEEDED FOR RHINITIS, Disp: 16 g, Rfl: 11    guaiFENesin 100 mg/5 ml (ROBITUSSIN) 100 mg/5 mL syrup, Take 200 mg by mouth 3 (three) times daily as needed for Cough., Disp: , Rfl:     HYDROcodone-acetaminophen (NORCO) 5-325 mg per tablet, Take 1 tablet by mouth every 6 (six) hours as needed., Disp: , Rfl:     ibuprofen (ADVIL,MOTRIN) 800 MG tablet, Take 800 mg by mouth every 6 (six) hours as needed., Disp: , Rfl:     ketoconazole (NIZORAL) 2 % cream, APPLY TO AFFECTED AREA ONCE DAILY., Disp: 60 g, Rfl: 0    lancets (TRUEPLUS LANCETS) 30 gauge Misc, Use to test blood glucose three (3) times a day, discard lancet after each use, Disp: 300 each, Rfl: 3    levothyroxine (SYNTHROID) 25 MCG tablet, TAKE 1 TABLET BY MOUTH IN THE MORNING BEFORE BREAKFAST., Disp: 90 tablet, Rfl: 3    lisinopriL  (PRINIVIL,ZESTRIL) 2.5 MG tablet, Take 1 tablet (2.5 mg total) by mouth once daily., Disp: 90 tablet, Rfl: 2    melatonin (MELATIN) 3 mg tablet, TAKE 2 TABLETS BY MOUTH AT BEDTIME., Disp: 60 tablet, Rfl: 0    metFORMIN (GLUCOPHAGE-XR) 500 MG ER 24hr tablet, Take 1 tablet (500 mg total) by mouth once daily., Disp: 90 tablet, Rfl: 3    multivit-min-FA-lycopen-lutein (CERTAVITE SENIOR) 0.4 mg-300 mcg- 250 mcg Tab, TAKE 1 TABLET BY MOUTH ONCE DAILY., Disp: 30 tablet, Rfl: 0    multivit-minerals/folic/ginkgo (ONE DAILY WOMEN 50 PLUS ORAL), Take 1 tablet by mouth once daily., Disp: , Rfl:     olopatadine (PATANOL) 0.1 % ophthalmic solution, PLACE 1 DROP IN EACH EYE TWICE DAILY., Disp: 5 mL, Rfl: 0    omeprazole (PRILOSEC) 40 MG capsule, Take 1 capsule (40 mg total) by mouth every morning., Disp: 90 capsule, Rfl: 1    polyethylene glycol (MIRALAX) 17 gram PwPk, Take 17 g by mouth daily as needed. Take every other day, Disp: 30 packet, Rfl: 11    promethazine-dextromethorphan (PROMETHAZINE-DM) 6.25-15 mg/5 mL Syrp, Take by mouth as needed., Disp: , Rfl:     saliva substitute combo no.9 (BIOTENE DRY MOUTH ORAL RINSE) Mwsh, by Mucous Membrane route. Use as directed as needed for dry mouth, Disp: , Rfl:     triamcinolone acetonide 0.1% (KENALOG) 0.1 % cream, APPLY TO AFFECTED AREA TWICE DAILY., Disp: 80 g, Rfl: 1    TRUE METRIX GLUCOSE METER Misc, , Disp: , Rfl:     TRUE METRIX GLUCOSE TEST STRIP Strp, CHECK BLOOD SUGAR 3 TIMES DAILY WITH MEALS., Disp: 300 strip, Rfl: 3    venlafaxine (EFFEXOR-XR) 150 MG Cp24, TAKE 1 CAPSULE BY MOUTH ONCE DAILY., Disp: 90 capsule, Rfl: 1    vitamin D (VITAMIN D3) 1000 units Tab, TAKE 1 TABLET BY MOUTH ONCE DAILY., Disp: 30 tablet, Rfl: 0    water Liqd 146 mL with MILK OF MAGNESIA 400 mg/5 mL Susp 400 mg, diphenhydrAMINE 12.5 mg/5 mL Elix 60 mg, nystatin 100,000 unit/mL Susp 500,000 Units, Take 5 mLs by mouth 3 (three) times daily as needed (mouth sores)., Disp: 1 Bottle, Rfl: 0    Current  Facility-Administered Medications:     cyanocobalamin injection 1,000 mcg, 1,000 mcg, Subcutaneous, 1 time in Clinic/HOD, Jatin Yadav MD     Review of Systems   Skin:  Positive for rash (senile skin changes).   Neurological:  Positive for weakness (left arm (Chronic)). Negative for tremors.        Body shakes        Objective:      Vitals:    06/05/24 1433   BP: 112/66   Pulse: 89   Temp: 98.1 °F (36.7 °C)       Physical Exam  Constitutional:       Appearance: Normal appearance.   HENT:      Head: Normocephalic and atraumatic.   Musculoskeletal:      Comments: Stiffness and limited ROM of left shoulder   Skin:     General: Skin is warm and dry.   Neurological:      General: No focal deficit present.      Mental Status: She is alert and oriented to person, place, and time.      Motor: Weakness present.   Psychiatric:         Mood and Affect: Mood normal.         Behavior: Behavior normal.          Assessment:       1. Type 2 diabetes mellitus with other circulatory complications    2. 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 Hypertension, well controlled          Plan:   1. Type 2 diabetes mellitus with other circulatory complications  Assessment & Plan:  Chronic, controlled   Lab Results   Component Value Date    HGBA1C 5.4 10/13/2023     - continue metformin. May consider d/c if still <6.5%. can reduce frequency of BG checks     Orders:  -     CBC Auto Differential; Future; Expected date: 06/05/2024  -     Hemoglobin A1C; Future; Expected date: 06/05/2024  -     Lipid Panel; Future; Expected date: 06/05/2024  -     Diabetic Eye Screening Photo; Future    2. Hemiparesis affecting left side as late effect of stroke  Overview:  Chronic - Stable - walks with dhruv walker. Followed by Neuro    Assessment & Plan:  Counseled patient to continue to try to use her left arm to avoid contractures      3. Hypertension, well controlled  Assessment & Plan:  BP below goal. Continue to monitor, may d/c  lisinopril if SBP still <120           Follow up in about 3 months (around 9/5/2024) for f/up.

## 2024-06-05 NOTE — PROGRESS NOTES
Carol Yadav is a 88 y.o. female here for a diabetic eye screening with non-dilated fundus photos per DR. VINICIUS BERNARD.    Patient cooperative?: Yes  Small pupils?: Yes  Last eye exam: UNKNOWN    For exam results, see Encounter Report.

## 2024-06-07 ENCOUNTER — TELEPHONE (OUTPATIENT)
Dept: FAMILY MEDICINE | Facility: CLINIC | Age: 89
End: 2024-06-07
Payer: MEDICARE

## 2024-06-07 PROBLEM — H40.023 OPEN ANGLE WITH BORDERLINE FINDINGS AND HIGH GLAUCOMA RISK IN BOTH EYES: Status: ACTIVE | Noted: 2024-06-07

## 2024-06-07 NOTE — TELEPHONE ENCOUNTER
----- Message from Natalee Henderson MD sent at 6/7/2024  1:34 PM CDT -----  Please contact the patient and let him/her know that his/her results were normal but she might have glaucoma. Recommend to follow up with eye specialist. Thank you.

## 2024-06-11 ENCOUNTER — TELEPHONE (OUTPATIENT)
Dept: FAMILY MEDICINE | Facility: CLINIC | Age: 89
End: 2024-06-11
Payer: MEDICARE

## 2024-06-11 DIAGNOSIS — N39.0 URINARY TRACT INFECTION WITHOUT HEMATURIA, SITE UNSPECIFIED: Primary | ICD-10-CM

## 2024-06-11 RX ORDER — NITROFURANTOIN 25; 75 MG/1; MG/1
100 CAPSULE ORAL 2 TIMES DAILY
Qty: 14 CAPSULE | Refills: 0 | Status: SHIPPED | OUTPATIENT
Start: 2024-06-11 | End: 2024-06-18

## 2024-06-11 NOTE — TELEPHONE ENCOUNTER
Spoke to nurse on duty at assisted living facility; she states pt c/o not feeling well; Temp 98.2; the did POCT urine dip and urine positive for leukocytes, no other abnormalities; asked if you would send in medication for UTI

## 2024-06-11 NOTE — TELEPHONE ENCOUNTER
----- Message from Venessa George sent at 6/11/2024 10:14 AM CDT -----  Regarding: self    Type: Patient Call Back     Who called:self     What is the request in detail:called in regards to having questions about a urine test     Can the clinic reply by [facility]? No     Would the patient rather a call back or a response via [facility]? Call back     Best call back number: [phone] pt would like you to call her nurse listed below       Additional Information:Christian the nurse taking care of the pt would like to speak to the office                                          [phone]     Thank you.

## 2024-06-28 ENCOUNTER — LAB VISIT (OUTPATIENT)
Dept: LAB | Facility: HOSPITAL | Age: 89
End: 2024-06-28
Attending: STUDENT IN AN ORGANIZED HEALTH CARE EDUCATION/TRAINING PROGRAM
Payer: MEDICARE

## 2024-06-28 ENCOUNTER — OFFICE VISIT (OUTPATIENT)
Dept: NEUROLOGY | Facility: CLINIC | Age: 89
End: 2024-06-28
Payer: MEDICARE

## 2024-06-28 VITALS — DIASTOLIC BLOOD PRESSURE: 79 MMHG | HEART RATE: 94 BPM | SYSTOLIC BLOOD PRESSURE: 147 MMHG

## 2024-06-28 DIAGNOSIS — M62.81 MUSCLE WEAKNESS: ICD-10-CM

## 2024-06-28 DIAGNOSIS — R53.83 FATIGUE, UNSPECIFIED TYPE: ICD-10-CM

## 2024-06-28 DIAGNOSIS — D51.3 OTHER DIETARY VITAMIN B12 DEFICIENCY ANEMIA: ICD-10-CM

## 2024-06-28 DIAGNOSIS — I63.9 CEREBROVASCULAR ACCIDENT (CVA), UNSPECIFIED MECHANISM: ICD-10-CM

## 2024-06-28 DIAGNOSIS — R53.81 PHYSICAL DECONDITIONING: Primary | ICD-10-CM

## 2024-06-28 DIAGNOSIS — R53.81 PHYSICAL DECONDITIONING: ICD-10-CM

## 2024-06-28 LAB
CK SERPL-CCNC: 50 U/L (ref 20–180)
CRP SERPL-MCNC: 1.2 MG/L (ref 0–8.2)
ERYTHROCYTE [SEDIMENTATION RATE] IN BLOOD BY PHOTOMETRIC METHOD: 22 MM/HR (ref 0–36)
LDH SERPL L TO P-CCNC: 170 U/L (ref 110–260)
VIT B12 SERPL-MCNC: 591 PG/ML (ref 210–950)

## 2024-06-28 PROCEDURE — 36415 COLL VENOUS BLD VENIPUNCTURE: CPT | Performed by: STUDENT IN AN ORGANIZED HEALTH CARE EDUCATION/TRAINING PROGRAM

## 2024-06-28 PROCEDURE — 86140 C-REACTIVE PROTEIN: CPT | Performed by: STUDENT IN AN ORGANIZED HEALTH CARE EDUCATION/TRAINING PROGRAM

## 2024-06-28 PROCEDURE — 85652 RBC SED RATE AUTOMATED: CPT | Performed by: STUDENT IN AN ORGANIZED HEALTH CARE EDUCATION/TRAINING PROGRAM

## 2024-06-28 PROCEDURE — 82085 ASSAY OF ALDOLASE: CPT | Performed by: STUDENT IN AN ORGANIZED HEALTH CARE EDUCATION/TRAINING PROGRAM

## 2024-06-28 PROCEDURE — 83615 LACTATE (LD) (LDH) ENZYME: CPT | Performed by: STUDENT IN AN ORGANIZED HEALTH CARE EDUCATION/TRAINING PROGRAM

## 2024-06-28 PROCEDURE — 82550 ASSAY OF CK (CPK): CPT | Performed by: STUDENT IN AN ORGANIZED HEALTH CARE EDUCATION/TRAINING PROGRAM

## 2024-06-28 PROCEDURE — 99999 PR PBB SHADOW E&M-EST. PATIENT-LVL II: CPT | Mod: PBBFAC,,, | Performed by: STUDENT IN AN ORGANIZED HEALTH CARE EDUCATION/TRAINING PROGRAM

## 2024-06-28 PROCEDURE — 82607 VITAMIN B-12: CPT | Performed by: STUDENT IN AN ORGANIZED HEALTH CARE EDUCATION/TRAINING PROGRAM

## 2024-06-28 NOTE — PROGRESS NOTES
Vascular Neurology Clinic  Initial Consult    Patient Name: Carol Yadav  MRN: [id number]  Date: 6/28/24  Referring Provider: Dr. Jatin Yadav    CC: History of stroke    SUBJECTIVE:      History of Present Illness: Carol Yadav is a 88 y.o. female with a past medical history significant for type II DM, HTN, HLD, prior stroke who presents for evaluate for history of stroke.      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      Today, she states that for the past several months, she has been feeling worse. Feeling weak all over. Her whole body hurts and she feels tired in her muscles. Unable to delineate joint vs muscle pain, but does state that she has pain in her shoulders. No tenderness to palpation over her muscles. She also feels like she has internal shaking with intermittent spasms and jerking.     Seen by neurology in the past (2017) for weakness. Work-up at the time including MRI brain was unremarkable. Seen by movement disorders in 2018 for tremor (internal shaking) felt to be mild essential tremor vs physiologic tremor. Exam was not consistent with PD.       Labs:  Recent Labs   Lab 04/04/23  1435 10/13/23  1101   Hemoglobin A1C 5.8 H 5.4   LDL Cholesterol 81.0  --    HDL 48  --    Triglycerides 125  --    Cholesterol 154  --          Review of Systems: The following systems were reviewed with pertinent positives and negatives documented in the HPI: constitutional, eyes, CV, respiratory, GI, , musculoskeletal, skin, neurological, psychiatric    Past Medical History:  Past Medical History:   Diagnosis Date    Acute blood loss anemia 06/21/2021    Allergic rhinitis     Arthritis     Diabetes mellitus, new onset 04/16/2022    Elevated blood pressure reading without diagnosis of hypertension     Hormone replacement therapy (postmenopausal)     Hyperlipidemia     Hypertension     Slurred speech 11/07/2014    Stroke of right basal ganglia 12/2014        Medications:    Current Outpatient Medications:     acetaminophen (TYLENOL) 325 MG tablet, TAKE 2 TABLETS BY MOUTH EVERY FOUR HOURS AS NEEDED FOR PAIN., Disp: 60 tablet, Rfl: 0    aspirin 81 MG Chew, Take 1 tablet (81 mg total) by mouth once daily., Disp: 90 tablet, Rfl: 3    atorvastatin (LIPITOR) 40 MG tablet, Take 1 tablet (40 mg total) by mouth once daily., Disp: 30 tablet, Rfl: 11    calcium carbonate (OS-HAYLEE) 600 mg calcium (1,500 mg) Tab, Take 1 tablet (600 mg total) by mouth once daily., Disp: 90 tablet, Rfl: 3    denosumab (PROLIA) 60 mg/mL Syrg, Inject 1 mL (60 mg total) into the skin every 6 (six) months., Disp: 2 mL, Rfl: 0    famotidine (PEPCID) 20 MG tablet, Take 20 mg by mouth 2 (two) times daily as needed., Disp: , Rfl:     flash glucose scanning reader (FREESTYLE YESY 14 DAY READER) Misc, 1 application by Misc.(Non-Drug; Combo Route) route 3 (three) times daily with meals., Disp: 2 each, Rfl: 2    flash glucose sensor (FREESTYLE YESY 14 DAY SENSOR) Kit, 1 application by Misc.(Non-Drug; Combo Route) route 3 (three) times daily with meals., Disp: 2 kit, Rfl: 3    fluticasone propionate (FLONASE) 50 mcg/actuation nasal spray, 1 SPRAY IN EACH NOSTRIL TWICE A DAY AS NEEDED FOR RHINITIS, Disp: 16 g, Rfl: 11    guaiFENesin 100 mg/5 ml (ROBITUSSIN) 100 mg/5 mL syrup, Take 200 mg by mouth 3 (three) times daily as needed for Cough., Disp: , Rfl:     HYDROcodone-acetaminophen (NORCO) 5-325 mg per tablet, Take 1 tablet by mouth every 6 (six) hours as needed., Disp: , Rfl:     ibuprofen (ADVIL,MOTRIN) 800 MG tablet, Take 800 mg by mouth every 6 (six) hours as needed., Disp: , Rfl:     ketoconazole (NIZORAL) 2 % cream, APPLY TO AFFECTED AREA ONCE DAILY., Disp: 60 g, Rfl: 0    lancets (TRUEPLUS LANCETS) 30 gauge Misc, Use to test blood glucose three (3) times a day, discard lancet after each use, Disp: 300 each, Rfl: 3    levothyroxine (SYNTHROID) 25 MCG tablet, TAKE 1 TABLET BY MOUTH IN THE MORNING BEFORE  BREAKFAST., Disp: 90 tablet, Rfl: 3    lisinopriL (PRINIVIL,ZESTRIL) 2.5 MG tablet, Take 1 tablet (2.5 mg total) by mouth once daily., Disp: 90 tablet, Rfl: 2    melatonin (MELATIN) 3 mg tablet, TAKE 2 TABLETS BY MOUTH AT BEDTIME., Disp: 60 tablet, Rfl: 0    metFORMIN (GLUCOPHAGE-XR) 500 MG ER 24hr tablet, Take 1 tablet (500 mg total) by mouth once daily., Disp: 90 tablet, Rfl: 3    multivit-min-FA-lycopen-lutein (CERTAVITE SENIOR) 0.4 mg-300 mcg- 250 mcg Tab, TAKE 1 TABLET BY MOUTH ONCE DAILY., Disp: 30 tablet, Rfl: 0    multivit-minerals/folic/ginkgo (ONE DAILY WOMEN 50 PLUS ORAL), Take 1 tablet by mouth once daily., Disp: , Rfl:     olopatadine (PATANOL) 0.1 % ophthalmic solution, PLACE 1 DROP IN EACH EYE TWICE DAILY., Disp: 5 mL, Rfl: 0    omeprazole (PRILOSEC) 40 MG capsule, Take 1 capsule (40 mg total) by mouth every morning., Disp: 90 capsule, Rfl: 1    polyethylene glycol (MIRALAX) 17 gram PwPk, Take 17 g by mouth daily as needed. Take every other day, Disp: 30 packet, Rfl: 11    promethazine-dextromethorphan (PROMETHAZINE-DM) 6.25-15 mg/5 mL Syrp, Take by mouth as needed., Disp: , Rfl:     saliva substitute combo no.9 (BIOTENE DRY MOUTH ORAL RINSE) Mwsh, by Mucous Membrane route. Use as directed as needed for dry mouth, Disp: , Rfl:     triamcinolone acetonide 0.1% (KENALOG) 0.1 % cream, APPLY TO AFFECTED AREA TWICE DAILY., Disp: 80 g, Rfl: 1    TRUE METRIX GLUCOSE METER Misc, , Disp: , Rfl:     TRUE METRIX GLUCOSE TEST STRIP Strp, CHECK BLOOD SUGAR 3 TIMES DAILY WITH MEALS., Disp: 300 strip, Rfl: 3    venlafaxine (EFFEXOR-XR) 150 MG Cp24, TAKE 1 CAPSULE BY MOUTH ONCE DAILY., Disp: 90 capsule, Rfl: 1    vitamin D (VITAMIN D3) 1000 units Tab, TAKE 1 TABLET BY MOUTH ONCE DAILY., Disp: 30 tablet, Rfl: 0    water Liqd 146 mL with MILK OF MAGNESIA 400 mg/5 mL Susp 400 mg, diphenhydrAMINE 12.5 mg/5 mL Elix 60 mg, nystatin 100,000 unit/mL Susp 500,000 Units, Take 5 mLs by mouth 3 (three) times daily as needed  (mouth sores)., Disp: 1 Bottle, Rfl: 0    Current Facility-Administered Medications:     cyanocobalamin injection 1,000 mcg, 1,000 mcg, Subcutaneous, 1 time in Clinic/HOD, Jatin Yadav MD  Any other notable medications as documented in HPI.    Allergies:  Review of patient's allergies indicates:  No Known Allergies    Social History:  Social History     Socioeconomic History    Marital status:    Tobacco Use    Smoking status: Former     Current packs/day: 0.00     Average packs/day: 2.0 packs/day for 36.0 years (72.0 ttl pk-yrs)     Types: Cigarettes     Start date:      Quit date:      Years since quittin.5    Smokeless tobacco: Never   Substance and Sexual Activity    Alcohol use: Not Currently     Alcohol/week: 21.0 standard drinks of alcohol     Types: 7 Glasses of wine, 14 Standard drinks or equivalent per week    Drug use: No    Sexual activity: Not Currently     Partners: Male     Comment: 2017 divorce      Social Determinants of Health     Financial Resource Strain: Low Risk  (2022)    Overall Financial Resource Strain (CARDIA)     Difficulty of Paying Living Expenses: Not hard at all   Food Insecurity: No Food Insecurity (2022)    Hunger Vital Sign     Worried About Running Out of Food in the Last Year: Never true     Ran Out of Food in the Last Year: Never true   Transportation Needs: No Transportation Needs (2022)    PRAPARE - Transportation     Lack of Transportation (Medical): No     Lack of Transportation (Non-Medical): No   Physical Activity: Insufficiently Active (2022)    Exercise Vital Sign     Days of Exercise per Week: 5 days     Minutes of Exercise per Session: 10 min   Stress: No Stress Concern Present (2022)    [employer] - Occupational Stress Questionnaire     Feeling of Stress : Only a little   Housing Stability: Low Risk  (2022)    Housing Stability Vital Sign     Unable to Pay for Housing in the  Last Year: No     Number of Places Lived in the Last Year: 1     Unstable Housing in the Last Year: No     Any other notable Social History as documented in HPI.    Family History:  Family History   Problem Relation Name Age of Onset    Breast cancer Mother      Brain cancer Mother          Metastatic from breast    Stroke Father      No Known Problems Sister      No Known Problems Brother      No Known Problems Maternal Aunt      No Known Problems Maternal Uncle      No Known Problems Paternal Aunt      No Known Problems Paternal Uncle      No Known Problems Maternal Grandmother      No Known Problems Maternal Grandfather      No Known Problems Paternal Grandmother      No Known Problems Paternal Grandfather      No Known Problems Daughter henrique     No Known Problems Daughter eusebio     Schizophrenia Daughter corinne     Amblyopia Neg Hx      Blindness Neg Hx      Cataracts Neg Hx      Diabetes Neg Hx      Glaucoma Neg Hx      Hypertension Neg Hx      Macular degeneration Neg Hx      Retinal detachment Neg Hx      Strabismus Neg Hx      Thyroid disease Neg Hx      Colon cancer Neg Hx      Esophageal cancer Neg Hx       Any other notable FMH as documented in HPI.      OBJECTIVE:     Physical Exam:   Vitals:    06/28/24 1539   BP: (!) 147/79   Pulse: 94       GEN: NAD, pleasant, cooperative  CV: RRR  PULM: No signs of resp distress, on room air  EXT: No cyanosis, clubbing, or edema.    NEURO:  Mental status: The patient is alert, attentive, and oriented to self, age, month, year.  Speech: Fluent speech with intact repetition, comprehension, and naming. Phonation is normal.    Cranial nerves:  CN II: Visual fields are full to confrontation. Pupils are 3mm and reactive to light OU.  CN III, IV, VI: EOMI, no nystagmus, no ptosis  CN V: Facial sensation is intact in all 3 divisions bilaterally.  CN VII: Face is symmetric with normal eye closure and smile.  CN VIII: Hearing is normal bilaterally.  CN IX, X: Palate  elevates symmetrically.   CN XI: Head turning and shoulder shrug are intact.  CN XII: Tongue is midline with normal movements and no atrophy.    Motor: Muscle bulk and tone are normal. No pronator drift.  -- RUE: 4+/5 shoulder abduction, otherwise 5/5.  -- RLE: 4+/5 throughout.   -- LUE: Limited proximal ROM, 1/5 shoulder abduction, 3/5 elbow extension/flexion, 4/5 finger flexion  -- LLE: 4-/5 throughout.  - Fine distal tremor with arms outstretched RUE. No rest tremor. No bradykinesia.     Reflexes: Reflexes are 2+ RUE, 1+ RLE, brisk LUE,/LLE.     Sensory: Light touch, temperature is intact in her bilateral upper and lower extremities.    Coordination: Rapid alternating movements and fine finger movements are intact. There is no dysmetria on finger-to-nose and heel-knee-shin. There are no abnormal or extraneous movements.    Gait/Stance: Deferred due to hemiparesis.       ASSESSMENT/PLAN:     # History of stroke - right BG/CR infarction (2014), etiology small vessel disease  - Antiplatelet/Anticoagulation: Continue aspirin 81mg daily.   - Lipid Management: Target is LDL < 70mg/dL. Continue atorvastatin 40mg daily.   - Diabetes: Target hemoglobin A1c <7%, measured 2X/year or quarterly if not meeting goals  - Hypertension: Target systolic BP <130mmHg. Continue home BP medications  - Smoking: Former smoker.  - Therapy: Recommend PT/OT or other exercise as tolerated.   - Follow-up: RTC in 6 months. Recommend close follow-up with their PCP for monitoring and management of the above vascular risk factors as needed to meet goals.     # Generalized weakness  - Appears to be a long-standing problem with unclear etiology. She has good strength RUE/RLE with known residual, stable left hemiparesis (LUE > LLE) from her prior stroke. Can consider myopathy/myositis, fibromyalgia, deconditioning. Plan for labs as listed below. Can also consider EMG/NCS to evaluate if labs are normal.   - Recommend continued exercise program or  PT/OT.     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 Her exam does not appear consistent with Parkinson's, but can consider follow-up with movement disorders.     Problem List Items Addressed This Visit          Other    Physical deconditioning - Primary    Relevant Orders    Sedimentation rate (Completed)     Other Visit Diagnoses       Cerebrovascular accident (CVA), unspecified mechanism        Muscle weakness        Relevant Orders    CK (Completed)    ALDOLASE (Completed)    Sedimentation rate (Completed)    C-REACTIVE PROTEIN (Completed)    LACTATE DEHYDROGENASE (Completed)    Fatigue, unspecified type        Relevant Orders    VITAMIN B12 (Completed)    Other dietary vitamin B12 deficiency anemia        Relevant Orders    VITAMIN B12 (Completed)          45 minutes of total time spent on the encounter, which includes face to face time and non-face to face time preparing to see the patient (eg, review of tests), obtaining and/or reviewing separately obtained history, documenting clinical information in the electronic or other health record, independently interpreting results (not separately reported), and communicating results to the patient/family/caregiver, patient/family education, and care coordination (not separately reported).       Anna Byrne MD, PhD  [facility]  Department of Vascular Neurology

## 2024-06-29 LAB — ALDOLASE SERPL-CCNC: 3.7 U/L (ref 1.2–7.6)

## 2024-07-01 DIAGNOSIS — H04.203 BILATERAL EPIPHORA: ICD-10-CM

## 2024-07-01 DIAGNOSIS — R21 RASH: ICD-10-CM

## 2024-07-01 DIAGNOSIS — B36.9 FUNGAL RASH OF TORSO: ICD-10-CM

## 2024-07-01 NOTE — TELEPHONE ENCOUNTER
Care Due:                  Date            Visit Type   Department     Provider  --------------------------------------------------------------------------------                                EP -                              PRIMARY      [facility]  Last Visit: 06-      CARE ([facility])   MEDICINE       Natalee Henderson  Next Visit: None Scheduled  None         None Found                                                            Last  Test          Frequency    Reason                     Performed    Due Date  --------------------------------------------------------------------------------    HBA1C.......  6 months...  metFORMIN................  10-   04-    Lipid Panel.  12 months..  atorvastatin.............  04- 03-    Vitamin D...  12 months..  vitamin..................  Not Found    Overdue    [employer] Embedded Care Due Messages. Reference number: [id number].   7/01/2024 3:20:28 PM CDT

## 2024-07-01 NOTE — TELEPHONE ENCOUNTER
Refill Routing Note   Medication(s) are not appropriate for processing by [facility] for the following reason(s):        Outside of protocol  No active prescription written by provider    [facility] action(s):  Defer  Route        Medication Therapy Plan: FLOS      Appointments  past 12m or future 3m with PCP    Date Provider   Last Visit   6/5/2024 Natalee Henderson MD   Next Visit   9/5/2024 Natalee eHnderson MD   ED visits in past 90 days: 0        Note composed:6:13 PM 07/01/2024

## 2024-07-02 RX ORDER — OLOPATADINE HYDROCHLORIDE 1 MG/ML
SOLUTION/ DROPS OPHTHALMIC
Qty: 5 ML | Refills: 0 | Status: SHIPPED | OUTPATIENT
Start: 2024-07-02

## 2024-07-02 RX ORDER — KETOCONAZOLE 20 MG/G
1 CREAM TOPICAL
Qty: 60 G | Refills: 0 | Status: SHIPPED | OUTPATIENT
Start: 2024-07-02

## 2024-07-02 RX ORDER — TRIAMCINOLONE ACETONIDE 1 MG/G
CREAM TOPICAL 2 TIMES DAILY
Qty: 80 G | Refills: 1 | Status: SHIPPED | OUTPATIENT
Start: 2024-07-02

## 2024-08-07 ENCOUNTER — PATIENT MESSAGE (OUTPATIENT)
Dept: ADMINISTRATIVE | Facility: CLINIC | Age: 89
End: 2024-08-07
Payer: MEDICARE

## 2024-08-07 ENCOUNTER — TELEPHONE (OUTPATIENT)
Dept: ADMINISTRATIVE | Facility: CLINIC | Age: 89
End: 2024-08-07
Payer: MEDICARE

## 2024-08-09 ENCOUNTER — TELEPHONE (OUTPATIENT)
Dept: ADMINISTRATIVE | Facility: CLINIC | Age: 89
End: 2024-08-09
Payer: MEDICARE

## 2024-08-14 ENCOUNTER — TELEPHONE (OUTPATIENT)
Dept: FAMILY MEDICINE | Facility: CLINIC | Age: 89
End: 2024-08-14
Payer: MEDICARE

## 2024-08-14 DIAGNOSIS — K21.9 GASTROESOPHAGEAL REFLUX DISEASE, UNSPECIFIED WHETHER ESOPHAGITIS PRESENT: ICD-10-CM

## 2024-08-14 DIAGNOSIS — I63.9 CEREBROVASCULAR ACCIDENT (CVA), UNSPECIFIED MECHANISM: ICD-10-CM

## 2024-08-14 NOTE — TELEPHONE ENCOUNTER
----- Message from Leann Pearson sent at 8/14/2024  3:24 PM CDT -----  Regarding: patient call back  Type: Patient Call Back    Who called: Self     What is the request in detail: asked for a call because she has COVID. Has questions to ask about it      Can the clinic reply by [facility]? No     Would the patient rather a call back or a response via [facility]? Call     Best call back number: .[phone]

## 2024-08-14 NOTE — TELEPHONE ENCOUNTER
Pt states she tested positive for covid about 2 weeks ago so she didn't log on for her virtual AWV; explained to her that is with a different department and they will reach out to her to reschedule; pt verbalized understanding

## 2024-08-15 NOTE — TELEPHONE ENCOUNTER
Care Due:                  Date            Visit Type   Department     Provider  --------------------------------------------------------------------------------                                EP -                              PRIMARY      [facility]  Last Visit: 06-      CARE ([facility])   MEDICINE       Natalee Henderson                              EP -                              PRIMARY      [facility]  Next Visit: 09-      CARE ([facility])   MEDICINE       Nataleedano Henderson                                                            Last  Test          Frequency    Reason                     Performed    Due Date  --------------------------------------------------------------------------------    CMP.........  12 months..  atorvastatin, lisinopriL,   10-   10-                             metFORMIN, venlafaxine,                             vitamin..................    [employer] Embedded Care Due Messages. Reference number: [id number].   8/14/2024 7:16:34 PM CDT

## 2024-08-15 NOTE — TELEPHONE ENCOUNTER
Refill Routing Note   Medication(s) are not appropriate for processing by [facility] for the following reason(s):        No active prescription written by provider  Required labs outdated    [facility] action(s):  Defer   Requires labs : Yes             Appointments  past 12m or future 3m with PCP    Date Provider   Last Visit   6/5/2024 Natalee Henderson MD   Next Visit   9/5/2024 Natalee Henderson MD   ED visits in past 90 days: 0        Note composed:6:41 AM 08/15/2024

## 2024-08-16 RX ORDER — ATORVASTATIN CALCIUM 40 MG/1
40 TABLET, FILM COATED ORAL
Qty: 90 TABLET | Refills: 0 | Status: SHIPPED | OUTPATIENT
Start: 2024-08-16

## 2024-08-16 RX ORDER — OMEPRAZOLE 40 MG/1
40 CAPSULE, DELAYED RELEASE ORAL EVERY MORNING
Qty: 90 CAPSULE | Refills: 3 | Status: SHIPPED | OUTPATIENT
Start: 2024-08-16

## 2024-08-20 ENCOUNTER — TELEPHONE (OUTPATIENT)
Dept: FAMILY MEDICINE | Facility: CLINIC | Age: 89
End: 2024-08-20
Payer: MEDICARE

## 2024-08-20 DIAGNOSIS — H04.203 BILATERAL EPIPHORA: ICD-10-CM

## 2024-08-20 DIAGNOSIS — E11.65 UNCONTROLLED TYPE 2 DIABETES MELLITUS WITH HYPERGLYCEMIA: ICD-10-CM

## 2024-08-20 DIAGNOSIS — B36.9 FUNGAL RASH OF TORSO: ICD-10-CM

## 2024-08-20 RX ORDER — KETOCONAZOLE 20 MG/G
1 CREAM TOPICAL DAILY
Qty: 60 G | Refills: 0 | Status: SHIPPED | OUTPATIENT
Start: 2024-08-20

## 2024-08-20 RX ORDER — BLOOD-GLUCOSE CONTROL, NORMAL
EACH MISCELLANEOUS
Qty: 300 EACH | Refills: 3 | Status: SHIPPED | OUTPATIENT
Start: 2024-08-20

## 2024-08-20 RX ORDER — OLOPATADINE HYDROCHLORIDE 1 MG/ML
SOLUTION/ DROPS OPHTHALMIC
Qty: 5 ML | Refills: 0 | Status: SHIPPED | OUTPATIENT
Start: 2024-08-20

## 2024-08-20 NOTE — TELEPHONE ENCOUNTER
Pt had multiple questions; asked when was last prolia injection (informed her she received it 4/22/24); also asked about her going out to eat since she had covid 2 weeks ago, not having any fever- advised ok for her to go out; pt states she had a fall couple weeks ago but doesn't think she injured herself, does not want to come in for sooner appt; she had appt scheduled with Dr Henderson for 9/5 but I changed it to [place] since Dr Henderson is out

## 2024-08-20 NOTE — TELEPHONE ENCOUNTER
No care due was identified.  [facility] Embedded Care Due Messages. Reference number: [id number].   8/20/2024 1:04:24 PM CDT

## 2024-08-20 NOTE — TELEPHONE ENCOUNTER
----- Message from Sundar Desai sent at 8/20/2024  2:12 PM CDT -----  Regarding: self  Type: Patient Call Back    Who called:self    What is the request in detail:pt is calling to speak with the office, because she fell and hit her head and hip. Also had covid, was isolated and would like to speak to the office as well     Can the clinic reply by [facility]?no    Would the patient rather a call back or a response via [facility]? callback    Best call back number:[phone]    Additional Information:

## 2024-08-22 ENCOUNTER — TELEPHONE (OUTPATIENT)
Dept: FAMILY MEDICINE | Facility: CLINIC | Age: 89
End: 2024-08-22
Payer: MEDICARE

## 2024-08-22 DIAGNOSIS — F03.A11 MILD DEMENTIA WITH AGITATION, UNSPECIFIED DEMENTIA TYPE: Primary | ICD-10-CM

## 2024-08-22 NOTE — TELEPHONE ENCOUNTER
----- Message from Tete Grant sent at 8/22/2024  9:45 AM CDT -----  Regarding: Daughter Fatou  Type: Patient Call Back     What is the request in detail: Pts daughter  is requesting to speak to dr jurado    Nurse in regards to her moms behavorial health   Can the clinic reply by [facility]? No     Would the patient rather a call back or a response via [facility]? Call back    Best call back number: .[phone]      Additional Information:    Thank you.

## 2024-08-22 NOTE — TELEPHONE ENCOUNTER
Spoke to daughter henrique who has power of ; she states the assisted living facility called her and informed her that pt is being verbally abusive to the staff and they will have to let her go if something is not done; they are willing to work with the daughter to see about getting pt some psychiatric help; daughter states pt gets like this at times, was on medication in the past and then stopped taking it; she is asking for referral to psychiatry or behavioral health

## 2024-08-27 DIAGNOSIS — H04.203 BILATERAL EPIPHORA: ICD-10-CM

## 2024-08-27 DIAGNOSIS — B36.9 FUNGAL RASH OF TORSO: ICD-10-CM

## 2024-08-28 ENCOUNTER — TELEPHONE (OUTPATIENT)
Dept: FAMILY MEDICINE | Facility: CLINIC | Age: 89
End: 2024-08-28
Payer: MEDICARE

## 2024-08-28 NOTE — TELEPHONE ENCOUNTER
Nurse from assisted living facility was asking for accuchecks to be decreased due to all of her readings under 100 but states after she spoke to supervisor they want to leave it at 3 times daily because she does fluctuate at times; states we can disregard the message

## 2024-08-28 NOTE — TELEPHONE ENCOUNTER
----- Message from Gordy Ellison MA sent at 8/28/2024  7:31 AM CDT -----  Type: Patient Call Back    Who called: Nurse Navarrete    What is the request in detail:please advise she needs daily accuchecks to be decreased due to the accuchecks being 100 or less ..    Can the clinic reply by [facility]?NO    Would the patient rather a call back or a response via [facility]? Yes, call    Best call back number: [phone]

## 2024-08-29 ENCOUNTER — TELEPHONE (OUTPATIENT)
Dept: FAMILY MEDICINE | Facility: CLINIC | Age: 89
End: 2024-08-29

## 2024-08-29 NOTE — TELEPHONE ENCOUNTER
----- Message from Leanne Odom sent at 8/29/2024  1:51 PM CDT -----  Regarding: Patient Advice                    Name of Who is Calling:  Carol Yadav    Who Left The Message:  Carol Yadav      What is the request in detail:  Patient called stating she's returning the office's call and would like you to please call again. Thank you      Reply by [facility]: NO      Preferred Call Back  : [phone]

## 2024-08-29 NOTE — TELEPHONE ENCOUNTER
Pt had appt at outside ortho for hip pain after fall; was told she has sciatic nerve pain and was prescribed medication to take once daily; pt states this is not controlling her pain and she would like to take tylenol as needed, her daughter advised her that she should reach out to the provider to see if it is ok for her to take tylenol and how often; she has ibuprofen and hydrocodone listed on her profile but states that is old and she is not taking those medications

## 2024-08-30 RX ORDER — KETOCONAZOLE 20 MG/G
1 CREAM TOPICAL
Qty: 60 G | Refills: 11 | OUTPATIENT
Start: 2024-08-30

## 2024-08-30 RX ORDER — OLOPATADINE HYDROCHLORIDE 1 MG/ML
SOLUTION/ DROPS OPHTHALMIC
Qty: 5 ML | Refills: 11 | OUTPATIENT
Start: 2024-08-30

## 2024-09-03 ENCOUNTER — LAB VISIT (OUTPATIENT)
Dept: LAB | Facility: HOSPITAL | Age: 89
End: 2024-09-03
Attending: INTERNAL MEDICINE
Payer: MEDICARE

## 2024-09-03 DIAGNOSIS — E11.59 TYPE 2 DIABETES MELLITUS WITH OTHER CIRCULATORY COMPLICATIONS: ICD-10-CM

## 2024-09-03 LAB
BASOPHILS # BLD AUTO: 0.05 K/UL (ref 0–0.2)
BASOPHILS NFR BLD: 0.6 % (ref 0–1.9)
CHOLEST SERPL-MCNC: 145 MG/DL (ref 120–199)
CHOLEST/HDLC SERPL: 3.7 {RATIO} (ref 2–5)
DIFFERENTIAL METHOD BLD: ABNORMAL
EOSINOPHIL # BLD AUTO: 0.2 K/UL (ref 0–0.5)
EOSINOPHIL NFR BLD: 2.2 % (ref 0–8)
ERYTHROCYTE [DISTWIDTH] IN BLOOD BY AUTOMATED COUNT: 14.1 % (ref 11.5–14.5)
ESTIMATED AVG GLUCOSE: 117 MG/DL (ref 68–131)
HBA1C MFR BLD: 5.7 % (ref 4–5.6)
HCT VFR BLD AUTO: 38.3 % (ref 37–48.5)
HDLC SERPL-MCNC: 39 MG/DL (ref 40–75)
HDLC SERPL: 26.9 % (ref 20–50)
HGB BLD-MCNC: 12.1 G/DL (ref 12–16)
IMM GRANULOCYTES # BLD AUTO: 0.03 K/UL (ref 0–0.04)
IMM GRANULOCYTES NFR BLD AUTO: 0.3 % (ref 0–0.5)
LDLC SERPL CALC-MCNC: 75.2 MG/DL (ref 63–159)
LYMPHOCYTES # BLD AUTO: 2.8 K/UL (ref 1–4.8)
LYMPHOCYTES NFR BLD: 30.8 % (ref 18–48)
MCH RBC QN AUTO: 29.5 PG (ref 27–31)
MCHC RBC AUTO-ENTMCNC: 31.6 G/DL (ref 32–36)
MCV RBC AUTO: 93 FL (ref 82–98)
MONOCYTES # BLD AUTO: 0.8 K/UL (ref 0.3–1)
MONOCYTES NFR BLD: 8.5 % (ref 4–15)
NEUTROPHILS # BLD AUTO: 5.2 K/UL (ref 1.8–7.7)
NEUTROPHILS NFR BLD: 57.6 % (ref 38–73)
NONHDLC SERPL-MCNC: 106 MG/DL
NRBC BLD-RTO: 0 /100 WBC
PLATELET # BLD AUTO: 388 K/UL (ref 150–450)
PMV BLD AUTO: 10 FL (ref 9.2–12.9)
RBC # BLD AUTO: 4.1 M/UL (ref 4–5.4)
TRIGL SERPL-MCNC: 154 MG/DL (ref 30–150)
WBC # BLD AUTO: 8.95 K/UL (ref 3.9–12.7)

## 2024-09-03 PROCEDURE — 36415 COLL VENOUS BLD VENIPUNCTURE: CPT | Mod: PO | Performed by: INTERNAL MEDICINE

## 2024-09-03 PROCEDURE — 83036 HEMOGLOBIN GLYCOSYLATED A1C: CPT | Performed by: INTERNAL MEDICINE

## 2024-09-03 PROCEDURE — 80061 LIPID PANEL: CPT | Performed by: INTERNAL MEDICINE

## 2024-09-03 PROCEDURE — 85025 COMPLETE CBC W/AUTO DIFF WBC: CPT | Performed by: INTERNAL MEDICINE

## 2024-09-05 ENCOUNTER — TELEPHONE (OUTPATIENT)
Dept: FAMILY MEDICINE | Facility: CLINIC | Age: 89
End: 2024-09-05
Payer: MEDICARE

## 2024-09-05 ENCOUNTER — OFFICE VISIT (OUTPATIENT)
Dept: FAMILY MEDICINE | Facility: CLINIC | Age: 89
End: 2024-09-05
Payer: MEDICARE

## 2024-09-05 VITALS
HEIGHT: 62 IN | SYSTOLIC BLOOD PRESSURE: 112 MMHG | HEART RATE: 96 BPM | OXYGEN SATURATION: 95 % | DIASTOLIC BLOOD PRESSURE: 72 MMHG | BODY MASS INDEX: 23.37 KG/M2 | RESPIRATION RATE: 16 BRPM | WEIGHT: 127 LBS | TEMPERATURE: 98 F

## 2024-09-05 DIAGNOSIS — M79.605 LOW BACK PAIN RADIATING TO LEFT LEG: ICD-10-CM

## 2024-09-05 DIAGNOSIS — R53.81 PHYSICAL DECONDITIONING: ICD-10-CM

## 2024-09-05 DIAGNOSIS — Z23 NEED FOR PROPHYLACTIC VACCINATION AND INOCULATION AGAINST INFLUENZA: ICD-10-CM

## 2024-09-05 DIAGNOSIS — E11.59 TYPE 2 DIABETES MELLITUS WITH OTHER CIRCULATORY COMPLICATIONS: Primary | ICD-10-CM

## 2024-09-05 DIAGNOSIS — M54.50 LOW BACK PAIN RADIATING TO LEFT LEG: ICD-10-CM

## 2024-09-05 PROCEDURE — 3288F FALL RISK ASSESSMENT DOCD: CPT | Mod: CPTII,S$GLB,,

## 2024-09-05 PROCEDURE — 99999 PR PBB SHADOW E&M-EST. PATIENT-LVL V: CPT | Mod: PBBFAC,,,

## 2024-09-05 PROCEDURE — G0008 ADMIN INFLUENZA VIRUS VAC: HCPCS | Mod: S$GLB,,,

## 2024-09-05 PROCEDURE — 90653 IIV ADJUVANT VACCINE IM: CPT | Mod: S$GLB,,,

## 2024-09-05 PROCEDURE — 99214 OFFICE O/P EST MOD 30 MIN: CPT | Mod: 25,S$GLB,,

## 2024-09-05 PROCEDURE — 1160F RVW MEDS BY RX/DR IN RCRD: CPT | Mod: CPTII,S$GLB,,

## 2024-09-05 PROCEDURE — 1159F MED LIST DOCD IN RCRD: CPT | Mod: CPTII,S$GLB,,

## 2024-09-05 PROCEDURE — 1101F PT FALLS ASSESS-DOCD LE1/YR: CPT | Mod: CPTII,S$GLB,,

## 2024-09-05 RX ORDER — MELOXICAM 7.5 MG/1
7.5 TABLET ORAL
COMMUNITY
Start: 2024-08-22

## 2024-09-05 RX ORDER — NITROFURANTOIN 25; 75 MG/1; MG/1
100 CAPSULE ORAL
COMMUNITY
Start: 2024-06-11 | End: 2024-09-10

## 2024-09-05 RX ORDER — CALCIUM CARBONATE 600 MG
600 TABLET ORAL
COMMUNITY
Start: 2023-10-13

## 2024-09-05 NOTE — PROGRESS NOTES
Health Maintenance Due   Topic     TETANUS VACCINE      Shingles Vaccine (1 of 2) hx chickenpox ; inform pt can get vaccine at pharmacy.    RSV Vaccine (Age 60+ and Pregnant patients) (1 - 1-dose 60+ series) Not given at this facility       Diabetes Urine Screening      Influenza Vaccine (1)     COVID-19 Vaccine (5 - 2023-24 season) Not given at this facility

## 2024-09-05 NOTE — TELEPHONE ENCOUNTER
Pt states transportation is getting tire changed, informed her she has 20 minute alfa period; pt verbalized understanding

## 2024-09-05 NOTE — TELEPHONE ENCOUNTER
----- Message from Sanjuana Kenney sent at 9/5/2024  1:21 PM CDT -----   Name of Who is Calling:     What is the request in detail:  patient request call back in reference to today's appointment  / patient want to know how late she can be due to transportation has caught a flat  Please contact to further discuss and advise      Can the clinic reply by [facility]:     What Number to Call Back if not in [facility]:   [phone]

## 2024-09-05 NOTE — PROGRESS NOTES
HPI     Chief Complaint:  Chief Complaint   Patient presents with    Follow-up    Medication Management       Carol Yadav is a 88 y.o. female with multiple medical diagnoses as listed in the medical history and problem list that presents for  follow up and medication management    Here with daughter in wheelchair    Follow-up  Associated symptoms include weakness (chronic medical issues). Pertinent negatives include no chest pain, fever, headaches, nausea or numbness. Associated symptoms comments:  Chronic back pain; is in her wheelchair. Nothing aggravates the symptoms. She has tried nothing for the symptoms.       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 Byrne 6/28/2024      ASSESSMENT/PLAN:      # History of stroke - right BG/CR infarction (2014), etiology small vessel disease  - Antiplatelet/Anticoagulation: Continue aspirin 81mg daily.   - Lipid Management: Target is LDL < 70mg/dL. Continue atorvastatin 40mg daily.   - Diabetes: Target hemoglobin A1c <7%, measured 2X/year or quarterly if not meeting goals  - Hypertension: Target systolic BP <130mmHg. Continue home BP medications  - Smoking: Former smoker.  - Therapy: Recommend PT/OT or other exercise as tolerated.   - Follow-up: RTC in 6 months. Recommend close follow-up with their PCP for monitoring and management of the above vascular risk factors as needed to meet goals.      # Generalized weakness  - Appears to  be a long-standing problem with unclear etiology. She has good strength RUE/RLE with known residual, stable left hemiparesis (LUE > LLE) from her prior stroke. Can consider myopathy/myositis, fibromyalgia, deconditioning. Plan for labs as listed below. Can also consider EMG/NCS to evaluate if labs are normal.   - Recommend continued exercise program or PT/OT.      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 Her exam does not appear consistent with Parkinson's, but can consider follow-up with movement disorders.        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       Orthopedic    Low back pain radiating to left leg    Relevant Orders    Ambulatory referral/consult to Home Health       Other    Physical deconditioning    Relevant Orders    Ambulatory referral/consult to Home Health     Other Visit Diagnoses       Need for prophylactic vaccination and inoculation against influenza        Relevant Medications    influenza (Fluad) 45 mcg/0.5 mL vaccine 0.5 mL              --------------------------------------------      Health Maintenance:  Health Maintenance         Date Due Completion Date    TETANUS VACCINE Never done ---    Shingles Vaccine (1 of 2) Never done ---    RSV Vaccine (Age 60+ and Pregnant patients) (1 - 1-dose 60+ series) Never done ---    Diabetes Urine Screening 08/23/2024 8/23/2023    Influenza Vaccine (1) 09/01/2024 10/13/2023    COVID-19 Vaccine (5 - 2023-24 season)  "09/01/2024 10/19/2023    DEXA Scan 02/16/2025 2/16/2023    Override on 11/1/2017: Not Clinically Appropriate    Hemoglobin A1c 03/03/2025 9/3/2024    Eye Exam 06/07/2025 6/7/2024    Lipid Panel 09/03/2025 9/3/2024            Health maintenance reviewed, Diabetic urine screening ordered, and Flu vaccine ordered    Follow Up:  Follow up in about 3 months (around 12/5/2024), or if symptoms worsen or fail to improve, for  with PCP.    Exam     Review of Systems:  (as noted above)  Review of Systems   Constitutional:  Negative for fever.   HENT:  Positive for mouth sores (has a inhe).    Respiratory:  Negative for shortness of breath.    Cardiovascular:  Negative for chest pain.   Gastrointestinal:  Negative for nausea.   Genitourinary:  Negative for difficulty urinating.   Neurological:  Positive for weakness (chronic medical issues). Negative for dizziness, numbness and headaches.       Physical Exam:   Physical Exam  Constitutional:       General: She is not in acute distress.     Appearance: Normal appearance. She is not ill-appearing, toxic-appearing or diaphoretic.   HENT:      Head: Normocephalic and atraumatic.   Cardiovascular:      Rate and Rhythm: Normal rate and regular rhythm.      Pulses: Normal pulses.      Heart sounds: No murmur heard.  Pulmonary:      Effort: Pulmonary effort is normal. No respiratory distress.   Musculoskeletal:      Right lower leg: No edema.      Left lower leg: No edema.   Neurological:      Mental Status: She is alert and oriented to person, place, and time.   Psychiatric:         Mood and Affect: Mood normal.       Vitals:    09/05/24 1500   BP: 112/72   BP Location: Right arm   Patient Position: Sitting   BP Method: Large (Manual)   Pulse: 96   Resp: 16   Temp: 97.8 °F (36.6 °C)   TempSrc: Oral   SpO2: 95%   Weight: 57.6 kg (126 lb 15.8 oz)   Height: 5' 2" (1.575 m)      Body mass index is 23.23 kg/m².        History     Past Medical History:  Past Medical History:   Diagnosis " Date    Acute blood loss anemia 2021    Allergic rhinitis     Arthritis     Diabetes mellitus, new onset 2022    Elevated blood pressure reading without diagnosis of hypertension     Hormone replacement therapy (postmenopausal)     Hyperlipidemia     Hypertension     Slurred speech 2014    Stroke of right basal ganglia 2014       Past Surgical History:  Past Surgical History:   Procedure Laterality Date    APPENDECTOMY      BREAST BIOPSY      CATARACT EXTRACTION Bilateral at age 62 or 72    monovision     EYE SURGERY      hai cataract surgery    OOPHORECTOMY      PINNING OF HIP Left 2021    Procedure: PINNING, HIP;  Surgeon: Dae Bernal MD;  Location: [facility];  Service: Orthopedics;  Laterality: Left;    TONSILLECTOMY         Social History:  Social History     Socioeconomic History    Marital status:    Tobacco Use    Smoking status: Former     Current packs/day: 0.00     Average packs/day: 2.0 packs/day for 36.0 years (72.0 ttl pk-yrs)     Types: Cigarettes     Start date:      Quit date:      Years since quittin.7    Smokeless tobacco: Never   Substance and Sexual Activity    Alcohol use: Not Currently     Alcohol/week: 21.0 standard drinks of alcohol     Types: 7 Glasses of wine, 14 Standard drinks or equivalent per week    Drug use: No    Sexual activity: Not Currently     Partners: Male     Comment: 2017 divorce      Social Determinants of Health     Financial Resource Strain: Low Risk  (2022)    Overall Financial Resource Strain (CARDIA)     Difficulty of Paying Living Expenses: Not hard at all   Food Insecurity: No Food Insecurity (2022)    Hunger Vital Sign     Worried About Running Out of Food in the Last Year: Never true     Ran Out of Food in the Last Year: Never true   Transportation Needs: No Transportation Needs (2022)    PRAPARE - Transportation     Lack of Transportation (Medical): No     Lack of Transportation (Non-Medical): No    Physical Activity: Insufficiently Active (4/14/2022)    Exercise Vital Sign     Days of Exercise per Week: 5 days     Minutes of Exercise per Session: 10 min   Stress: No Stress Concern Present (4/14/2022)    [employer] [place] [employer] - Occupational Stress Questionnaire     Feeling of Stress : Only a little   Housing Stability: Low Risk  (4/14/2022)    Housing Stability Vital Sign     Unable to Pay for Housing in the Last Year: No     Number of Places Lived in the Last Year: 1     Unstable Housing in the Last Year: No       Family History:  Family History   Problem Relation Name Age of Onset    Breast cancer Mother      Brain cancer Mother          Metastatic from breast    Stroke Father      No Known Problems Sister      No Known Problems Brother      No Known Problems Maternal Aunt      No Known Problems Maternal Uncle      No Known Problems Paternal Aunt      No Known Problems Paternal Uncle      No Known Problems Maternal Grandmother      No Known Problems Maternal Grandfather      No Known Problems Paternal Grandmother      No Known Problems Paternal Grandfather      No Known Problems Daughter henrique     No Known Problems Daughter eusebio     Schizophrenia Daughter corinne     Amblyopia Neg Hx      Blindness Neg Hx      Cataracts Neg Hx      Diabetes Neg Hx      Glaucoma Neg Hx      Hypertension Neg Hx      Macular degeneration Neg Hx      Retinal detachment Neg Hx      Strabismus Neg Hx      Thyroid disease Neg Hx      Colon cancer Neg Hx      Esophageal cancer Neg Hx         Allergies and Medications: (updated and reviewed)  Review of patient's allergies indicates:  No Known Allergies  Current Outpatient Medications   Medication Sig Dispense Refill    acetaminophen (TYLENOL) 325 MG tablet TAKE 2 TABLETS BY MOUTH EVERY FOUR HOURS AS NEEDED FOR PAIN. 60 tablet 0    aspirin 81 MG Chew Take 1 tablet (81 mg total) by mouth once daily. 90 tablet 3    atorvastatin (LIPITOR) 40 MG tablet TAKE 1 TABLET  BY MOUTH ONCE DAILY. 90 tablet 0    calcium carbonate (OS-HAYLEE) 600 mg calcium (1,500 mg) Tab Take 1 tablet (600 mg total) by mouth once daily. 90 tablet 3    calcium carbonate (OS-HAYLEE) 600 mg calcium (1,500 mg) Tab Take 600 mg by mouth.      denosumab (PROLIA) 60 mg/mL Syrg Inject 1 mL (60 mg total) into the skin every 6 (six) months. 2 mL 0    famotidine (PEPCID) 20 MG tablet Take 20 mg by mouth 2 (two) times daily as needed.      flash glucose scanning reader (SocialEngineSTYLE YESY 14 DAY READER) Misc 1 application by Misc.(Non-Drug; Combo Route) route 3 (three) times daily with meals. 2 each 2    flash glucose sensor (FREESTYLE YESY 14 DAY SENSOR) Kit 1 application by Misc.(Non-Drug; Combo Route) route 3 (three) times daily with meals. 2 kit 3    fluticasone propionate (FLONASE) 50 mcg/actuation nasal spray 1 SPRAY IN EACH NOSTRIL TWICE A DAY AS NEEDED FOR RHINITIS 16 g 11    HYDROcodone-acetaminophen (NORCO) 5-325 mg per tablet Take 1 tablet by mouth every 6 (six) hours as needed.      ibuprofen (ADVIL,MOTRIN) 800 MG tablet Take 800 mg by mouth every 6 (six) hours as needed.      ketoconazole (NIZORAL) 2 % cream Apply 1 application  topically once daily. Apply to affected area 60 g 0    lancets (TRUEPLUS LANCETS) 30 gauge Misc Use to test blood glucose three (3) times a day, discard lancet after each use 300 each 3    levothyroxine (SYNTHROID) 25 MCG tablet TAKE 1 TABLET BY MOUTH IN THE MORNING BEFORE BREAKFAST. 90 tablet 3    lisinopriL (PRINIVIL,ZESTRIL) 2.5 MG tablet Take 1 tablet (2.5 mg total) by mouth once daily. 90 tablet 2    melatonin (MELATIN) 3 mg tablet TAKE 2 TABLETS BY MOUTH AT BEDTIME. 60 tablet 0    meloxicam (MOBIC) 7.5 MG tablet Take 7.5 mg by mouth.      metFORMIN (GLUCOPHAGE-XR) 500 MG ER 24hr tablet Take 1 tablet (500 mg total) by mouth once daily. 90 tablet 3    multivit-min-FA-lycopen-lutein (CERTAVITE SENIOR) 0.4 mg-300 mcg- 250 mcg Tab TAKE 1 TABLET BY MOUTH ONCE DAILY. 30 tablet 0     multivit-minerals/folic/ginkgo (ONE DAILY WOMEN 50 PLUS ORAL) Take 1 tablet by mouth once daily.      nitrofurantoin, macrocrystal-monohydrate, (MACROBID) 100 MG capsule Take 100 mg by mouth.      olopatadine (PATANOL) 0.1 % ophthalmic solution PLACE 1 DROP IN EACH EYE TWICE DAILY. 5 mL 0    omeprazole (PRILOSEC) 40 MG capsule TAKE 1 CAPSULE BY MOUTH EVERY MORNING. 90 capsule 3    polyethylene glycol (MIRALAX) 17 gram PwPk Take 17 g by mouth daily as needed. Take every other day 30 packet 11    saliva substitute combo no.9 (BIOTENE DRY MOUTH ORAL RINSE) Mwsh by Mucous Membrane route. Use as directed as needed for dry mouth      triamcinolone acetonide 0.1% (KENALOG) 0.1 % cream APPLY TO AFFECTED AREA TWICE DAILY. 80 g 1    TRUE METRIX GLUCOSE METER Misc       TRUE METRIX GLUCOSE TEST STRIP Strp CHECK BLOOD SUGAR 3 TIMES DAILY WITH MEALS. 300 strip 3    venlafaxine (EFFEXOR-XR) 150 MG Cp24 TAKE 1 CAPSULE BY MOUTH ONCE DAILY. 90 capsule 1    vitamin D (VITAMIN D3) 1000 units Tab TAKE 1 TABLET BY MOUTH ONCE DAILY. 30 tablet 0    water Liqd 146 mL with MILK OF MAGNESIA 400 mg/5 mL Susp 400 mg, diphenhydrAMINE 12.5 mg/5 mL Elix 60 mg, nystatin 100,000 unit/mL Susp 500,000 Units Take 5 mLs by mouth 3 (three) times daily as needed (mouth sores). 1 Bottle 0    guaiFENesin 100 mg/5 ml (ROBITUSSIN) 100 mg/5 mL syrup Take 200 mg by mouth 3 (three) times daily as needed for Cough. (Patient not taking: Reported on 9/5/2024)      promethazine-dextromethorphan (PROMETHAZINE-DM) 6.25-15 mg/5 mL Syrp Take by mouth as needed. (Patient not taking: Reported on 9/5/2024)       Current Facility-Administered Medications   Medication Dose Route Frequency Provider Last Rate Last Admin    cyanocobalamin injection 1,000 mcg  1,000 mcg Subcutaneous 1 time in Clinic/HOD Jatin Yadav MD        influenza (Fluad) 45 mcg/0.5 mL vaccine 0.5 mL  0.5 mL Intramuscular 1 time in Clinic/HOD Mahnaz Bennett PA-C           Patient Care  Team:  Natalee Henderson MD as PCP - General (Internal Medicine)  Brandon Segal MD as Consulting Physician (Neurology)  Zahraa Herrera LPN (Inactive) as Care Coordinator  Delfina Jaime MD as Consulting Physician (Urology)  Jennifer Baker PharmD as Pharmacist (Pharmacist)         - The patient is given an After Visit Summary that lists all medications with directions, allergies, education, orders placed during this encounter and follow-up instructions.      - I have reviewed the patient's medical information including past medical, family, and social history sections including the medications and allergies.      - We discussed the patient's current medications.     This note was created by combination of typed  and MModal dictation.  Transcription errors may be present.  If there are any questions, please contact me.       Mahnaz Bennett PA-C

## 2024-09-10 DIAGNOSIS — R21 RASH: ICD-10-CM

## 2024-09-10 PROBLEM — Z23 NEED FOR PROPHYLACTIC VACCINATION AND INOCULATION AGAINST INFLUENZA: Status: ACTIVE | Noted: 2024-09-10

## 2024-09-10 RX ORDER — TRIAMCINOLONE ACETONIDE 1 MG/G
CREAM TOPICAL 2 TIMES DAILY
Qty: 80 G | Refills: 1 | Status: SHIPPED | OUTPATIENT
Start: 2024-09-10

## 2024-09-10 NOTE — TELEPHONE ENCOUNTER
Care Due:                  Date            Visit Type   Department     Provider  --------------------------------------------------------------------------------                                EP -                              PRIMARY      [facility]  Last Visit: 06-      CARE ([facility])   MEDICINE       Natalee Henderson                              EP -                              PRIMARY      [facility]  Next Visit: 10-      CARE ([facility])   MEDICINE       Natalee Henderson                                                            Last  Test          Frequency    Reason                     Performed    Due Date  --------------------------------------------------------------------------------    Vitamin D...  12 months..  vitamin..................  Not Found    Overdue    [employer] Embedded Care Due Messages. Reference number: [id number].   9/10/2024 2:27:38 PM CDT

## 2024-10-02 NOTE — TELEPHONE ENCOUNTER
Refill Routing Note   Medication(s) are not appropriate for processing by [facility] for the following reason(s):        Outside of protocol    [facility] action(s):  Route             Appointments  past 12m or future 3m with PCP    Date Provider   Last Visit   Visit date not found Jatin Yadav MD   Next Visit   Visit date not found Jatin Yadav MD   ED visits in past 90 days: 0        Note composed:6:10 PM 10/02/2024

## 2024-10-03 RX ORDER — CALCIUM CARBONATE 600 MG
1 TABLET ORAL
Qty: 30 TABLET | Refills: 11 | Status: SHIPPED | OUTPATIENT
Start: 2024-10-03

## 2024-10-09 ENCOUNTER — TELEPHONE (OUTPATIENT)
Dept: FAMILY MEDICINE | Facility: CLINIC | Age: 89
End: 2024-10-09
Payer: MEDICARE

## 2024-10-09 ENCOUNTER — EXTERNAL HOME HEALTH (OUTPATIENT)
Dept: HOME HEALTH SERVICES | Facility: HOSPITAL | Age: 89
End: 2024-10-09
Payer: MEDICARE

## 2024-10-10 ENCOUNTER — OFFICE VISIT (OUTPATIENT)
Dept: FAMILY MEDICINE | Facility: CLINIC | Age: 89
End: 2024-10-10
Payer: MEDICARE

## 2024-10-10 VITALS
TEMPERATURE: 98 F | HEART RATE: 92 BPM | SYSTOLIC BLOOD PRESSURE: 120 MMHG | OXYGEN SATURATION: 97 % | WEIGHT: 126.13 LBS | HEIGHT: 62 IN | DIASTOLIC BLOOD PRESSURE: 80 MMHG | BODY MASS INDEX: 23.21 KG/M2

## 2024-10-10 DIAGNOSIS — Z86.73 H/O: CVA (CEREBROVASCULAR ACCIDENT): ICD-10-CM

## 2024-10-10 DIAGNOSIS — Z78.9 IMPAIRED MOBILITY AND ADLS: ICD-10-CM

## 2024-10-10 DIAGNOSIS — K59.01 SLOW TRANSIT CONSTIPATION: ICD-10-CM

## 2024-10-10 DIAGNOSIS — E11.59 TYPE 2 DIABETES MELLITUS WITH OTHER CIRCULATORY COMPLICATIONS: Primary | ICD-10-CM

## 2024-10-10 DIAGNOSIS — F41.8 ANXIETY WITH DEPRESSION: ICD-10-CM

## 2024-10-10 DIAGNOSIS — Z74.09 IMPAIRED MOBILITY AND ADLS: ICD-10-CM

## 2024-10-10 DIAGNOSIS — I10 HYPERTENSION, WELL CONTROLLED: ICD-10-CM

## 2024-10-10 DIAGNOSIS — I69.354 HEMIPARESIS AFFECTING LEFT SIDE AS LATE EFFECT OF STROKE: ICD-10-CM

## 2024-10-10 PROBLEM — Z23 NEED FOR PROPHYLACTIC VACCINATION AND INOCULATION AGAINST INFLUENZA: Status: RESOLVED | Noted: 2024-09-10 | Resolved: 2024-10-10

## 2024-10-10 PROCEDURE — 99999 PR PBB SHADOW E&M-EST. PATIENT-LVL V: CPT | Mod: PBBFAC,,, | Performed by: INTERNAL MEDICINE

## 2024-10-10 RX ADMIN — CYANOCOBALAMIN 1000 MCG: 1000 INJECTION, SOLUTION INTRAMUSCULAR; SUBCUTANEOUS at 03:10

## 2024-10-10 NOTE — PROGRESS NOTES
Health Maintenance Due   Topic     TETANUS VACCINE   hx chicken pox. Notified pt can get vaccine at pharmacy    Shingles Vaccine (1 of 2)  hx chicken pox. Notified pt can get vaccine at pharmacy    RSV Vaccine (Age 60+ and Pregnant patients) (1 - 1-dose 75+ series) Not offered at this office    Diabetes Urine Screening  Consult pcp    COVID-19 Vaccine (5 - 2024-25 season) Pt agree to get today

## 2024-10-10 NOTE — PROGRESS NOTES
Chief Complaint: Follow-up      Carol Yadav  is a 89 y.o. year old patient who presents today for follow up     Pt is here with her daughter. Has the following complains:  - reports feeling tired and weak more   - has small thing stool and difficulty passing BM  - reports unhealthy food served at her living facility   - started therapy (4 sessions?) report they help but she feels tired after  - history of falls but no recent falls   - has $300 copay for prolia   - shared her story of delayed MRI when she had her stroke. Was told she took too much benadryl  - -120. Sometimes 88  - went to see GI for constipation and nothing was done    Past Medical History:   Diagnosis Date    Acute blood loss anemia 06/21/2021    Allergic rhinitis     Arthritis     Diabetes mellitus, new onset 04/16/2022    Elevated blood pressure reading without diagnosis of hypertension     Hormone replacement therapy (postmenopausal)     Hyperlipidemia     Hypertension     Slurred speech 11/07/2014    Stroke of right basal ganglia 12/2014       Past Surgical History:   Procedure Laterality Date    APPENDECTOMY      BREAST BIOPSY      CATARACT EXTRACTION Bilateral at age 62 or 72    monovision     EYE SURGERY      hai cataract surgery    OOPHORECTOMY      PINNING OF HIP Left 6/16/2021    Procedure: PINNING, HIP;  Surgeon: Dae Bernal MD;  Location: [facility];  Service: Orthopedics;  Laterality: Left;    TONSILLECTOMY          Family History   Problem Relation Name Age of Onset    Breast cancer Mother      Brain cancer Mother          Metastatic from breast    Stroke Father      No Known Problems Sister      No Known Problems Brother      No Known Problems Maternal Aunt      No Known Problems Maternal Uncle      No Known Problems Paternal Aunt      No Known Problems Paternal Uncle      No Known Problems Maternal Grandmother      No Known Problems Maternal Grandfather      No Known Problems Paternal Grandmother      No Known Problems  Paternal Grandfather      No Known Problems Daughter henrique     No Known Problems Daughter eusebio     Schizophrenia Daughter corinne     Amblyopia Neg Hx      Blindness Neg Hx      Cataracts Neg Hx      Diabetes Neg Hx      Glaucoma Neg Hx      Hypertension Neg Hx      Macular degeneration Neg Hx      Retinal detachment Neg Hx      Strabismus Neg Hx      Thyroid disease Neg Hx      Colon cancer Neg Hx      Esophageal cancer Neg Hx          Social History     Socioeconomic History    Marital status:    Tobacco Use    Smoking status: Former     Current packs/day: 0.00     Average packs/day: 2.0 packs/day for 36.0 years (72.0 ttl pk-yrs)     Types: Cigarettes     Start date:      Quit date:      Years since quittin.8    Smokeless tobacco: Never   Substance and Sexual Activity    Alcohol use: Not Currently     Alcohol/week: 21.0 standard drinks of alcohol     Types: 7 Glasses of wine, 14 Standard drinks or equivalent per week    Drug use: No    Sexual activity: Not Currently     Partners: Male     Comment: 2017 divorce      Social Drivers of Health     Financial Resource Strain: Low Risk  (2022)    Overall Financial Resource Strain (CARDIA)     Difficulty of Paying Living Expenses: Not hard at all   Food Insecurity: No Food Insecurity (2022)    Hunger Vital Sign     Worried About Running Out of Food in the Last Year: Never true     Ran Out of Food in the Last Year: Never true   Transportation Needs: No Transportation Needs (2022)    PRAPARE - Transportation     Lack of Transportation (Medical): No     Lack of Transportation (Non-Medical): No   Physical Activity: Insufficiently Active (2022)    Exercise Vital Sign     Days of Exercise per Week: 5 days     Minutes of Exercise per Session: 10 min   Stress: No Stress Concern Present (2022)    [employer] - Occupational Stress Questionnaire     Feeling of Stress : Only a little   Housing  Stability: Low Risk  (4/14/2022)    Housing Stability Vital Sign     Unable to Pay for Housing in the Last Year: No     Number of Places Lived in the Last Year: 1     Unstable Housing in the Last Year: No         Current Outpatient Medications:     acetaminophen (TYLENOL) 325 MG tablet, TAKE 2 TABLETS BY MOUTH EVERY FOUR HOURS AS NEEDED FOR PAIN., Disp: 60 tablet, Rfl: 0    aspirin 81 MG Chew, Take 1 tablet (81 mg total) by mouth once daily., Disp: 90 tablet, Rfl: 3    atorvastatin (LIPITOR) 40 MG tablet, TAKE 1 TABLET BY MOUTH ONCE DAILY., Disp: 90 tablet, Rfl: 0    calcium carbonate (OS-HAYLEE) 600 mg calcium (1,500 mg) Tab, TAKE 1 TABLET BY MOUTH ONCE DAILY., Disp: 30 tablet, Rfl: 11    denosumab (PROLIA) 60 mg/mL Syrg, Inject 1 mL (60 mg total) into the skin every 6 (six) months., Disp: 2 mL, Rfl: 0    famotidine (PEPCID) 20 MG tablet, Take 20 mg by mouth 2 (two) times daily as needed., Disp: , Rfl:     flash glucose scanning reader (FREESTYLE YESY 14 DAY READER) Misc, 1 application by Misc.(Non-Drug; Combo Route) route 3 (three) times daily with meals., Disp: 2 each, Rfl: 2    flash glucose sensor (FREESTYLE YESY 14 DAY SENSOR) Kit, 1 application by Misc.(Non-Drug; Combo Route) route 3 (three) times daily with meals., Disp: 2 kit, Rfl: 3    fluticasone propionate (FLONASE) 50 mcg/actuation nasal spray, 1 SPRAY IN EACH NOSTRIL TWICE A DAY AS NEEDED FOR RHINITIS, Disp: 16 g, Rfl: 11    ibuprofen (ADVIL,MOTRIN) 800 MG tablet, Take 800 mg by mouth every 6 (six) hours as needed., Disp: , Rfl:     ketoconazole (NIZORAL) 2 % cream, Apply 1 application  topically once daily. Apply to affected area, Disp: 60 g, Rfl: 0    lancets (TRUEPLUS LANCETS) 30 gauge Misc, Use to test blood glucose three (3) times a day, discard lancet after each use, Disp: 300 each, Rfl: 3    levothyroxine (SYNTHROID) 25 MCG tablet, TAKE 1 TABLET BY MOUTH IN THE MORNING BEFORE BREAKFAST., Disp: 90 tablet, Rfl: 3    lisinopriL (PRINIVIL,ZESTRIL)  2.5 MG tablet, Take 1 tablet (2.5 mg total) by mouth once daily., Disp: 90 tablet, Rfl: 2    melatonin (MELATIN) 3 mg tablet, TAKE 2 TABLETS BY MOUTH AT BEDTIME., Disp: 60 tablet, Rfl: 0    meloxicam (MOBIC) 7.5 MG tablet, Take 7.5 mg by mouth., Disp: , Rfl:     metFORMIN (GLUCOPHAGE-XR) 500 MG ER 24hr tablet, Take 1 tablet (500 mg total) by mouth once daily., Disp: 90 tablet, Rfl: 3    multivit-min-FA-lycopen-lutein (CERTAVITE SENIOR) 0.4 mg-300 mcg- 250 mcg Tab, TAKE 1 TABLET BY MOUTH ONCE DAILY., Disp: 30 tablet, Rfl: 0    multivit-minerals/folic/ginkgo (ONE DAILY WOMEN 50 PLUS ORAL), Take 1 tablet by mouth once daily., Disp: , Rfl:     olopatadine (PATANOL) 0.1 % ophthalmic solution, PLACE 1 DROP IN EACH EYE TWICE DAILY., Disp: 5 mL, Rfl: 0    omeprazole (PRILOSEC) 40 MG capsule, TAKE 1 CAPSULE BY MOUTH EVERY MORNING., Disp: 90 capsule, Rfl: 3    polyethylene glycol (MIRALAX) 17 gram PwPk, Take 17 g by mouth daily as needed. Take every other day, Disp: 30 packet, Rfl: 11    saliva substitute combo no.9 (BIOTENE DRY MOUTH ORAL RINSE) Mwsh, by Mucous Membrane route. Use as directed as needed for dry mouth, Disp: , Rfl:     triamcinolone acetonide 0.1% (KENALOG) 0.1 % cream, Apply topically 2 (two) times daily., Disp: 80 g, Rfl: 1    TRUE METRIX GLUCOSE METER Misc, , Disp: , Rfl:     TRUE METRIX GLUCOSE TEST STRIP Strp, CHECK BLOOD SUGAR 3 TIMES DAILY WITH MEALS., Disp: 300 strip, Rfl: 3    venlafaxine (EFFEXOR-XR) 150 MG Cp24, TAKE 1 CAPSULE BY MOUTH ONCE DAILY., Disp: 90 capsule, Rfl: 1    vitamin D (VITAMIN D3) 1000 units Tab, TAKE 1 TABLET BY MOUTH ONCE DAILY., Disp: 30 tablet, Rfl: 0    water Liqd 146 mL with MILK OF MAGNESIA 400 mg/5 mL Susp 400 mg, diphenhydrAMINE 12.5 mg/5 mL Elix 60 mg, nystatin 100,000 unit/mL Susp 500,000 Units, Take 5 mLs by mouth 3 (three) times daily as needed (mouth sores)., Disp: 1 Bottle, Rfl: 0  No current facility-administered medications for this visit.     Review of Systems    Constitutional:  Positive for malaise/fatigue.   Gastrointestinal:  Positive for constipation.   Neurological:  Positive for weakness.        Objective:      Vitals:    10/10/24 1459   BP: 120/80   Pulse: 92   Temp: 98 °F (36.7 °C)       Physical Exam  Constitutional:       Appearance: Normal appearance.      Comments: Wheelchair bound   HENT:      Head: Normocephalic and atraumatic.   Skin:     General: Skin is warm and dry.   Neurological:      General: No focal deficit present.      Mental Status: She is alert and oriented to person, place, and time.      Motor: Weakness (left sided hemiparesis) present.      Comments: Contracture on left but not fixed. Still has some active movement on the left   Psychiatric:         Mood and Affect: Mood normal.         Behavior: Behavior normal.          Assessment:       1. Type 2 diabetes mellitus with other circulatory complications    2. Hypertension, well controlled    3. Hemiparesis affecting left side as late effect of stroke    4. H/O: CVA (cerebrovascular accident)    5. Anxiety with depression    6. Slow transit constipation    7. Impaired mobility and ADLs          Plan:   1. Type 2 diabetes mellitus with other circulatory complications  Assessment & Plan:  Chronic, controlled   Lab Results   Component Value Date    HGBA1C 5.7 (H) 09/03/2024     - continue metformin    Orders:  -     Microalbumin/Creatinine Ratio, Urine; Future; Expected date: 10/10/2024    2. Hypertension, well controlled  Assessment & Plan:  BP at goal. Continue to monitor  - cont lisinopril       3. Hemiparesis affecting left side as late effect of stroke  Overview:  Chronic - Stable - walks with dhruv walker. Followed by Neuro    Assessment & Plan:  Counseled patient to continue to try to use her left arm to avoid contractures      4. H/O: CVA (cerebrovascular accident)  Overview:  L sided chronic weakness. LUE > LLE    Assessment & Plan:  W left hemiparesis   - cont statin and ASA      5.  Anxiety with depression  Assessment & Plan:  Chronic, stable   - cont effexor      6. Slow transit constipation  Assessment & Plan:  Takes miralax every other day   - counseled on increased water and fiber intake       7. Impaired mobility and ADLs  Assessment & Plan:  Doing well with home health   - cont for now            Follow up in about 6 months (around 4/10/2025) for annual.    Total 40 mins spent on patient with more than 50% spent counseling

## 2024-10-10 NOTE — PROGRESS NOTES
Pt tolerated prolia injection without difficulty; medication supplied by [facility]  NDC 36285-300-73; lot# [id number]; exp [id number]

## 2024-11-03 DIAGNOSIS — F32.A ANXIETY AND DEPRESSION: ICD-10-CM

## 2024-11-03 DIAGNOSIS — F41.9 ANXIETY AND DEPRESSION: ICD-10-CM

## 2024-11-03 NOTE — TELEPHONE ENCOUNTER
Care Due:                  Date            Visit Type   Department     Provider  --------------------------------------------------------------------------------                                EP -                              PRIMARY      [facility]  Last Visit: 10-      CARE ([facility])   MEDICINE       Natalee Henderson                              EP -                              PRIMARY      [facility]  Next Visit: 04-      CARE ([facility])   MEDICINE       Natalee Henderson                                                            Last  Test          Frequency    Reason                     Performed    Due Date  --------------------------------------------------------------------------------    CMP.........  12 months..  atorvastatin, lisinopriL,   10-   10-                             metFORMIN, venlafaxine,                             vitamin..................    [employer] Embedded Care Due Messages. Reference number: [id number].   11/03/2024 10:00:31 AM CST

## 2024-11-04 RX ORDER — VENLAFAXINE HYDROCHLORIDE 150 MG/1
150 CAPSULE, EXTENDED RELEASE ORAL
Qty: 30 CAPSULE | Refills: 11 | Status: SHIPPED | OUTPATIENT
Start: 2024-11-04

## 2024-11-04 NOTE — TELEPHONE ENCOUNTER
Refill Routing Note   Medication(s) are not appropriate for processing by [facility] for the following reason(s):        Required labs outdated  No active prescription written by provider    [facility] action(s):  Defer     Requires labs : Yes             Appointments  past 12m or future 3m with PCP    Date Provider   Last Visit   10/10/2024 Natalee Henderson MD   Next Visit   4/10/2025 Natalee Henderson MD   ED visits in past 90 days: 0        Note composed:8:23 AM 11/04/2024

## 2024-11-24 DIAGNOSIS — E11.65 UNCONTROLLED TYPE 2 DIABETES MELLITUS WITH HYPERGLYCEMIA: ICD-10-CM

## 2024-11-24 DIAGNOSIS — I63.9 CEREBROVASCULAR ACCIDENT (CVA), UNSPECIFIED MECHANISM: ICD-10-CM

## 2024-11-24 DIAGNOSIS — K21.9 GASTROESOPHAGEAL REFLUX DISEASE, UNSPECIFIED WHETHER ESOPHAGITIS PRESENT: ICD-10-CM

## 2024-11-24 NOTE — TELEPHONE ENCOUNTER
Care Due:                  Date            Visit Type   Department     Provider  --------------------------------------------------------------------------------                                EP -                              PRIMARY      [facility]  Last Visit: 10-      CARE ([facility])   MEDICINE       Natalee Henderson                              EP -                              PRIMARY      [facility]  Next Visit: 04-      CARE ([facility])   MEDICINE       Natalee Henderson                                                            Last  Test          Frequency    Reason                     Performed    Due Date  --------------------------------------------------------------------------------    Vitamin D...  12 months..  vitamin..................  Not Found    Overdue    [employer] Embedded Care Due Messages. Reference number: [id number].   11/24/2024 8:44:23 AM CST

## 2024-11-24 NOTE — TELEPHONE ENCOUNTER
No care due was identified.  [facility] Embedded Care Due Messages. Reference number: [id number].   11/24/2024 8:44:59 AM CST

## 2024-11-25 RX ORDER — OMEPRAZOLE 40 MG/1
40 CAPSULE, DELAYED RELEASE ORAL EVERY MORNING
Qty: 90 CAPSULE | Refills: 3 | Status: SHIPPED | OUTPATIENT
Start: 2024-11-25 | End: 2025-11-25

## 2024-11-25 RX ORDER — ATORVASTATIN CALCIUM 40 MG/1
40 TABLET, FILM COATED ORAL DAILY
Qty: 90 TABLET | Refills: 3 | Status: SHIPPED | OUTPATIENT
Start: 2024-11-25 | End: 2025-11-25

## 2024-11-25 RX ORDER — CALCIUM CITRATE/VITAMIN D3 200MG-6.25
TABLET ORAL
Qty: 300 STRIP | Refills: 11 | Status: SHIPPED | OUTPATIENT
Start: 2024-11-25

## 2024-11-25 NOTE — TELEPHONE ENCOUNTER
Refill Routing Note   Medication(s) are not appropriate for processing by [facility] for the following reason(s):        No active prescription written by provider    [facility] action(s):  Defer               Appointments  past 12m or future 3m with PCP    Date Provider   Last Visit   10/10/2024 Natalee Henderson MD   Next Visit   4/10/2025 Natalee Henderson MD   ED visits in past 90 days: 0        Note composed:6:12 PM 11/24/2024

## 2024-11-25 NOTE — TELEPHONE ENCOUNTER
Refill Routing Note   Medication(s) are not appropriate for processing by [facility] for the following reason(s):        No active prescription written by provider  Required labs outdated    [facility] action(s):  Defer               Appointments  past 12m or future 3m with PCP    Date Provider   Last Visit   10/10/2024 Natalee Henderson MD   Next Visit   4/10/2025 Natalee Henderson MD   ED visits in past 90 days: 0        Note composed:6:11 PM 11/24/2024

## 2025-01-10 DIAGNOSIS — I10 HYPERTENSION, WELL CONTROLLED: Primary | ICD-10-CM

## 2025-01-11 NOTE — TELEPHONE ENCOUNTER
Refill Routing Note   Medication(s) are not appropriate for processing by [facility] for the following reason(s):        Required labs outdated  No active prescription written by provider    [facility] action(s):  Defer     Requires labs : Yes             Appointments  past 12m or future 3m with PCP    Date Provider   Last Visit   10/10/2024 Natalee Henderson MD   Next Visit   4/10/2025 Natalee Henderson MD   ED visits in past 90 days: 0        Note composed:9:59 PM 01/10/2025

## 2025-01-11 NOTE — TELEPHONE ENCOUNTER
Care Due:                  Date            Visit Type   Department     Provider  --------------------------------------------------------------------------------                                EP -                              PRIMARY      [facility]  Last Visit: 10-      CARE ([facility])   MEDICINE       Natalee Henderson                              EP -                              PRIMARY      [facility]  Next Visit: 04-      CARE ([facility])   MEDICINE       Nataleedano Henderson                                                            Last  Test          Frequency    Reason                     Performed    Due Date  --------------------------------------------------------------------------------    CMP.........  12 months..  atorvastatin, lisinopriL,   10-   10-                             metFORMIN, venlafaxine,                             vitamin..................    HBA1C.......  6 months...  metFORMIN................  09- 03-    Health [facility] Embedded Care Due Messages. Reference number: [id number].   1/10/2025 9:12:43 PM CST

## 2025-01-13 RX ORDER — LISINOPRIL 2.5 MG/1
2.5 TABLET ORAL DAILY
Qty: 30 TABLET | Refills: 11 | Status: SHIPPED | OUTPATIENT
Start: 2025-01-13 | End: 2026-01-13

## 2025-01-28 DIAGNOSIS — E11.9 TYPE 2 DIABETES MELLITUS WITHOUT COMPLICATION, WITHOUT LONG-TERM CURRENT USE OF INSULIN: ICD-10-CM

## 2025-01-28 NOTE — TELEPHONE ENCOUNTER
Care Due:                  Date            Visit Type   Department     Provider  --------------------------------------------------------------------------------                                EP -                              PRIMARY      [facility]  Last Visit: 10-      CARE ([facility])   MEDICINE       Natalee Henderson                              EP -                              PRIMARY      [facility]  Next Visit: 04-      CARE ([facility])   MEDICINE       Nataleedano Henderson                                                            Last  Test          Frequency    Reason                     Performed    Due Date  --------------------------------------------------------------------------------    Vitamin D...  12 months..  vitamin..................  Not Found    Overdue    [employer] Embedded Care Due Messages. Reference number: [id number].   1/28/2025 5:44:11 PM CST

## 2025-01-29 RX ORDER — METFORMIN HYDROCHLORIDE 500 MG/1
TABLET, EXTENDED RELEASE ORAL
Qty: 30 TABLET | Refills: 11 | Status: SHIPPED | OUTPATIENT
Start: 2025-01-29

## 2025-01-29 NOTE — TELEPHONE ENCOUNTER
Refill Routing Note   Medication(s) are not appropriate for processing by [facility] for the following reason(s):        Required labs outdated    [facility] action(s):  Defer   Requires labs : Yes      Medication Therapy Plan: FOVS      Appointments  past 12m or future 3m with PCP    Date Provider   Last Visit   10/10/2024 Natalee Henderson MD   Next Visit   4/10/2025 Natalee Henderson MD   ED visits in past 90 days: 0        Note composed:7:51 PM 01/28/2025

## 2025-02-05 DIAGNOSIS — M81.0 AGE-RELATED OSTEOPOROSIS WITHOUT CURRENT PATHOLOGICAL FRACTURE: ICD-10-CM

## 2025-02-05 NOTE — TELEPHONE ENCOUNTER
No care due was identified.  [facility] Embedded Care Due Messages. Reference number: [id number].   2/05/2025 5:19:53 PM CST

## 2025-02-06 RX ORDER — TALC
POWDER (GRAM) TOPICAL
Qty: 60 TABLET | Refills: 10 | Status: SHIPPED | OUTPATIENT
Start: 2025-02-06

## 2025-02-06 RX ORDER — CHOLECALCIFEROL (VITAMIN D3) 25 MCG
TABLET ORAL
Qty: 30 TABLET | Refills: 10 | Status: SHIPPED | OUTPATIENT
Start: 2025-02-06

## 2025-02-06 NOTE — TELEPHONE ENCOUNTER
Bed rest  Increase Po fluids  RTC in 1-2 weeks LOV 10/10/2024  Last refill Vitamin D 1/25/2024                  Melatonin 1/25/2024

## 2025-02-11 RX ORDER — MULTIVIT-MIN/FA/LYCOPEN/LUTEIN .4-300-25
1 TABLET ORAL DAILY
Qty: 90 TABLET | Refills: 3 | Status: SHIPPED | OUTPATIENT
Start: 2025-02-11 | End: 2026-02-11

## 2025-02-11 NOTE — TELEPHONE ENCOUNTER
----- Message from Nikunj sent at 2/11/2025  9:42 AM CST -----  Regarding: [facility]  Type: RX Refill Request    Who called:[facility]  Have you contacted your pharmacy:yes  Refill or New Rx:refill  RX name and strength:multivit-min-FA-lycopen-lutein (CERTAVITE SENIOR) 0.4 mg-300 mcg- 250 mcg Tab    Preferred pharmacy and phone number:   [facility] [address] [address]  Phone: [phone] Fax: [phone]            Ordering provider:ruby  Would the patient rather a call back or a response via [facility]:call  Best call back number:[phone]   (Patient)  Additional information:

## 2025-02-22 ENCOUNTER — ON-DEMAND VIRTUAL (OUTPATIENT)
Dept: URGENT CARE | Facility: CLINIC | Age: 89
End: 2025-02-22
Payer: MEDICARE

## 2025-02-22 DIAGNOSIS — R30.0 DYSURIA: Primary | ICD-10-CM

## 2025-02-22 RX ORDER — NITROFURANTOIN 25; 75 MG/1; MG/1
100 CAPSULE ORAL 2 TIMES DAILY
Qty: 10 CAPSULE | Refills: 0 | Status: SHIPPED | OUTPATIENT
Start: 2025-02-22 | End: 2025-02-28

## 2025-02-22 NOTE — PROGRESS NOTES
Subjective:      Patient ID: Carol Yadav is a 89 y.o. female.    Vitals:  vitals were not taken for this visit.     Chief Complaint: Dysuria (Feverish feeling)      Visit Type: TELE AUDIOVISUAL - This visit was conducted virtually based on  subjective information and limited objective exam    Present with the patient at the time of consultation: TELEMED PRESENT WITH PATIENT: None  [facility] [place]  Two patient identifiers used to verify patient- saying out date of birth and full name.       Past Medical History:   Diagnosis Date    Acute blood loss anemia 06/21/2021    Allergic rhinitis     Arthritis     Diabetes mellitus, new onset 04/16/2022    Elevated blood pressure reading without diagnosis of hypertension     Hormone replacement therapy (postmenopausal)     Hyperlipidemia     Hypertension     Slurred speech 11/07/2014    Stroke of right basal ganglia 12/2014     Past Surgical History:   Procedure Laterality Date    APPENDECTOMY      BREAST BIOPSY      CATARACT EXTRACTION Bilateral at age 62 or 72    monovision     EYE SURGERY      hai cataract surgery    OOPHORECTOMY      PINNING OF HIP Left 6/16/2021    Procedure: PINNING, HIP;  Surgeon: Dae Bernal MD;  Location: [facility];  Service: Orthopedics;  Laterality: Left;    TONSILLECTOMY       Review of patient's allergies indicates:  No Known Allergies  Medications Ordered Prior to Encounter[1]  Family History   Problem Relation Name Age of Onset    Breast cancer Mother      Brain cancer Mother          Metastatic from breast    Stroke Father      No Known Problems Sister      No Known Problems Brother      No Known Problems Maternal Aunt      No Known Problems Maternal Uncle      No Known Problems Paternal Aunt      No Known Problems Paternal Uncle      No Known Problems Maternal Grandmother      No Known Problems Maternal Grandfather      No Known Problems Paternal Grandmother      No Known Problems Paternal Grandfather      No Known  Problems Daughter henrique     No Known Problems Daughter eusebio     Schizophrenia Daughter corinne     Amblyopia Neg Hx      Blindness Neg Hx      Cataracts Neg Hx      Diabetes Neg Hx      Glaucoma Neg Hx      Hypertension Neg Hx      Macular degeneration Neg Hx      Retinal detachment Neg Hx      Strabismus Neg Hx      Thyroid disease Neg Hx      Colon cancer Neg Hx      Esophageal cancer Neg Hx         Medications Ordered                [id number] - [place], [place] - [address] [facility] [address] [place] [address] [address], [facility] [address]    Telephone: [phone]   Fax: [phone]   Hours: Open 24 hours                         E-Prescribed (1 of 1)              nitrofurantoin, macrocrystal-monohydrate, (MACROBID) 100 MG capsule    Sig: Take 1 capsule (100 mg total) by mouth 2 (two) times daily. for 5 days       Start: 2/22/25     Quantity: 10 capsule Refills: 0                           Ohs Peq Odvv Intake    2/22/2025  4:25 PM CST - Filed by Patient   What is your current physical address in the event of a medical emergency? [address] [address] [address]    Are you able to take your vital signs? No   Please attach any relevant images or files    Is your employer contracted with [facility]? No         88 yo female with c/o feeling bad. She states she had a stroke about 10 years ago 2014. She states she is feeling weaker and weaker and shakes all over. She states neurologist told her not parkinson. She has osteoporosis.  She states she is having uti symptoms. She states she gets up every morning and pees all over. She states she felt feverish she states skin feels hot.   She states she is afraid of uti  She states she has blood in stool for a while and was evaluated by gastrologist. She states they did not give her anything because not enough blood.   She denies blood in urine.  She denies lower abdominal and back pain.   She did have  pain in knee the other day          Constitution: Negative. Negative for fever.   HENT: Negative.     Neck: neck negative.   Cardiovascular: Negative.    Respiratory: Negative.     Gastrointestinal: Negative.  Negative for abdominal pain, nausea and vomiting.   Endocrine: negative.   Genitourinary:  Positive for dysuria, frequency and urgency. Negative for vaginal discharge, vaginal odor and genital sore.   Musculoskeletal: Negative.  Negative for back pain.   Skin: Negative.    Neurological: Negative.         Objective:   The physical exam was conducted virtually.    AAO x 3 ; no acute distress noted; appearance normal; mood and behavior normal; thought process normal  Head- normocephalic  Nose- appears normal, no discharge or erythema  Eyes- pupils appear normal in size, no drainage, no erythema  Ears- normal appearing; no discharge, no erythema  Mouth- appears normal  Oropharynx- no erythema, lesions  Lungs- breathing at a normal rate, no acute distress noted  Heart- no reports of tachycardia, palpitations, chest pain  Abdomen- non distended, non tender reported by patient  Skin- warm and dry, no erythema or edema noted by patient or visualized  Psych- as above; no si/hi      Assessment:     1. Dysuria        Plan:   PLEASE READ YOUR DISCHARGE INSTRUCTIONS ENTIRELY AS IT CONTAINS IMPORTANT INFORMATION.      Take the antibiotics to completion.     Drink plenty of fluids, wipe front to back, take showers not baths, no scented soaps, wear breathable cotton underwear, urinate after sexual intercourse.     Please go to Urgent Care or the ER for worsening symptoms including fever, worsening flank pain, vomiting, etc.       Please return or see your primary care doctor if you develop new or worsening symptoms.     Please arrange follow up with your primary medical clinic as soon as possible. You must understand that you've received an Urgent Care treatment only and that you may be released before all of your medical  problems are known or treated. You, the patient, will arrange for follow up as instructed. If your symptoms worsen or fail to improve you should go to the Emergency Room.  WE CANNOT RULE OUT ALL POSSIBLE CAUSES OF YOUR SYMPTOMS IN THE URGENT CARE SETTING PLEASE GO TO THE ER IF YOU FEELS YOUR CONDITION IS WORSENING OR YOU WOULD LIKE EMERGENT EVALUATION.       Thank you for choosing [facility]!    Our goal in the [facility] is to always provide outstanding medical care. You may receive a survey by mail or e-mail in the next week regarding your experience today. We would greatly appreciate you completing and returning the survey. Your feedback provides us with a way to recognize our staff who provide very good care, and it helps us learn how to improve when your experience was below our aspiration of excellence.         We appreciate you trusting us with your medical care. We hope you feel better soon. We will be happy to take care of you for all of your future medical needs.    You must understand that you've received an Urgent Care treatment only and that you may be released before all your medical problems are known or treated. You, the patient, will arrange for follow up care as instructed.    Follow up with your PCP or specialty clinic as directed in the next 1-2 weeks if not improved or as needed.  You can call [phone] to schedule an appointment with the appropriate provider.    If your condition worsens we recommend that you receive another evaluation in person, with your primary care provider, urgent care or at the emergency room immediately or contact your primary medical clinics after hours call service to discuss your concerns.         Dysuria  -     nitrofurantoin, macrocrystal-monohydrate, (MACROBID) 100 MG capsule; Take 1 capsule (100 mg total) by mouth 2 (two) times daily. for 5 days  Dispense: 10 capsule; Refill: 0                         [1]   Current  Outpatient Medications on File Prior to Visit   Medication Sig Dispense Refill    acetaminophen (TYLENOL) 325 MG tablet TAKE 2 TABLETS BY MOUTH EVERY FOUR HOURS AS NEEDED FOR PAIN. 60 tablet 0    aspirin 81 MG Chew Take 1 tablet (81 mg total) by mouth once daily. 90 tablet 3    atorvastatin (LIPITOR) 40 MG tablet Take 1 tablet (40 mg total) by mouth once daily. 90 tablet 3    calcium carbonate (OS-HAYLEE) 600 mg calcium (1,500 mg) Tab TAKE 1 TABLET BY MOUTH ONCE DAILY. 30 tablet 11    denosumab (PROLIA) 60 mg/mL Syrg Inject 1 mL (60 mg total) into the skin every 6 (six) months. 2 mL 0    famotidine (PEPCID) 20 MG tablet Take 20 mg by mouth 2 (two) times daily as needed.      flash glucose scanning reader (broadbandchoicesSTYLE YESY 14 DAY READER) Misc 1 application by Misc.(Non-Drug; Combo Route) route 3 (three) times daily with meals. 2 each 2    flash glucose sensor (FREESTYLE YESY 14 DAY SENSOR) Kit 1 application by Misc.(Non-Drug; Combo Route) route 3 (three) times daily with meals. 2 kit 3    fluticasone propionate (FLONASE) 50 mcg/actuation nasal spray 1 SPRAY IN EACH NOSTRIL TWICE A DAY AS NEEDED FOR RHINITIS 16 g 11    ibuprofen (ADVIL,MOTRIN) 800 MG tablet Take 800 mg by mouth every 6 (six) hours as needed.      ketoconazole (NIZORAL) 2 % cream Apply 1 application  topically once daily. Apply to affected area 60 g 0    lancets (TRUEPLUS LANCETS) 30 gauge Misc Use to test blood glucose three (3) times a day, discard lancet after each use 300 each 3    levothyroxine (SYNTHROID) 25 MCG tablet TAKE 1 TABLET BY MOUTH IN THE MORNING BEFORE BREAKFAST. 90 tablet 3    lisinopriL (PRINIVIL,ZESTRIL) 2.5 MG tablet Take 1 tablet (2.5 mg total) by mouth once daily. 30 tablet 11    melatonin (MELATIN) 3 mg tablet TAKE TWO (2) TABLETS BY MOUTH AT BEDTIME. 60 tablet 10    meloxicam (MOBIC) 7.5 MG tablet Take 7.5 mg by mouth.      metFORMIN (GLUCOPHAGE-XR) 500 MG ER 24hr tablet Daily with meal 30 tablet 11     multivit-min-FA-lycopen-lutein (CERTAVITE SENIOR) 0.4 mg-300 mcg- 250 mcg Tab Take 1 tablet by mouth once daily. 90 tablet 3    multivit-minerals/folic/ginkgo (ONE DAILY WOMEN 50 PLUS ORAL) Take 1 tablet by mouth once daily.      olopatadine (PATANOL) 0.1 % ophthalmic solution PLACE 1 DROP IN EACH EYE TWICE DAILY. 5 mL 0    omeprazole (PRILOSEC) 40 MG capsule Take 1 capsule (40 mg total) by mouth every morning. 90 capsule 3    polyethylene glycol (MIRALAX) 17 gram PwPk Take 17 g by mouth daily as needed. Take every other day 30 packet 11    saliva substitute combo no.9 (BIOTENE DRY MOUTH ORAL RINSE) Mwsh by Mucous Membrane route. Use as directed as needed for dry mouth      triamcinolone acetonide 0.1% (KENALOG) 0.1 % cream Apply topically 2 (two) times daily. 80 g 1    TRUE METRIX GLUCOSE METER Misc       TRUE METRIX GLUCOSE TEST STRIP Strp CHECK BLOOD SUGAR THREE (3) TIMES DAILY WITH MEALS. 300 strip 11    venlafaxine (EFFEXOR-XR) 150 MG Cp24 TAKE 1 CAPSULE BY MOUTH ONCE DAILY. 30 capsule 11    vitamin D (VITAMIN D3) 1000 units Tab TAKE 1 TABLET BY MOUTH ONCE DAILY. 30 tablet 10    water Liqd 146 mL with MILK OF MAGNESIA 400 mg/5 mL Susp 400 mg, diphenhydrAMINE 12.5 mg/5 mL Elix 60 mg, nystatin 100,000 unit/mL Susp 500,000 Units Take 5 mLs by mouth 3 (three) times daily as needed (mouth sores). 1 Bottle 0     Current Facility-Administered Medications on File Prior to Visit   Medication Dose Route Frequency Provider Last Rate Last Admin    cyanocobalamin injection 1,000 mcg  1,000 mcg Subcutaneous 1 time in Clinic/HOD Jatin Yadav MD

## 2025-02-25 ENCOUNTER — TELEPHONE (OUTPATIENT)
Dept: FAMILY MEDICINE | Facility: CLINIC | Age: 89
End: 2025-02-25
Payer: MEDICARE

## 2025-02-25 NOTE — TELEPHONE ENCOUNTER
----- Message from Tete sent at 2/25/2025 12:49 PM CST -----  Regarding: Self  Patient is requesting a sooner appointment.  Patient declined first available appointment listed as well as another facility and provider .  Patient will not accept being placed on the waitlist and is requesting a message be sent to doctor.Name of Caller: SelfWhen is the first available appointment?  04/10Symptoms:  UTI, other symptoms worsening , pt requested to speak to nurse to discuss what she should doWould the patient rather a call back or a response via [facility]?  Call back Best Call Back Number: .[phone] Information:Pt wants to know if she has to make 2 appts for having osteoporosis  ----- Message -----  From: Tete Grant  Sent: 2/25/2025  12:50 PM CST  To: [facility] Staff  Subject: Self                                             Patient is requesting a sooner appointment.  Patient declined first available appointment listed as well as another facility and provider .  Patient will not accept being placed on the waitlist and is requesting a message be sent to doctor.Name of Caller: SelfWhen is the first available appointment?  04/10Symptoms:  UTI, other symptoms worsening , pt requested to speak to nurse to discuss what she should doWould the patient rather a call back or a response via [facility]?  Call back Best Call Back Number: .[phone] Information:

## 2025-02-25 NOTE — TELEPHONE ENCOUNTER
Pt had virtual visit on 2/22 and was prescribed macrobid for UTI, states she is still having urinary frequency in the morning with incontinent episodes, some pain when urinating; pt also states she has more blood in her stool; she does take miralax every other day for constipation; I scheduled appt with you for Friday to address her concerns; also advised her prolia injection is due in April and she can get done same time as her appt with Dr Henderson

## 2025-02-28 ENCOUNTER — OFFICE VISIT (OUTPATIENT)
Dept: FAMILY MEDICINE | Facility: CLINIC | Age: 89
End: 2025-02-28
Payer: MEDICARE

## 2025-02-28 ENCOUNTER — TELEPHONE (OUTPATIENT)
Dept: FAMILY MEDICINE | Facility: CLINIC | Age: 89
End: 2025-02-28

## 2025-02-28 ENCOUNTER — PATIENT OUTREACH (OUTPATIENT)
Dept: ADMINISTRATIVE | Facility: HOSPITAL | Age: 89
End: 2025-02-28
Payer: MEDICARE

## 2025-02-28 VITALS
RESPIRATION RATE: 18 BRPM | DIASTOLIC BLOOD PRESSURE: 80 MMHG | BODY MASS INDEX: 22.07 KG/M2 | OXYGEN SATURATION: 98 % | WEIGHT: 119.94 LBS | HEART RATE: 89 BPM | SYSTOLIC BLOOD PRESSURE: 120 MMHG | HEIGHT: 62 IN

## 2025-02-28 DIAGNOSIS — I69.354 HEMIPARESIS AFFECTING LEFT SIDE AS LATE EFFECT OF STROKE: ICD-10-CM

## 2025-02-28 DIAGNOSIS — L89.311 PRESSURE INJURY OF RIGHT BUTTOCK, STAGE 1: ICD-10-CM

## 2025-02-28 DIAGNOSIS — R35.0 URINARY FREQUENCY: Primary | ICD-10-CM

## 2025-02-28 DIAGNOSIS — K64.9 HEMORRHOIDS, UNSPECIFIED HEMORRHOID TYPE: ICD-10-CM

## 2025-02-28 DIAGNOSIS — R26.81 GAIT INSTABILITY: ICD-10-CM

## 2025-02-28 PROBLEM — M81.0 OSTEOPOROSIS: Status: RESOLVED | Noted: 2024-03-07 | Resolved: 2025-02-28

## 2025-02-28 PROCEDURE — 87086 URINE CULTURE/COLONY COUNT: CPT | Mod: HCNC | Performed by: INTERNAL MEDICINE

## 2025-02-28 PROCEDURE — 99999 PR PBB SHADOW E&M-EST. PATIENT-LVL V: CPT | Mod: PBBFAC,HCNC,, | Performed by: INTERNAL MEDICINE

## 2025-02-28 PROCEDURE — 81001 URINALYSIS AUTO W/SCOPE: CPT | Mod: HCNC | Performed by: INTERNAL MEDICINE

## 2025-02-28 RX ORDER — AMOXICILLIN AND CLAVULANATE POTASSIUM 500; 125 MG/1; MG/1
1 TABLET, FILM COATED ORAL 2 TIMES DAILY
Qty: 10 TABLET | Refills: 0 | Status: SHIPPED | OUTPATIENT
Start: 2025-02-28 | End: 2025-03-05

## 2025-02-28 RX ORDER — HYDROCORTISONE ACETATE 25 MG/1
25 SUPPOSITORY RECTAL 2 TIMES DAILY
Qty: 20 SUPPOSITORY | Refills: 0 | Status: SHIPPED | OUTPATIENT
Start: 2025-02-28 | End: 2025-03-10

## 2025-02-28 RX ORDER — HYDROCORTISONE 1 %
CREAM (GRAM) TOPICAL 2 TIMES DAILY
Qty: 112 G | Refills: 0 | Status: SHIPPED | OUTPATIENT
Start: 2025-02-28

## 2025-02-28 NOTE — TELEPHONE ENCOUNTER
----- Message from Nikunj sent at 2/28/2025  4:01 PM CST -----  Regarding: [facility]  Type: Pharmacy calling to clarify an RXName of Caller:[facility] Name:[facility] Prescription name:hydrocortisone 1 % creamWhat do the need to clarify?Verify the quanityCan you be contacted via [facility]?callBest call back number:[facility] - [place], [place] - [address] [address] [facility] [address]Tkgxp: [phone] Fax: [phone] information:

## 2025-02-28 NOTE — PROGRESS NOTES
Population Health Chart Review & Patient Outreach Details      Additional Pop Health Notes:      HM and immunization's reviewed and updated.  DUE FOR URINE SCREENING AND DEXA SCAN. IF ALREADY DONE, NEED TO GET RECORDS INFORMATION.  SCHEDULE URINE FOR AFTER VISIT. PLEASE GIVE CUP    Place in visit note to advise.          Updates Requested / Reviewed:      Updated Care Coordination Note, Care Everywhere, and Care Team Updated         Health Maintenance Topics Overdue:      VBHM Score: 1     Osteoporosis Screening    Tetanus Vaccine  Shingles/Zoster Vaccine  RSV Vaccine                  Health Maintenance Topic(s) Outreach Outcomes & Actions Taken:    Lab(s) - Outreach Outcomes & Actions Taken  : Overdue Lab(s) Scheduled    Osteoporosis Screening - Outreach Outcomes & Actions Taken  : Place in visit note to advise.

## 2025-02-28 NOTE — ASSESSMENT & PLAN NOTE
Likely UTI. Patient has stool incontinence   - urine studies   - start augmentin. Was previously on macrobid

## 2025-02-28 NOTE — ASSESSMENT & PLAN NOTE
- Recommend changing position every 2 hours when in bed or chair to prevent pressure injuries.   - discussed pressure injuries

## 2025-02-28 NOTE — PROGRESS NOTES
Chief Complaint: Urinary Frequency, Follow-up (4 month follow up), Memory Loss (Patient states not sure when memory loss started but is certain that it started just recently ), and Diabetes      Carol Yadav  is a 89 y.o. year old patient who presents today for     History of Present Illness    CHIEF COMPLAINT:  Carol presents today with increasing weakness and difficulty with mobility    MOBILITY AND FALL RISK:  She is primarily bedridden, only ambulating to the bathroom with a HemiWalker. While she can walk independently with the HemiWalker, she requires close supervision due to fall risk. She has a history of falls, including two severe incidents resulting in head injuries. She acknowledges the ability to increase her walking frequency but expresses skepticism about benefits from previous exercise regimens.    BOWEL CONCERNS:  She reports frequent blood in stool with changes in stool shape and consistency, describing thread-like appearance similar to the inside of a baseball with tendency toward diarrhea. She takes Miralax every other day for constipation management. She experiences stool incontinence requiring pull-ups.    SKIN INTEGRITY:  She has pressure injuries on the buttocks being treated with Ketoconazole cream. She reports recently developing new small spots in the same area discovered during showering.    GENITOURINARY:  She has ongoing urinary incontinence and recently completed a course of Nitrofurantoin.      ROS:  General: -fever, -chills, -fatigue, -weight gain, -weight loss  Eyes: -vision changes, -redness, -discharge  ENT: -ear pain, -nasal congestion, -sore throat  Cardiovascular: -chest pain, -palpitations, -lower extremity edema  Respiratory: -cough, -shortness of breath  Gastrointestinal: -abdominal pain, -nausea, -vomiting, -diarrhea, -constipation, -blood in stool, +change in bowel habits  Genitourinary: -dysuria, -hematuria, -frequency, +urinary incontinence  Musculoskeletal: -joint pain,  -muscle pain  Skin: -rash, -lesion  Neurological: -headache, -dizziness, -numbness, -tingling, +weakness  Psychiatric: -anxiety, -depression, -sleep difficulty         Past Medical History:   Diagnosis Date    Acute blood loss anemia 06/21/2021    Allergic rhinitis     Arthritis     Diabetes mellitus, new onset 04/16/2022    Elevated blood pressure reading without diagnosis of hypertension     Hormone replacement therapy (postmenopausal)     Hyperlipidemia     Hypertension     Slurred speech 11/07/2014    Stroke of right basal ganglia 12/2014       Past Surgical History:   Procedure Laterality Date    APPENDECTOMY      BREAST BIOPSY      CATARACT EXTRACTION Bilateral at age 62 or 72    monovision     EYE SURGERY      hai cataract surgery    OOPHORECTOMY      PINNING OF HIP Left 6/16/2021    Procedure: PINNING, HIP;  Surgeon: Dae Bernal MD;  Location: [facility];  Service: Orthopedics;  Laterality: Left;    TONSILLECTOMY          Family History   Problem Relation Name Age of Onset    Breast cancer Mother      Brain cancer Mother          Metastatic from breast    Stroke Father      No Known Problems Sister      No Known Problems Brother      No Known Problems Maternal Aunt      No Known Problems Maternal Uncle      No Known Problems Paternal Aunt      No Known Problems Paternal Uncle      No Known Problems Maternal Grandmother      No Known Problems Maternal Grandfather      No Known Problems Paternal Grandmother      No Known Problems Paternal Grandfather      No Known Problems Daughter henrique     No Known Problems Daughter eusebio     Schizophrenia Daughter corinne     Amblyopia Neg Hx      Blindness Neg Hx      Cataracts Neg Hx      Diabetes Neg Hx      Glaucoma Neg Hx      Hypertension Neg Hx      Macular degeneration Neg Hx      Retinal detachment Neg Hx      Strabismus Neg Hx      Thyroid disease Neg Hx      Colon cancer Neg Hx      Esophageal cancer Neg Hx          Social History     Socioeconomic History     Marital status:    Tobacco Use    Smoking status: Former     Current packs/day: 0.00     Average packs/day: 2.0 packs/day for 36.0 years (72.0 ttl pk-yrs)     Types: Cigarettes     Start date:      Quit date:      Years since quittin.1    Smokeless tobacco: Never   Substance and Sexual Activity    Alcohol use: Not Currently     Alcohol/week: 21.0 standard drinks of alcohol     Types: 7 Glasses of wine, 14 Standard drinks or equivalent per week    Drug use: No    Sexual activity: Not Currently     Partners: Male     Comment: 2017 divorce      Social Drivers of Health     Financial Resource Strain: Low Risk  (2022)    Overall Financial Resource Strain (CARDIA)     Difficulty of Paying Living Expenses: Not hard at all   Food Insecurity: No Food Insecurity (2022)    Hunger Vital Sign     Worried About Running Out of Food in the Last Year: Never true     Ran Out of Food in the Last Year: Never true   Transportation Needs: No Transportation Needs (2022)    PRAPARE - Transportation     Lack of Transportation (Medical): No     Lack of Transportation (Non-Medical): No   Physical Activity: Insufficiently Active (2022)    Exercise Vital Sign     Days of Exercise per Week: 5 days     Minutes of Exercise per Session: 10 min   Stress: No Stress Concern Present (2022)    [employer] - Occupational Stress Questionnaire     Feeling of Stress : Only a little   Housing Stability: Low Risk  (2022)    Housing Stability Vital Sign     Unable to Pay for Housing in the Last Year: No     Number of Places Lived in the Last Year: 1     Unstable Housing in the Last Year: No       Current Medications[1]           Objective:      Vitals:    25 1454   BP: 120/80   Pulse: 89   Resp: 18       Physical Exam  Constitutional:       Appearance: Normal appearance.      Comments: wheelchair   HENT:      Head: Normocephalic and atraumatic.   Abdominal:       Comments: External hemorrhoids, mild. Patient had stool on her pad   Skin:     General: Skin is warm and dry.      Findings: Erythema (buttock area, pressure related) present.   Neurological:      General: No focal deficit present.      Mental Status: She is alert and oriented to person, place, and time.   Psychiatric:         Mood and Affect: Mood normal.         Behavior: Behavior normal.          Assessment:       1. Urinary frequency    2. Hemorrhoids, unspecified hemorrhoid type    3. Gait instability    4. Hemiparesis affecting left side as late effect of stroke    5. Pressure injury of right buttock, stage 1          Plan:   1. Urinary frequency  Assessment & Plan:  Likely UTI. Patient has stool incontinence   - urine studies   - start augmentin. Was previously on macrobid    Orders:  -     Urinalysis  -     Urine Culture High Risk  -     amoxicillin-clavulanate 500-125mg (AUGMENTIN) 500-125 mg Tab; Take 1 tablet (500 mg total) by mouth 2 (two) times daily. for 5 days  Dispense: 10 tablet; Refill: 0    2. Hemorrhoids, unspecified hemorrhoid type  Assessment & Plan:  Has stool incontinence. Unsure if this is the cause   Does take miralax everyday   External hemorrhoids seen, not thrombosed   Stool found on pad   - advised pt to improve ambulation   - rx for hemorrhoids   - reduce dose of miralax  - f/up 1 month, if unresolved, refer to colorectal    Orders:  -     hydrocortisone 1 % cream; Apply topically 2 (two) times daily.  Dispense: 112 g; Refill: 0  -     hydrocortisone (ANUSOL-HC) 25 mg suppository; Place 1 suppository (25 mg total) rectally 2 (two) times daily. for 10 days  Dispense: 20 suppository; Refill: 0    3. Gait instability  Assessment & Plan:  - Observed signs of hemiplegia or hemiparesis, including the use of a HemiWalker and limited use of one hand.  - Attributed some weakness to potential muscle atrophy from lack of mobility.  - Recommend daily walking exercises to improve strength and  mobility, instructing the patient to gradually increase duration and intensity as tolerated.  - Advised the patient to report any significant changes in strength or mobility.      4. Hemiparesis affecting left side as late effect of stroke  Overview:  With left arm contracture and ambulatory dysfunction    Assessment & Plan:  Advised pt to increase ambulation and use of walker  Daughter will try to help pt everyday   Cautioned patient of falls       5. Pressure injury of right buttock, stage 1  Overview:  bilateral    Assessment & Plan:  - Recommend changing position every 2 hours when in bed or chair to prevent pressure injuries.   - discussed pressure injuries         TYPE 2 DIABETES MELLITUS WITH OTHER CIRCULATORY COMPLICATIONS:  - Monitored the patient's blood sugar, which were previously found to be within acceptable range.  - Advised continued regular monitoring of glucose levels.    MOBILITY IMPAIRMENT:  - Perform sitting exercises as tolerated, but prioritize walking.  - Educated on the importance of regular movement and exercise to improve energy levels.    Total 40 mins spent on patient with more than 50% spent counseling          Follow up in about 4 weeks (around 3/28/2025) for reschedule 4/10.    This note was generated with the assistance of ambient listening technology. Verbal consent was obtained by the patient and accompanying visitor(s) for the recording of patient appointment to facilitate this note. I attest to having reviewed and edited the generated note for accuracy, though some syntax or spelling errors may persist. Please contact the author of this note for any clarification.                [1]   Current Outpatient Medications:     acetaminophen (TYLENOL) 325 MG tablet, TAKE 2 TABLETS BY MOUTH EVERY FOUR HOURS AS NEEDED FOR PAIN., Disp: 60 tablet, Rfl: 0    aspirin 81 MG Chew, Take 1 tablet (81 mg total) by mouth once daily., Disp: 90 tablet, Rfl: 3    atorvastatin (LIPITOR) 40 MG tablet,  Take 1 tablet (40 mg total) by mouth once daily., Disp: 90 tablet, Rfl: 3    calcium carbonate (OS-HAYLEE) 600 mg calcium (1,500 mg) Tab, TAKE 1 TABLET BY MOUTH ONCE DAILY., Disp: 30 tablet, Rfl: 11    denosumab (PROLIA) 60 mg/mL Syrg, Inject 1 mL (60 mg total) into the skin every 6 (six) months., Disp: 2 mL, Rfl: 0    famotidine (PEPCID) 20 MG tablet, Take 20 mg by mouth 2 (two) times daily as needed., Disp: , Rfl:     flash glucose scanning reader (FREESTYLE YESY 14 DAY READER) Misc, 1 application by Misc.(Non-Drug; Combo Route) route 3 (three) times daily with meals., Disp: 2 each, Rfl: 2    flash glucose sensor (FREESTYLE YESY 14 DAY SENSOR) Kit, 1 application by Misc.(Non-Drug; Combo Route) route 3 (three) times daily with meals., Disp: 2 kit, Rfl: 3    fluticasone propionate (FLONASE) 50 mcg/actuation nasal spray, 1 SPRAY IN EACH NOSTRIL TWICE A DAY AS NEEDED FOR RHINITIS, Disp: 16 g, Rfl: 11    ibuprofen (ADVIL,MOTRIN) 800 MG tablet, Take 800 mg by mouth every 6 (six) hours as needed., Disp: , Rfl:     ketoconazole (NIZORAL) 2 % cream, Apply 1 application  topically once daily. Apply to affected area, Disp: 60 g, Rfl: 0    lancets (TRUEPLUS LANCETS) 30 gauge Misc, Use to test blood glucose three (3) times a day, discard lancet after each use, Disp: 300 each, Rfl: 3    levothyroxine (SYNTHROID) 25 MCG tablet, TAKE 1 TABLET BY MOUTH IN THE MORNING BEFORE BREAKFAST., Disp: 90 tablet, Rfl: 3    lisinopriL (PRINIVIL,ZESTRIL) 2.5 MG tablet, Take 1 tablet (2.5 mg total) by mouth once daily., Disp: 30 tablet, Rfl: 11    melatonin (MELATIN) 3 mg tablet, TAKE TWO (2) TABLETS BY MOUTH AT BEDTIME., Disp: 60 tablet, Rfl: 10    meloxicam (MOBIC) 7.5 MG tablet, Take 7.5 mg by mouth., Disp: , Rfl:     metFORMIN (GLUCOPHAGE-XR) 500 MG ER 24hr tablet, Daily with meal, Disp: 30 tablet, Rfl: 11    multivit-min-FA-lycopen-lutein (CERTAVITE SENIOR) 0.4 mg-300 mcg- 250 mcg Tab, Take 1 tablet by mouth once daily., Disp: 90 tablet,  Rfl: 3    multivit-minerals/folic/ginkgo (ONE DAILY WOMEN 50 PLUS ORAL), Take 1 tablet by mouth once daily., Disp: , Rfl:     olopatadine (PATANOL) 0.1 % ophthalmic solution, PLACE 1 DROP IN EACH EYE TWICE DAILY., Disp: 5 mL, Rfl: 0    omeprazole (PRILOSEC) 40 MG capsule, Take 1 capsule (40 mg total) by mouth every morning., Disp: 90 capsule, Rfl: 3    polyethylene glycol (MIRALAX) 17 gram PwPk, Take 17 g by mouth daily as needed. Take every other day, Disp: 30 packet, Rfl: 11    saliva substitute combo no.9 (BIOTENE DRY MOUTH ORAL RINSE) Mwsh, by Mucous Membrane route. Use as directed as needed for dry mouth, Disp: , Rfl:     triamcinolone acetonide 0.1% (KENALOG) 0.1 % cream, Apply topically 2 (two) times daily., Disp: 80 g, Rfl: 1    TRUE METRIX GLUCOSE METER Misc, , Disp: , Rfl:     TRUE METRIX GLUCOSE TEST STRIP Strp, CHECK BLOOD SUGAR THREE (3) TIMES DAILY WITH MEALS., Disp: 300 strip, Rfl: 11    venlafaxine (EFFEXOR-XR) 150 MG Cp24, TAKE 1 CAPSULE BY MOUTH ONCE DAILY., Disp: 30 capsule, Rfl: 11    vitamin D (VITAMIN D3) 1000 units Tab, TAKE 1 TABLET BY MOUTH ONCE DAILY., Disp: 30 tablet, Rfl: 10    water Liqd 146 mL with MILK OF MAGNESIA 400 mg/5 mL Susp 400 mg, diphenhydrAMINE 12.5 mg/5 mL Elix 60 mg, nystatin 100,000 unit/mL Susp 500,000 Units, Take 5 mLs by mouth 3 (three) times daily as needed (mouth sores)., Disp: 1 Bottle, Rfl: 0    amoxicillin-clavulanate 500-125mg (AUGMENTIN) 500-125 mg Tab, Take 1 tablet (500 mg total) by mouth 2 (two) times daily. for 5 days, Disp: 10 tablet, Rfl: 0    hydrocortisone (ANUSOL-HC) 25 mg suppository, Place 1 suppository (25 mg total) rectally 2 (two) times daily. for 10 days, Disp: 20 suppository, Rfl: 0    hydrocortisone 1 % cream, Apply topically 2 (two) times daily., Disp: 112 g, Rfl: 0    Current Facility-Administered Medications:     cyanocobalamin injection 1,000 mcg, 1,000 mcg, Subcutaneous, 1 time in Clinic/HOD, Jatin Yadav MD

## 2025-02-28 NOTE — ASSESSMENT & PLAN NOTE
Has stool incontinence. Unsure if this is the cause   Does take miralax everyday   External hemorrhoids seen, not thrombosed   Stool found on pad   - advised pt to improve ambulation   - rx for hemorrhoids   - reduce dose of miralax  - f/up 1 month, if unresolved, refer to colorectal

## 2025-02-28 NOTE — ASSESSMENT & PLAN NOTE
Advised pt to increase ambulation and use of walker  Daughter will try to help pt everyday   Cautioned patient of falls

## 2025-02-28 NOTE — ASSESSMENT & PLAN NOTE
- Observed signs of hemiplegia or hemiparesis, including the use of a HemiWalker and limited use of one hand.  - Attributed some weakness to potential muscle atrophy from lack of mobility.  - Recommend daily walking exercises to improve strength and mobility, instructing the patient to gradually increase duration and intensity as tolerated.  - Advised the patient to report any significant changes in strength or mobility.

## 2025-03-01 LAB
BACTERIA #/AREA URNS AUTO: ABNORMAL /HPF
BILIRUB UR QL STRIP: NEGATIVE
CLARITY UR REFRACT.AUTO: CLEAR
COLOR UR AUTO: YELLOW
GLUCOSE UR QL STRIP: NEGATIVE
HGB UR QL STRIP: NEGATIVE
KETONES UR QL STRIP: NEGATIVE
LEUKOCYTE ESTERASE UR QL STRIP: ABNORMAL
MICROSCOPIC COMMENT: ABNORMAL
NITRITE UR QL STRIP: NEGATIVE
PH UR STRIP: 6 [PH] (ref 5–8)
PROT UR QL STRIP: NEGATIVE
RBC #/AREA URNS AUTO: 4 /HPF (ref 0–4)
SP GR UR STRIP: 1.01 (ref 1–1.03)
SQUAMOUS #/AREA URNS AUTO: 0 /HPF
URN SPEC COLLECT METH UR: ABNORMAL
WBC #/AREA URNS AUTO: 15 /HPF (ref 0–5)

## 2025-03-06 ENCOUNTER — RESULTS FOLLOW-UP (OUTPATIENT)
Dept: FAMILY MEDICINE | Facility: CLINIC | Age: 89
End: 2025-03-06

## 2025-03-24 DIAGNOSIS — Z00.00 ENCOUNTER FOR MEDICARE ANNUAL WELLNESS EXAM: ICD-10-CM

## 2025-03-28 ENCOUNTER — OFFICE VISIT (OUTPATIENT)
Dept: FAMILY MEDICINE | Facility: CLINIC | Age: 89
End: 2025-03-28
Payer: MEDICARE

## 2025-03-28 VITALS
BODY MASS INDEX: 23.13 KG/M2 | OXYGEN SATURATION: 97 % | RESPIRATION RATE: 18 BRPM | WEIGHT: 125.69 LBS | SYSTOLIC BLOOD PRESSURE: 100 MMHG | HEART RATE: 96 BPM | DIASTOLIC BLOOD PRESSURE: 58 MMHG | HEIGHT: 62 IN

## 2025-03-28 DIAGNOSIS — I70.0 ATHEROSCLEROSIS OF AORTA: ICD-10-CM

## 2025-03-28 DIAGNOSIS — E11.9 TYPE 2 DIABETES MELLITUS WITHOUT COMPLICATION, WITHOUT LONG-TERM CURRENT USE OF INSULIN: Primary | ICD-10-CM

## 2025-03-28 DIAGNOSIS — Z74.09 DECREASED FUNCTIONAL MOBILITY AND ENDURANCE: ICD-10-CM

## 2025-03-28 DIAGNOSIS — L89.311 PRESSURE INJURY OF RIGHT BUTTOCK, STAGE 1: ICD-10-CM

## 2025-03-28 DIAGNOSIS — Z91.81 AT HIGH RISK FOR INJURY RELATED TO FALL: ICD-10-CM

## 2025-03-28 DIAGNOSIS — M81.0 AGE-RELATED OSTEOPOROSIS WITHOUT CURRENT PATHOLOGICAL FRACTURE: ICD-10-CM

## 2025-03-28 DIAGNOSIS — R79.9 ABNORMAL FINDING OF BLOOD CHEMISTRY, UNSPECIFIED: ICD-10-CM

## 2025-03-28 DIAGNOSIS — R53.81 PHYSICAL DECONDITIONING: ICD-10-CM

## 2025-03-28 DIAGNOSIS — F03.A11 MILD DEMENTIA WITH AGITATION, UNSPECIFIED DEMENTIA TYPE: ICD-10-CM

## 2025-03-28 DIAGNOSIS — I69.354 HEMIPARESIS AFFECTING LEFT SIDE AS LATE EFFECT OF STROKE: ICD-10-CM

## 2025-03-28 DIAGNOSIS — K64.9 HEMORRHOIDS, UNSPECIFIED HEMORRHOID TYPE: ICD-10-CM

## 2025-03-28 PROBLEM — I27.20 PULMONARY HYPERTENSION, UNSPECIFIED: Status: RESOLVED | Noted: 2020-01-06 | Resolved: 2025-03-28

## 2025-03-28 PROCEDURE — 82043 UR ALBUMIN QUANTITATIVE: CPT | Mod: HCNC | Performed by: INTERNAL MEDICINE

## 2025-03-28 PROCEDURE — 99999 PR PBB SHADOW E&M-EST. PATIENT-LVL IV: CPT | Mod: PBBFAC,HCNC,, | Performed by: INTERNAL MEDICINE

## 2025-03-28 NOTE — ASSESSMENT & PLAN NOTE
- Educated on benefits of exercise for muscle strength and bone density.  - Carol to walk to the end of the [place] and back daily, and continue participating in daily morning exercise program at 10:30.  - Recommend incorporating resistance training into exercise routine if possible.

## 2025-03-28 NOTE — LETTER
March 28, 2025      [facility]  [address] [facility] [address]  Phone: [phone]  Fax: [phone]       Patient: Carol Yadav   YOB: 1935  Date of Visit: 03/28/2025    To Whom It May Concern:    Tom Yadav  was at [facility] on 03/28/2025. She was found to have a stage 1 pressure injury in her gluteal area. I advise that someone in the facility assist her with walking everyday at least for 30 minutes to prevent worsening of her pressure injuries. Please don't hesitate to reach out with further questions. Thank you.     Sincerely,        Natalee Henderson MD

## 2025-03-28 NOTE — PROGRESS NOTES
Chief Complaint: Follow-up (6 month follow up Pt states she is having urinary and bowel issues )      Carol Yadav  is a 89 y.o. year old patient who presents today for     History of Present Illness    CHIEF COMPLAINT:  Carol presents today for follow up of stool incontinence    BOWEL MANAGEMENT:  She reports compliance with reduced Miralax dosage, taking half of the original dose. Her stools vary in shape and color. Since reducing laxative dose, she denies any stool leakage or incontinence episodes.    MOBILITY AND WEAKNESS:  She reports increasing weakness, noting difficulty with walking and other activities. She has increased difficulty pushing herself up to a sitting position and getting out of bed. She uses a wheelchair for transportation to and from dinner due to history of falling twice. Her exercise program has been disrupted due to COVID exposure of the . While exercise improves her mental state, she reports it does not improve her muscle strength.    SKIN:  Previous pressure injury redness has improved after one week of cream application and has not recurred. She denies any current pressure injuries on her buttocks.    BLOOD SUGAR:  She reports blood sugar fluctuations ranging from 70s to 145. She consumes chocolate ice cream nightly without significant impact on blood sugar.    MEDICAL HISTORY:  Last colonoscopy in 2018 was normal. She has osteoporosis treated with Prolia injections, most recently in April 2023 and March 2022.      ROS:  General: -fever, -chills, +fatigue, -weight gain, -weight loss  Eyes: -vision changes, -redness, -discharge  ENT: -ear pain, -nasal congestion, -sore throat  Cardiovascular: -chest pain, -palpitations, -lower extremity edema  Respiratory: -cough, -shortness of breath  Gastrointestinal: -abdominal pain, -nausea, -vomiting, -diarrhea, -constipation, -blood in stool, +stool color changes, +stool texture change  Genitourinary: -dysuria, -hematuria,  -frequency  Musculoskeletal: -joint pain, -muscle pain, +difficulty walking, +limited movement, +muscle weakness, +difficulty standing up  Skin: -rash, -lesion  Neurological: -headache, -dizziness, -numbness, -tingling, +memory loss, +memory problems  Psychiatric: -anxiety, -depression, -sleep difficulty         Past Medical History:   Diagnosis Date    Acute blood loss anemia 06/21/2021    Allergic rhinitis     Arthritis     Diabetes mellitus, new onset 04/16/2022    Elevated blood pressure reading without diagnosis of hypertension     Hormone replacement therapy (postmenopausal)     Hyperlipidemia     Hypertension     Slurred speech 11/07/2014    Stroke of right basal ganglia 12/2014       Past Surgical History:   Procedure Laterality Date    APPENDECTOMY      BREAST BIOPSY      CATARACT EXTRACTION Bilateral at age 62 or 72    monovision     EYE SURGERY      hai cataract surgery    OOPHORECTOMY      PINNING OF HIP Left 6/16/2021    Procedure: PINNING, HIP;  Surgeon: Dae Bernal MD;  Location: [facility];  Service: Orthopedics;  Laterality: Left;    TONSILLECTOMY          Family History   Problem Relation Name Age of Onset    Breast cancer Mother      Brain cancer Mother          Metastatic from breast    Stroke Father      No Known Problems Sister      No Known Problems Brother      No Known Problems Maternal Aunt      No Known Problems Maternal Uncle      No Known Problems Paternal Aunt      No Known Problems Paternal Uncle      No Known Problems Maternal Grandmother      No Known Problems Maternal Grandfather      No Known Problems Paternal Grandmother      No Known Problems Paternal Grandfather      No Known Problems Daughter henrique     No Known Problems Daughter eusebio     Schizophrenia Daughter corinne     Amblyopia Neg Hx      Blindness Neg Hx      Cataracts Neg Hx      Diabetes Neg Hx      Glaucoma Neg Hx      Hypertension Neg Hx      Macular degeneration Neg Hx      Retinal detachment Neg Hx       Strabismus Neg Hx      Thyroid disease Neg Hx      Colon cancer Neg Hx      Esophageal cancer Neg Hx          Social History     Socioeconomic History    Marital status:    Tobacco Use    Smoking status: Former     Current packs/day: 0.00     Average packs/day: 2.0 packs/day for 36.0 years (72.0 ttl pk-yrs)     Types: Cigarettes     Start date:      Quit date:      Years since quittin.2    Smokeless tobacco: Never   Substance and Sexual Activity    Alcohol use: Not Currently     Alcohol/week: 21.0 standard drinks of alcohol     Types: 7 Glasses of wine, 14 Standard drinks or equivalent per week    Drug use: No    Sexual activity: Not Currently     Partners: Male     Comment: 2017 divorce      Social Drivers of Health     Financial Resource Strain: Low Risk  (2022)    Overall Financial Resource Strain (CARDIA)     Difficulty of Paying Living Expenses: Not hard at all   Food Insecurity: No Food Insecurity (2022)    Hunger Vital Sign     Worried About Running Out of Food in the Last Year: Never true     Ran Out of Food in the Last Year: Never true   Transportation Needs: No Transportation Needs (2022)    PRAPARE - Transportation     Lack of Transportation (Medical): No     Lack of Transportation (Non-Medical): No   Physical Activity: Insufficiently Active (2022)    Exercise Vital Sign     Days of Exercise per Week: 5 days     Minutes of Exercise per Session: 10 min   Stress: No Stress Concern Present (2022)    [employer] - Occupational Stress Questionnaire     Feeling of Stress : Only a little   Housing Stability: Low Risk  (2022)    Housing Stability Vital Sign     Unable to Pay for Housing in the Last Year: No     Number of Places Lived in the Last Year: 1     Unstable Housing in the Last Year: No       Current Medications[1]           Objective:      Vitals:    25 1351   BP: (!) 100/58   Pulse: 96   Resp: 18       Physical  Exam  Constitutional:       Appearance: Normal appearance.      Comments: wheelchair   HENT:      Head: Normocephalic and atraumatic.   Skin:     General: Skin is warm and dry.   Neurological:      General: No focal deficit present.      Mental Status: She is alert and oriented to person, place, and time.   Psychiatric:         Mood and Affect: Mood normal.         Behavior: Behavior normal.          Assessment:       1. Type 2 diabetes mellitus without complication, without long-term current use of insulin    2. Atherosclerosis of aorta    3. Age-related osteoporosis without current pathological fracture    4. Abnormal finding of blood chemistry, unspecified    5. Decreased functional mobility and endurance    6. Hemiparesis affecting left side as late effect of stroke    7. Mild dementia with agitation, unspecified dementia type    8. At high risk for injury related to fall    9. Hemorrhoids, unspecified hemorrhoid type    10. Pressure injury of right buttock, stage 1    11. Physical deconditioning          Plan:   1. Type 2 diabetes mellitus without complication, without long-term current use of insulin  Assessment & Plan:  Chronic, controlled   Lab Results   Component Value Date    HGBA1C 5.7 (H) 09/03/2024     - cont metformin  - Carol reported blood sugar fluctuations from 70s to 145.  - While 145 is not concerning, low 70s could be problematic.  - Discussed importance of monitoring sugar intake and impact of diet on blood sugar.  - Ordered annual urine test as part of diabetes management.    Orders:  -     Microalbumin/Creatinine Ratio, Urine; Future; Expected date: 03/28/2025  -     Comprehensive Metabolic Panel; Future; Expected date: 03/28/2025  -     Hemoglobin A1C; Future; Expected date: 03/28/2025  -     TSH; Future; Expected date: 03/28/2025    2. Atherosclerosis of aorta  -     CBC Auto Differential; Future; Expected date: 03/28/2025  -     Lipid Panel; Future; Expected date: 03/28/2025  -     TSH;  Future; Expected date: 03/28/2025    3. Age-related osteoporosis without current pathological fracture  -     Calcitriol; Future; Expected date: 03/28/2025  -     DXA Bone Density Axial Skeleton 1 or more sites; Future; Expected date: 03/28/2025    4. Abnormal finding of blood chemistry, unspecified  -     CBC Auto Differential; Future; Expected date: 03/28/2025    5. Decreased functional mobility and endurance  Assessment & Plan:  - Noted patient's increasing difficulty in walking and moving around, with muscle weakness.  - Recommend physical therapy to address decreased mobility and strength.  - Noted patient's use of wheelchair for mobility due to fall risk, but recommended daily walking with assistance to reduce dependence on wheelchair.    Orders:  -     Ambulatory referral/consult to Home Health; Future; Expected date: 03/29/2025    6. Hemiparesis affecting left side as late effect of stroke  Overview:  With left arm contracture and ambulatory dysfunction    Orders:  -     Ambulatory referral/consult to Home Health; Future; Expected date: 03/29/2025    7. Mild dementia with agitation, unspecified dementia type  -     Ambulatory referral/consult to Home Health; Future; Expected date: 03/29/2025    8. At high risk for injury related to fall  Assessment & Plan:  + osteoporosis   - Noted patient's history of falling twice, causing problems.  - Acknowledged fall risk and discussed the importance of supervised walking to improve mobility and reduce fall risk.      9. Hemorrhoids, unspecified hemorrhoid type  Assessment & Plan:  - Decreased Miralax dose to half of previous dose; noted improvement in stool incontinence.  - Assessed hemorrhoid symptoms as likely benign given recent normal colonoscopy.  - Discussed patient's concerns about colorectal cancer, reviewing last colonoscopy results.  - Determined colorectal cancer screening unnecessary given age (89) and recent normal colonoscopy.  - Prescribed cream for  hemorrhoids.      10. Pressure injury of right buttock, stage 1  Overview:  bilateral    Assessment & Plan:  - Noted resolution of redness in buttocks area with topical cream treatment.  - Discussed risk of pressure injuries due to prolonged sitting/lying.  - Explained pressure injury progression and prevention through mobility.      11. Physical deconditioning  Assessment & Plan:  - Educated on benefits of exercise for muscle strength and bone density.  - Carol to walk to the end of the [place] and back daily, and continue participating in daily morning exercise program at 10:30.  - Recommend incorporating resistance training into exercise routine if possible.         FOLLOW-UP AND MONITORING:  - Follow up in 6 months for annual check-up.  - Lab work scheduled for September 19th at 11:00 AM, fasting.  - Return completed urine sample to office when convenient.  - Contact office if experiencing UTI symptoms.       Total 40 mins spent on patient with more than 50% spent counseling    No follow-ups on file.    This note was generated with the assistance of ambient listening technology. Verbal consent was obtained by the patient and accompanying visitor(s) for the recording of patient appointment to facilitate this note. I attest to having reviewed and edited the generated note for accuracy, though some syntax or spelling errors may persist. Please contact the author of this note for any clarification.                  [1]   Current Outpatient Medications:     acetaminophen (TYLENOL) 325 MG tablet, TAKE 2 TABLETS BY MOUTH EVERY FOUR HOURS AS NEEDED FOR PAIN., Disp: 60 tablet, Rfl: 0    aspirin 81 MG Chew, Take 1 tablet (81 mg total) by mouth once daily., Disp: 90 tablet, Rfl: 3    atorvastatin (LIPITOR) 40 MG tablet, Take 1 tablet (40 mg total) by mouth once daily., Disp: 90 tablet, Rfl: 3    calcium carbonate (OS-HAYLEE) 600 mg calcium (1,500 mg) Tab, TAKE 1 TABLET BY MOUTH ONCE DAILY., Disp: 30 tablet, Rfl: 11    denosumab  (PROLIA) 60 mg/mL Syrg, Inject 1 mL (60 mg total) into the skin every 6 (six) months., Disp: 2 mL, Rfl: 0    flash glucose scanning reader (FREESTYLE YESY 14 DAY READER) Misc, 1 application by Misc.(Non-Drug; Combo Route) route 3 (three) times daily with meals., Disp: 2 each, Rfl: 2    flash glucose sensor (FREESTYLE YESY 14 DAY SENSOR) Kit, 1 application by Misc.(Non-Drug; Combo Route) route 3 (three) times daily with meals., Disp: 2 kit, Rfl: 3    hydrocortisone 1 % cream, Apply topically 2 (two) times daily., Disp: 112 g, Rfl: 0    ibuprofen (ADVIL,MOTRIN) 800 MG tablet, Take 800 mg by mouth every 6 (six) hours as needed., Disp: , Rfl:     ketoconazole (NIZORAL) 2 % cream, Apply 1 application  topically once daily. Apply to affected area, Disp: 60 g, Rfl: 0    lancets (TRUEPLUS LANCETS) 30 gauge Misc, Use to test blood glucose three (3) times a day, discard lancet after each use, Disp: 300 each, Rfl: 3    levothyroxine (SYNTHROID) 25 MCG tablet, TAKE 1 TABLET BY MOUTH IN THE MORNING BEFORE BREAKFAST., Disp: 90 tablet, Rfl: 3    lisinopriL (PRINIVIL,ZESTRIL) 2.5 MG tablet, Take 1 tablet (2.5 mg total) by mouth once daily., Disp: 30 tablet, Rfl: 11    melatonin (MELATIN) 3 mg tablet, TAKE TWO (2) TABLETS BY MOUTH AT BEDTIME., Disp: 60 tablet, Rfl: 10    meloxicam (MOBIC) 7.5 MG tablet, Take 7.5 mg by mouth., Disp: , Rfl:     metFORMIN (GLUCOPHAGE-XR) 500 MG ER 24hr tablet, Daily with meal, Disp: 30 tablet, Rfl: 11    multivit-min-FA-lycopen-lutein (CERTAVITE SENIOR) 0.4 mg-300 mcg- 250 mcg Tab, Take 1 tablet by mouth once daily., Disp: 90 tablet, Rfl: 3    multivit-minerals/folic/ginkgo (ONE DAILY WOMEN 50 PLUS ORAL), Take 1 tablet by mouth once daily., Disp: , Rfl:     olopatadine (PATANOL) 0.1 % ophthalmic solution, PLACE 1 DROP IN EACH EYE TWICE DAILY., Disp: 5 mL, Rfl: 0    omeprazole (PRILOSEC) 40 MG capsule, Take 1 capsule (40 mg total) by mouth every morning., Disp: 90 capsule, Rfl: 3    polyethylene  glycol (MIRALAX) 17 gram PwPk, Take 17 g by mouth daily as needed. Take every other day, Disp: 30 packet, Rfl: 11    saliva substitute combo no.9 (BIOTENE DRY MOUTH ORAL RINSE) Mwsh, by Mucous Membrane route. Use as directed as needed for dry mouth, Disp: , Rfl:     triamcinolone acetonide 0.1% (KENALOG) 0.1 % cream, Apply topically 2 (two) times daily., Disp: 80 g, Rfl: 1    TRUE METRIX GLUCOSE METER Misc, , Disp: , Rfl:     TRUE METRIX GLUCOSE TEST STRIP Strp, CHECK BLOOD SUGAR THREE (3) TIMES DAILY WITH MEALS., Disp: 300 strip, Rfl: 11    venlafaxine (EFFEXOR-XR) 150 MG Cp24, TAKE 1 CAPSULE BY MOUTH ONCE DAILY., Disp: 30 capsule, Rfl: 11    vitamin D (VITAMIN D3) 1000 units Tab, TAKE 1 TABLET BY MOUTH ONCE DAILY., Disp: 30 tablet, Rfl: 10    water Liqd 146 mL with MILK OF MAGNESIA 400 mg/5 mL Susp 400 mg, diphenhydrAMINE 12.5 mg/5 mL Elix 60 mg, nystatin 100,000 unit/mL Susp 500,000 Units, Take 5 mLs by mouth 3 (three) times daily as needed (mouth sores)., Disp: 1 Bottle, Rfl: 0    famotidine (PEPCID) 20 MG tablet, Take 20 mg by mouth 2 (two) times daily as needed. (Patient not taking: Reported on 3/28/2025), Disp: , Rfl:     fluticasone propionate (FLONASE) 50 mcg/actuation nasal spray, 1 SPRAY IN EACH NOSTRIL TWICE A DAY AS NEEDED FOR RHINITIS (Patient not taking: Reported on 3/28/2025), Disp: 16 g, Rfl: 11    Current Facility-Administered Medications:     cyanocobalamin injection 1,000 mcg, 1,000 mcg, Subcutaneous, 1 time in Clinic/HOD, Jatin Yadav MD

## 2025-03-28 NOTE — ASSESSMENT & PLAN NOTE
- Noted patient's increasing difficulty in walking and moving around, with muscle weakness.  - Recommend physical therapy to address decreased mobility and strength.  - Noted patient's use of wheelchair for mobility due to fall risk, but recommended daily walking with assistance to reduce dependence on wheelchair.

## 2025-03-28 NOTE — ASSESSMENT & PLAN NOTE
- Noted patient's history of falling twice, causing problems.  - Acknowledged fall risk and discussed the importance of supervised walking to improve mobility and reduce fall risk.

## 2025-03-28 NOTE — PROGRESS NOTES
Health Maintenance Due   Topic     TETANUS VACCINE  Patient advised to go to pharmacy    Shingles Vaccine (1 of 2) Hx of Chicken Pox. Patient advised to go to pharmacy    RSV Vaccine (Age 60+ and Pregnant patients) (1 - 1-dose 75+ series) Not offered at this facility     Diabetes Urine Screening  Consult PCP    COVID-19 Vaccine (5 - 2024-25 season) Patient advised to go to pharmacy    DEXA Scan      Hemoglobin A1c  Consult PCP

## 2025-03-28 NOTE — ASSESSMENT & PLAN NOTE
- Decreased Miralax dose to half of previous dose; noted improvement in stool incontinence.  - Assessed hemorrhoid symptoms as likely benign given recent normal colonoscopy.  - Discussed patient's concerns about colorectal cancer, reviewing last colonoscopy results.  - Determined colorectal cancer screening unnecessary given age (89) and recent normal colonoscopy.  - Prescribed cream for hemorrhoids.

## 2025-03-28 NOTE — ASSESSMENT & PLAN NOTE
- Noted resolution of redness in buttocks area with topical cream treatment.  - Discussed risk of pressure injuries due to prolonged sitting/lying.  - Explained pressure injury progression and prevention through mobility.

## 2025-03-28 NOTE — ASSESSMENT & PLAN NOTE
Chronic, controlled   Lab Results   Component Value Date    HGBA1C 5.7 (H) 09/03/2024     - cont metformin  - Carol reported blood sugar fluctuations from 70s to 145.  - While 145 is not concerning, low 70s could be problematic.  - Discussed importance of monitoring sugar intake and impact of diet on blood sugar.  - Ordered annual urine test as part of diabetes management.

## 2025-03-28 NOTE — ASSESSMENT & PLAN NOTE
+ osteoporosis   - Noted patient's history of falling twice, causing problems.  - Acknowledged fall risk and discussed the importance of supervised walking to improve mobility and reduce fall risk.

## 2025-03-29 ENCOUNTER — RESULTS FOLLOW-UP (OUTPATIENT)
Dept: FAMILY MEDICINE | Facility: CLINIC | Age: 89
End: 2025-03-29

## 2025-03-29 LAB
ALBUMIN/CREAT UR: 13.5 UG/MG
CREAT UR-MCNC: 104 MG/DL (ref 15–325)
MICROALBUMIN UR-MCNC: 14 UG/ML (ref ?–5000)

## 2025-04-03 DIAGNOSIS — M81.0 OSTEOPOROSIS, UNSPECIFIED OSTEOPOROSIS TYPE, UNSPECIFIED PATHOLOGICAL FRACTURE PRESENCE: ICD-10-CM

## 2025-04-04 RX ORDER — GUAIFENESIN 100 MG/5ML
100 LIQUID ORAL 3 TIMES DAILY PRN
COMMUNITY

## 2025-04-04 RX ORDER — DENOSUMAB 60 MG/ML
60 INJECTION SUBCUTANEOUS
Qty: 2 ML | Refills: 0 | Status: ACTIVE | OUTPATIENT
Start: 2025-04-04 | End: 2026-04-04

## 2025-04-04 RX ORDER — HYDROCODONE BITARTRATE AND ACETAMINOPHEN 5; 325 MG/1; MG/1
1 TABLET ORAL EVERY 6 HOURS PRN
COMMUNITY

## 2025-04-28 DIAGNOSIS — E03.8 SUBCLINICAL HYPOTHYROIDISM: ICD-10-CM

## 2025-04-28 DIAGNOSIS — Z86.73 H/O: CVA (CEREBROVASCULAR ACCIDENT): ICD-10-CM

## 2025-04-28 NOTE — TELEPHONE ENCOUNTER
No care due was identified.  [facility] Embedded Care Due Messages. Reference number: [id number].   4/28/2025 5:59:01 PM CDT

## 2025-04-29 RX ORDER — NAPROXEN SODIUM 220 MG/1
81 TABLET, FILM COATED ORAL
Qty: 30 TABLET | Refills: 11 | Status: SHIPPED | OUTPATIENT
Start: 2025-04-29

## 2025-04-29 RX ORDER — LEVOTHYROXINE SODIUM 25 UG/1
25 TABLET ORAL
Qty: 30 TABLET | Refills: 11 | Status: SHIPPED | OUTPATIENT
Start: 2025-04-29

## 2025-04-29 NOTE — TELEPHONE ENCOUNTER
Refill Routing Note   Medication(s) are not appropriate for processing by [facility] for the following reason(s):        Outside of protocol: Aspirin  Required labs outdated: levothyroxine    [facility] action(s):  Route  Defer             Appointments  past 12m or future 3m with PCP    Date Provider   Last Visit   3/28/2025 Natalee Henderson MD   Next Visit   9/26/2025 Natalee Henderson MD   ED visits in past 90 days: 0        Note composed:7:04 PM 04/28/2025

## 2025-05-05 ENCOUNTER — HOSPITAL ENCOUNTER (OUTPATIENT)
Dept: RADIOLOGY | Facility: CLINIC | Age: 89
Discharge: HOME | End: 2025-05-05
Attending: INTERNAL MEDICINE
Payer: MEDICARE

## 2025-05-05 DIAGNOSIS — H04.203 BILATERAL EPIPHORA: ICD-10-CM

## 2025-05-05 DIAGNOSIS — M81.0 AGE-RELATED OSTEOPOROSIS WITHOUT CURRENT PATHOLOGICAL FRACTURE: ICD-10-CM

## 2025-05-05 PROCEDURE — 77080 DXA BONE DENSITY AXIAL: CPT | Mod: 26,HCNC,, | Performed by: INTERNAL MEDICINE

## 2025-05-05 PROCEDURE — 77080 DXA BONE DENSITY AXIAL: CPT | Mod: TC,HCNC,PO

## 2025-05-05 RX ORDER — OLOPATADINE HYDROCHLORIDE 1 MG/ML
SOLUTION OPHTHALMIC
Qty: 5 ML | Refills: 11 | Status: SHIPPED | OUTPATIENT
Start: 2025-05-05

## 2025-05-07 ENCOUNTER — TELEPHONE (OUTPATIENT)
Dept: FAMILY MEDICINE | Facility: CLINIC | Age: 89
End: 2025-05-07
Payer: MEDICARE

## 2025-05-07 NOTE — TELEPHONE ENCOUNTER
----- Message from David sent at 5/7/2025  1:19 PM CDT -----  Regarding: Carol  Type: Patient Callback Who called: Carol What is the request in detail: Patient stated that she needs to have some more shots. This is in reference to her Osteoporosis. Please reach out to the patient for more information.Can the clinic reply by [facility]? Yes Would the patient rather a call back or a response via [facility]? Callback Best call back number: [phone] Information:

## 2025-05-09 DIAGNOSIS — M81.0 OSTEOPOROSIS, UNSPECIFIED OSTEOPOROSIS TYPE, UNSPECIFIED PATHOLOGICAL FRACTURE PRESENCE: Primary | ICD-10-CM

## 2025-05-13 ENCOUNTER — LAB VISIT (OUTPATIENT)
Dept: LAB | Facility: HOSPITAL | Age: 89
End: 2025-05-13
Payer: MEDICARE

## 2025-05-13 DIAGNOSIS — M81.0 OSTEOPOROSIS, UNSPECIFIED OSTEOPOROSIS TYPE, UNSPECIFIED PATHOLOGICAL FRACTURE PRESENCE: ICD-10-CM

## 2025-05-13 LAB
ANION GAP (OHS): 10 MMOL/L (ref 8–16)
BUN SERPL-MCNC: 18 MG/DL (ref 8–23)
CALCIUM SERPL-MCNC: 8.9 MG/DL (ref 8.7–10.5)
CHLORIDE SERPL-SCNC: 111 MMOL/L (ref 95–110)
CO2 SERPL-SCNC: 22 MMOL/L (ref 23–29)
CREAT SERPL-MCNC: 0.8 MG/DL (ref 0.5–1.4)
GFR SERPLBLD CREATININE-BSD FMLA CKD-EPI: >60 ML/MIN/1.73/M2
GLUCOSE SERPL-MCNC: 99 MG/DL (ref 70–110)
POTASSIUM SERPL-SCNC: 4 MMOL/L (ref 3.5–5.1)
SODIUM SERPL-SCNC: 143 MMOL/L (ref 136–145)

## 2025-05-13 PROCEDURE — 36415 COLL VENOUS BLD VENIPUNCTURE: CPT | Mod: HCNC,PO

## 2025-05-13 PROCEDURE — 82310 ASSAY OF CALCIUM: CPT | Mod: HCNC

## 2025-05-13 PROCEDURE — 82652 VIT D 1 25-DIHYDROXY: CPT | Mod: HCNC

## 2025-05-15 ENCOUNTER — CLINICAL SUPPORT (OUTPATIENT)
Dept: FAMILY MEDICINE | Facility: CLINIC | Age: 89
End: 2025-05-15
Payer: MEDICARE

## 2025-05-15 DIAGNOSIS — M81.0 OSTEOPOROSIS, UNSPECIFIED OSTEOPOROSIS TYPE, UNSPECIFIED PATHOLOGICAL FRACTURE PRESENCE: Primary | ICD-10-CM

## 2025-05-15 NOTE — PROGRESS NOTES
Pt received injection of Prolia 60mg/ml to right posterior arm without difficulty; no adverse reaction noted; medication was billed to pt by [facility] and delivered to office;  LOT#[id number]   NDC 78802-377-52  EXP- [id number]

## 2025-05-16 LAB — W VITAMIN D, 1, 25-DIHYDROXY: 42 PG/ML

## 2025-05-19 ENCOUNTER — RESULTS FOLLOW-UP (OUTPATIENT)
Dept: FAMILY MEDICINE | Facility: CLINIC | Age: 89
End: 2025-05-19

## 2025-06-01 ENCOUNTER — PATIENT MESSAGE (OUTPATIENT)
Dept: ADMINISTRATIVE | Facility: HOSPITAL | Age: 89
End: 2025-06-01
Payer: MEDICARE

## 2025-06-11 ENCOUNTER — TELEPHONE (OUTPATIENT)
Dept: FAMILY MEDICINE | Facility: CLINIC | Age: 89
End: 2025-06-11
Payer: MEDICARE

## 2025-06-11 ENCOUNTER — LAB VISIT (OUTPATIENT)
Dept: LAB | Facility: HOSPITAL | Age: 89
End: 2025-06-11
Payer: MEDICARE

## 2025-06-11 ENCOUNTER — RESULTS FOLLOW-UP (OUTPATIENT)
Dept: FAMILY MEDICINE | Facility: CLINIC | Age: 89
End: 2025-06-11

## 2025-06-11 DIAGNOSIS — R30.0 DYSURIA: Primary | ICD-10-CM

## 2025-06-11 DIAGNOSIS — R30.0 DYSURIA: ICD-10-CM

## 2025-06-11 LAB
BILIRUB UR QL STRIP.AUTO: NEGATIVE
CLARITY UR: CLEAR
COLOR UR AUTO: YELLOW
GLUCOSE UR QL STRIP: NEGATIVE
HGB UR QL STRIP: NEGATIVE
KETONES UR QL STRIP: NEGATIVE
LEUKOCYTE ESTERASE UR QL STRIP: NEGATIVE
NITRITE UR QL STRIP: NEGATIVE
PH UR STRIP: 7 [PH]
PROT UR QL STRIP: NEGATIVE
SP GR UR STRIP: 1.01
UROBILINOGEN UR STRIP-ACNC: NEGATIVE EU/DL

## 2025-06-11 PROCEDURE — 81003 URINALYSIS AUTO W/O SCOPE: CPT | Mod: HCNC

## 2025-06-11 RX ORDER — NITROFURANTOIN 25; 75 MG/1; MG/1
100 CAPSULE ORAL 2 TIMES DAILY
Qty: 14 CAPSULE | Refills: 0 | Status: SHIPPED | OUTPATIENT
Start: 2025-06-11 | End: 2025-06-11 | Stop reason: SDUPTHER

## 2025-06-11 RX ORDER — NITROFURANTOIN 25; 75 MG/1; MG/1
100 CAPSULE ORAL 2 TIMES DAILY
Qty: 14 CAPSULE | Refills: 0 | Status: CANCELLED | OUTPATIENT
Start: 2025-06-11 | End: 2025-06-18

## 2025-06-11 RX ORDER — NITROFURANTOIN 25; 75 MG/1; MG/1
100 CAPSULE ORAL 2 TIMES DAILY
Qty: 14 CAPSULE | Refills: 0 | Status: SHIPPED | OUTPATIENT
Start: 2025-06-11 | End: 2025-06-18

## 2025-06-11 NOTE — TELEPHONE ENCOUNTER
Daughter calling back regarding urine; did you want to send something in until we get the results?

## 2025-06-11 NOTE — TELEPHONE ENCOUNTER
LM advising daughter results not back, specimen was sent to lab and we are waiting on results; antibiotics sent to pharmacy in the meantime

## 2025-06-11 NOTE — TELEPHONE ENCOUNTER
----- Message from Tech Julita sent at 6/11/2025 12:00 PM CDT -----  Regarding: Requesting result  .Type:  Test ResultsWho Called: daughter- CorinneName of Test (Lab/Mammo/Etc): urine sample Date of Test: 06/11/2025Ordering Provider: YAMILEX HendersonWhere the test was performed: [place] Would the patient rather a call back or a response via [facility]? Call Best Call Back Number: [phone] Information:  For Clinical Team:Has the provider reviewed the results?

## 2025-06-11 NOTE — TELEPHONE ENCOUNTER
----- Message from Any sent at 6/11/2025  2:10 PM CDT -----  Regarding: daughter  Who called: daughterWhat is the request in detail: pt had nitrofurantoin, macrocrystal-monohydrate, (MACROBID) 100 MG capsule sent to wrong pharm and needs it to go to pharm belowCan the clinic reply by [facility]? NoWould the patient rather a call back or a response via [facility]? Call [facility] call back number:  [phone] Information: [id number] - [place], [place] - [id number] [id number]: [phone] Fax: [phone] you.

## 2025-07-25 DIAGNOSIS — E11.65 UNCONTROLLED TYPE 2 DIABETES MELLITUS WITH HYPERGLYCEMIA: ICD-10-CM

## 2025-08-04 ENCOUNTER — TELEPHONE (OUTPATIENT)
Dept: PHARMACY | Facility: CLINIC | Age: 89
End: 2025-08-04
Payer: MEDICARE

## 2025-08-04 NOTE — TELEPHONE ENCOUNTER
[facility]/Population Health Chart Review & Patient Outreach Details For Medication Adherence Project    Reason for Outreach Encounter: 3rd Party payor non-compliance report ([employer], [employer], [employer], etc)  2.  Patient Outreach Method: Reviewed patient chart   3.   Medication in question:    Diabetes Medications              metFORMIN (GLUCOPHAGE-XR) 500 MG ER 24hr tablet Daily with meal                 metformin  last filled  7/31 for 16 day supply      4.  Reviewed and or Updates Made To: Patient Chart  5. Outreach Outcomes and/or actions taken: Patient filled medication and is on track to be adherent  Additional Notes:

## 2025-08-11 ENCOUNTER — TELEPHONE (OUTPATIENT)
Dept: FAMILY MEDICINE | Facility: CLINIC | Age: 89
End: 2025-08-11
Payer: MEDICARE

## 2025-08-15 ENCOUNTER — OFFICE VISIT (OUTPATIENT)
Dept: FAMILY MEDICINE | Facility: CLINIC | Age: 89
End: 2025-08-15
Payer: MEDICARE

## 2025-08-15 VITALS
RESPIRATION RATE: 18 BRPM | SYSTOLIC BLOOD PRESSURE: 120 MMHG | HEART RATE: 93 BPM | DIASTOLIC BLOOD PRESSURE: 68 MMHG | OXYGEN SATURATION: 97 % | BODY MASS INDEX: 21.91 KG/M2 | HEIGHT: 62 IN | WEIGHT: 119.06 LBS

## 2025-08-15 DIAGNOSIS — R53.81 PHYSICAL DECONDITIONING: ICD-10-CM

## 2025-08-15 DIAGNOSIS — I69.354 HEMIPARESIS AFFECTING LEFT SIDE AS LATE EFFECT OF STROKE: ICD-10-CM

## 2025-08-15 DIAGNOSIS — Z78.9 IMPAIRED MOBILITY AND ADLS: ICD-10-CM

## 2025-08-15 DIAGNOSIS — E11.9 TYPE 2 DIABETES MELLITUS WITHOUT COMPLICATION, WITHOUT LONG-TERM CURRENT USE OF INSULIN: Primary | ICD-10-CM

## 2025-08-15 DIAGNOSIS — I10 HYPERTENSION, WELL CONTROLLED: ICD-10-CM

## 2025-08-15 DIAGNOSIS — Z74.09 IMPAIRED MOBILITY AND ADLS: ICD-10-CM

## 2025-08-15 PROCEDURE — 99999 PR PBB SHADOW E&M-EST. PATIENT-LVL V: CPT | Mod: PBBFAC,HCNC,, | Performed by: INTERNAL MEDICINE

## 2025-08-18 ENCOUNTER — TELEPHONE (OUTPATIENT)
Dept: FAMILY MEDICINE | Facility: CLINIC | Age: 89
End: 2025-08-18
Payer: MEDICARE

## 2025-08-21 ENCOUNTER — RESEARCH ENCOUNTER (OUTPATIENT)
Dept: ENDOCRINOLOGY | Facility: HOSPITAL | Age: 89
End: 2025-08-21
Payer: MEDICARE

## 2025-08-26 ENCOUNTER — TELEPHONE (OUTPATIENT)
Dept: FAMILY MEDICINE | Facility: CLINIC | Age: 89
End: 2025-08-26
Payer: MEDICARE

## (undated) DEVICE — SEE MEDLINE ITEM 153151

## (undated) DEVICE — SUT VICRYL 2-0 8-18 CP-2

## (undated) DEVICE — DRESSING XEROFORM FOIL PK 1X8

## (undated) DEVICE — DRESSING AQUACEL AG 3.5X10IN

## (undated) DEVICE — UNDERGLOVES BIOGEL PI SIZE 8

## (undated) DEVICE — SOL 9P NACL IRR PIC IL

## (undated) DEVICE — GLOVE BIOGEL SKINSENSE PI 7.5

## (undated) DEVICE — PAD CAST SPECIALIST STRL 6

## (undated) DEVICE — GLOVE BIOGEL SKINSENSE PI 7.0

## (undated) DEVICE — SYR 30CC LUER LOCK

## (undated) DEVICE — ELECTRODE REM PLYHSV RETURN 9

## (undated) DEVICE — SPONGE COTTON TRAY 4X4IN

## (undated) DEVICE — STAPLER SKIN ROTATING HEAD

## (undated) DEVICE — APPLICATOR CHLORAPREP ORN 26ML

## (undated) DEVICE — SEE MEDLINE ITEM 146231

## (undated) DEVICE — GLOVE BIOGEL SKINSENSE PI 8.5

## (undated) DEVICE — SUT VICRYL PLUS ANTIBACT

## (undated) DEVICE — SEE MEDLINE ITEM 157148

## (undated) DEVICE — NDL 18GA X1 1/2 REG BEVEL

## (undated) DEVICE — UNDERGLOVES BIOGEL PI SZ 7 LF

## (undated) DEVICE — CLOSURE SKIN STERI STRIP 1/2X4

## (undated) DEVICE — BLANKET MAXI-THERM PED 25X33IN

## (undated) DEVICE — Device

## (undated) DEVICE — UNDERGLOVES BIOGEL PI SIZE 8.5

## (undated) DEVICE — DRESSING TRANS 4X4 TEGADERM

## (undated) DEVICE — COVER BACK TABLE 72X21

## (undated) DEVICE — TOWEL OR NONABSORB ADH 17X26

## (undated) DEVICE — DRAPE C ARM 42 X 120 10/BX